# Patient Record
Sex: FEMALE | Race: WHITE | Employment: OTHER | URBAN - METROPOLITAN AREA
[De-identification: names, ages, dates, MRNs, and addresses within clinical notes are randomized per-mention and may not be internally consistent; named-entity substitution may affect disease eponyms.]

---

## 2018-03-12 RX ORDER — GABAPENTIN 100 MG/1
100 CAPSULE ORAL DAILY
COMMUNITY
End: 2018-09-17 | Stop reason: DRUGHIGH

## 2018-03-12 RX ORDER — CLOPIDOGREL BISULFATE 75 MG/1
75 TABLET ORAL DAILY
COMMUNITY
End: 2018-03-19 | Stop reason: ALTCHOICE

## 2018-03-12 RX ORDER — SERTRALINE HYDROCHLORIDE 100 MG/1
100 TABLET, FILM COATED ORAL DAILY
COMMUNITY
End: 2018-09-17 | Stop reason: ALTCHOICE

## 2018-03-12 RX ORDER — ROSUVASTATIN CALCIUM 40 MG/1
40 TABLET, COATED ORAL EVERY EVENING
COMMUNITY
End: 2021-03-03 | Stop reason: SDUPTHER

## 2018-03-19 ENCOUNTER — OFFICE VISIT (OUTPATIENT)
Dept: CARDIOLOGY CLINIC | Age: 63
End: 2018-03-19
Payer: MEDICARE

## 2018-03-19 VITALS
HEIGHT: 64 IN | BODY MASS INDEX: 33.97 KG/M2 | DIASTOLIC BLOOD PRESSURE: 60 MMHG | SYSTOLIC BLOOD PRESSURE: 120 MMHG | WEIGHT: 199 LBS | HEART RATE: 72 BPM

## 2018-03-19 DIAGNOSIS — E78.5 DYSLIPIDEMIA: ICD-10-CM

## 2018-03-19 DIAGNOSIS — I10 ESSENTIAL HYPERTENSION: Primary | ICD-10-CM

## 2018-03-19 DIAGNOSIS — I34.0 NON-RHEUMATIC MITRAL REGURGITATION: ICD-10-CM

## 2018-03-19 DIAGNOSIS — J41.0 SIMPLE CHRONIC BRONCHITIS (HCC): ICD-10-CM

## 2018-03-19 DIAGNOSIS — E66.09 CLASS 1 OBESITY DUE TO EXCESS CALORIES WITHOUT SERIOUS COMORBIDITY IN ADULT, UNSPECIFIED BMI: ICD-10-CM

## 2018-03-19 DIAGNOSIS — Z72.0 TOBACCO USE: ICD-10-CM

## 2018-03-19 DIAGNOSIS — R06.09 DOE (DYSPNEA ON EXERTION): ICD-10-CM

## 2018-03-19 DIAGNOSIS — I42.9 CARDIOMYOPATHY, UNSPECIFIED TYPE (HCC): ICD-10-CM

## 2018-03-19 DIAGNOSIS — I11.9 HYPERTENSIVE LEFT VENTRICULAR HYPERTROPHY, WITHOUT HEART FAILURE: ICD-10-CM

## 2018-03-19 PROCEDURE — G8417 CALC BMI ABV UP PARAM F/U: HCPCS | Performed by: INTERNAL MEDICINE

## 2018-03-19 PROCEDURE — 4004F PT TOBACCO SCREEN RCVD TLK: CPT | Performed by: INTERNAL MEDICINE

## 2018-03-19 PROCEDURE — 3014F SCREEN MAMMO DOC REV: CPT | Performed by: INTERNAL MEDICINE

## 2018-03-19 PROCEDURE — 99204 OFFICE O/P NEW MOD 45 MIN: CPT | Performed by: INTERNAL MEDICINE

## 2018-03-19 PROCEDURE — 3017F COLORECTAL CA SCREEN DOC REV: CPT | Performed by: INTERNAL MEDICINE

## 2018-03-19 PROCEDURE — G8926 SPIRO NO PERF OR DOC: HCPCS | Performed by: INTERNAL MEDICINE

## 2018-03-19 PROCEDURE — 3023F SPIROM DOC REV: CPT | Performed by: INTERNAL MEDICINE

## 2018-03-19 PROCEDURE — G8484 FLU IMMUNIZE NO ADMIN: HCPCS | Performed by: INTERNAL MEDICINE

## 2018-03-19 PROCEDURE — 3046F HEMOGLOBIN A1C LEVEL >9.0%: CPT | Performed by: INTERNAL MEDICINE

## 2018-03-19 PROCEDURE — 93000 ELECTROCARDIOGRAM COMPLETE: CPT | Performed by: INTERNAL MEDICINE

## 2018-03-19 PROCEDURE — G8427 DOCREV CUR MEDS BY ELIG CLIN: HCPCS | Performed by: INTERNAL MEDICINE

## 2018-03-19 RX ORDER — METOPROLOL SUCCINATE 25 MG/1
TABLET, EXTENDED RELEASE ORAL
Qty: 90 TABLET | Refills: 3 | Status: SHIPPED | OUTPATIENT
Start: 2018-03-19 | End: 2021-03-03 | Stop reason: SDUPTHER

## 2018-03-19 RX ORDER — METOPROLOL SUCCINATE 25 MG/1
TABLET, EXTENDED RELEASE ORAL
Refills: 0 | COMMUNITY
Start: 2018-03-09 | End: 2018-03-19 | Stop reason: SDUPTHER

## 2018-03-19 RX ORDER — ASPIRIN 81 MG/1
81 TABLET ORAL DAILY
Qty: 30 TABLET | Refills: 11
Start: 2018-03-19 | End: 2021-03-03 | Stop reason: SDUPTHER

## 2018-04-17 ENCOUNTER — HOSPITAL ENCOUNTER (OUTPATIENT)
Dept: CARDIOLOGY | Age: 63
Discharge: HOME | End: 2018-04-17
Payer: MEDICARE

## 2018-04-17 DIAGNOSIS — I11.9 HYPERTENSIVE LEFT VENTRICULAR HYPERTROPHY, WITHOUT HEART FAILURE: ICD-10-CM

## 2018-04-17 DIAGNOSIS — I10 ESSENTIAL HYPERTENSION: ICD-10-CM

## 2018-04-17 DIAGNOSIS — I34.0 NON-RHEUMATIC MITRAL REGURGITATION: ICD-10-CM

## 2018-04-17 DIAGNOSIS — R06.09 DOE (DYSPNEA ON EXERTION): ICD-10-CM

## 2018-04-17 LAB
LEFT VENTRICULAR EJECTION FRACTION HIGH VALUE: 60 %
LEFT VENTRICULAR EJECTION FRACTION MODE: NORMAL
LV EF: 55 %
LV EF: 58 %
LVEF MODALITY: NORMAL

## 2018-04-17 PROCEDURE — 93306 TTE W/DOPPLER COMPLETE: CPT

## 2018-04-18 ENCOUNTER — TELEPHONE (OUTPATIENT)
Dept: CARDIOLOGY CLINIC | Age: 63
End: 2018-04-18

## 2018-05-21 ENCOUNTER — HOSPITAL ENCOUNTER (OUTPATIENT)
Dept: GENERAL RADIOLOGY | Age: 63
Discharge: HOME | End: 2018-05-23
Payer: MEDICARE

## 2018-05-21 ENCOUNTER — HOSPITAL ENCOUNTER (OUTPATIENT)
Age: 63
Discharge: HOME | End: 2018-05-23
Payer: MEDICARE

## 2018-05-21 DIAGNOSIS — R07.81 RIB PAIN ON RIGHT SIDE: ICD-10-CM

## 2018-05-21 PROCEDURE — 71101 X-RAY EXAM UNILAT RIBS/CHEST: CPT

## 2018-09-17 ENCOUNTER — OFFICE VISIT (OUTPATIENT)
Dept: CARDIOLOGY CLINIC | Age: 63
End: 2018-09-17
Payer: MEDICARE

## 2018-09-17 VITALS
HEART RATE: 75 BPM | WEIGHT: 197 LBS | SYSTOLIC BLOOD PRESSURE: 134 MMHG | DIASTOLIC BLOOD PRESSURE: 62 MMHG | HEIGHT: 64 IN | BODY MASS INDEX: 33.63 KG/M2

## 2018-09-17 DIAGNOSIS — I34.0 NON-RHEUMATIC MITRAL REGURGITATION: ICD-10-CM

## 2018-09-17 DIAGNOSIS — Z72.0 TOBACCO USE: ICD-10-CM

## 2018-09-17 DIAGNOSIS — E66.9 NON MORBID OBESITY: ICD-10-CM

## 2018-09-17 DIAGNOSIS — I42.8 OTHER CARDIOMYOPATHY (HCC): ICD-10-CM

## 2018-09-17 DIAGNOSIS — E78.5 DYSLIPIDEMIA: ICD-10-CM

## 2018-09-17 DIAGNOSIS — I10 ESSENTIAL HYPERTENSION: Primary | ICD-10-CM

## 2018-09-17 PROBLEM — I42.9 MYOCARDIOPATHY (HCC): Status: ACTIVE | Noted: 2018-09-17

## 2018-09-17 PROCEDURE — 99214 OFFICE O/P EST MOD 30 MIN: CPT | Performed by: INTERNAL MEDICINE

## 2018-09-17 PROCEDURE — 4004F PT TOBACCO SCREEN RCVD TLK: CPT | Performed by: INTERNAL MEDICINE

## 2018-09-17 PROCEDURE — G8417 CALC BMI ABV UP PARAM F/U: HCPCS | Performed by: INTERNAL MEDICINE

## 2018-09-17 PROCEDURE — 93000 ELECTROCARDIOGRAM COMPLETE: CPT | Performed by: INTERNAL MEDICINE

## 2018-09-17 PROCEDURE — G8427 DOCREV CUR MEDS BY ELIG CLIN: HCPCS | Performed by: INTERNAL MEDICINE

## 2018-09-17 PROCEDURE — 3017F COLORECTAL CA SCREEN DOC REV: CPT | Performed by: INTERNAL MEDICINE

## 2018-09-17 RX ORDER — GABAPENTIN 300 MG/1
300 CAPSULE ORAL 3 TIMES DAILY
Status: ON HOLD | COMMUNITY
Start: 2018-09-12 | End: 2021-09-12 | Stop reason: HOSPADM

## 2018-09-17 RX ORDER — LISINOPRIL 2.5 MG/1
2.5 TABLET ORAL DAILY
Qty: 90 TABLET | Refills: 3 | Status: SHIPPED | OUTPATIENT
Start: 2018-09-17 | End: 2021-08-24 | Stop reason: DRUGHIGH

## 2018-09-17 RX ORDER — CETIRIZINE HYDROCHLORIDE 10 MG/1
10 TABLET ORAL DAILY
COMMUNITY

## 2018-09-17 RX ORDER — VARENICLINE TARTRATE
KIT
COMMUNITY
Start: 2018-09-12 | End: 2018-09-17 | Stop reason: DRUGHIGH

## 2018-09-17 RX ORDER — VARENICLINE TARTRATE 1 MG/1
TABLET, FILM COATED ORAL 2 TIMES DAILY
COMMUNITY
Start: 2018-09-12 | End: 2019-12-09

## 2018-09-17 RX ORDER — IPRATROPIUM/ALBUTEROL SULFATE 20-100 MCG
1 MIST INHALER (GRAM) INHALATION EVERY 6 HOURS PRN
Status: ON HOLD | COMMUNITY
Start: 2018-09-12 | End: 2021-09-12 | Stop reason: HOSPADM

## 2018-09-17 RX ORDER — VENLAFAXINE HYDROCHLORIDE 150 MG/1
150 TABLET, EXTENDED RELEASE ORAL
COMMUNITY
Start: 2018-09-12

## 2018-09-17 NOTE — PROGRESS NOTES
Hyperlipidemia     Hypertension             Past Surgical History:   Procedure Laterality Date    CHOLECYSTECTOMY      HYSTERECTOMY      HYSTERECTOMY, VAGINAL            Family History   Problem Relation Age of Onset    COPD Mother           Social History   Substance Use Topics    Smoking status: Current Some Day Smoker     Packs/day: 0.25    Smokeless tobacco: Never Used      Comment: 5-6 cigarettes per day    Alcohol use Yes      Comment: Wine daily          Review of Systems:  HEENT: negative for acute visual symptoms or auditory problems, no dysphagia  Constitutional: negative for fever and chills, or significant weight loss  Respiratory: negative for cough, wheezing, or hemoptysis  Cardiovascular: negative for chest pain, palpitations, and dyspnea  Gastrointestinal: negative for abdominal pain, diarrhea, nausea and vomiting  Endocrine: Negative for polyuria and polydyspsia  Genitourinary:negative for dysuria and hematuria  Derm: negative for rash and skin lesion(s)  Neurological: negative for tingling, numbness, weakness, seizures and tremors  Endocrine: negative for polydipsia and polyuria  Musculoskeletal: negative for pain or tenderness  Psychiatric: negative for anxiety, depression, or suicidal ideations         O:  COMPLETE PHYSICAL EXAM:       /62   Pulse 75   Ht 5' 4\" (1.626 m)   Wt 197 lb (89.4 kg)   BMI 33.81 kg/m²       General:   Patient alert, comfortable, no distress. Appears stated age. HEENT:    Pupils equal, no icterus, no nasal drainage, tongue moist.   Neck:              No masses, Thyroid not palpable. Chest:   Normal configuration, non tender. Lungs:   Clear to auscultation bilaterally, few scattered rhonchi. Cardiovascular:  Regular rhythm, 1/6 systolic murmur, No S3, no palpable thrills, No elevated JVD, No carotid bruit. Abdomen:  Soft, Non tender, Bowel sounds normal, no pulsatile abdominal aorta, no palpable masses. Extremities:  No edema.  Distal pulses palpable. No cyanosis, no clubbing. Skin:   Good turgor, warm and dry, no cyanosis. Musculoskeletal: No joint swelling or deformity. Neuro:   Cranial nerves grossly intact; No focal neurologic deficit. Psych:   Alert, good mood and effect. REVIEW OF DIAGNOSTIC TESTS:        Electrocardiogram: Reviewed              A/P:   ASSESSMENT / PLAN:    Carlyn Medrano was seen today for hypertension. Diagnoses and all orders for this visit:    Essential hypertension - Stable  -     EKG 12 Lead    Other cardiomyopathy (HCC) - EF 45%, no acute CHF; On BB; Resume Lisinopril 2.5mg ( Was on Lisinopril 20mg last visit)    Non-rheumatic mitral regurgitation - Mild, Stable    Dyslipidemia - On Statins    Tobacco use - Counseled to quit smoking    Non morbid obesity - Diet, exercise and weight loss discussed. Preventive Cardiology: Low cholesterol diet, regular exercise as tolerate, and gradual weight loss discussed. -  Resume Lisinopril 2.5mg ONCE daily     Monitor BP and heart rates. All questions answered about cardiac diagnoses and cardiac medications. Continue current medications. Compliance with medications and f/u with all physicians discussed. Risk factor modification based on risk profile discussed. Call if any exertional chest pain, short of breath, dizzy or palpitations   Follow up in 9 months or earlier if needed.          [facility]  [address], [place], [address] [facility]  [phone]

## 2018-11-24 ENCOUNTER — HOSPITAL ENCOUNTER (INPATIENT)
Age: 63
LOS: 2 days | Discharge: HOME | DRG: 312 | End: 2018-11-26
Attending: EMERGENCY MEDICINE | Admitting: INTERNAL MEDICINE
Payer: MEDICARE

## 2018-11-24 ENCOUNTER — APPOINTMENT (OUTPATIENT)
Dept: GENERAL RADIOLOGY | Age: 63
DRG: 312 | End: 2018-11-24
Payer: MEDICARE

## 2018-11-24 ENCOUNTER — APPOINTMENT (OUTPATIENT)
Dept: CT IMAGING | Age: 63
DRG: 312 | End: 2018-11-24
Payer: MEDICARE

## 2018-11-24 DIAGNOSIS — R10.84 GENERALIZED ABDOMINAL PAIN: ICD-10-CM

## 2018-11-24 DIAGNOSIS — R55 NEAR SYNCOPE: Primary | ICD-10-CM

## 2018-11-24 DIAGNOSIS — R19.7 DIARRHEA, UNSPECIFIED TYPE: ICD-10-CM

## 2018-11-24 PROBLEM — R10.9 ABDOMINAL PAIN: Status: ACTIVE | Noted: 2018-11-24

## 2018-11-24 LAB
ALBUMIN SERPL-MCNC: 4.8 G/DL (ref 3.5–5.2)
ALP BLD-CCNC: 96 U/L (ref 35–104)
ALT SERPL-CCNC: 31 U/L (ref 0–32)
ANION GAP SERPL CALCULATED.3IONS-SCNC: 15 MMOL/L (ref 7–16)
AST SERPL-CCNC: 39 U/L (ref 0–31)
BACTERIA: ABNORMAL /HPF
BASOPHILS ABSOLUTE: 0.17 E9/L (ref 0–0.2)
BASOPHILS RELATIVE PERCENT: 0.9 % (ref 0–2)
BILIRUB SERPL-MCNC: 0.4 MG/DL (ref 0–1.2)
BILIRUBIN URINE: NEGATIVE
BLOOD, URINE: NEGATIVE
BUN BLDV-MCNC: 26 MG/DL (ref 8–23)
CALCIUM SERPL-MCNC: 10.7 MG/DL (ref 8.6–10.2)
CHLORIDE BLD-SCNC: 97 MMOL/L (ref 98–107)
CHP ED QC CHECK: NORMAL
CHP ED QC CHECK: NORMAL
CLARITY: CLEAR
CO2: 31 MMOL/L (ref 22–29)
COLOR: YELLOW
CREAT SERPL-MCNC: 1.5 MG/DL (ref 0.5–1)
CRYSTALS, UA: ABNORMAL
EOSINOPHILS ABSOLUTE: 0 E9/L (ref 0.05–0.5)
EOSINOPHILS RELATIVE PERCENT: 0.6 % (ref 0–6)
EPITHELIAL CELLS, UA: ABNORMAL /HPF
GFR AFRICAN AMERICAN: 42
GFR NON-AFRICAN AMERICAN: 35 ML/MIN/1.73
GLUCOSE BLD-MCNC: 285 MG/DL
GLUCOSE BLD-MCNC: 60 MG/DL (ref 74–99)
GLUCOSE BLD-MCNC: 68 MG/DL
GLUCOSE URINE: NEGATIVE MG/DL
HCT VFR BLD CALC: 46.2 % (ref 34–48)
HEMOGLOBIN: 15.3 G/DL (ref 11.5–15.5)
KETONES, URINE: 15 MG/DL
LACTIC ACID: 2.3 MMOL/L (ref 0.5–2.2)
LEUKOCYTE ESTERASE, URINE: NEGATIVE
LIPASE: 44 U/L (ref 13–60)
LYMPHOCYTES ABSOLUTE: 1.92 E9/L (ref 1.5–4)
LYMPHOCYTES RELATIVE PERCENT: 10.3 % (ref 20–42)
MCH RBC QN AUTO: 31.2 PG (ref 26–35)
MCHC RBC AUTO-ENTMCNC: 33.1 % (ref 32–34.5)
MCV RBC AUTO: 94.1 FL (ref 80–99.9)
METER GLUCOSE: 285 MG/DL (ref 74–99)
MONOCYTES ABSOLUTE: 1.15 E9/L (ref 0.1–0.95)
MONOCYTES RELATIVE PERCENT: 6 % (ref 2–12)
NEUTROPHILS ABSOLUTE: 15.94 E9/L (ref 1.8–7.3)
NEUTROPHILS RELATIVE PERCENT: 82.8 % (ref 43–80)
NITRITE, URINE: NEGATIVE
PDW BLD-RTO: 11.4 FL (ref 11.5–15)
PH UA: 5 (ref 5–9)
PLATELET # BLD: 283 E9/L (ref 130–450)
PMV BLD AUTO: 8.8 FL (ref 7–12)
POTASSIUM REFLEX MAGNESIUM: 4.5 MMOL/L (ref 3.5–5)
PROTEIN UA: 100 MG/DL
RBC # BLD: 4.91 E12/L (ref 3.5–5.5)
RBC UA: ABNORMAL /HPF (ref 0–2)
SODIUM BLD-SCNC: 143 MMOL/L (ref 132–146)
SPECIFIC GRAVITY UA: >=1.03 (ref 1–1.03)
TOTAL PROTEIN: 7.8 G/DL (ref 6.4–8.3)
TROPONIN: <0.01 NG/ML (ref 0–0.03)
UROBILINOGEN, URINE: 1 E.U./DL
WBC # BLD: 19.2 E9/L (ref 4.5–11.5)
WBC UA: ABNORMAL /HPF (ref 0–5)

## 2018-11-24 PROCEDURE — 81001 URINALYSIS AUTO W/SCOPE: CPT

## 2018-11-24 PROCEDURE — 99285 EMERGENCY DEPT VISIT HI MDM: CPT

## 2018-11-24 PROCEDURE — 83690 ASSAY OF LIPASE: CPT

## 2018-11-24 PROCEDURE — 74176 CT ABD & PELVIS W/O CONTRAST: CPT

## 2018-11-24 PROCEDURE — 2700000000 HC OXYGEN THERAPY PER DAY

## 2018-11-24 PROCEDURE — 36415 COLL VENOUS BLD VENIPUNCTURE: CPT

## 2018-11-24 PROCEDURE — 80053 COMPREHEN METABOLIC PANEL: CPT

## 2018-11-24 PROCEDURE — 83605 ASSAY OF LACTIC ACID: CPT

## 2018-11-24 PROCEDURE — 96374 THER/PROPH/DIAG INJ IV PUSH: CPT

## 2018-11-24 PROCEDURE — 93005 ELECTROCARDIOGRAM TRACING: CPT | Performed by: STUDENT IN AN ORGANIZED HEALTH CARE EDUCATION/TRAINING PROGRAM

## 2018-11-24 PROCEDURE — 84484 ASSAY OF TROPONIN QUANT: CPT

## 2018-11-24 PROCEDURE — 71045 X-RAY EXAM CHEST 1 VIEW: CPT

## 2018-11-24 PROCEDURE — 82962 GLUCOSE BLOOD TEST: CPT

## 2018-11-24 PROCEDURE — 6370000000 HC RX 637 (ALT 250 FOR IP): Performed by: EMERGENCY MEDICINE

## 2018-11-24 PROCEDURE — 85025 COMPLETE CBC W/AUTO DIFF WBC: CPT

## 2018-11-24 PROCEDURE — 2580000003 HC RX 258: Performed by: STUDENT IN AN ORGANIZED HEALTH CARE EDUCATION/TRAINING PROGRAM

## 2018-11-24 PROCEDURE — 1200000000 HC SEMI PRIVATE

## 2018-11-24 RX ORDER — DEXTROSE MONOHYDRATE 25 G/50ML
25 INJECTION, SOLUTION INTRAVENOUS ONCE
Status: COMPLETED | OUTPATIENT
Start: 2018-11-24 | End: 2018-11-24

## 2018-11-24 RX ORDER — SODIUM CHLORIDE 0.9 % (FLUSH) 0.9 %
10 SYRINGE (ML) INJECTION PRN
Status: DISCONTINUED | OUTPATIENT
Start: 2018-11-24 | End: 2018-11-26 | Stop reason: HOSPADM

## 2018-11-24 RX ORDER — ACETAMINOPHEN 325 MG/1
650 TABLET ORAL EVERY 4 HOURS PRN
Status: DISCONTINUED | OUTPATIENT
Start: 2018-11-24 | End: 2018-11-26 | Stop reason: HOSPADM

## 2018-11-24 RX ORDER — SODIUM CHLORIDE 0.9 % (FLUSH) 0.9 %
10 SYRINGE (ML) INJECTION EVERY 12 HOURS SCHEDULED
Status: DISCONTINUED | OUTPATIENT
Start: 2018-11-24 | End: 2018-11-26 | Stop reason: HOSPADM

## 2018-11-24 RX ORDER — DIPHENOXYLATE HYDROCHLORIDE AND ATROPINE SULFATE 2.5; .025 MG/1; MG/1
1 TABLET ORAL ONCE
Status: COMPLETED | OUTPATIENT
Start: 2018-11-24 | End: 2018-11-24

## 2018-11-24 RX ORDER — MULTIVITAMIN/IRON/FOLIC ACID 18MG-0.4MG
1 TABLET ORAL DAILY
COMMUNITY

## 2018-11-24 RX ADMIN — SODIUM CHLORIDE 1000 ML: 9 INJECTION, SOLUTION INTRAVENOUS at 19:09

## 2018-11-24 RX ADMIN — DIPHENOXYLATE HYDROCHLORIDE AND ATROPINE SULFATE 1 TABLET: 2.5; .025 TABLET ORAL at 22:27

## 2018-11-24 RX ADMIN — DEXTROSE MONOHYDRATE 25 G: 25 INJECTION, SOLUTION INTRAVENOUS at 19:09

## 2018-11-24 ASSESSMENT — ENCOUNTER SYMPTOMS
SHORTNESS OF BREATH: 1
COUGH: 0
ABDOMINAL PAIN: 1
CONSTIPATION: 0
DIARRHEA: 1
VOMITING: 0
ABDOMINAL DISTENTION: 0
ANAL BLEEDING: 0
EYE REDNESS: 0
NAUSEA: 0
VISUAL CHANGE: 0
HEMATOCHEZIA: 0

## 2018-11-24 ASSESSMENT — PAIN DESCRIPTION - FREQUENCY
FREQUENCY: INTERMITTENT
FREQUENCY: INTERMITTENT

## 2018-11-24 ASSESSMENT — PAIN DESCRIPTION - PAIN TYPE
TYPE: ACUTE PAIN
TYPE: ACUTE PAIN

## 2018-11-24 ASSESSMENT — PAIN DESCRIPTION - DESCRIPTORS
DESCRIPTORS: ACHING;DISCOMFORT
DESCRIPTORS: CRAMPING

## 2018-11-24 ASSESSMENT — PAIN DESCRIPTION - LOCATION
LOCATION: ABDOMEN
LOCATION: ABDOMEN

## 2018-11-24 ASSESSMENT — PAIN SCALES - GENERAL
PAINLEVEL_OUTOF10: 6
PAINLEVEL_OUTOF10: 4

## 2018-11-24 ASSESSMENT — PAIN DESCRIPTION - ORIENTATION
ORIENTATION: MID;UPPER
ORIENTATION: MID

## 2018-11-24 ASSESSMENT — PAIN DESCRIPTION - ONSET
ONSET: ON-GOING
ONSET: ON-GOING

## 2018-11-24 NOTE — ED PROVIDER NOTES
abdominal distention, anal bleeding, anorexia, constipation, dysphagia, hematochezia, melena, nausea and vomiting. Endocrine: Negative for polyuria. Genitourinary: Negative for dysuria, frequency and urgency. Musculoskeletal: Negative for arthralgias, falls and myalgias. Allergic/Immunologic: Negative for immunocompromised state. Neurological: Positive for light-headedness. Negative for dizziness, seizures, loss of consciousness, weakness and headaches. Hematological: Does not bruise/bleed easily. Psychiatric/Behavioral: Negative. Physical Exam   Constitutional: She is oriented to person, place, and time. She appears well-developed and well-nourished. She appears listless. She is active and cooperative. She has a sickly appearance. She appears ill. HENT:   Head: Normocephalic and atraumatic. Right Ear: Hearing and external ear normal.   Left Ear: Hearing and external ear normal.   Nose: Nose normal.   Mouth/Throat: Uvula is midline, oropharynx is clear and moist and mucous membranes are normal.   Eyes: Conjunctivae are normal. Lids are everted and swept, no foreign bodies found. Neck: Trachea normal, normal range of motion, full passive range of motion without pain and phonation normal. Neck supple. Cardiovascular: Normal rate, regular rhythm and normal heart sounds. Pulmonary/Chest: Effort normal.   Abdominal: Normal appearance and bowel sounds are normal. There is generalized tenderness. Neurological: She is oriented to person, place, and time. She appears listless. GCS eye subscore is 4. GCS verbal subscore is 5. GCS motor subscore is 6. Skin: Skin is warm, dry and intact. Psychiatric: She has a normal mood and affect.  Her speech is normal and behavior is normal. Judgment and thought content normal. Cognition and memory are normal.       Procedures    MDM  Number of Diagnoses or Management Options  Diarrhea, unspecified type:   Generalized abdominal pain:   Near syncope: Diagnosis management comments: Patient's lab work showed lactic acidosis, other white count, and hypoglycemia. ED Course as of Nov 24 2000   Sat Nov 24, 2018 1909 CT called and informed me that due to patient's apparent GORDON, she cannot have contrast also put in a waiver. I do not one damage patient's kidneys more therefore I have changed the order to noncontrast CT of her abdomen and pelvis. [DS]      ED Course User Index  [DS] Vikash Ayers DO       Labs      Radiology      EKG Interpretation. EKG: This EKG is signed and interpreted by me. Rate: 70  Rhythm: Sinus  Interpretation: no acute changes  Comparison: Was normal     ED Course as of Nov 25 1520   Sat Nov 24, 2018 1909 CT called and informed me that due to patient's apparent GORDON, she cannot have contrast also put in a waiver. I do not one damage patient's kidneys more therefore I have changed the order to noncontrast CT of her abdomen and pelvis. [DS]      ED Course User Index  [DS] Taran Soni DO       --------------------------------------------- PAST HISTORY ---------------------------------------------  Past Medical History:  has a past medical history of Arthritis; Asthma; Cerebral artery occlusion with cerebral infarction [facility]); COPD (chronic obstructive pulmonary disease) ([facility] Diabetes mellitus ([facility] Hyperlipidemia; and Hypertension. Past Surgical History:  has a past surgical history that includes Hysterectomy; Cholecystectomy; and Hysterectomy, vaginal.    Social History:  reports that she has been smoking. She has been smoking about 0.25 packs per day. She has never used smokeless tobacco. She reports that she drinks alcohol. She reports that she does not use drugs. Family History: family history includes COPD in her mother. The patients home medications have been reviewed.     Allergies: Pcn [penicillins]    -------------------------------------------------- RESULTS

## 2018-11-25 ENCOUNTER — APPOINTMENT (OUTPATIENT)
Dept: ULTRASOUND IMAGING | Age: 63
DRG: 312 | End: 2018-11-25
Payer: MEDICARE

## 2018-11-25 LAB
C DIFFICILE TOXIN, EIA: NORMAL
EKG ATRIAL RATE: 70 BPM
EKG P AXIS: 64 DEGREES
EKG P-R INTERVAL: 156 MS
EKG Q-T INTERVAL: 396 MS
EKG QRS DURATION: 72 MS
EKG QTC CALCULATION (BAZETT): 427 MS
EKG R AXIS: -3 DEGREES
EKG T AXIS: 85 DEGREES
EKG VENTRICULAR RATE: 70 BPM
FILM ARRAY ADENOVIRUS: NORMAL
FILM ARRAY BORDETELLA PERTUSSIS: NORMAL
FILM ARRAY CHLAMYDOPHILIA PNEUMONIAE: NORMAL
FILM ARRAY CORONAVIRUS 229E: NORMAL
FILM ARRAY CORONAVIRUS HKU1: NORMAL
FILM ARRAY CORONAVIRUS NL63: NORMAL
FILM ARRAY CORONAVIRUS OC43: NORMAL
FILM ARRAY INFLUENZA A VIRUS 09H1: NORMAL
FILM ARRAY INFLUENZA A VIRUS H1: NORMAL
FILM ARRAY INFLUENZA A VIRUS H3: NORMAL
FILM ARRAY INFLUENZA A VIRUS: NORMAL
FILM ARRAY INFLUENZA B: NORMAL
FILM ARRAY METAPNEUMOVIRUS: NORMAL
FILM ARRAY MYCOPLASMA PNEUMONIAE: NORMAL
FILM ARRAY PARAINFLUENZA VIRUS 1: NORMAL
FILM ARRAY PARAINFLUENZA VIRUS 2: NORMAL
FILM ARRAY PARAINFLUENZA VIRUS 3: NORMAL
FILM ARRAY PARAINFLUENZA VIRUS 4: NORMAL
FILM ARRAY RESPIRATORY SYNCITIAL VIRUS: NORMAL
FILM ARRAY RHINOVIRUS/ENTEROVIRUS: NORMAL
METER GLUCOSE: 213 MG/DL (ref 74–99)
METER GLUCOSE: 224 MG/DL (ref 74–99)
METER GLUCOSE: 290 MG/DL (ref 74–99)
TROPONIN: <0.01 NG/ML (ref 0–0.03)
TROPONIN: <0.01 NG/ML (ref 0–0.03)

## 2018-11-25 PROCEDURE — 87581 M.PNEUMON DNA AMP PROBE: CPT

## 2018-11-25 PROCEDURE — 94761 N-INVAS EAR/PLS OXIMETRY MLT: CPT

## 2018-11-25 PROCEDURE — 36415 COLL VENOUS BLD VENIPUNCTURE: CPT

## 2018-11-25 PROCEDURE — 87798 DETECT AGENT NOS DNA AMP: CPT

## 2018-11-25 PROCEDURE — 93880 EXTRACRANIAL BILAT STUDY: CPT

## 2018-11-25 PROCEDURE — 2580000003 HC RX 258: Performed by: FAMILY MEDICINE

## 2018-11-25 PROCEDURE — 87324 CLOSTRIDIUM AG IA: CPT

## 2018-11-25 PROCEDURE — 6370000000 HC RX 637 (ALT 250 FOR IP): Performed by: INTERNAL MEDICINE

## 2018-11-25 PROCEDURE — 2700000000 HC OXYGEN THERAPY PER DAY

## 2018-11-25 PROCEDURE — 82962 GLUCOSE BLOOD TEST: CPT

## 2018-11-25 PROCEDURE — 87486 CHLMYD PNEUM DNA AMP PROBE: CPT

## 2018-11-25 PROCEDURE — 87329 GIARDIA AG IA: CPT

## 2018-11-25 PROCEDURE — 6370000000 HC RX 637 (ALT 250 FOR IP): Performed by: FAMILY MEDICINE

## 2018-11-25 PROCEDURE — 87045 FECES CULTURE AEROBIC BACT: CPT

## 2018-11-25 PROCEDURE — 6360000002 HC RX W HCPCS: Performed by: FAMILY MEDICINE

## 2018-11-25 PROCEDURE — 2580000003 HC RX 258: Performed by: INTERNAL MEDICINE

## 2018-11-25 PROCEDURE — 84484 ASSAY OF TROPONIN QUANT: CPT

## 2018-11-25 PROCEDURE — 1200000000 HC SEMI PRIVATE

## 2018-11-25 PROCEDURE — 87633 RESP VIRUS 12-25 TARGETS: CPT

## 2018-11-25 RX ORDER — VENLAFAXINE HYDROCHLORIDE 150 MG/1
150 CAPSULE, EXTENDED RELEASE ORAL
Status: DISCONTINUED | OUTPATIENT
Start: 2018-11-25 | End: 2018-11-26 | Stop reason: HOSPADM

## 2018-11-25 RX ORDER — VENLAFAXINE HYDROCHLORIDE 150 MG/1
150 TABLET, EXTENDED RELEASE ORAL
Status: DISCONTINUED | OUTPATIENT
Start: 2018-11-25 | End: 2018-11-25 | Stop reason: SDUPTHER

## 2018-11-25 RX ORDER — DEXTROSE MONOHYDRATE 50 MG/ML
100 INJECTION, SOLUTION INTRAVENOUS PRN
Status: DISCONTINUED | OUTPATIENT
Start: 2018-11-25 | End: 2018-11-26 | Stop reason: HOSPADM

## 2018-11-25 RX ORDER — DEXTROSE MONOHYDRATE 25 G/50ML
12.5 INJECTION, SOLUTION INTRAVENOUS PRN
Status: DISCONTINUED | OUTPATIENT
Start: 2018-11-25 | End: 2018-11-26 | Stop reason: HOSPADM

## 2018-11-25 RX ORDER — ROSUVASTATIN CALCIUM 10 MG/1
40 TABLET, COATED ORAL EVERY EVENING
Status: DISCONTINUED | OUTPATIENT
Start: 2018-11-25 | End: 2018-11-26 | Stop reason: HOSPADM

## 2018-11-25 RX ORDER — NICOTINE POLACRILEX 4 MG
15 LOZENGE BUCCAL PRN
Status: DISCONTINUED | OUTPATIENT
Start: 2018-11-25 | End: 2018-11-26 | Stop reason: HOSPADM

## 2018-11-25 RX ORDER — ASPIRIN 81 MG/1
81 TABLET ORAL DAILY
Status: DISCONTINUED | OUTPATIENT
Start: 2018-11-25 | End: 2018-11-26 | Stop reason: HOSPADM

## 2018-11-25 RX ORDER — SODIUM CHLORIDE, SODIUM LACTATE, POTASSIUM CHLORIDE, CALCIUM CHLORIDE 600; 310; 30; 20 MG/100ML; MG/100ML; MG/100ML; MG/100ML
INJECTION, SOLUTION INTRAVENOUS CONTINUOUS
Status: DISCONTINUED | OUTPATIENT
Start: 2018-11-25 | End: 2018-11-26 | Stop reason: HOSPADM

## 2018-11-25 RX ORDER — GABAPENTIN 300 MG/1
300 CAPSULE ORAL 3 TIMES DAILY
Status: DISCONTINUED | OUTPATIENT
Start: 2018-11-25 | End: 2018-11-26 | Stop reason: HOSPADM

## 2018-11-25 RX ORDER — CETIRIZINE HYDROCHLORIDE 10 MG/1
10 TABLET ORAL DAILY
Status: DISCONTINUED | OUTPATIENT
Start: 2018-11-25 | End: 2018-11-26 | Stop reason: HOSPADM

## 2018-11-25 RX ORDER — METOPROLOL SUCCINATE 25 MG/1
25 TABLET, EXTENDED RELEASE ORAL DAILY
Status: DISCONTINUED | OUTPATIENT
Start: 2018-11-25 | End: 2018-11-26 | Stop reason: HOSPADM

## 2018-11-25 RX ORDER — LISINOPRIL 5 MG/1
2.5 TABLET ORAL DAILY
Status: DISCONTINUED | OUTPATIENT
Start: 2018-11-25 | End: 2018-11-26 | Stop reason: HOSPADM

## 2018-11-25 RX ADMIN — ACETAMINOPHEN 650 MG: 325 TABLET, FILM COATED ORAL at 09:39

## 2018-11-25 RX ADMIN — GABAPENTIN 300 MG: 300 CAPSULE ORAL at 09:39

## 2018-11-25 RX ADMIN — SODIUM CHLORIDE, POTASSIUM CHLORIDE, SODIUM LACTATE AND CALCIUM CHLORIDE: 600; 310; 30; 20 INJECTION, SOLUTION INTRAVENOUS at 20:33

## 2018-11-25 RX ADMIN — Medication 10 ML: at 01:08

## 2018-11-25 RX ADMIN — LISINOPRIL 2.5 MG: 5 TABLET ORAL at 09:39

## 2018-11-25 RX ADMIN — SODIUM CHLORIDE, POTASSIUM CHLORIDE, SODIUM LACTATE AND CALCIUM CHLORIDE: 600; 310; 30; 20 INJECTION, SOLUTION INTRAVENOUS at 11:12

## 2018-11-25 RX ADMIN — CETIRIZINE HYDROCHLORIDE 10 MG: 10 TABLET, FILM COATED ORAL at 09:39

## 2018-11-25 RX ADMIN — ROSUVASTATIN CALCIUM 40 MG: 10 TABLET, FILM COATED ORAL at 18:48

## 2018-11-25 RX ADMIN — GABAPENTIN 300 MG: 300 CAPSULE ORAL at 20:33

## 2018-11-25 RX ADMIN — PROCHLORPERAZINE EDISYLATE 10 MG: 5 INJECTION INTRAMUSCULAR; INTRAVENOUS at 09:52

## 2018-11-25 RX ADMIN — SODIUM CHLORIDE, POTASSIUM CHLORIDE, SODIUM LACTATE AND CALCIUM CHLORIDE: 600; 310; 30; 20 INJECTION, SOLUTION INTRAVENOUS at 01:03

## 2018-11-25 RX ADMIN — VENLAFAXINE HYDROCHLORIDE 150 MG: 150 CAPSULE, EXTENDED RELEASE ORAL at 09:42

## 2018-11-25 RX ADMIN — METOPROLOL SUCCINATE 25 MG: 25 TABLET, EXTENDED RELEASE ORAL at 09:39

## 2018-11-25 ASSESSMENT — PAIN SCALES - GENERAL
PAINLEVEL_OUTOF10: 10
PAINLEVEL_OUTOF10: 2
PAINLEVEL_OUTOF10: 0
PAINLEVEL_OUTOF10: 5

## 2018-11-25 ASSESSMENT — PAIN DESCRIPTION - LOCATION: LOCATION: ABDOMEN

## 2018-11-25 ASSESSMENT — PAIN DESCRIPTION - PROGRESSION: CLINICAL_PROGRESSION: NOT CHANGED

## 2018-11-25 ASSESSMENT — PAIN DESCRIPTION - DESCRIPTORS: DESCRIPTORS: ACHING;DISCOMFORT;TENDER

## 2018-11-25 ASSESSMENT — PAIN DESCRIPTION - ORIENTATION: ORIENTATION: MID

## 2018-11-25 ASSESSMENT — PAIN DESCRIPTION - PAIN TYPE: TYPE: ACUTE PAIN

## 2018-11-25 ASSESSMENT — PAIN DESCRIPTION - FREQUENCY: FREQUENCY: CONTINUOUS

## 2018-11-25 ASSESSMENT — PAIN DESCRIPTION - ONSET: ONSET: ON-GOING

## 2018-11-25 NOTE — H&P
11/24/2018    SPECGRAV >=1.030 11/24/2018    BLOODU Negative 11/24/2018    GLUCOSEU Negative 11/24/2018     TSH:    No results found for: TSH    Ct Abdomen Pelvis Wo Contrast Additional Contrast? None    Result Date: 11/24/2018  Location:200 Exam: CT ABDOMEN PELVIS WO CONTRAST Comparison: None History:  Dizziness Contrast: No contrast FINDINGS:  Spiral CT scan of the abdomen and pelvis performed without contrast from the lower chest to symphysis pubis. Coronal and sagittal reconstructions. Automated dose exposure control was utilized for this examination. Visualized lung bases are clear. The liver has a vague area of low attenuation in the left lobe, just lateral to the hepatic fissure, which could be focal area of fatty infiltration. Gallbladder is surgically absent. The spleen, pancreas, adrenal glands, and kidneys are normal.  There is no evidence of adenopathy or ascites. The visualized intestinal structures are normal. The appendix is normal. The abdominal aorta is densely calcified, normal in size. There is a normal appearing urinary bladder. There are no pelvic masses. 1. No acute abnormalities of the abdomen or pelvis. 2. Questionable vague hypodensity in the liver as described above. Possible represents a area of focal fatty infiltration. If there is clinical concern recommend contrast-enhanced CT, alternatively a ultrasound or MRI could be performed for further evaluation. Xr Chest 1 Vw    Result Date: 11/24/2018  Location:200 Exam: XR CHEST 1 VIEW Comparison: May 21, 2018 History:   Abdominal pain dizziness Findings: A single frontal portable view of the chest shows the mediastinum, great vessels and heart to be unremarkable. No acute pulmonary parenchymal opacity.      Normal portable chest.      Assessment and Plan:  Presyncope 2/2 to vasovagal  HTN  HLD  COPD  DM  CAD      This is a Dr. Prince Davies pt with Dr. Rola Billings and Dr. Segovia Sessions covering  Admit to med/surg  AM labs  Cycle cardiac enzymes  Carb control diet  Carotid US  Home medications reviewed  DVT/GI prophylaxis  Will discuss with Attending Physician. Veta Duverney, DO 12:11 AM, 11/25/2018     Addendum: I have personally participated in a face-to-face history and physical exam on the date of service. Reviewed chart, vitals, labs and radiologic studies. I agree with all of the pertinent clinical information, assessment and treatment plan. I have reviewed and edited the note above based on my findings during my history, exam, and decision making.      Agree with IVF  Await stool studies  Still having abd pain with an essentially normal ct abd - will consult GI  Follow labs    Jose R Browne MD

## 2018-11-26 VITALS
TEMPERATURE: 98.8 F | RESPIRATION RATE: 16 BRPM | SYSTOLIC BLOOD PRESSURE: 138 MMHG | HEIGHT: 63 IN | OXYGEN SATURATION: 93 % | DIASTOLIC BLOOD PRESSURE: 72 MMHG | WEIGHT: 182 LBS | BODY MASS INDEX: 32.25 KG/M2 | HEART RATE: 69 BPM

## 2018-11-26 PROBLEM — R55 PRE-SYNCOPE: Status: ACTIVE | Noted: 2018-11-26

## 2018-11-26 LAB
ALBUMIN SERPL-MCNC: 3.3 G/DL (ref 3.5–5.2)
ALP BLD-CCNC: 65 U/L (ref 35–104)
ALT SERPL-CCNC: 16 U/L (ref 0–32)
ANION GAP SERPL CALCULATED.3IONS-SCNC: 10 MMOL/L (ref 7–16)
AST SERPL-CCNC: 18 U/L (ref 0–31)
BASOPHILS ABSOLUTE: 0.03 E9/L (ref 0–0.2)
BASOPHILS RELATIVE PERCENT: 0.3 % (ref 0–2)
BILIRUB SERPL-MCNC: 0.4 MG/DL (ref 0–1.2)
BUN BLDV-MCNC: 15 MG/DL (ref 8–23)
CALCIUM SERPL-MCNC: 8.8 MG/DL (ref 8.6–10.2)
CHLORIDE BLD-SCNC: 100 MMOL/L (ref 98–107)
CHOLESTEROL, TOTAL: 156 MG/DL (ref 0–199)
CO2: 31 MMOL/L (ref 22–29)
CREAT SERPL-MCNC: 1 MG/DL (ref 0.5–1)
EKG ATRIAL RATE: 88 BPM
EKG P AXIS: 72 DEGREES
EKG P-R INTERVAL: 148 MS
EKG Q-T INTERVAL: 352 MS
EKG QRS DURATION: 64 MS
EKG QTC CALCULATION (BAZETT): 425 MS
EKG R AXIS: 19 DEGREES
EKG T AXIS: 59 DEGREES
EKG VENTRICULAR RATE: 88 BPM
EOSINOPHILS ABSOLUTE: 0.17 E9/L (ref 0.05–0.5)
EOSINOPHILS RELATIVE PERCENT: 1.7 % (ref 0–6)
GFR AFRICAN AMERICAN: >60
GFR NON-AFRICAN AMERICAN: 56 ML/MIN/1.73
GIARDIA ANTIGEN STOOL: NORMAL
GLUCOSE BLD-MCNC: 471 MG/DL (ref 74–99)
HBA1C MFR BLD: 10.2 % (ref 4–5.6)
HCT VFR BLD CALC: 36.6 % (ref 34–48)
HDLC SERPL-MCNC: 41 MG/DL
HEMOGLOBIN: 11.7 G/DL (ref 11.5–15.5)
IMMATURE GRANULOCYTES #: 0.04 E9/L
IMMATURE GRANULOCYTES %: 0.4 % (ref 0–5)
LDL CHOLESTEROL CALCULATED: 68 MG/DL (ref 0–99)
LYMPHOCYTES ABSOLUTE: 2.57 E9/L (ref 1.5–4)
LYMPHOCYTES RELATIVE PERCENT: 25.5 % (ref 20–42)
MCH RBC QN AUTO: 31.1 PG (ref 26–35)
MCHC RBC AUTO-ENTMCNC: 32 % (ref 32–34.5)
MCV RBC AUTO: 97.3 FL (ref 80–99.9)
METER GLUCOSE: 427 MG/DL (ref 74–99)
MONOCYTES ABSOLUTE: 0.96 E9/L (ref 0.1–0.95)
MONOCYTES RELATIVE PERCENT: 9.5 % (ref 2–12)
NEUTROPHILS ABSOLUTE: 6.29 E9/L (ref 1.8–7.3)
NEUTROPHILS RELATIVE PERCENT: 62.6 % (ref 43–80)
PDW BLD-RTO: 11.7 FL (ref 11.5–15)
PLATELET # BLD: 187 E9/L (ref 130–450)
PMV BLD AUTO: 9.4 FL (ref 7–12)
POTASSIUM SERPL-SCNC: 5.1 MMOL/L (ref 3.5–5)
RBC # BLD: 3.76 E12/L (ref 3.5–5.5)
SODIUM BLD-SCNC: 141 MMOL/L (ref 132–146)
TOTAL PROTEIN: 5.3 G/DL (ref 6.4–8.3)
TRIGL SERPL-MCNC: 236 MG/DL (ref 0–149)
TSH SERPL DL<=0.05 MIU/L-ACNC: 1.68 UIU/ML (ref 0.27–4.2)
VLDLC SERPL CALC-MCNC: 47 MG/DL
WBC # BLD: 10.1 E9/L (ref 4.5–11.5)

## 2018-11-26 PROCEDURE — 36415 COLL VENOUS BLD VENIPUNCTURE: CPT

## 2018-11-26 PROCEDURE — 80053 COMPREHEN METABOLIC PANEL: CPT

## 2018-11-26 PROCEDURE — 93005 ELECTROCARDIOGRAM TRACING: CPT | Performed by: FAMILY MEDICINE

## 2018-11-26 PROCEDURE — 93010 ELECTROCARDIOGRAM REPORT: CPT | Performed by: INTERNAL MEDICINE

## 2018-11-26 PROCEDURE — 2580000003 HC RX 258: Performed by: FAMILY MEDICINE

## 2018-11-26 PROCEDURE — 83036 HEMOGLOBIN GLYCOSYLATED A1C: CPT

## 2018-11-26 PROCEDURE — 82962 GLUCOSE BLOOD TEST: CPT

## 2018-11-26 PROCEDURE — 84443 ASSAY THYROID STIM HORMONE: CPT

## 2018-11-26 PROCEDURE — 94640 AIRWAY INHALATION TREATMENT: CPT

## 2018-11-26 PROCEDURE — 94761 N-INVAS EAR/PLS OXIMETRY MLT: CPT

## 2018-11-26 PROCEDURE — 6370000000 HC RX 637 (ALT 250 FOR IP): Performed by: FAMILY MEDICINE

## 2018-11-26 PROCEDURE — 85025 COMPLETE CBC W/AUTO DIFF WBC: CPT

## 2018-11-26 PROCEDURE — 80061 LIPID PANEL: CPT

## 2018-11-26 RX ADMIN — LISINOPRIL 2.5 MG: 5 TABLET ORAL at 08:26

## 2018-11-26 RX ADMIN — MOMETASONE FUROATE AND FORMOTEROL FUMARATE DIHYDRATE 2 PUFF: 100; 5 AEROSOL RESPIRATORY (INHALATION) at 06:42

## 2018-11-26 RX ADMIN — ASPIRIN 81 MG: 81 TABLET, COATED ORAL at 08:27

## 2018-11-26 RX ADMIN — VENLAFAXINE HYDROCHLORIDE 150 MG: 150 CAPSULE, EXTENDED RELEASE ORAL at 08:26

## 2018-11-26 RX ADMIN — IPRATROPIUM BROMIDE AND ALBUTEROL 1 PUFF: 20; 100 SPRAY, METERED RESPIRATORY (INHALATION) at 06:41

## 2018-11-26 RX ADMIN — GABAPENTIN 300 MG: 300 CAPSULE ORAL at 08:26

## 2018-11-26 RX ADMIN — GABAPENTIN 300 MG: 300 CAPSULE ORAL at 14:20

## 2018-11-26 RX ADMIN — ROSUVASTATIN CALCIUM 40 MG: 10 TABLET, FILM COATED ORAL at 18:13

## 2018-11-26 RX ADMIN — IPRATROPIUM BROMIDE AND ALBUTEROL 1 PUFF: 20; 100 SPRAY, METERED RESPIRATORY (INHALATION) at 10:53

## 2018-11-26 RX ADMIN — MOMETASONE FUROATE AND FORMOTEROL FUMARATE DIHYDRATE 2 PUFF: 100; 5 AEROSOL RESPIRATORY (INHALATION) at 16:20

## 2018-11-26 RX ADMIN — SODIUM CHLORIDE, POTASSIUM CHLORIDE, SODIUM LACTATE AND CALCIUM CHLORIDE: 600; 310; 30; 20 INJECTION, SOLUTION INTRAVENOUS at 16:01

## 2018-11-26 RX ADMIN — METOPROLOL SUCCINATE 25 MG: 25 TABLET, EXTENDED RELEASE ORAL at 08:27

## 2018-11-26 RX ADMIN — CETIRIZINE HYDROCHLORIDE 10 MG: 10 TABLET, FILM COATED ORAL at 08:26

## 2018-11-26 RX ADMIN — IPRATROPIUM BROMIDE AND ALBUTEROL 1 PUFF: 20; 100 SPRAY, METERED RESPIRATORY (INHALATION) at 16:20

## 2018-11-26 ASSESSMENT — PAIN SCALES - GENERAL
PAINLEVEL_OUTOF10: 0
PAINLEVEL_OUTOF10: 0

## 2018-11-28 LAB — CULTURE, STOOL: NORMAL

## 2019-06-11 ENCOUNTER — OFFICE VISIT (OUTPATIENT)
Dept: CARDIOLOGY CLINIC | Age: 64
End: 2019-06-11
Payer: MEDICARE

## 2019-06-11 VITALS
BODY MASS INDEX: 32.78 KG/M2 | OXYGEN SATURATION: 94 % | SYSTOLIC BLOOD PRESSURE: 146 MMHG | DIASTOLIC BLOOD PRESSURE: 60 MMHG | HEART RATE: 82 BPM | RESPIRATION RATE: 16 BRPM | WEIGHT: 192 LBS | HEIGHT: 64 IN

## 2019-06-11 DIAGNOSIS — I10 ESSENTIAL HYPERTENSION: Primary | ICD-10-CM

## 2019-06-11 PROCEDURE — 93000 ELECTROCARDIOGRAM COMPLETE: CPT | Performed by: INTERNAL MEDICINE

## 2019-06-11 PROCEDURE — G8427 DOCREV CUR MEDS BY ELIG CLIN: HCPCS | Performed by: NURSE PRACTITIONER

## 2019-06-11 PROCEDURE — 3017F COLORECTAL CA SCREEN DOC REV: CPT | Performed by: NURSE PRACTITIONER

## 2019-06-11 PROCEDURE — 4004F PT TOBACCO SCREEN RCVD TLK: CPT | Performed by: NURSE PRACTITIONER

## 2019-06-11 PROCEDURE — 99213 OFFICE O/P EST LOW 20 MIN: CPT | Performed by: NURSE PRACTITIONER

## 2019-06-11 PROCEDURE — G8417 CALC BMI ABV UP PARAM F/U: HCPCS | Performed by: NURSE PRACTITIONER

## 2019-06-11 RX ORDER — RANITIDINE 150 MG/1
150 TABLET ORAL 2 TIMES DAILY
Refills: 0 | COMMUNITY
Start: 2019-05-24 | End: 2019-12-09

## 2019-06-11 SDOH — HEALTH STABILITY: MENTAL HEALTH: HOW MANY STANDARD DRINKS CONTAINING ALCOHOL DO YOU HAVE ON A TYPICAL DAY?: 1 OR 2

## 2019-06-11 SDOH — HEALTH STABILITY: MENTAL HEALTH: HOW OFTEN DO YOU HAVE A DRINK CONTAINING ALCOHOL?: 4 OR MORE TIMES A WEEK

## 2019-06-11 NOTE — PROGRESS NOTES
Laterality Date    CHOLECYSTECTOMY      COLONOSCOPY  12/2018    HYSTERECTOMY      HYSTERECTOMY, VAGINAL      UPPER GASTROINTESTINAL ENDOSCOPY  12/2018       Family History   Problem Relation Age of Onset    COPD Mother    Sal Dunlap Alzheimer's Disease Mother     Alcohol Abuse Father     No Known Problems Sister     Diabetes Brother     No Known Problems Brother          O:  COMPLETE PHYSICAL EXAM:       BP (!) 146/60   Pulse 82   Resp 16   Ht 5' 4\" (1.626 m)   Wt 192 lb (87.1 kg)   SpO2 94%   BMI 32.96 kg/m²       General:   in no acute distress. Well-nourished well-developed   Skin                  Warm and dry, no rashes   Head & Neck:  No JVD. No carotid bruits. Mucous membranes moist.   Chest:   No deformities. Symmetrical and nontender. No respiratory distress   Lungs:   Clear to auscultation bilaterally. Wheezing slightly   Heart:  Normal S1 and S2. No S3 or S4. No Murmur. No gallops no rubs   Abdomen: Soft without organomegaly or masses. Nontender, Bowel sound     normoactive   Extremities:  No edema of lower extremities . No cyanosis and moves all extremities   Neuro:  Alert & orient x 3 no focal deficits     REVIEW OF DIAGNOSTIC TESTS:     EKG today sinus rhythm with low voltage  Lab Results   Component Value Date     11/26/2018     11/24/2018    K 5.1 11/26/2018    K 4.5 11/24/2018     11/26/2018    CL 97 11/24/2018    CO2 31 11/26/2018    CO2 31 11/24/2018    BUN 15 11/26/2018    BUN 26 11/24/2018    CREATININE 1.0 11/26/2018    CREATININE 1.5 11/24/2018       No results found for: PROBNP      ASSESSMENT / DIAGNOSIS:   1. Coronary artery disease is stable  2. Hypertension is not ideally controlled however she notes some episodes of lightheadedness and low blood pressure at home  3. Obesity pressure   4. Hyperlipidemia, on a statin  5. Recent tobacco cessation  6. Mild aortic insufficiency by 2D echocardiogram in April 2018     TREATMENT PLAN:  1.  Encouraged to continue with tobacco cessation  2. Encouraged exercise  3. Continue current medication  4. Return to the office in 6 months      The patient's current medication list, allergies, problem list and results of all previously ordered tests were reviewed at today's visit      Liza Paige, SHELBIE - CNP        [address] [address]  [facility] [address]  [phone]

## 2019-12-09 ENCOUNTER — OFFICE VISIT (OUTPATIENT)
Dept: CARDIOLOGY CLINIC | Age: 64
End: 2019-12-09
Payer: MEDICARE

## 2019-12-09 VITALS
WEIGHT: 198 LBS | DIASTOLIC BLOOD PRESSURE: 68 MMHG | BODY MASS INDEX: 33.8 KG/M2 | HEIGHT: 64 IN | SYSTOLIC BLOOD PRESSURE: 134 MMHG | HEART RATE: 95 BPM

## 2019-12-09 DIAGNOSIS — E78.5 DYSLIPIDEMIA: ICD-10-CM

## 2019-12-09 DIAGNOSIS — I35.1 NONRHEUMATIC AORTIC VALVE INSUFFICIENCY: ICD-10-CM

## 2019-12-09 DIAGNOSIS — E66.9 NON MORBID OBESITY: ICD-10-CM

## 2019-12-09 DIAGNOSIS — Z87.898 HISTORY OF SYNCOPE: ICD-10-CM

## 2019-12-09 DIAGNOSIS — Z86.79 HISTORY OF CARDIOMYOPATHY: Primary | ICD-10-CM

## 2019-12-09 DIAGNOSIS — I10 ESSENTIAL HYPERTENSION: ICD-10-CM

## 2019-12-09 DIAGNOSIS — J41.0 SIMPLE CHRONIC BRONCHITIS (HCC): ICD-10-CM

## 2019-12-09 PROCEDURE — 4040F PNEUMOC VAC/ADMIN/RCVD: CPT | Performed by: INTERNAL MEDICINE

## 2019-12-09 PROCEDURE — G8417 CALC BMI ABV UP PARAM F/U: HCPCS | Performed by: INTERNAL MEDICINE

## 2019-12-09 PROCEDURE — G8427 DOCREV CUR MEDS BY ELIG CLIN: HCPCS | Performed by: INTERNAL MEDICINE

## 2019-12-09 PROCEDURE — G8400 PT W/DXA NO RESULTS DOC: HCPCS | Performed by: INTERNAL MEDICINE

## 2019-12-09 PROCEDURE — G8926 SPIRO NO PERF OR DOC: HCPCS | Performed by: INTERNAL MEDICINE

## 2019-12-09 PROCEDURE — G8484 FLU IMMUNIZE NO ADMIN: HCPCS | Performed by: INTERNAL MEDICINE

## 2019-12-09 PROCEDURE — 3017F COLORECTAL CA SCREEN DOC REV: CPT | Performed by: INTERNAL MEDICINE

## 2019-12-09 PROCEDURE — 1036F TOBACCO NON-USER: CPT | Performed by: INTERNAL MEDICINE

## 2019-12-09 PROCEDURE — 99213 OFFICE O/P EST LOW 20 MIN: CPT | Performed by: INTERNAL MEDICINE

## 2019-12-09 PROCEDURE — 1090F PRES/ABSN URINE INCON ASSESS: CPT | Performed by: INTERNAL MEDICINE

## 2019-12-09 PROCEDURE — 1123F ACP DISCUSS/DSCN MKR DOCD: CPT | Performed by: INTERNAL MEDICINE

## 2019-12-09 PROCEDURE — 93000 ELECTROCARDIOGRAM COMPLETE: CPT | Performed by: INTERNAL MEDICINE

## 2019-12-09 PROCEDURE — 3023F SPIROM DOC REV: CPT | Performed by: INTERNAL MEDICINE

## 2019-12-19 ENCOUNTER — HOSPITAL ENCOUNTER (OUTPATIENT)
Age: 64
Discharge: HOME | End: 2019-12-19
Payer: MEDICARE

## 2019-12-19 ENCOUNTER — OFFICE VISIT (OUTPATIENT)
Dept: SURGERY | Age: 64
End: 2019-12-19
Payer: MEDICARE

## 2019-12-19 VITALS
OXYGEN SATURATION: 93 % | HEIGHT: 64 IN | TEMPERATURE: 98.5 F | SYSTOLIC BLOOD PRESSURE: 164 MMHG | BODY MASS INDEX: 33.12 KG/M2 | HEART RATE: 102 BPM | WEIGHT: 194 LBS | DIASTOLIC BLOOD PRESSURE: 75 MMHG

## 2019-12-19 DIAGNOSIS — R10.84 DIFFUSE ABDOMINAL PAIN: ICD-10-CM

## 2019-12-19 DIAGNOSIS — R10.84 DIFFUSE ABDOMINAL PAIN: Primary | ICD-10-CM

## 2019-12-19 LAB
ANION GAP SERPL CALCULATED.3IONS-SCNC: 17 MMOL/L (ref 7–16)
BUN BLDV-MCNC: 22 MG/DL (ref 8–23)
CALCIUM SERPL-MCNC: 9.6 MG/DL (ref 8.6–10.2)
CHLORIDE BLD-SCNC: 96 MMOL/L (ref 98–107)
CO2: 22 MMOL/L (ref 22–29)
CREAT SERPL-MCNC: 0.9 MG/DL (ref 0.5–1)
GFR AFRICAN AMERICAN: >60
GFR NON-AFRICAN AMERICAN: >60 ML/MIN/1.73
GLUCOSE BLD-MCNC: 287 MG/DL (ref 74–99)
POTASSIUM SERPL-SCNC: 4.7 MMOL/L (ref 3.5–5)
RHEUMATOID FACTOR: <10 IU/ML (ref 0–13)
SEDIMENTATION RATE, ERYTHROCYTE: 10 MM/HR (ref 0–20)
SODIUM BLD-SCNC: 135 MMOL/L (ref 132–146)
URIC ACID, SERUM: 5 MG/DL (ref 2.4–5.7)

## 2019-12-19 PROCEDURE — 84550 ASSAY OF BLOOD/URIC ACID: CPT

## 2019-12-19 PROCEDURE — G8400 PT W/DXA NO RESULTS DOC: HCPCS | Performed by: SURGERY

## 2019-12-19 PROCEDURE — 4040F PNEUMOC VAC/ADMIN/RCVD: CPT | Performed by: SURGERY

## 2019-12-19 PROCEDURE — 1036F TOBACCO NON-USER: CPT | Performed by: SURGERY

## 2019-12-19 PROCEDURE — G8427 DOCREV CUR MEDS BY ELIG CLIN: HCPCS | Performed by: SURGERY

## 2019-12-19 PROCEDURE — 36415 COLL VENOUS BLD VENIPUNCTURE: CPT

## 2019-12-19 PROCEDURE — 86431 RHEUMATOID FACTOR QUANT: CPT

## 2019-12-19 PROCEDURE — 1090F PRES/ABSN URINE INCON ASSESS: CPT | Performed by: SURGERY

## 2019-12-19 PROCEDURE — G8417 CALC BMI ABV UP PARAM F/U: HCPCS | Performed by: SURGERY

## 2019-12-19 PROCEDURE — 85651 RBC SED RATE NONAUTOMATED: CPT

## 2019-12-19 PROCEDURE — 86200 CCP ANTIBODY: CPT

## 2019-12-19 PROCEDURE — 80048 BASIC METABOLIC PNL TOTAL CA: CPT

## 2019-12-19 PROCEDURE — 86038 ANTINUCLEAR ANTIBODIES: CPT

## 2019-12-19 PROCEDURE — G8484 FLU IMMUNIZE NO ADMIN: HCPCS | Performed by: SURGERY

## 2019-12-19 PROCEDURE — 1123F ACP DISCUSS/DSCN MKR DOCD: CPT | Performed by: SURGERY

## 2019-12-19 PROCEDURE — 3017F COLORECTAL CA SCREEN DOC REV: CPT | Performed by: SURGERY

## 2019-12-19 PROCEDURE — 99204 OFFICE O/P NEW MOD 45 MIN: CPT | Performed by: SURGERY

## 2019-12-20 ENCOUNTER — HOSPITAL ENCOUNTER (OUTPATIENT)
Dept: CT IMAGING | Age: 64
Discharge: HOME | End: 2019-12-20
Payer: MEDICARE

## 2019-12-20 DIAGNOSIS — R10.84 DIFFUSE ABDOMINAL PAIN: ICD-10-CM

## 2019-12-20 LAB — ANTI-NUCLEAR ANTIBODY (ANA): NEGATIVE

## 2019-12-20 PROCEDURE — 6360000004 HC RX CONTRAST MEDICATION: Performed by: RADIOLOGY

## 2019-12-20 PROCEDURE — 74177 CT ABD & PELVIS W/CONTRAST: CPT

## 2019-12-20 RX ADMIN — IOHEXOL 50 ML: 240 INJECTION, SOLUTION INTRATHECAL; INTRAVASCULAR; INTRAVENOUS; ORAL at 14:39

## 2019-12-20 RX ADMIN — IOPAMIDOL 80 ML: 755 INJECTION, SOLUTION INTRAVENOUS at 14:40

## 2019-12-21 LAB — CCP IGG ANTIBODIES: 9 UNITS (ref 0–19)

## 2019-12-23 ENCOUNTER — TELEPHONE (OUTPATIENT)
Dept: VASCULAR SURGERY | Age: 64
End: 2019-12-23

## 2019-12-23 ENCOUNTER — OFFICE VISIT (OUTPATIENT)
Dept: SURGERY | Age: 64
End: 2019-12-23
Payer: MEDICARE

## 2019-12-23 ENCOUNTER — TELEPHONE (OUTPATIENT)
Dept: SURGERY | Age: 64
End: 2019-12-23

## 2019-12-23 VITALS
HEART RATE: 72 BPM | SYSTOLIC BLOOD PRESSURE: 173 MMHG | HEIGHT: 64 IN | OXYGEN SATURATION: 93 % | TEMPERATURE: 98 F | DIASTOLIC BLOOD PRESSURE: 83 MMHG | WEIGHT: 194 LBS | BODY MASS INDEX: 33.12 KG/M2

## 2019-12-23 DIAGNOSIS — I74.5 ILIAC ARTERY OCCLUSION, BILATERAL (HCC): ICD-10-CM

## 2019-12-23 DIAGNOSIS — R10.84 DIFFUSE ABDOMINAL PAIN: Primary | ICD-10-CM

## 2019-12-23 PROCEDURE — 3017F COLORECTAL CA SCREEN DOC REV: CPT | Performed by: SURGERY

## 2019-12-23 PROCEDURE — G8598 ASA/ANTIPLAT THER USED: HCPCS | Performed by: SURGERY

## 2019-12-23 PROCEDURE — G8417 CALC BMI ABV UP PARAM F/U: HCPCS | Performed by: SURGERY

## 2019-12-23 PROCEDURE — 1123F ACP DISCUSS/DSCN MKR DOCD: CPT | Performed by: SURGERY

## 2019-12-23 PROCEDURE — G8400 PT W/DXA NO RESULTS DOC: HCPCS | Performed by: SURGERY

## 2019-12-23 PROCEDURE — 99213 OFFICE O/P EST LOW 20 MIN: CPT | Performed by: SURGERY

## 2019-12-23 PROCEDURE — 1036F TOBACCO NON-USER: CPT | Performed by: SURGERY

## 2019-12-23 PROCEDURE — 4040F PNEUMOC VAC/ADMIN/RCVD: CPT | Performed by: SURGERY

## 2019-12-23 PROCEDURE — G8427 DOCREV CUR MEDS BY ELIG CLIN: HCPCS | Performed by: SURGERY

## 2019-12-23 PROCEDURE — 1090F PRES/ABSN URINE INCON ASSESS: CPT | Performed by: SURGERY

## 2019-12-23 PROCEDURE — G8484 FLU IMMUNIZE NO ADMIN: HCPCS | Performed by: SURGERY

## 2019-12-28 ENCOUNTER — HOSPITAL ENCOUNTER (OUTPATIENT)
Dept: MRI IMAGING | Age: 64
Discharge: HOME | End: 2019-12-30
Payer: MEDICARE

## 2019-12-28 DIAGNOSIS — R10.84 DIFFUSE ABDOMINAL PAIN: ICD-10-CM

## 2019-12-28 PROCEDURE — A9577 INJ MULTIHANCE: HCPCS | Performed by: RADIOLOGY

## 2019-12-28 PROCEDURE — 6360000004 HC RX CONTRAST MEDICATION: Performed by: RADIOLOGY

## 2019-12-28 PROCEDURE — 74183 MRI ABD W/O CNTR FLWD CNTR: CPT

## 2019-12-28 RX ADMIN — GADOBENATE DIMEGLUMINE 20 ML: 529 INJECTION, SOLUTION INTRAVENOUS at 13:08

## 2019-12-30 ENCOUNTER — OFFICE VISIT (OUTPATIENT)
Dept: SURGERY | Age: 64
End: 2019-12-30
Payer: MEDICARE

## 2019-12-30 VITALS
TEMPERATURE: 98.1 F | BODY MASS INDEX: 32.78 KG/M2 | DIASTOLIC BLOOD PRESSURE: 75 MMHG | WEIGHT: 192 LBS | SYSTOLIC BLOOD PRESSURE: 138 MMHG | HEART RATE: 93 BPM | HEIGHT: 64 IN

## 2019-12-30 DIAGNOSIS — K31.9 LESION OF STOMACH: Primary | ICD-10-CM

## 2019-12-30 PROCEDURE — 1036F TOBACCO NON-USER: CPT | Performed by: SURGERY

## 2019-12-30 PROCEDURE — G8598 ASA/ANTIPLAT THER USED: HCPCS | Performed by: SURGERY

## 2019-12-30 PROCEDURE — G8400 PT W/DXA NO RESULTS DOC: HCPCS | Performed by: SURGERY

## 2019-12-30 PROCEDURE — 4040F PNEUMOC VAC/ADMIN/RCVD: CPT | Performed by: SURGERY

## 2019-12-30 PROCEDURE — G8427 DOCREV CUR MEDS BY ELIG CLIN: HCPCS | Performed by: SURGERY

## 2019-12-30 PROCEDURE — 1123F ACP DISCUSS/DSCN MKR DOCD: CPT | Performed by: SURGERY

## 2019-12-30 PROCEDURE — 99213 OFFICE O/P EST LOW 20 MIN: CPT | Performed by: SURGERY

## 2019-12-30 PROCEDURE — 3017F COLORECTAL CA SCREEN DOC REV: CPT | Performed by: SURGERY

## 2019-12-30 PROCEDURE — G8484 FLU IMMUNIZE NO ADMIN: HCPCS | Performed by: SURGERY

## 2019-12-30 PROCEDURE — 1090F PRES/ABSN URINE INCON ASSESS: CPT | Performed by: SURGERY

## 2019-12-30 PROCEDURE — G8417 CALC BMI ABV UP PARAM F/U: HCPCS | Performed by: SURGERY

## 2020-01-03 ENCOUNTER — APPOINTMENT (OUTPATIENT)
Dept: GENERAL RADIOLOGY | Age: 65
End: 2020-01-03
Payer: MEDICARE

## 2020-01-03 ENCOUNTER — HOSPITAL ENCOUNTER (EMERGENCY)
Age: 65
Discharge: HOME | End: 2020-01-03
Attending: EMERGENCY MEDICINE
Payer: MEDICARE

## 2020-01-03 VITALS
HEIGHT: 64 IN | BODY MASS INDEX: 33.8 KG/M2 | OXYGEN SATURATION: 99 % | HEART RATE: 99 BPM | DIASTOLIC BLOOD PRESSURE: 57 MMHG | RESPIRATION RATE: 18 BRPM | WEIGHT: 198 LBS | TEMPERATURE: 98 F | SYSTOLIC BLOOD PRESSURE: 145 MMHG

## 2020-01-03 LAB
ALBUMIN SERPL-MCNC: 3.6 G/DL (ref 3.5–5.2)
ALP BLD-CCNC: 89 U/L (ref 35–104)
ALT SERPL-CCNC: 21 U/L (ref 0–32)
ANION GAP SERPL CALCULATED.3IONS-SCNC: 13 MMOL/L (ref 7–16)
AST SERPL-CCNC: 31 U/L (ref 0–31)
BASOPHILS ABSOLUTE: 0.04 E9/L (ref 0–0.2)
BASOPHILS RELATIVE PERCENT: 0.4 % (ref 0–2)
BILIRUB SERPL-MCNC: 0.4 MG/DL (ref 0–1.2)
BUN BLDV-MCNC: 14 MG/DL (ref 8–23)
CALCIUM SERPL-MCNC: 9.4 MG/DL (ref 8.6–10.2)
CHLORIDE BLD-SCNC: 92 MMOL/L (ref 98–107)
CHP ED QC CHECK: YES
CO2: 27 MMOL/L (ref 22–29)
CREAT SERPL-MCNC: 0.9 MG/DL (ref 0.5–1)
EOSINOPHILS ABSOLUTE: 0.15 E9/L (ref 0.05–0.5)
EOSINOPHILS RELATIVE PERCENT: 1.5 % (ref 0–6)
GFR AFRICAN AMERICAN: >60
GFR NON-AFRICAN AMERICAN: >60 ML/MIN/1.73
GLUCOSE BLD-MCNC: 380 MG/DL
GLUCOSE BLD-MCNC: 399 MG/DL (ref 74–99)
HCT VFR BLD CALC: 36.7 % (ref 34–48)
HEMOGLOBIN: 11.9 G/DL (ref 11.5–15.5)
IMMATURE GRANULOCYTES #: 0.03 E9/L
IMMATURE GRANULOCYTES %: 0.3 % (ref 0–5)
INFLUENZA A BY PCR: NOT DETECTED
INFLUENZA B BY PCR: NOT DETECTED
LYMPHOCYTES ABSOLUTE: 1.79 E9/L (ref 1.5–4)
LYMPHOCYTES RELATIVE PERCENT: 17.7 % (ref 20–42)
MCH RBC QN AUTO: 32.2 PG (ref 26–35)
MCHC RBC AUTO-ENTMCNC: 32.4 % (ref 32–34.5)
MCV RBC AUTO: 99.5 FL (ref 80–99.9)
METER GLUCOSE: 380 MG/DL (ref 74–99)
MONOCYTES ABSOLUTE: 1.47 E9/L (ref 0.1–0.95)
MONOCYTES RELATIVE PERCENT: 14.5 % (ref 2–12)
NEUTROPHILS ABSOLUTE: 6.64 E9/L (ref 1.8–7.3)
NEUTROPHILS RELATIVE PERCENT: 65.6 % (ref 43–80)
PDW BLD-RTO: 12 FL (ref 11.5–15)
PLATELET # BLD: 206 E9/L (ref 130–450)
PMV BLD AUTO: 9.3 FL (ref 7–12)
POTASSIUM SERPL-SCNC: 5 MMOL/L (ref 3.5–5)
PRO-BNP: 317 PG/ML (ref 0–125)
RBC # BLD: 3.69 E12/L (ref 3.5–5.5)
SODIUM BLD-SCNC: 132 MMOL/L (ref 132–146)
TOTAL PROTEIN: 6.1 G/DL (ref 6.4–8.3)
TROPONIN: <0.01 NG/ML (ref 0–0.03)
WBC # BLD: 10.1 E9/L (ref 4.5–11.5)

## 2020-01-03 PROCEDURE — 96375 TX/PRO/DX INJ NEW DRUG ADDON: CPT

## 2020-01-03 PROCEDURE — 87040 BLOOD CULTURE FOR BACTERIA: CPT

## 2020-01-03 PROCEDURE — 96374 THER/PROPH/DIAG INJ IV PUSH: CPT

## 2020-01-03 PROCEDURE — 99284 EMERGENCY DEPT VISIT MOD MDM: CPT

## 2020-01-03 PROCEDURE — 71046 X-RAY EXAM CHEST 2 VIEWS: CPT

## 2020-01-03 PROCEDURE — 80053 COMPREHEN METABOLIC PANEL: CPT

## 2020-01-03 PROCEDURE — 6360000002 HC RX W HCPCS: Performed by: STUDENT IN AN ORGANIZED HEALTH CARE EDUCATION/TRAINING PROGRAM

## 2020-01-03 PROCEDURE — 94644 CONT INHLJ TX 1ST HOUR: CPT

## 2020-01-03 PROCEDURE — 87502 INFLUENZA DNA AMP PROBE: CPT

## 2020-01-03 PROCEDURE — 93005 ELECTROCARDIOGRAM TRACING: CPT | Performed by: STUDENT IN AN ORGANIZED HEALTH CARE EDUCATION/TRAINING PROGRAM

## 2020-01-03 PROCEDURE — 36415 COLL VENOUS BLD VENIPUNCTURE: CPT

## 2020-01-03 PROCEDURE — 82962 GLUCOSE BLOOD TEST: CPT

## 2020-01-03 PROCEDURE — 84484 ASSAY OF TROPONIN QUANT: CPT

## 2020-01-03 PROCEDURE — 6370000000 HC RX 637 (ALT 250 FOR IP): Performed by: STUDENT IN AN ORGANIZED HEALTH CARE EDUCATION/TRAINING PROGRAM

## 2020-01-03 PROCEDURE — 83880 ASSAY OF NATRIURETIC PEPTIDE: CPT

## 2020-01-03 PROCEDURE — 85025 COMPLETE CBC W/AUTO DIFF WBC: CPT

## 2020-01-03 RX ORDER — PREDNISONE 20 MG/1
TABLET ORAL
Qty: 10 TABLET | Refills: 0 | Status: SHIPPED | OUTPATIENT
Start: 2020-01-03 | End: 2020-01-13

## 2020-01-03 RX ORDER — IPRATROPIUM BROMIDE AND ALBUTEROL SULFATE 2.5; .5 MG/3ML; MG/3ML
3 SOLUTION RESPIRATORY (INHALATION) ONCE
Status: COMPLETED | OUTPATIENT
Start: 2020-01-03 | End: 2020-01-03

## 2020-01-03 RX ORDER — METHYLPREDNISOLONE SODIUM SUCCINATE 125 MG/2ML
125 INJECTION, POWDER, LYOPHILIZED, FOR SOLUTION INTRAMUSCULAR; INTRAVENOUS ONCE
Status: COMPLETED | OUTPATIENT
Start: 2020-01-03 | End: 2020-01-03

## 2020-01-03 RX ORDER — IPRATROPIUM BROMIDE AND ALBUTEROL SULFATE 2.5; .5 MG/3ML; MG/3ML
1 SOLUTION RESPIRATORY (INHALATION) EVERY 6 HOURS PRN
Qty: 360 ML | Refills: 0 | Status: SHIPPED | OUTPATIENT
Start: 2020-01-03

## 2020-01-03 RX ORDER — AZITHROMYCIN 250 MG/1
TABLET, FILM COATED ORAL
Qty: 1 PACKET | Refills: 0 | Status: SHIPPED | OUTPATIENT
Start: 2020-01-03 | End: 2020-01-13

## 2020-01-03 RX ADMIN — INSULIN HUMAN 5 UNITS: 100 INJECTION, SOLUTION PARENTERAL at 14:34

## 2020-01-03 RX ADMIN — METHYLPREDNISOLONE SODIUM SUCCINATE 125 MG: 125 INJECTION, POWDER, FOR SOLUTION INTRAMUSCULAR; INTRAVENOUS at 12:45

## 2020-01-03 RX ADMIN — IPRATROPIUM BROMIDE AND ALBUTEROL SULFATE 3 AMPULE: .5; 3 SOLUTION RESPIRATORY (INHALATION) at 12:04

## 2020-01-03 ASSESSMENT — ENCOUNTER SYMPTOMS
WHEEZING: 1
NAUSEA: 0
BLOOD IN STOOL: 0
CHEST TIGHTNESS: 0
BACK PAIN: 0
SORE THROAT: 0
DIARRHEA: 0
TROUBLE SWALLOWING: 0
CONSTIPATION: 0
VOICE CHANGE: 0
VOMITING: 0
COUGH: 1
COLOR CHANGE: 0
ABDOMINAL DISTENTION: 0
ABDOMINAL PAIN: 0
SHORTNESS OF BREATH: 1

## 2020-01-03 ASSESSMENT — PAIN DESCRIPTION - PAIN TYPE: TYPE: ACUTE PAIN

## 2020-01-03 ASSESSMENT — PAIN DESCRIPTION - LOCATION: LOCATION: RIB CAGE

## 2020-01-03 ASSESSMENT — PAIN DESCRIPTION - ORIENTATION: ORIENTATION: RIGHT;LEFT

## 2020-01-03 ASSESSMENT — PAIN DESCRIPTION - FREQUENCY: FREQUENCY: CONTINUOUS

## 2020-01-03 ASSESSMENT — PAIN SCALES - GENERAL: PAINLEVEL_OUTOF10: 6

## 2020-01-03 NOTE — ED NOTES
Pt ambulated, felt good walking. Pt spo2 88-92% dropped briefly to 88%, HR 85-95 bpm. Pt states she has oxygen at home.      Woo Singh RN  01/03/20 2342

## 2020-01-03 NOTE — ED PROVIDER NOTES
Allergic/Immunologic: Negative for immunocompromised state. Neurological: Negative for dizziness, syncope, weakness, light-headedness, numbness and headaches. Hematological: Negative for adenopathy. Does not bruise/bleed easily. Psychiatric/Behavioral: Negative. Physical Exam  Vitals signs and nursing note reviewed. Constitutional:       General: She is not in acute distress. Appearance: She is well-developed. She is not ill-appearing, toxic-appearing or diaphoretic. HENT:      Head: Normocephalic and atraumatic. Right Ear: Tympanic membrane, ear canal and external ear normal.      Left Ear: Tympanic membrane, ear canal and external ear normal.      Nose: Nose normal. No congestion or rhinorrhea. Mouth/Throat:      Mouth: Mucous membranes are moist.      Pharynx: Oropharynx is clear. No oropharyngeal exudate or posterior oropharyngeal erythema. Eyes:      General: No scleral icterus. Right eye: No discharge. Left eye: No discharge. Conjunctiva/sclera: Conjunctivae normal.      Pupils: Pupils are equal, round, and reactive to light. Neck:      Musculoskeletal: Normal range of motion and neck supple. No muscular tenderness. Thyroid: No thyromegaly. Vascular: No JVD. Trachea: No tracheal deviation. Cardiovascular:      Rate and Rhythm: Regular rhythm. Tachycardia present. Pulses:           Radial pulses are 2+ on the right side and 2+ on the left side. Heart sounds: Normal heart sounds, S1 normal and S2 normal. Heart sounds not distant. No murmur. No friction rub. No gallop. No S4 sounds. Pulmonary:      Effort: Pulmonary effort is normal. Tachypnea present. No accessory muscle usage, respiratory distress or retractions. Breath sounds: No stridor, decreased air movement or transmitted upper airway sounds. Wheezing present. No rhonchi or rales. Chest:      Chest wall: Tenderness (Bilateral lower ribs anteriorly) present.  No mass, lacerations, deformity, swelling, crepitus or edema. Abdominal:      General: Bowel sounds are normal. There is no distension. Palpations: Abdomen is soft. There is no hepatomegaly, splenomegaly, mass or pulsatile mass. Tenderness: There is no tenderness. There is no right CVA tenderness, left CVA tenderness, guarding or rebound. Hernia: No hernia is present. Musculoskeletal: Normal range of motion. General: No swelling, tenderness, deformity or signs of injury. Right lower leg: Edema present. Left lower leg: Edema present. Lymphadenopathy:      Cervical: No cervical adenopathy. Skin:     General: Skin is warm and dry. Capillary Refill: Capillary refill takes less than 2 seconds. Coloration: Skin is not jaundiced or pale. Findings: No bruising, erythema, lesion or rash. Neurological:      Mental Status: She is alert and oriented to person, place, and time. Psychiatric:         Mood and Affect: Mood normal.         Behavior: Behavior normal.         Thought Content: Thought content normal.         Judgment: Judgment normal.          Procedures     MDM   Patient presents to the ED for cough, fever, sore throat, rib pain. Differential diagnoses included but not limited to ACS, COPD exacerbation, viral URI, pneumonia, pneumothorax, musculoskeletal pain, ACS, arrhythmia/dysrhythmia, CHF, sepsis, other process. Workup in the ED revealed findings consistent with COPD exacerbation and hyperglycemia. Patient was given IV Solu-Medrol, DuoNeb treatments, IV insulin for their symptoms with good improvement. Patient nontoxic on reassessment. Patient discharged home with PCP follow-up. 5-day course of oral steroids, DuoNeb treatments as needed prescribed, 5-day course of azithromycin. EKG: This EKG is signed and interpreted by me.     Rate: 90  Rhythm: Sinus  Interpretation: Normal sinus rhythm  Comparison: changes compared to previous EKG on 12-9-19, PVCs no longer present    ED Course as of Jan 03 1556 Fri Jan 03, 2020   1449 Patient reevaluated. She report significant improvement in her symptoms. Lung sounds much improved. Only faint wheezes at this time. Patient requesting to go home. She does report that she has home oxygen that she can use as needed. The patient was found to have a blood sugar of 399. She was given 5 units regular insulin. Patient was advised that since she will be on steroids for the next 5 days she will likely need to watch her blood sugar more closely and may need to give more insulin than usual.  She stated understanding. Patient was ambulated in the emergency department on room air. Her pulse oximeter reading dropped briefly to 88% and then came back up above the 90s. She was not having any shortness of breath or distress while ambulating.    [LS]      ED Course User Index  [LS] Ginny Cameron, DO          ED Course as of Jan 03 1557 Fri Jan 03, 2020   1449 Patient reevaluated. She report significant improvement in her symptoms. Lung sounds much improved. Only faint wheezes at this time. Patient requesting to go home. She does report that she has home oxygen that she can use as needed. The patient was found to have a blood sugar of 399. She was given 5 units regular insulin. Patient was advised that since she will be on steroids for the next 5 days she will likely need to watch her blood sugar more closely and may need to give more insulin than usual.  She stated understanding. Patient was ambulated in the emergency department on room air. Her pulse oximeter reading dropped briefly to 88% and then came back up above the 90s.   She was not having any shortness of breath or distress while ambulating.    [LS]      ED Course User Index  [LS] Ginny Cameron DO       --------------------------------------------- PAST HISTORY ---------------------------------------------  Past Medical History:  has a past medical history of Interpretation: Normal      ------------------------------------------ PROGRESS NOTES ------------------------------------------  3:57 PM  I have spoken with the patient and discussed todays results, in addition to providing specific details for the plan of care and counseling regarding the diagnosis and prognosis. Their questions are answered at this time and they are agreeable with the plan. I discussed at length with them reasons for immediate return here for re evaluation. They will followup with their primary care physician by calling their office on Monday.      --------------------------------- ADDITIONAL PROVIDER NOTES ---------------------------------  At this time the patient is without objective evidence of an acute process requiring hospitalization or inpatient management. They have remained hemodynamically stable throughout their entire ED visit and are stable for discharge with outpatient follow-up. The plan has been discussed in detail and they are aware of the specific conditions for emergent return, as well as the importance of follow-up. Discharge Medication List as of 1/3/2020  3:00 PM      START taking these medications    Details   ipratropium-albuterol (DUONEB) 0.5-2.5 (3) MG/3ML SOLN nebulizer solution Inhale 3 mLs into the lungs every 6 hours as needed for Shortness of Breath, Disp-360 mL, R-0Print      predniSONE (DELTASONE) 20 MG tablet Take prednisone 40 mg by mouth daily for 5 days, Disp-10 tablet, R-0Print      azithromycin (ZITHROMAX Z-LOUIE) 250 MG tablet TAKE 500MG PO DAY ONE. .. 250MG PO DAY TWO THROUGH FIVE   DISPENSE 6 TABS  NO REFILLS, Disp-1 packet, R-0Print             Diagnosis:  1. COPD exacerbation ([facility]    2. Hyperglycemia        Disposition:  Patient's disposition: Discharge to home  Patient's condition is stable.         Shayna Castro DO  Resident  01/03/20 1266

## 2020-01-04 LAB
EKG ATRIAL RATE: 90 BPM
EKG P AXIS: 62 DEGREES
EKG P-R INTERVAL: 146 MS
EKG Q-T INTERVAL: 360 MS
EKG QRS DURATION: 68 MS
EKG QTC CALCULATION (BAZETT): 440 MS
EKG R AXIS: 22 DEGREES
EKG T AXIS: 71 DEGREES
EKG VENTRICULAR RATE: 90 BPM

## 2020-01-04 PROCEDURE — 93010 ELECTROCARDIOGRAM REPORT: CPT | Performed by: INTERNAL MEDICINE

## 2020-01-08 LAB
BLOOD CULTURE, ROUTINE: NORMAL
CULTURE, BLOOD 2: NORMAL

## 2020-01-27 ENCOUNTER — OFFICE VISIT (OUTPATIENT)
Dept: VASCULAR SURGERY | Age: 65
End: 2020-01-27
Payer: MEDICARE

## 2020-01-27 VITALS — DIASTOLIC BLOOD PRESSURE: 80 MMHG | SYSTOLIC BLOOD PRESSURE: 122 MMHG | HEART RATE: 76 BPM

## 2020-01-27 PROCEDURE — 99204 OFFICE O/P NEW MOD 45 MIN: CPT | Performed by: SURGERY

## 2020-01-27 RX ORDER — CILOSTAZOL 100 MG/1
100 TABLET ORAL 2 TIMES DAILY
Qty: 60 TABLET | Refills: 5 | Status: SHIPPED
Start: 2020-01-27 | End: 2020-07-23

## 2020-01-27 NOTE — PROGRESS NOTES
Vascular Surgery Outpatient Consultation    Reason for Consult:  PVD    PCP : Galina Holland MD   Gen Surgeon : Dr. Stepan Lauren  Podiatrist : Dr. John Catalan:    The patient is a 71 y.o. female who states is here in regards to pvd noted on CT imaging done to evaluate abdominal pain. She does get cramping in her left > right posterior thigh cramping when she walks. Her pain resolves when she stops walking. The pain has been present since 2018, and has been about the same. It happens at about 150 feet. It is limiting but ankle pain is primary issue in limiting her mobility. She has chronic pain in her ankles bilaterally since 2017. She denies previous workup. It is constant when she is walking. She has no pain when she is sitting. Her pain 9/10, achey, sore. Her pain in her ankles radiates up to her calves. She takes alleve and excedrin which helps a little bit. She also notes some swelling calves and ankles which is also chronic. Elevation seems to help. She is a smoker and is a DM. She did have egd with excision of area per her report by Dr. Taina Cline. Per her report she is on medication now for this issue. She does have reflux sxs also.         ROS : All others Negative if blank [], Positive if [x]  General Urinary   [] Fevers [] Hematuria   [] Chills [] Dysuria   [] Weight Loss Vascular   Skin [x] Claudication   [] Tissue Loss [] Rest Pain   Eyes Neurologic   [x] Wears Glasses/Contacts [] Stroke/TIA   [] Vision Changes [] Focal weakness   Respiratory [] Slurred Speech    [] Shortness of breath ENT   Cardiovascular [x] Difficulty swallowing - intermittent regurgitation   [] Chest Pain Endocrine    [] Shortness of breath with exertion [] Increased Thirst   Gastrointestinal    [x] Abdominal Pain bilateral mid abdominal pain    [] Melena   [] Hematochezia         Past Medical History:        Diagnosis Date    Arthritis     Asthma     Cerebral artery occlusion with cerebral infarction [facility]) 2015    COPD (chronic obstructive pulmonary disease) (HCC)     Diabetes [facility]     Heart attack ([facility]     Hyperlipidemia     Hypertension     VHD (valvular heart disease)      Past Surgical History:        Procedure Laterality Date    CHOLECYSTECTOMY      COLONOSCOPY  12/2018    HYSTERECTOMY      HYSTERECTOMY, VAGINAL      UPPER GASTROINTESTINAL ENDOSCOPY  12/2018     Current Medications:   Current Outpatient Medications   Medication Sig Dispense Refill    ipratropium-albuterol (DUONEB) 0.5-2.5 (3) MG/3ML SOLN nebulizer solution Inhale 3 mLs into the lungs every 6 hours as needed for Shortness of Breath 360 mL 0    Milk Thistle 1000 MG CAPS Take by mouth      Multiple Vitamins-Minerals (CENTRUM ADULTS) TABS Take 1 tablet by mouth daily       venlafaxine 150 MG extended release tablet Take 150 mg by mouth daily (with breakfast)       gabapentin (NEURONTIN) 300 MG capsule Take 300 mg by mouth 3 times daily. Yadira Cason cetirizine (ZYRTEC) 10 MG tablet Take 10 mg by mouth daily      ADVAIR DISKUS 250-50 MCG/DOSE AEPB Inhale 1 puff into the lungs 2 times daily       COMBIVENT RESPIMAT  MCG/ACT AERS inhaler 1 puff 2 times daily       lisinopril (PRINIVIL;ZESTRIL) 2.5 MG tablet Take 1 tablet by mouth daily 90 tablet 3    insulin lispro (HUMALOG) 100 UNIT/ML injection vial Inject into the skin Checks BS 4-6x/day; For use with insulin pump;  Uses sliding scale      aspirin EC 81 MG EC tablet Take 1 tablet by mouth daily 30 tablet 11    metoprolol succinate (TOPROL XL) 25 MG extended release tablet take 1 tablet by mouth once daily 90 tablet 3    rosuvastatin (CRESTOR) 40 MG tablet Take 40 mg by mouth every evening       No current facility-administered medications for this visit.       Allergies:  Pcn [penicillins]  Social History     Socioeconomic History    Marital status:      Spouse name: Not on file    Number of children: Not on file    Years of PHYSICAL EXAM:    CONSTITUTIONAL:   Awake, alert, cooperative  PSYCHIATRIC :  Oriented to time, place and person     Appropriate insight to disease process  EYES: Lids and lashes normal  ENT:  External ears and nose without lesions   Hearing deficits not noted  NECK: Supple, symmetrical, trachea midline   Thyroid goiter not appreciated   Carotid bruit not noted  LUNGS:  No increased work of breathing                 Clear to auscultation  CARDIOVASCULAR:  regular rate and rhythm   ABDOMEN:  soft, non-distended, mod ttp bilaterally mid abdomen   Hernias not noted   Aorta is not palpable  Lymphatics : Cervical lymphadenopathy not noted     Femoral lymphadenopathy not noted  SKIN:   Normal skin color   Texture and turgor normal, no induration  EXTREMITIES:   R UE 5/5 strength   No cyanosis noted in nail beds  L UE 5/5 strength   No cyanosis noted in nail beds  R LE Edema slight   No open wounds  L LE Edema slight   No open wounds  R femoral biphasic L femoral biphasic   R dorsalis pedis biphasic L dorsalis pedis biphasic   R posterior tibial biphasic L posterior tibial biphasic     RADIOLOGY:  CTA reviewed  ?  80% stenosis of proximal common iliac arteries  Patent distal to this    A/P PVD with bilateral thigh claudication  · Continue Medical management with ASA and statin  · Discussed with patient use of pletal   · start pletal 100 mg bid  · they have no hx of chf  · understands to stop medication and call if develops sob, dizziness  · Discussed with pt tobacco use and significant relationship to PVD   pt currently is  a smoker  pt offered nicotine patch and is not interested - she was previously on chantix  Pt understands tobacco uses potential to cause increased problems post intervention, future worsening of disease, and even limb loss  · Walking Program  · Emphasized importance of foot care  · They understood to call if develops any wounds or ulcerations, changes in appearance or symptoms  · abis and pvrs at [facility]

## 2020-01-28 NOTE — PATIENT INSTRUCTIONS
Patient Education        Learning About Peripheral Arterial Disease of the Legs  What is peripheral arterial disease? Peripheral arterial disease (PAD) is narrowing or blockage of arteries in your arms and legs. The most common cause of PAD is the buildup of plaque on the inside of arteries. Plaque is made of extra cholesterol, calcium, and other material in your blood. Over time, plaque builds up in the walls of the arteries, including those that supply blood to your legs. This buildup leads to poor blood flow. When you have PAD, you have a risk of having plaque in other arteries in your body. This raises your risk of a heart attack and stroke. This information focuses on peripheral arterial disease of the legs, the area where it is most common. When you have PAD of the legs and you walk or exercise, your leg muscles may not get enough blood. This may cause symptoms, such as leg pain during exercise. Peripheral arterial disease is also called peripheral vascular disease. What are the symptoms? Many people who have PAD do not have any symptoms. But if you have symptoms, you may have weak or tired legs, difficulty walking or balancing, or pain. If you have pain, you might feel a tight, aching, or squeezing pain in the calf, thigh, or buttock. This pain usually happens after you have walked a certain distance. The pain goes away if you stop walking. This pain is called intermittent claudication. If PAD gets worse, you may have other symptoms that are caused by poor blood flow to your legs and feet. You may have:  · Cold or numb feet or toes. · Sores that are slow to heal.  · Leg or foot pain while you are at rest.  · Feet and toes that become pale from exercise or when elevated. · Feet that turn red when dangled. · Blue or purple marks on your legs, feet, or toes. How can you prevent PAD? · Quit smoking. Quitting smoking is one of the best things you can do to help prevent PAD.  If you need help quitting, talk to your doctor about stop-smoking programs and medicines. These can increase your chances of quitting for good. · Stay at a healthy weight. · Manage other health problems, including diabetes, high blood pressure, and high cholesterol. · Be physically active. Get at least 30 minutes of exercise on most days of the week. Walking is a good choice. You also may want to do other activities, such as running, swimming, cycling, or playing tennis or team sports. · Eat a variety of heart-healthy foods. ? Eat fruits, vegetables, whole grains (like oatmeal), dried beans and peas, nuts and seeds, soy products (like tofu), and fat-free or low-fat dairy products. ? Replace butter, margarine, and hydrogenated or partially hydrogenated oils with olive and canola oils. (Canola oil margarine without trans fat is fine.)  ? Replace red meat with fish, poultry, and soy protein (like tofu). ? Limit processed and packaged foods like chips, crackers, and cookies. How is PAD treated? Your doctor may suggest ways to relieve symptoms and lower your risk of heart attack and stroke. These may include:  · Quitting smoking. It's one of the most important things you can do. If you need help quitting, talk to your doctor about stop-smoking programs and medicines. These can increase your chances of quitting for good. · Eating heart-healthy foods. · Staying at a healthy weight. Lose weight if you need to. · Regular exercise (if your doctor says it's safe). Try walking, swimming, or biking for at least 30 minutes on most, if not all, days of the week. If you have leg symptoms when you exercise, your doctor might recommend a specialized exercise program that may relieve symptoms. The goal is to be able to walk farther without pain. · Medicines that help manage other problems such as high blood pressure and high cholesterol.   · Medicine, such as aspirin, that prevents blood clots which could cause a heart attack or stroke. · Procedures, such as opening narrowed or blocked arteries (angioplasty) or using healthy blood vessels to create detours around narrowed or blocked arteries (bypass surgery). Follow-up care is a key part of your treatment and safety. Be sure to make and go to all appointments, and call your doctor if you are having problems. It's also a good idea to know your test results and keep a list of the medicines you take. Where can you learn more? Go to https://[employer] Systems.[employer]ue Technology. org and sign in to your [employer] account. Enter A812 in the [employer] box to learn more about \"Learning About Peripheral Arterial Disease of the Legs. \"     If you do not have an account, please click on the \"Sign Up Now\" link. Current as of: April 9, 2019  Content Version: 12.3  © 8576-8330 Healthwise, Incorporated. Care instructions adapted under license by [facility] ([facility]). If you have questions about a medical condition or this instruction, always ask your healthcare professional. [place] [address] any warranty or liability for your use of this information.

## 2020-03-16 ENCOUNTER — HOSPITAL ENCOUNTER (OUTPATIENT)
Dept: GENERAL RADIOLOGY | Age: 65
Discharge: HOME | End: 2020-03-18
Payer: MEDICARE

## 2020-03-16 ENCOUNTER — HOSPITAL ENCOUNTER (OUTPATIENT)
Age: 65
Discharge: HOME | End: 2020-03-18
Payer: MEDICARE

## 2020-03-16 PROCEDURE — 72100 X-RAY EXAM L-S SPINE 2/3 VWS: CPT

## 2020-03-16 PROCEDURE — 72220 X-RAY EXAM SACRUM TAILBONE: CPT

## 2020-06-05 ENCOUNTER — TELEPHONE (OUTPATIENT)
Dept: VASCULAR SURGERY | Age: 65
End: 2020-06-05

## 2020-07-01 ENCOUNTER — TELEPHONE (OUTPATIENT)
Dept: VASCULAR SURGERY | Age: 65
End: 2020-07-01

## 2020-07-08 ENCOUNTER — HOSPITAL ENCOUNTER (OUTPATIENT)
Dept: INTERVENTIONAL RADIOLOGY/VASCULAR | Age: 65
Discharge: HOME | End: 2020-07-10
Payer: MEDICARE

## 2020-07-08 PROCEDURE — 93923 UPR/LXTR ART STDY 3+ LVLS: CPT

## 2020-07-14 NOTE — PROGRESS NOTES
ABIs and PVRs reviewed  Right ZEINA 0.66, TBI 0.49  Left ZEINA 0.79, TBI 0.50    Right lower extremity has evidence of iliofemoral and tibial arterial occlusive disease moderate  Left lower extremity has evidence of tibial disease mild to moderate    Sarwat Sparks

## 2020-07-20 ENCOUNTER — VIRTUAL VISIT (OUTPATIENT)
Dept: VASCULAR SURGERY | Age: 65
End: 2020-07-20
Payer: MEDICARE

## 2020-07-20 ENCOUNTER — TELEPHONE (OUTPATIENT)
Dept: VASCULAR SURGERY | Age: 65
End: 2020-07-20

## 2020-07-20 PROBLEM — M25.572 CHRONIC PAIN OF BOTH ANKLES: Status: ACTIVE | Noted: 2020-07-20

## 2020-07-20 PROBLEM — G89.29 CHRONIC PAIN OF BOTH ANKLES: Status: ACTIVE | Noted: 2020-07-20

## 2020-07-20 PROBLEM — I73.9 PVD (PERIPHERAL VASCULAR DISEASE) WITH CLAUDICATION (HCC): Status: ACTIVE | Noted: 2020-07-20

## 2020-07-20 PROBLEM — M25.571 CHRONIC PAIN OF BOTH ANKLES: Status: ACTIVE | Noted: 2020-07-20

## 2020-07-20 PROCEDURE — 99442 PR PHYS/QHP TELEPHONE EVALUATION 11-20 MIN: CPT | Performed by: SURGERY

## 2020-07-20 NOTE — PATIENT INSTRUCTIONS
Patient Education        Learning About Peripheral Arterial Disease of the Legs  What is peripheral arterial disease? Peripheral arterial disease (PAD) is narrowing or blockage of arteries in your arms and legs. The most common cause of PAD is the buildup of plaque on the inside of arteries. Plaque is made of extra cholesterol, calcium, and other material in your blood. Over time, plaque builds up in the walls of the arteries, including those that supply blood to your legs. This buildup leads to poor blood flow. When you have PAD, you have a risk of having plaque in other arteries in your body. This raises your risk of a heart attack and stroke. This information focuses on peripheral arterial disease of the legs, the area where it is most common. When you have PAD of the legs and you walk or exercise, your leg muscles may not get enough blood. This may cause symptoms, such as leg pain during exercise. Peripheral arterial disease is also called peripheral vascular disease. What are the symptoms? Many people who have PAD do not have any symptoms. But if you have symptoms, you may have weak or tired legs, difficulty walking or balancing, or pain. If you have pain, you might feel a tight, aching, or squeezing pain in the calf, thigh, or buttock. This pain usually happens after you have walked a certain distance. The pain goes away if you stop walking. This pain is called intermittent claudication. If PAD gets worse, you may have other symptoms that are caused by poor blood flow to your legs and feet. You may have:  · Cold or numb feet or toes. · Sores that are slow to heal.  · Leg or foot pain while you are at rest.  · Feet and toes that become pale from exercise or when elevated. · Feet that turn red when dangled. · Blue or purple marks on your legs, feet, or toes. How can you prevent PAD? · Quit smoking. Quitting smoking is one of the best things you can do to help prevent PAD.  If you need help stroke. · Procedures, such as opening narrowed or blocked arteries (angioplasty) or using healthy blood vessels to create detours around narrowed or blocked arteries (bypass surgery). Follow-up care is a key part of your treatment and safety. Be sure to make and go to all appointments, and call your doctor if you are having problems. It's also a good idea to know your test results and keep a list of the medicines you take. Where can you learn more? Go to https://PassbeeMedia.Drifty. org and sign in to your [employer] account. Enter C173 in the [place] box to learn more about \"Learning About Peripheral Arterial Disease of the Legs. \"     If you do not have an account, please click on the \"Sign Up Now\" link. Current as of: December 16, 2019               Content Version: 12.5  © 5102-1034 Healthwise, Incorporated. Care instructions adapted under license by [facility] ([facility]). If you have questions about a medical condition or this instruction, always ask your healthcare professional. [place] [address] any warranty or liability for your use of this information.

## 2020-07-20 NOTE — PROGRESS NOTES
TELEPHONE VISIT    Consent:  He and/or health care decision maker is aware that that he may receive a bill for this telephone service, depending on his insurance coverage, and has provided verbal consent to proceed: Yes    Documentation:  I communicated with the patient and/or health care decision maker about her pvd, and claudication sxs. Details of this discussion including any medical advice provided:     Her leg cramping is the same as previous. She is not able to walk more than 150 feet. She does get cramping in posterior thigh cramping when she walks. Her pain resolves when she stops walking. The pain has been present since 2018, and has been about the same. She previously felt ankle pain was primary issue now she feels both problems are limiting her. She has chronic pain in her ankles bilaterally since 2017. She denies previous workup. It is constant when she is walking. She has no pain when she is sitting. Her pain 9/10, achey, sore. Her pain in her ankles radiates up to her calves. She takes alleve and excedrin which helps a little bit.       She also notes some swelling calves and ankles which is also chronic. Elevation seems to help.       She is a smoker but has cut back significantly. She is a DM.        She did have egd with excision of area per her report by Dr. Claudia Batres. Per her report she is on medication now for this issue. She does have reflux sxs also. ABIs and PVRs reviewed  Right ZEINA 0.66, TBI 0.49  Left ZEINA 0.79, TBI 0.50     Right lower extremity has evidence of iliofemoral and tibial arterial occlusive disease moderate  Left lower extremity has evidence of tibial disease mild to moderate    CT imaging reviewed  Possible 80% stenosis of proximal common iliac arteries  Patent distal to this     PVD with bilateral thigh claudication  · Continue Medical management with ASA.  pletal and statin  · Discussed with pt tobacco use and significant relationship to PVD   · pt currently is smoker but has cut back and understands that if she hasn't quit by the time of the angiogram I will cancel her procedure  · Pt understands tobacco uses potential to cause increased problems post intervention, future worsening of disease, and even limb loss  · Walking Program  · Emphasized importance of foot care  ? They understood to call if develops any wounds or ulcerations, changes in appearance or symptoms   We discussed options   · Plan for angiogram assuming she quits smoking  · Likely iliac stenting  · She understands that some of her sxs may improve but not all    Chronic Ankle Pain  · Sounds more likely to be related to arthritis  · Further workup per pcp  · Unlikely to be related to pvd    I reviewed the procedure with the patient and family as available. I discussed the procedure, risks, benefits, complications, and alternatives of the procedure. They understand and consent. All questions were answered    I affirm this is a Patient Initiated Episode with a Patient who has not had a related appointment within my department in the past 7 days or scheduled within the next 24 hours.     Patient's location: Home address  Physician  location other address in [facility]   Other people involved in call    Total Time: minutes: 11-20 minutes     Tex Canela

## 2020-07-23 ENCOUNTER — HOSPITAL ENCOUNTER (OUTPATIENT)
Age: 65
Discharge: HOME | End: 2020-07-25
Payer: MEDICARE

## 2020-07-23 PROCEDURE — U0003 INFECTIOUS AGENT DETECTION BY NUCLEIC ACID (DNA OR RNA); SEVERE ACUTE RESPIRATORY SYNDROME CORONAVIRUS 2 (SARS-COV-2) (CORONAVIRUS DISEASE [COVID-19]), AMPLIFIED PROBE TECHNIQUE, MAKING USE OF HIGH THROUGHPUT TECHNOLOGIES AS DESCRIBED BY CMS-2020-01-R: HCPCS

## 2020-07-23 RX ORDER — CILOSTAZOL 100 MG/1
TABLET ORAL
Qty: 60 TABLET | Refills: 5 | Status: SHIPPED | OUTPATIENT
Start: 2020-07-23

## 2020-07-26 LAB
SARS-COV-2: NOT DETECTED
SOURCE: NORMAL

## 2020-07-27 PROBLEM — I73.9 PVD (PERIPHERAL VASCULAR DISEASE) (HCC): Status: ACTIVE | Noted: 2020-07-27

## 2020-07-28 ENCOUNTER — HOSPITAL ENCOUNTER (OUTPATIENT)
Dept: CARDIAC CATH/INVASIVE PROCEDURES | Age: 65
Discharge: HOME | End: 2020-07-28
Payer: MEDICARE

## 2020-07-28 VITALS
HEIGHT: 64 IN | BODY MASS INDEX: 33.29 KG/M2 | DIASTOLIC BLOOD PRESSURE: 90 MMHG | OXYGEN SATURATION: 95 % | SYSTOLIC BLOOD PRESSURE: 187 MMHG | HEART RATE: 95 BPM | WEIGHT: 195 LBS | TEMPERATURE: 97.9 F

## 2020-07-28 LAB
ABO/RH: NORMAL
ANION GAP SERPL CALCULATED.3IONS-SCNC: 11 MMOL/L (ref 7–16)
ANTIBODY SCREEN: NORMAL
BUN BLDV-MCNC: 20 MG/DL (ref 8–23)
CALCIUM SERPL-MCNC: 9.5 MG/DL (ref 8.6–10.2)
CHLORIDE BLD-SCNC: 96 MMOL/L (ref 98–107)
CO2: 30 MMOL/L (ref 22–29)
CREAT SERPL-MCNC: 0.9 MG/DL (ref 0.5–1)
GFR AFRICAN AMERICAN: >60
GFR NON-AFRICAN AMERICAN: >60 ML/MIN/1.73
GLUCOSE BLD-MCNC: 139 MG/DL (ref 74–99)
HCT VFR BLD CALC: 39.9 % (ref 34–48)
HEMOGLOBIN: 13.3 G/DL (ref 11.5–15.5)
INR BLD: 0.9
MCH RBC QN AUTO: 32 PG (ref 26–35)
MCHC RBC AUTO-ENTMCNC: 33.3 % (ref 32–34.5)
MCV RBC AUTO: 95.9 FL (ref 80–99.9)
PDW BLD-RTO: 11.3 FL (ref 11.5–15)
PLATELET # BLD: 259 E9/L (ref 130–450)
PMV BLD AUTO: 8.7 FL (ref 7–12)
POTASSIUM REFLEX MAGNESIUM: 3.9 MMOL/L (ref 3.5–5)
PROTHROMBIN TIME: 10.1 SEC (ref 9.3–12.4)
RBC # BLD: 4.16 E12/L (ref 3.5–5.5)
SODIUM BLD-SCNC: 137 MMOL/L (ref 132–146)
WBC # BLD: 8.6 E9/L (ref 4.5–11.5)

## 2020-07-28 PROCEDURE — 86850 RBC ANTIBODY SCREEN: CPT

## 2020-07-28 PROCEDURE — C1760 CLOSURE DEV, VASC: HCPCS

## 2020-07-28 PROCEDURE — 86901 BLOOD TYPING SEROLOGIC RH(D): CPT

## 2020-07-28 PROCEDURE — 85027 COMPLETE CBC AUTOMATED: CPT

## 2020-07-28 PROCEDURE — C1894 INTRO/SHEATH, NON-LASER: HCPCS

## 2020-07-28 PROCEDURE — 80048 BASIC METABOLIC PNL TOTAL CA: CPT

## 2020-07-28 PROCEDURE — 75625 CONTRAST EXAM ABDOMINL AORTA: CPT | Performed by: SURGERY

## 2020-07-28 PROCEDURE — 75630 X-RAY AORTA LEG ARTERIES: CPT | Performed by: SURGERY

## 2020-07-28 PROCEDURE — 37220 PR REVASCULARIZATION ILIAC ARTERY ANGIOP 1ST VSL: CPT | Performed by: SURGERY

## 2020-07-28 PROCEDURE — C1725 CATH, TRANSLUMIN NON-LASER: HCPCS

## 2020-07-28 PROCEDURE — 36415 COLL VENOUS BLD VENIPUNCTURE: CPT

## 2020-07-28 PROCEDURE — 2500000003 HC RX 250 WO HCPCS

## 2020-07-28 PROCEDURE — 75716 ARTERY X-RAYS ARMS/LEGS: CPT | Performed by: SURGERY

## 2020-07-28 PROCEDURE — 37220 HC ILIAC TERRITORY PLASTY: CPT | Performed by: SURGERY

## 2020-07-28 PROCEDURE — C1769 GUIDE WIRE: HCPCS

## 2020-07-28 PROCEDURE — 86900 BLOOD TYPING SEROLOGIC ABO: CPT

## 2020-07-28 PROCEDURE — 2709999900 HC NON-CHARGEABLE SUPPLY

## 2020-07-28 PROCEDURE — 2580000003 HC RX 258: Performed by: SURGERY

## 2020-07-28 PROCEDURE — 76937 US GUIDE VASCULAR ACCESS: CPT | Performed by: SURGERY

## 2020-07-28 PROCEDURE — 85610 PROTHROMBIN TIME: CPT

## 2020-07-28 PROCEDURE — 6360000002 HC RX W HCPCS

## 2020-07-28 RX ORDER — SODIUM CHLORIDE 0.9 % (FLUSH) 0.9 %
10 SYRINGE (ML) INJECTION PRN
Status: DISCONTINUED | OUTPATIENT
Start: 2020-07-28 | End: 2020-07-29 | Stop reason: HOSPADM

## 2020-07-28 RX ORDER — SODIUM CHLORIDE 0.9 % (FLUSH) 0.9 %
10 SYRINGE (ML) INJECTION EVERY 12 HOURS SCHEDULED
Status: DISCONTINUED | OUTPATIENT
Start: 2020-07-28 | End: 2020-07-29 | Stop reason: HOSPADM

## 2020-07-28 RX ORDER — SODIUM CHLORIDE 9 MG/ML
INJECTION, SOLUTION INTRAVENOUS CONTINUOUS
Status: DISCONTINUED | OUTPATIENT
Start: 2020-07-28 | End: 2020-07-29 | Stop reason: HOSPADM

## 2020-07-28 RX ORDER — CLINDAMYCIN PHOSPHATE 600 MG/50ML
600 INJECTION INTRAVENOUS ONCE
Status: DISCONTINUED | OUTPATIENT
Start: 2020-07-28 | End: 2020-07-29 | Stop reason: HOSPADM

## 2020-07-28 RX ADMIN — SODIUM CHLORIDE: 9 INJECTION, SOLUTION INTRAVENOUS at 06:40

## 2020-07-28 NOTE — H&P
Vascular Surgery History & Physical Exam    Chief Complaint: Peripheral vascular disease, lifestyle limiting claudication    HISTORY OF PRESENT ILLNESS:    The patient is a 76 y.o. female who presents to the hospital for elective arteriogram with possible intervention. The patient has a history of peripheral vascular disease and lifestyle limiting claudication. ROS : All others Negative if blank [], Positive if [x]  General   [] Fevers   [] Chills   [] Weight Loss   Skin   [] Tissue Loss   Eyes   [x] Wears Glasses/Contacts   [] Vision Changes   Respiratory    [] Shortness of breath   Cardiovascular   [] Chest Pain   [] Shortness of breath with exertion   Gastrointestinal   [] Abdominal Pain     Past Medical History:   Diagnosis Date    Arthritis     Asthma     [facility]bral [facility] 2015    [facility] obstructive [facility] disease) [facility]     Diabetes mellitus ([facility]     Heart attack ([facility]     Hyperlipidemia     Hypertension     VHD (valvular heart disease)      Past Surgical History:   Procedure Laterality Date    CHOLECYSTECTOMY      COLONOSCOPY  12/2018    HYSTERECTOMY      HYSTERECTOMY, VAGINAL      UPPER GASTROINTESTINAL ENDOSCOPY  12/2018     Current Medications:     Current Outpatient Medications:     cilostazol (PLETAL) 100 MG tablet, take 1 tablet by mouth twice a day, Disp: 60 tablet, Rfl: 5    Milk Thistle 1000 MG CAPS, Take by mouth, Disp: , Rfl:     Multiple Vitamins-Minerals (CENTRUM ADULTS) TABS, Take 1 tablet by mouth daily , Disp: , Rfl:     venlafaxine 150 MG extended release tablet, Take 150 mg by mouth daily (with breakfast) , Disp: , Rfl:     gabapentin (NEURONTIN) 300 MG capsule, Take 300 mg by mouth 3 times daily.  ., Disp: , Rfl:     cetirizine (ZYRTEC) 10 MG tablet, Take 10 mg by mouth daily, Disp: , Rfl:     ADVAIR DISKUS 250-50 MCG/DOSE AEPB, Inhale 1 puff into the lungs 2 times daily , Disp: , Rfl:     COMBIVENT RESPIMAT  MCG/ACT AERS inhaler, 1 puff 2 times daily , Disp: , Rfl:     lisinopril (PRINIVIL;ZESTRIL) 2.5 MG tablet, Take 1 tablet by mouth daily, Disp: 90 tablet, Rfl: 3    insulin lispro (HUMALOG) 100 UNIT/ML injection vial, Inject into the skin Checks BS 4-6x/day;  For use with insulin pump;  Uses sliding scale, Disp: , Rfl:     aspirin EC 81 MG EC tablet, Take 1 tablet by mouth daily, Disp: 30 tablet, Rfl: 11    metoprolol succinate (TOPROL XL) 25 MG extended release tablet, take 1 tablet by mouth once daily, Disp: 90 tablet, Rfl: 3    rosuvastatin (CRESTOR) 40 MG tablet, Take 40 mg by mouth every evening, Disp: , Rfl:     ipratropium-albuterol (DUONEB) 0.5-2.5 (3) MG/3ML SOLN nebulizer solution, Inhale 3 mLs into the lungs every 6 hours as needed for Shortness of Breath, Disp: 360 mL, Rfl: 0    Current Facility-Administered Medications:     0.9 % sodium chloride infusion, , Intravenous, Continuous, Erika Park MD, Last Rate: 75 mL/hr at 07/28/20 0640    sodium chloride flush 0.9 % injection 10 mL, 10 mL, Intravenous, 2 times per day, Erika Park MD    sodium chloride flush 0.9 % injection 10 mL, 10 mL, Intravenous, PRN, Erika Park MD    ceFAZolin (ANCEF) 2 g in sterile water 20 mL IV syringe, 2 g, Intravenous, On Call to OR, Erika Park MD  Allergies:  Pcn [penicillins]  Social History     Socioeconomic History    Marital status:      Spouse name: Not on file    Number of children: Not on file    Years of education: Not on file    Highest education level: Not on file   Occupational History    Not on file   Social Needs    Financial resource strain: Not on file    Food insecurity     Worry: Not on file     Inability: Not on file    Transportation needs     Medical: Not on file     Non-medical: Not on file   Tobacco Use    Smoking status: Current Every Day Smoker     Packs/day: 0.25     Years: 45.00     Pack years: 11.25     Types: Cigarettes    Smokeless tobacco: Never Used   Substance and Sexual Activity    Alcohol use: Yes     Alcohol/week: 7.0 - 8.0 standard drinks     Types: 7 - 8 Glasses of wine per week     Frequency: 4 or more times a week     Drinks per session: 1 or 2     Binge frequency: Never     Comment: glass of wine with dinner    Drug use: Never    Sexual activity: Not on file   Lifestyle    Physical activity     Days per week: Not on file     Minutes per session: Not on file    Stress: Not on file   Relationships    Social connections     Talks on phone: Not on file     Gets together: Not on file     Attends Mormon service: Not on file     Active member of club or organization: Not on file     Attends meetings of clubs or organizations: Not on file     Relationship status: Not on file    Intimate partner violence     Fear of current or ex partner: Not on file     Emotionally abused: Not on file     Physically abused: Not on file     Forced sexual activity: Not on file   Other Topics Concern    Not on file   Social History Narrative    Drinks 2 cups coffee daily.       Family History   Problem Relation Age of Onset   [place] COPD Mother    [place] Alzheimer's Disease Mother     Alcohol Abuse Father     No Known Problems Sister     Diabetes Brother     No Known Problems Brother      PHYSICAL EXAM:    Vitals:    07/28/20 0624   BP: (!) 187/90   Pulse: 95   Temp: 97.9 °F (36.6 °C)   SpO2: 95%     CONSTITUTIONAL:  awake, alert, cooperative, no apparent distress, and appears stated age  NECK:  supple, symmetrical, trachea midline  LUNGS:  no increased work of breathing, good resp excursion  CARDIOVASCULAR:  regular rate and rhythm  ABDOMEN:  soft, non-distended and non-tenderl   Pulse Exam   R femoral biphasic L femoral biphasic   R dorsalis pedis biphasic L dorsalis pedis biphasic   R posterior tibial biphasic L posterior tibial biphasic     LABS:    Lab Results   Component Value Date    WBC 8.6 07/28/2020    HGB 13.3 07/28/2020    HCT 39.9 07/28/2020     07/28/2020    K 3.9 07/28/2020    BUN 20 07/28/2020    CREATININE 0.9 07/28/2020     Assesment:  Peripheral vascular disease. Lifestyle limiting claudication  PLAN:    · Aortogram,  Bilateral lower extremity arteriogram, possible intervention. · I reviewed the procedure with the patient and family as available. I discussed the procedure, risks, benefits, complications, and alternatives of the procedure. They understand and consent.   All questions were answered    Ria Hugo

## 2020-07-28 NOTE — OP NOTE
evidence of residual stenosis. Completion angiogram noted improved filling distally and brisk flow though the stenotic area. Pressure gradient between aorta and iliacs was bout 20 mm hg.      Right lower extremity angiogram was prefromed via sheath.     A 6 fr angioseal device used to close the Right common femoral artery    Postop Exam  Right DP biphasic  PT strongly biphasic  Left DP biphasic  PT 1+    Plan  Continue Medical Management with asa, pletal and statin  Encourage continued tobacco cessation  Monitor sxs  - unable to place stents due to small size of native aorta and large common iliac arteries - to much of a size mis match  - if patients sxs not better will likely need a aortobiliac bypass  - Expect L LE to be significantly better and right probably not as significant improvement    [facility] [employer]

## 2020-08-17 ENCOUNTER — TELEPHONE (OUTPATIENT)
Dept: VASCULAR SURGERY | Age: 65
End: 2020-08-17

## 2020-08-17 ENCOUNTER — OFFICE VISIT (OUTPATIENT)
Dept: VASCULAR SURGERY | Age: 65
End: 2020-08-17
Payer: MEDICARE

## 2020-08-17 VITALS — HEIGHT: 64 IN | WEIGHT: 182 LBS | RESPIRATION RATE: 16 BRPM | BODY MASS INDEX: 31.07 KG/M2

## 2020-08-17 PROCEDURE — 99213 OFFICE O/P EST LOW 20 MIN: CPT | Performed by: SURGERY

## 2020-08-17 NOTE — PATIENT INSTRUCTIONS
Patient Education        Learning About Peripheral Arterial Disease of the Legs  What is peripheral arterial disease? Peripheral arterial disease (PAD) is narrowing or blockage of arteries in your arms and legs. The most common cause of PAD is the buildup of plaque on the inside of arteries. Plaque is made of extra cholesterol, calcium, and other material in your blood. Over time, plaque builds up in the walls of the arteries, including those that supply blood to your legs. This buildup leads to poor blood flow. When you have PAD, you have a risk of having plaque in other arteries in your body. This raises your risk of a heart attack and stroke. This information focuses on peripheral arterial disease of the legs, the area where it is most common. When you have PAD of the legs and you walk or exercise, your leg muscles may not get enough blood. This may cause symptoms, such as leg pain during exercise. Peripheral arterial disease is also called peripheral vascular disease. What are the symptoms? Many people who have PAD do not have any symptoms. But if you have symptoms, you may have weak or tired legs, difficulty walking or balancing, or pain. If you have pain, you might feel a tight, aching, or squeezing pain in the calf, thigh, or buttock. This pain usually happens after you have walked a certain distance. The pain goes away if you stop walking. This pain is called intermittent claudication. If PAD gets worse, you may have other symptoms that are caused by poor blood flow to your legs and feet. You may have:  · Cold or numb feet or toes. · Sores that are slow to heal.  · Leg or foot pain while you are at rest.  · Feet and toes that become pale from exercise or when elevated. · Feet that turn red when dangled. · Blue or purple marks on your legs, feet, or toes. How can you prevent PAD? · Quit smoking. Quitting smoking is one of the best things you can do to help prevent PAD.  If you need help stroke. · Procedures, such as opening narrowed or blocked arteries (angioplasty) or using healthy blood vessels to create detours around narrowed or blocked arteries (bypass surgery). Follow-up care is a key part of your treatment and safety. Be sure to make and go to all appointments, and call your doctor if you are having problems. It's also a good idea to know your test results and keep a list of the medicines you take. Where can you learn more? Go to https://Codeanywhere..com. org and sign in to your [employer] account. Enter E035 in the [employer] box to learn more about \"Learning About Peripheral Arterial Disease of the Legs. \"     If you do not have an account, please click on the \"Sign Up Now\" link. Current as of: December 16, 2019               Content Version: 12.5  © 5686-2002 Healthwise, Incorporated. Care instructions adapted under license by [facility] ([facility]). If you have questions about a medical condition or this instruction, always ask your healthcare professional. [place] [address] any warranty or liability for your use of this information.

## 2020-09-02 ENCOUNTER — HOSPITAL ENCOUNTER (OUTPATIENT)
Dept: INTERVENTIONAL RADIOLOGY/VASCULAR | Age: 65
Discharge: HOME | End: 2020-09-04
Payer: MEDICARE

## 2020-09-02 PROCEDURE — 93923 UPR/LXTR ART STDY 3+ LVLS: CPT

## 2020-09-06 NOTE — PROGRESS NOTES
Arterial studies reviewed  Right ZEINA 0.71, TBI 0.49  Left ZEINA 0.91, TBI 0.50    There is a significant improvement as compared to previous.   Bilaterally noted in the waveforms in the PVRs    Previous study 7/7/2020  Right ZEINA 0.66, TBI 0.49  Left ZEINA 0.76, TBI 0.50    She is scheduled to follow-up with me in approximately 1 month    Eboni Segovia

## 2020-09-18 ENCOUNTER — TELEPHONE (OUTPATIENT)
Dept: CARDIOLOGY CLINIC | Age: 65
End: 2020-09-18

## 2020-09-28 ENCOUNTER — OFFICE VISIT (OUTPATIENT)
Dept: VASCULAR SURGERY | Age: 65
End: 2020-09-28
Payer: MEDICARE

## 2020-09-28 VITALS — HEIGHT: 63 IN | BODY MASS INDEX: 34.73 KG/M2 | WEIGHT: 196 LBS | RESPIRATION RATE: 16 BRPM

## 2020-09-28 PROCEDURE — 99214 OFFICE O/P EST MOD 30 MIN: CPT | Performed by: PHYSICIAN ASSISTANT

## 2020-09-28 RX ORDER — CLOPIDOGREL BISULFATE 75 MG/1
75 TABLET ORAL DAILY
Qty: 90 TABLET | Refills: 1 | Status: SHIPPED | OUTPATIENT
Start: 2020-09-28

## 2020-09-28 NOTE — PROGRESS NOTES
Vascular Surgery Outpatient Followup    PCP : Marga Patricia MD   Gen Surgeon : Dr. Viktor Marin  Podiatrist : Dr. Del Valle Neither     7/28/20 Angiogram  B/L LE HERNAN plasty with 6x60 pacifica         HISTORY OF PRESENT ILLNESS:    The patient is a 76 y.o. female who states is here in regards to fu of her sx pvd. She was incidentally noted on CT imaging done for evaluation of her abdominal pain to have significant PVD involving her iliac arteries bilaterally. She remains free from claudication symptoms. At her initial visit she was having cramping in her left > right posterior thigh cramping when she walks at about 150 feet, present since 2018. Her pain in her ankles remains improved. She states it really hasnt been bothering her on a daily basis unless she has walked a lot in that day. Rates it to be about a 5/10 pain (previously 9/10). Her chronic pain in her ankles bilaterally since 2017 which is constant when she is walking. She has no pain when she is sitting. Her swelling in calves and ankles has also improved since her procedure which was chronic. Elevation seems to help. Pt has been noting dizziness, nausea and vomiting intermittently for the last 5 days. She notes she had a change to her HTN meds and thinks her BP is running too low. Position makes the dizziness worse. She has not seen her PCP or cardiologist regarding this. She denies any chest pain, SOB.      Past Medical History:        Diagnosis Date    Arthritis     Asthma     Cerebral artery occlusion with cerebral infarction [facility]) 2015    [facility] [address]     Diabetes mellitus ([facility] [address]     Heart attack ([facility] [address] [facility]     Hyperlipidemia     Hypertension     VHD (valvular heart disease)      Past Surgical History:        Procedure Laterality Date    CHOLECYSTECTOMY      COLONOSCOPY  12/2018    HYSTERECTOMY      HYSTERECTOMY, VAGINAL      UPPER GASTROINTESTINAL ENDOSCOPY  12/2018     Current Medications: Day Smoker     Packs/day: 0.25     Years: 45.00     Pack years: 11.25     Types: Cigarettes    Smokeless tobacco: Never Used   Substance and Sexual Activity    Alcohol use: Yes     Alcohol/week: 7.0 - 8.0 standard drinks     Types: 7 - 8 Glasses of wine per week     Frequency: 4 or more times a week     Drinks per session: 1 or 2     Binge frequency: Never     Comment: glass of wine with dinner    Drug use: Never    Sexual activity: Not on file   Lifestyle    Physical activity     Days per week: Not on file     Minutes per session: Not on file    Stress: Not on file   Relationships    Social connections     Talks on phone: Not on file     Gets together: Not on file     Attends Gnosticist service: Not on file     Active member of club or organization: Not on file     Attends meetings of clubs or organizations: Not on file     Relationship status: Not on file    Intimate partner violence     Fear of current or ex partner: Not on file     Emotionally abused: Not on file     Physically abused: Not on file     Forced sexual activity: Not on file   Other Topics Concern    Not on file   Social History Narrative    Drinks 2 cups coffee daily.          Family History   Problem Relation Age of Onset    COPD Mother    [facility] Alzheimer's Disease Mother     Alcohol Abuse Father     No Known Problems Sister     Diabetes Brother     No Known Problems Brother      Labs  Lab Results   Component Value Date    WBC 8.6 07/28/2020    HGB 13.3 07/28/2020    HCT 39.9 07/28/2020     07/28/2020    PROTIME 10.1 07/28/2020    INR 0.9 07/28/2020    K 3.9 07/28/2020    BUN 20 07/28/2020    CREATININE 0.9 07/28/2020     PHYSICAL EXAM:    CONSTITUTIONAL:   Awake, alert, cooperative  PSYCHIATRIC :  Oriented to time, place and person     Appropriate insight to disease process  EYES: Lids and lashes normal  ENT:  External ears and nose without lesions   Hearing deficits not noted  NECK: Supple, symmetrical, trachea midline   Carotid bruit not noted  LUNGS:  No increased work of breathing                 Clear to auscultation  CARDIOVASCULAR:  Irregularly irregular rhythm, normal rate  ABDOMEN:  soft, non-distended  SKIN:   Normal skin color   Texture and turgor normal, no induration  EXTREMITIES:   R LE Edema not present   No open wounds  L LE Edema not present   No open wounds  R femoral 1+ L femoral 1+   R dorsalis pedis biphasic L dorsalis pedis biphasic   R posterior tibial biphasic L posterior tibial 1+     RADIOLOGY:'  Arterial studies reviewed from 9/2/2020  Right ZEINA 0.77, TBI 0.49  Left ZEINA 0.91, TBI 0.50     There is a significant improvement as compared to previous.   Bilaterally noted in the waveforms in the PVRs     Previous study 7/7/2020  Right ZEINA 0.66, TBI 0.49  Left ZEINA 0.76, TBI 0.50    A/P PVD with bilateral thigh claudication s/p plasty  · Patient no longer has thigh claudication symptoms  · Her ankle pain remains improved  · Her leg swelling has improved  · Continue Medical management with ASA, plavix, pletal and statin  · Pt was not taking plavix, Rx sent to the pharmacy and she was educated on the need to take ez since she is still smoking  · Discussed with pt tobacco use and significant relationship to PVD   pt smoking about 3 cigarettes a day  She continues to use chantix  Pt understands tobacco uses potential to cause increased problems post intervention, future worsening of disease, and even limb loss  · Walking Program  · Emphasized importance of foot care  · They understood to call if develops any wounds or ulcerations, changes in appearance or symptoms  · abis and pvrs at fu visit   · Fu 6 months    Dizziness  · Seems to be associated with hypotension and change in HTN meds, per patient report  · Discussed with her that her heart rhythm was irregular, she notes she has been told this previously  · We discussed the need for both PCP and cardiologist fu in the near future - she stated she will call today for an appt    Pt seen and plan reviewed with Dr. Solis Childress.      Sarmad Low PA-C

## 2020-11-03 PROBLEM — I73.9 PVD (PERIPHERAL VASCULAR DISEASE) (HCC): Status: RESOLVED | Noted: 2020-07-27 | Resolved: 2020-11-03

## 2021-01-04 ENCOUNTER — HOSPITAL ENCOUNTER (OUTPATIENT)
Age: 66
Discharge: HOME | End: 2021-01-04
Payer: MEDICARE

## 2021-01-04 LAB
ALBUMIN SERPL-MCNC: 4 G/DL (ref 3.5–5.2)
ALP BLD-CCNC: 63 U/L (ref 35–104)
ALT SERPL-CCNC: 19 U/L (ref 0–32)
ANION GAP SERPL CALCULATED.3IONS-SCNC: 10 MMOL/L (ref 7–16)
AST SERPL-CCNC: 24 U/L (ref 0–31)
BASOPHILS ABSOLUTE: 0.05 E9/L (ref 0–0.2)
BASOPHILS RELATIVE PERCENT: 0.9 % (ref 0–2)
BILIRUB SERPL-MCNC: 0.3 MG/DL (ref 0–1.2)
BILIRUBIN DIRECT: <0.2 MG/DL (ref 0–0.3)
BUN BLDV-MCNC: 15 MG/DL (ref 8–23)
C-REACTIVE PROTEIN: 0.2 MG/DL (ref 0–0.4)
CALCIUM SERPL-MCNC: 9.2 MG/DL (ref 8.6–10.2)
CHLORIDE BLD-SCNC: 102 MMOL/L (ref 98–107)
CHOLESTEROL, TOTAL: 296 MG/DL (ref 0–199)
CO2: 29 MMOL/L (ref 22–29)
CREAT SERPL-MCNC: 0.9 MG/DL (ref 0.5–1)
CREATININE URINE: 285 MG/DL (ref 29–226)
EOSINOPHILS ABSOLUTE: 0.2 E9/L (ref 0.05–0.5)
EOSINOPHILS RELATIVE PERCENT: 3.6 % (ref 0–6)
GFR AFRICAN AMERICAN: >60
GFR NON-AFRICAN AMERICAN: >60 ML/MIN/1.73
GLUCOSE BLD-MCNC: 91 MG/DL (ref 74–99)
HBA1C MFR BLD: 8.4 % (ref 4–5.6)
HCT VFR BLD CALC: 41 % (ref 34–48)
HDLC SERPL-MCNC: 66 MG/DL
HEMOGLOBIN: 13 G/DL (ref 11.5–15.5)
IMMATURE GRANULOCYTES #: 0.01 E9/L
IMMATURE GRANULOCYTES %: 0.2 % (ref 0–5)
LDL CHOLESTEROL CALCULATED: 196 MG/DL (ref 0–99)
LYMPHOCYTES ABSOLUTE: 2.19 E9/L (ref 1.5–4)
LYMPHOCYTES RELATIVE PERCENT: 39.7 % (ref 20–42)
MCH RBC QN AUTO: 30.7 PG (ref 26–35)
MCHC RBC AUTO-ENTMCNC: 31.7 % (ref 32–34.5)
MCV RBC AUTO: 96.9 FL (ref 80–99.9)
MICROALBUMIN UR-MCNC: 21.9 MG/L
MICROALBUMIN/CREAT UR-RTO: 7.7 (ref 0–30)
MONOCYTES ABSOLUTE: 0.59 E9/L (ref 0.1–0.95)
MONOCYTES RELATIVE PERCENT: 10.7 % (ref 2–12)
NEUTROPHILS ABSOLUTE: 2.47 E9/L (ref 1.8–7.3)
NEUTROPHILS RELATIVE PERCENT: 44.9 % (ref 43–80)
PDW BLD-RTO: 11.9 FL (ref 11.5–15)
PLATELET # BLD: 258 E9/L (ref 130–450)
PMV BLD AUTO: 9.1 FL (ref 7–12)
POTASSIUM SERPL-SCNC: 4.3 MMOL/L (ref 3.5–5)
RBC # BLD: 4.23 E12/L (ref 3.5–5.5)
SODIUM BLD-SCNC: 141 MMOL/L (ref 132–146)
T4 FREE: 0.97 NG/DL (ref 0.93–1.7)
TOTAL PROTEIN: 6.6 G/DL (ref 6.4–8.3)
TRIGL SERPL-MCNC: 170 MG/DL (ref 0–149)
TSH SERPL DL<=0.05 MIU/L-ACNC: 1.84 UIU/ML (ref 0.27–4.2)
URIC ACID, SERUM: 3.4 MG/DL (ref 2.4–5.7)
VITAMIN D 25-HYDROXY: 66 NG/ML (ref 30–100)
VLDLC SERPL CALC-MCNC: 34 MG/DL
WBC # BLD: 5.5 E9/L (ref 4.5–11.5)

## 2021-01-04 PROCEDURE — 84443 ASSAY THYROID STIM HORMONE: CPT

## 2021-01-04 PROCEDURE — 86235 NUCLEAR ANTIGEN ANTIBODY: CPT

## 2021-01-04 PROCEDURE — 86334 IMMUNOFIX E-PHORESIS SERUM: CPT

## 2021-01-04 PROCEDURE — 83874 ASSAY OF MYOGLOBIN: CPT

## 2021-01-04 PROCEDURE — 84165 PROTEIN E-PHORESIS SERUM: CPT

## 2021-01-04 PROCEDURE — 85025 COMPLETE CBC W/AUTO DIFF WBC: CPT

## 2021-01-04 PROCEDURE — 82570 ASSAY OF URINE CREATININE: CPT

## 2021-01-04 PROCEDURE — 80053 COMPREHEN METABOLIC PANEL: CPT

## 2021-01-04 PROCEDURE — 36415 COLL VENOUS BLD VENIPUNCTURE: CPT

## 2021-01-04 PROCEDURE — 83036 HEMOGLOBIN GLYCOSYLATED A1C: CPT

## 2021-01-04 PROCEDURE — 82306 VITAMIN D 25 HYDROXY: CPT

## 2021-01-04 PROCEDURE — 86140 C-REACTIVE PROTEIN: CPT

## 2021-01-04 PROCEDURE — 82044 UR ALBUMIN SEMIQUANTITATIVE: CPT

## 2021-01-04 PROCEDURE — 84439 ASSAY OF FREE THYROXINE: CPT

## 2021-01-04 PROCEDURE — 82248 BILIRUBIN DIRECT: CPT

## 2021-01-04 PROCEDURE — 84550 ASSAY OF BLOOD/URIC ACID: CPT

## 2021-01-04 PROCEDURE — 80061 LIPID PANEL: CPT

## 2021-01-05 ENCOUNTER — HOSPITAL ENCOUNTER (OUTPATIENT)
Dept: MAMMOGRAPHY | Age: 66
Discharge: HOME | End: 2021-01-07
Payer: MEDICARE

## 2021-01-05 DIAGNOSIS — Z00.00 ROUTINE GENERAL MEDICAL EXAMINATION AT A HEALTH CARE FACILITY: ICD-10-CM

## 2021-01-05 LAB — ANTI JO-1 IGG: NEGATIVE

## 2021-01-05 PROCEDURE — 77067 SCR MAMMO BI INCL CAD: CPT

## 2021-01-06 LAB
ALBUMIN SERPL-MCNC: 3.3 G/DL (ref 3.5–4.7)
ALPHA-1-GLOBULIN: 0.3 G/DL (ref 0.2–0.4)
ALPHA-2-GLOBULIN: 1 G/FL (ref 0.5–1)
BETA GLOBULIN: 1.1 G/DL (ref 0.8–1.3)
ELECTROPHORESIS: ABNORMAL
GAMMA GLOBULIN: 0.7 G/DL (ref 0.7–1.6)
IMMUNOFIXATION RESULT, SERUM: NORMAL
MYOGLOBIN: 38 NG/ML (ref 25–58)
TOTAL PROTEIN: 6.3 G/DL (ref 6.4–8.3)

## 2021-01-08 ENCOUNTER — HOSPITAL ENCOUNTER (OUTPATIENT)
Age: 66
Discharge: HOME | End: 2021-01-10
Payer: MEDICARE

## 2021-01-08 ENCOUNTER — HOSPITAL ENCOUNTER (OUTPATIENT)
Dept: GENERAL RADIOLOGY | Age: 66
Discharge: HOME | End: 2021-01-10
Payer: MEDICARE

## 2021-01-08 DIAGNOSIS — M19.90 ARTHRITIS: ICD-10-CM

## 2021-01-08 DIAGNOSIS — M54.2 CERVICALGIA: ICD-10-CM

## 2021-01-08 PROCEDURE — 72050 X-RAY EXAM NECK SPINE 4/5VWS: CPT

## 2021-01-08 PROCEDURE — 73030 X-RAY EXAM OF SHOULDER: CPT

## 2021-01-18 ENCOUNTER — TELEPHONE (OUTPATIENT)
Dept: ADMINISTRATIVE | Age: 66
End: 2021-01-18

## 2021-01-20 ENCOUNTER — NURSE ONLY (OUTPATIENT)
Dept: CARDIOLOGY CLINIC | Age: 66
End: 2021-01-20

## 2021-01-20 ENCOUNTER — OFFICE VISIT (OUTPATIENT)
Dept: CARDIOLOGY CLINIC | Age: 66
End: 2021-01-20
Payer: MEDICARE

## 2021-01-20 VITALS
DIASTOLIC BLOOD PRESSURE: 76 MMHG | HEIGHT: 63 IN | WEIGHT: 192 LBS | SYSTOLIC BLOOD PRESSURE: 132 MMHG | HEART RATE: 91 BPM | BODY MASS INDEX: 34.02 KG/M2

## 2021-01-20 DIAGNOSIS — I35.1 NONRHEUMATIC AORTIC VALVE INSUFFICIENCY: ICD-10-CM

## 2021-01-20 DIAGNOSIS — I49.3 FREQUENT PVCS: ICD-10-CM

## 2021-01-20 DIAGNOSIS — Z72.0 TOBACCO USE: ICD-10-CM

## 2021-01-20 DIAGNOSIS — I10 ESSENTIAL HYPERTENSION: Primary | ICD-10-CM

## 2021-01-20 DIAGNOSIS — R94.31 ABNORMAL EKG: ICD-10-CM

## 2021-01-20 DIAGNOSIS — E78.5 DYSLIPIDEMIA: ICD-10-CM

## 2021-01-20 DIAGNOSIS — Z86.79 HISTORY OF CARDIOMYOPATHY: ICD-10-CM

## 2021-01-20 DIAGNOSIS — E66.9 NON MORBID OBESITY: ICD-10-CM

## 2021-01-20 PROCEDURE — 99214 OFFICE O/P EST MOD 30 MIN: CPT | Performed by: INTERNAL MEDICINE

## 2021-01-20 PROCEDURE — 93000 ELECTROCARDIOGRAM COMPLETE: CPT | Performed by: INTERNAL MEDICINE

## 2021-01-20 NOTE — PROGRESS NOTES
OFFICE VISIT     PRIMARY CARE PHYSICIAN:      Lakisha Juares MD       ALLERGIES / SENSITIVITIES:        Allergies   Allergen Reactions    Pcn [Penicillins] Hives          REVIEWED MEDICATIONS:        Current Outpatient Medications:     clopidogrel (PLAVIX) 75 MG tablet, Take 1 tablet by mouth daily, Disp: 90 tablet, Rfl: 1    cilostazol (PLETAL) 100 MG tablet, take 1 tablet by mouth twice a day, Disp: 60 tablet, Rfl: 5    ipratropium-albuterol (DUONEB) 0.5-2.5 (3) MG/3ML SOLN nebulizer solution, Inhale 3 mLs into the lungs every 6 hours as needed for Shortness of Breath, Disp: 360 mL, Rfl: 0    Milk Thistle 1000 MG CAPS, Take by mouth, Disp: , Rfl:     Multiple Vitamins-Minerals (CENTRUM ADULTS) TABS, Take 1 tablet by mouth daily , Disp: , Rfl:     venlafaxine 150 MG extended release tablet, Take 150 mg by mouth daily (with breakfast) , Disp: , Rfl:     gabapentin (NEURONTIN) 300 MG capsule, Take 300 mg by mouth 3 times daily. ., Disp: , Rfl:     cetirizine (ZYRTEC) 10 MG tablet, Take 10 mg by mouth daily, Disp: , Rfl:     ADVAIR DISKUS 250-50 MCG/DOSE AEPB, Inhale 1 puff into the lungs 2 times daily , Disp: , Rfl:     COMBIVENT RESPIMAT  MCG/ACT AERS inhaler, 1 puff 2 times daily , Disp: , Rfl:     lisinopril (PRINIVIL;ZESTRIL) 2.5 MG tablet, Take 1 tablet by mouth daily, Disp: 90 tablet, Rfl: 3    insulin lispro (HUMALOG) 100 UNIT/ML injection vial, Inject into the skin Checks BS 4-6x/day;  For use with insulin pump;  Uses sliding scale, Disp: , Rfl:     aspirin EC 81 MG EC tablet, Take 1 tablet by mouth daily, Disp: 30 tablet, Rfl: 11    metoprolol succinate (TOPROL XL) 25 MG extended release tablet, take 1 tablet by mouth once daily, Disp: 90 tablet, Rfl: 3    rosuvastatin (CRESTOR) 40 MG tablet, Take 40 mg by mouth every evening, Disp: , Rfl:       S: REASON FOR VISIT:       Chief Complaint   Patient presents with    Cardiomyopathy 9 month check . PAtient denies any cp sob or dizziness. History of Present Illness:       Office Visit for follow up of VHD, HTN, history of CMP   76 yr old Gautam Vizcarra with history of CMP, HTN, VHD came for f/u visit   No hospitalizations or surgeries since last visit   Patient is compliant with all medications   Amari any exertional chest pain or short of breath   No palpitations, dizzy or syncope. Active at home   No orthopnea   Try to watch diet          Past Medical History:   Diagnosis Date    Arthritis     Asthma     Cerebral artery occlusion with cerebral infarction ([facility] [address] 2015    COPD (chronic obstructive pulmonary disease) ([facility]     Diabetes mellitus ([facility]     [facility]     Hyperlipidemia     Hypertension     VHD (valvular heart disease)             Past Surgical History:   Procedure Laterality Date    CHOLECYSTECTOMY      COLONOSCOPY  12/2018    HYSTERECTOMY      HYSTERECTOMY, VAGINAL      UPPER GASTROINTESTINAL ENDOSCOPY  12/2018          Family History   Problem Relation Age of Onset    COPD Mother     Alzheimer's Disease Mother     Alcohol Abuse Father     No Known Problems Sister     Diabetes Brother     No Known Problems Brother           Social History     Tobacco Use    Smoking status: Current Every Day Smoker     Packs/day: 0.25     Years: 45.00     Pack years: 11.25     Types: Cigarettes    Smokeless tobacco: Never Used   Substance Use Topics    Alcohol use: Yes     Alcohol/week: 7.0 - 8.0 standard drinks     Types: 7 - 8 Glasses of wine per week     Frequency: 4 or more times a week     Drinks per session: 1 or 2     Binge frequency: Never     Comment: glass of wine with dinner.   1 cup of coffee a day          Review of Systems:  Constitutional: negative for fever and chills, or significant weight loss  HEENT: negative for acute visual symptoms or auditory problems, no dysphagia  Respiratory: negative for cough, wheezing, or hemoptysis -     Cardiac Stress Test Exercise - Treadmill; Future    Frequent PVCs - Avoid caffeine, continue BB, TSH and Lytes OK         -     Holter monitor 48 hour; Future  -     Echo 2D w doppler w color complete; Future  -     Cardiac Stress Test Exercise - Treadmill; Future    History of cardiomyopathy - EF normal on Echo 2018  -     Holter monitor 48 hour; Future  -     Echo 2D w doppler w color complete; Future  -     Cardiac Stress Test Exercise - Treadmill; Future    Nonrheumatic aortic valve insufficiency  -     Echo 2D w doppler w color complete; Future    Dyslipidemia - On Statin    Diabetes - Follows with Endocrinology - Dr Melodie Emmanuel    Non morbid obesity - Diet, exercise and weight loss discussed. COPD    Tobacco use - Counseled to quit smoking    Other orders  -     Beta Blocker Management Prior to Cardiac Stress Test; Standing       Preventive Cardiology: Low cholesterol diet, regular exercise as tolerate, and gradual weight loss discussed. Monitor BP and heart rates. Above recommendations discussed with her. All questions answered about cardiac diagnoses and cardiac medications. Continue current medications. Compliance with medications and f/u with all physicians discussed. Risk factor modification based on risk profile discussed. Call if any exertional chest pain, short of breath, dizzy or palpitations   Follow up after tests or  earlier if needed.          [facility]  [address] [facility]  [phone]

## 2021-02-09 ENCOUNTER — TELEPHONE (OUTPATIENT)
Dept: CARDIOLOGY | Age: 66
End: 2021-02-09

## 2021-02-09 NOTE — TELEPHONE ENCOUNTER
Spoke with patient reminder of appointment on 2/11/21 at 1:oopm for a Regular stress test instructions given hold Jojo VELAZQUEZ 24hrs prior to the test  patient confirmed appointment.

## 2021-02-10 DIAGNOSIS — Z86.79 HISTORY OF CARDIOMYOPATHY: ICD-10-CM

## 2021-02-10 DIAGNOSIS — I49.3 FREQUENT PVCS: ICD-10-CM

## 2021-02-11 ENCOUNTER — HOSPITAL ENCOUNTER (OUTPATIENT)
Dept: CARDIOLOGY | Age: 66
Discharge: HOME | End: 2021-02-11
Payer: MEDICARE

## 2021-02-11 VITALS
RESPIRATION RATE: 18 BRPM | HEART RATE: 89 BPM | BODY MASS INDEX: 34.02 KG/M2 | SYSTOLIC BLOOD PRESSURE: 160 MMHG | HEIGHT: 63 IN | WEIGHT: 192 LBS | TEMPERATURE: 96.6 F | DIASTOLIC BLOOD PRESSURE: 86 MMHG

## 2021-02-11 DIAGNOSIS — I35.1 NONRHEUMATIC AORTIC VALVE INSUFFICIENCY: ICD-10-CM

## 2021-02-11 DIAGNOSIS — R94.31 ABNORMAL EKG: ICD-10-CM

## 2021-02-11 DIAGNOSIS — I49.3 FREQUENT PVCS: ICD-10-CM

## 2021-02-11 DIAGNOSIS — Z86.79 HISTORY OF CARDIOMYOPATHY: ICD-10-CM

## 2021-02-11 DIAGNOSIS — I10 ESSENTIAL HYPERTENSION: ICD-10-CM

## 2021-02-11 LAB
LV EF: 65 %
LVEF MODALITY: NORMAL

## 2021-02-11 PROCEDURE — 93016 CV STRESS TEST SUPVJ ONLY: CPT | Performed by: INTERNAL MEDICINE

## 2021-02-11 PROCEDURE — 93018 CV STRESS TEST I&R ONLY: CPT | Performed by: INTERNAL MEDICINE

## 2021-02-11 PROCEDURE — 93017 CV STRESS TEST TRACING ONLY: CPT

## 2021-02-11 PROCEDURE — 93306 TTE W/DOPPLER COMPLETE: CPT

## 2021-02-11 NOTE — PROCEDURES
[address] [facility]. Ramón Kirby [address] [place] [address]  [phone]    Exercise Stress Study       Name: [address] [place] [address] Account Number:  [de-identified]      :  1955          Sex: female         Date of Study:  2021    Height: 5' 3\" (160 cm)          Weight: 192 lb (87.1 kg)    Ordering Provider: Clem Ennis MD          PCP: Shanel Mckenna MD    Cardiologist: Clem Ennis MD              Interpreting Physician: Keith Kyle MD    Indication:   Detecting the presence and location of coronary artery disease    Clinical History:   Patient has no known history of coronary artery disease. Resting ECG:    WY int 154 msec, QRS int 70 msec, QT int 366 msec; HR 89 bpm  Sinus rhythm with PVCs    Exercise: The patient exercised using a Pacheco protocol, completing 2:44 minutes and reaching an estimated work load of 4.5 metabolic equivalents (METS). Resting HR was 89. Peak exercise heart rate was 116 ( 75% of maximum predicted heart rate for age). Baseline /86. Peak exercise /59. The blood pressure response to exercise was normal      Exercise was terminated due to bilateral ankle discomfort. The patient experienced no chest pain with exercise. Pulse oximetry was used to monitor oxygen saturation during the stress test.  The study was performed on Room Air. The resting pulse oximeter was 98%. The post stress O2 saturation seen during exercise was 98 %. Exercise ECG:   The patient demonstrated occasional PVC's during exercise. With exercise, EKG was nondiagnostic since patient did not achieve 85% of her maximum predicted heart rate. Impression:    1. Exercise EKG was normal.  2. The patient experienced Shortness of breath and claudication with exercise. 3. Monterroso treadmill score was 2 implying intermediate risk of acute ischemic events.     4. Exercise capacity was impaired. 5. Nondiagnostic, suboptimal exercise stress test since patient did not achieve 85% of her maximum predicted heart rate (patient achieved 75% of her maximum predicted heart rate), test was terminated due to dyspnea on exertion and significant claudication    Thank you for sending your patient to this [facility] [employer] [facility].     Electronically signed by Aditi Andrade MD on 2/11/21 at 8:34 PM EST

## 2021-02-15 NOTE — PROGRESS NOTES
Outpatient Cardiology - Office Visit Follow-up    Date of Consultation: 3/3/2021    HISTORY OF PRESENT ILLNESS:   Patient is a 76year old WF who follows with Dr. Farrukh Armando. She is here today to discuss diagnostic testing results. Patient states she has been doing well since her last office visit. She does admit to occasional episodes of palpitations, maybe occurring once every few weeks. The episodes are random, and not always related to exertion. They are brief, lasting seconds in duration before complete spontaneous resolution. She denies any complaints to me of chest discomfort at rest or on exertion, shortness of breath at rest or on exertion, nausea, emesis, diaphoresis, dizziness, near syncope or syncope. She denies any paroxysmal nocturnal dyspnea, orthopnea or peripheral edema. She denies subjective fever, chills, cough or any known recent sick contacts. She admits in the office today to not taking any of her medications for the past several months. Please note: past medical records were reviewed per electronic medical record (EMR) - see detailed reports under Past Medical/ Surgical History. PAST MEDICAL HISTORY:    1. Hypertension. 2. Ventricular ectopy. 3. History of ?cardiomyopathy. 4. Valvular heart disease. 5. Diabetes mellitus type II. 6. Obesity. 7. Chronic obstructive pulmonary disease. 8. Hyperlipidemia, on statin therapy. 9. Tobacco abuse. 10. Peripheral arterial disease s/p angioplasty of bilateral common iliac arteries (Dr. Oanh Gill) July 2020. 11. Medical non-compliance.         PRIOR CARDIAC TESTING     Echocardiogram (Dr. Martinez Romero, 04/2018)  Summary:   Left ventricular internal dimensions were normal in diastole and systole.   Borderline concentric left ventricular hypertrophy.   No regional wall motion abnormalities seen.   Normal left ventricular ejection fraction. EF 55-60%.   The aortic valve appears mildly sclerotic.  Mild aortic regurgitation is noted.     Holter monitor 48 hour (02/2021)  Frequent PVCs, 11% of beats  Episodes of NSVT and SVT noted. Exercise Non-nuclear Stress Test (2/2021)  Exercise:  The patient exercised using a Pacheco protocol,   completing 2:44 minutes and reaching an estimated work load of   4.5 metabolic equivalents (METS). Resting HR was 89. Peak   exercise heart rate was 116 ( 75% of maximum predicted heart rate   for age). Baseline /86. Peak exercise /59. The blood pressure response to exercise was normal     Exercise was terminated due to bilateral ankle discomfort. The patient experienced no chest pain with exercise. Pulse oximetry was used to monitor oxygen saturation during the   stress test.  The study was performed on Room Air.  The resting   pulse oximeter was 98%.  The post stress O2 saturation seen   during exercise was 98 %.             Exercise ECG:   The patient demonstrated occasional PVC's during exercise. With exercise, EKG was nondiagnostic since patient did not   achieve 85% of her maximum predicted heart rate. Impression:     1. Exercise EKG was normal.   2. The patient experienced shortness of breath and claudication   with exercise. 3. Monterroso treadmill score was 2 implying intermediate risk of acute   ischemic events.     4. Exercise capacity was impaired. 5. Nondiagnostic, suboptimal exercise stress test since patient   did not achieve 85% of her maximum predicted heart rate (patient   achieved 75% of her maximum predicted heart rate), test was   terminated due to dyspnea on exertion and significant   claudication      Echocardiogram (Dr. Josue Asher, 02/2021)   Summary:   Normal left ventricular chamber size.   Normal left ventricular systolic function, LVEF is 65%.   Mild left ventricular concentric hypertrophy noted.   Stage I diastolic dysfunction.   Interatrial septum not well visualized but appears intact.  Normal right ventricle size and function.   No mitral valve prolapse.   The aortic valve appears mildly sclerotic.   No hemodynamically significant aortic stenosis.   There is mild aortic regurgitation.   Normal aortic root size.   No evidence of pericardial effusion.   No intra cardiac mass or thrombus.   Compared to prior echo from 4/2018, no changes noted. PAST SURGICAL HISTORY:    Past Surgical History:   Procedure Laterality Date    CHOLECYSTECTOMY      COLONOSCOPY  12/2018    HYSTERECTOMY      HYSTERECTOMY, VAGINAL      UPPER GASTROINTESTINAL ENDOSCOPY  12/2018       HOME MEDICATIONS:  Prior to Admission medications    Medication Sig Start Date End Date Taking? Authorizing Provider   clopidogrel (PLAVIX) 75 MG tablet Take 1 tablet by mouth daily 9/28/20   Quynh English PA-C   cilostazol (PLETAL) 100 MG tablet take 1 tablet by mouth twice a day 7/23/20   Grant Dawkins MD   ipratropium-albuterol (DUONEB) 0.5-2.5 (3) MG/3ML SOLN nebulizer solution Inhale 3 mLs into the lungs every 6 hours as needed for Shortness of Breath 1/3/20   Jean-Claude Perez,    Milk Thistle 1000 MG CAPS Take by mouth    Historical Provider, MD   Multiple Vitamins-Minerals (CENTRUM ADULTS) TABS Take 1 tablet by mouth daily     Historical Provider, MD   venlafaxine 150 MG extended release tablet Take 150 mg by mouth daily (with breakfast)  9/12/18   Historical Provider, MD   gabapentin (NEURONTIN) 300 MG capsule Take 300 mg by mouth 3 times daily.  . 9/12/18   Historical Provider, MD   cetirizine (ZYRTEC) 10 MG tablet Take 10 mg by mouth daily    Historical Provider, MD   ADVAIR DISKUS 250-50 MCG/DOSE AEPB Inhale 1 puff into the lungs 2 times daily  9/12/18   Historical Provider, MD   COMBIVENT RESPIMAT  MCG/ACT AERS inhaler 1 puff 2 times daily  9/12/18   Historical Provider, MD   lisinopril (PRINIVIL;ZESTRIL) 2.5 MG tablet Take 1 tablet by mouth daily 9/17/18   Brinda Ribeiro MD insulin lispro (HUMALOG) 100 UNIT/ML injection vial Inject into the skin Checks BS 4-6x/day; For use with insulin pump;  Uses sliding scale    Historical Provider, MD   aspirin EC 81 MG EC tablet Take 1 tablet by mouth daily 3/19/18   Boris Zuñiga MD   metoprolol succinate (TOPROL XL) 25 MG extended release tablet take 1 tablet by mouth once daily 3/19/18   Boris Zuñiga MD   rosuvastatin (CRESTOR) 40 MG tablet Take 40 mg by mouth every evening    Historical Provider, MD       CURRENT MEDICATIONS:      Current Outpatient Medications:     clopidogrel (PLAVIX) 75 MG tablet, Take 1 tablet by mouth daily, Disp: 90 tablet, Rfl: 1    cilostazol (PLETAL) 100 MG tablet, take 1 tablet by mouth twice a day, Disp: 60 tablet, Rfl: 5    ipratropium-albuterol (DUONEB) 0.5-2.5 (3) MG/3ML SOLN nebulizer solution, Inhale 3 mLs into the lungs every 6 hours as needed for Shortness of Breath, Disp: 360 mL, Rfl: 0    Milk Thistle 1000 MG CAPS, Take by mouth, Disp: , Rfl:     Multiple Vitamins-Minerals (CENTRUM ADULTS) TABS, Take 1 tablet by mouth daily , Disp: , Rfl:     venlafaxine 150 MG extended release tablet, Take 150 mg by mouth daily (with breakfast) , Disp: , Rfl:     gabapentin (NEURONTIN) 300 MG capsule, Take 300 mg by mouth 3 times daily. ., Disp: , Rfl:     cetirizine (ZYRTEC) 10 MG tablet, Take 10 mg by mouth daily, Disp: , Rfl:     ADVAIR DISKUS 250-50 MCG/DOSE AEPB, Inhale 1 puff into the lungs 2 times daily , Disp: , Rfl:     COMBIVENT RESPIMAT  MCG/ACT AERS inhaler, 1 puff 2 times daily , Disp: , Rfl:     lisinopril (PRINIVIL;ZESTRIL) 2.5 MG tablet, Take 1 tablet by mouth daily, Disp: 90 tablet, Rfl: 3    insulin lispro (HUMALOG) 100 UNIT/ML injection vial, Inject into the skin Checks BS 4-6x/day;  For use with insulin pump;  Uses sliding scale, Disp: , Rfl:     aspirin EC 81 MG EC tablet, Take 1 tablet by mouth daily, Disp: 30 tablet, Rfl: 11   metoprolol succinate (TOPROL XL) 25 MG extended release tablet, take 1 tablet by mouth once daily, Disp: 90 tablet, Rfl: 3    rosuvastatin (CRESTOR) 40 MG tablet, Take 40 mg by mouth every evening, Disp: , Rfl:       ALLERGIES:  Pcn [penicillins]    SOCIAL HISTORY:    Social History     Socioeconomic History    Marital status:      Spouse name: Not on file    Number of children: Not on file    Years of education: Not on file    Highest education level: Not on file   Occupational History    Not on file   Social Needs    Financial resource strain: Not on file    Food insecurity     Worry: Not on file     Inability: Not on file   [employer] needs     Medical: Not on file     Non-medical: Not on file   Tobacco Use    Smoking status: Current Every Day Smoker     Packs/day: 0.25     Years: 45.00     Pack years: 11.25     Types: Cigarettes    Smokeless tobacco: Never Used   Substance and Sexual Activity    Alcohol use: Yes     Alcohol/week: 7.0 - 8.0 standard drinks     Types: 7 - 8 Glasses of wine per week     Frequency: 4 or more times a week     Drinks per session: 1 or 2     Binge frequency: Never     Comment: glass of wine with dinner.   1 cup of coffee a day     Drug use: Never    Sexual activity: Not on file   Lifestyle    Physical activity     Days per week: Not on file     Minutes per session: Not on file    Stress: Not on file   Relationships    Social connections     Talks on phone: Not on file     Gets together: Not on file     Attends Hinduism service: Not on file     Active member of club or organization: Not on file     Attends meetings of clubs or organizations: Not on file     Relationship status: Not on file    Intimate partner violence     Fear of current or ex partner: Not on file     Emotionally abused: Not on file     Physically abused: Not on file     Forced sexual activity: Not on file   Other Topics Concern    Not on file   Social History Narrative Drinks 2 cups coffee daily. FAMILY HISTORY:   Family History   Problem Relation Age of Onset   Salome Officer COPD Mother     Alzheimer's Disease Mother     Alcohol Abuse Father     No Known Problems Sister     Diabetes Brother     No Known Problems Brother          REVIEW OF SYSTEMS:     · Constitutional: Denies fevers, chills, night sweats, and generalized fatigue. Denies significant weight loss or weight gain. · HEENT: Denies headaches, nose bleeds, rhinorrhea, sore throat. Denies blurred vision. Denies dysphagia, odynophagia. · Musculoskeletal: Denies falls, pain to BLE with ambulation. Denies muscle weakness. · Neurological: Denies dizziness and lightheadedness, numbness and tingling. Denies focal neurological deficits. · Cardiovascular: +palpitations. Denies chest pain, diaphoresis. Denies dizziness, syncope. Denies PND, orthopnea, peripheral edema. · Respiratory: Denies shortness of breath at rest or with exertion. Denies cough, hemoptysis. · Gastrointestinal: Denies abdominal pain, nausea/vomiting, diarrhea and constipation, black/bloody, and tarry stools. · Genitourinary: Denies dysuria and hematuria. · Hematologic: Denies excessive bruising or bleeding. · Endocrine: Denies excessive thirst. Denies intolerance to hot and cold. PHYSICAL EXAM:   /64 (Site: Right Upper Arm, Position: Sitting, Cuff Size: Large Adult)   Pulse 85   Ht 5' 3\" (1.6 m)   Wt 189 lb (85.7 kg)   SpO2 96%   BMI 33.48 kg/m²   CONST:  Well developed, obese WF who appears stated age. Awake, alert, cooperative, no apparent distress. HEENT:   Head- Normocephalic, atraumatic. Eyes- Conjunctivae pink, anicteric. Neck-  No stridor, trachea midline, no apparent jugular venous distention. CHEST: Chest symmetrical and non-tender to palpation. No accessory muscle use or intercostal retractions. RESPIRATORY: Lung sounds - clear throughout fields. No wheezing, rales or rhonchi.   CARDIOVASCULAR: No noted carotid bruit. Heart Inspection- shows no noted pulsations. Heart Palpation- no heaves or thrills. Heart Ausculation- Regular rate and rhythm, no apparent murmur. PV: No lower extremity edema. Pedal pulses palpable, no clubbing or cyanosis. ABDOMEN: Soft, non-tender to light palpation. Bowel sounds present. MS: Good muscle strength and tone. No atrophy or abnormal movements. : Deferred  SKIN: Warm and dry. No statis dermatitis or ulcers. NEURO / PSYCH: Oriented to person, place and time. Speech clear and appropriate. Follows all commands. Pleasant affect. DATA:    Labs:   HgA1c:   Lab Results   Component Value Date    LABA1C 8.4 (H) 01/04/2021     FASTING LIPID PANEL:  Lab Results   Component Value Date    CHOL 296 01/04/2021    HDL 66 01/04/2021    LDLCALC 196 01/04/2021    TRIG 170 01/04/2021         ASSESSMENT:  1. Episodes of palpitations. NSVT, frequent PVCs and SVT on Holter monitor February 2021. Normal LV function on echocardiogram.  2. Hypertension, well-controlled. 3. Mild AI with mild LVH and stage I diastolic dysfunction on TTE Feb 2021. 4. Hyperlipidemia, not on statin therapy. 5. Peripheral arterial disease s/p angioplasty of bilateral common iliac arteries (Dr. Casi Gardner) July 2020. 6. Medical non-compliance. Patient self-discontinued all medications on her own. 7. Chronic obstructive pulmonary disease. 8. Diabetes mellitus type II. 9. Tobacco abuse. 10. Obesity. PLAN:   1. Re-start aspirin 81 mg daily, Crestor 40 mg daily and Toprol-XL 25 mg daily. 2. Follow-up with vascular surgery regarding Plavix and Pletal re-initiation. 3. Check CMP and Mag level for evaluation of LFTs, electrolytes and kidney function. 4. Coronary CTA for further evaluation given exercise stress test with patient [facility] and NSVT noted on Holter monitor. Patient requesting coronary CTA prior to considering left heart catheterization. 5. Sleep medicine referral for evaluation of sleep apnea given cardiac arrhythmias. 6. EP referral for evaluation of cardiac arrhythmias. 7. Follow up in two months once above have been completed, or sooner if needed. The patient's current medication list, allergies, problem list, recent labs and diagnostic testing were reviewed at today's visit.       [facility], [address] [place] [address], [place] [id number] Cardiology    Electronically signed by Lorrie Kumar PA-C on 3/3/2021 at 1:24 PM

## 2021-03-03 ENCOUNTER — OFFICE VISIT (OUTPATIENT)
Dept: CARDIOLOGY CLINIC | Age: 66
End: 2021-03-03
Payer: MEDICARE

## 2021-03-03 ENCOUNTER — TELEPHONE (OUTPATIENT)
Dept: NON INVASIVE DIAGNOSTICS | Age: 66
End: 2021-03-03

## 2021-03-03 VITALS
SYSTOLIC BLOOD PRESSURE: 124 MMHG | DIASTOLIC BLOOD PRESSURE: 64 MMHG | BODY MASS INDEX: 33.49 KG/M2 | OXYGEN SATURATION: 96 % | HEIGHT: 63 IN | WEIGHT: 189 LBS | HEART RATE: 85 BPM

## 2021-03-03 DIAGNOSIS — I47.29 NSVT (NONSUSTAINED VENTRICULAR TACHYCARDIA): Primary | ICD-10-CM

## 2021-03-03 DIAGNOSIS — R07.9 CHEST PAIN, UNSPECIFIED TYPE: ICD-10-CM

## 2021-03-03 PROCEDURE — 99215 OFFICE O/P EST HI 40 MIN: CPT | Performed by: PHYSICIAN ASSISTANT

## 2021-03-03 RX ORDER — METOPROLOL SUCCINATE 25 MG/1
TABLET, EXTENDED RELEASE ORAL
Qty: 90 TABLET | Refills: 3 | Status: SHIPPED
Start: 2021-03-03 | End: 2021-04-21 | Stop reason: ALTCHOICE

## 2021-03-03 RX ORDER — ROSUVASTATIN CALCIUM 40 MG/1
40 TABLET, COATED ORAL EVERY EVENING
Qty: 30 TABLET | Refills: 2 | Status: SHIPPED
Start: 2021-03-03 | End: 2021-08-24 | Stop reason: ALTCHOICE

## 2021-03-03 RX ORDER — ASPIRIN 81 MG/1
81 TABLET ORAL DAILY
Qty: 30 TABLET | Refills: 11 | Status: SHIPPED | OUTPATIENT
Start: 2021-03-03

## 2021-03-03 NOTE — TELEPHONE ENCOUNTER
Called and left a message to call the office back to schedule a new pt appointment with next available doctor.

## 2021-03-09 NOTE — PROGRESS NOTES
[address] [facility],[address] and [address] [facility] Electrophysiology  Consultation Report  PATIENT: [facility] RECORD NUMBER: <[id number]>  DATE OF SERVICE:  3/10/2021  ATTENDING ELECTROPHYSIOLOGIST: Juliet Martins MD  REFERRING PHYSICIAN: Herb Chawla PA-C and Lana Baird MD  CHIEF COMPLAINT: Palpitations and abnormal Holter monitor    HPI: This is a 76 y.o. female with a history of   Patient Active Problem List   Diagnosis    Non-rheumatic mitral regurgitation    Hypertensive left ventricular hypertrophy, without heart failure    Dyslipidemia    Non morbid obesity    Essential hypertension    Diabetes mellitus, insulin dependent (IDDM), controlled    [facility]ocardiopathy ([facility] [address]    Tobacco use    Near syncope    Diarrhea    Generalized abdominal pain    Abdominal pain    Pre-syncope    PVD (peripheral vascular disease) with claudication (HCC)    Chronic pain of both ankles   who presents to cardiac electrophysiology clinic for consultation of palpitations and abnormal Holter monitor which shows NSVT. Ms. Thomas High follows with Dr. Paula Noguera from a Cardiology POV and complained of ongoing palpitations in January 2021. She a 48 hour HM that showed around 11% PVC burden and episodes of NSVT (maximum of 4 beats) and  SVT (maximum of 9 beats) were noted. While wearing the HM she reports being unaware of having any palpitations. She reports that her episodes are sporadic, occur regardless of activity, last a few seconds and resolve spontaneously. She goes on to report feeling well at today's visit but does report SOBOE especially with stairs. She presents today in NSR. The patient denies any chest pain, dizziness, syncope, orthopnea or paroxysmal nocturnal dyspnea.       Patient Active Problem List    Diagnosis Date Noted    PVD (peripheral vascular disease) with claudication ([facility] [address] 07/20/2020    Chronic pain of both ankles 07/20/2020    Pre-syncope 11/26/2018    Near syncope 11/24/2018    Diarrhea 11/24/2018    Generalized abdominal pain 11/24/2018    Abdominal pain 11/24/2018    Myocardiopathy ([facility] [address] 09/17/2018    Tobacco use 09/17/2018    Non-rheumatic mitral regurgitation 03/19/2018    Hypertensive left ventricular hypertrophy, without heart failure 03/19/2018    Dyslipidemia 03/19/2018    Non morbid obesity 03/19/2018    Essential hypertension 03/19/2018    Diabetes mellitus, insulin dependent (IDDM), controlled 03/19/2018       Past Medical History:   Diagnosis Date    Arthritis     Asthma     Cerebral artery occlusion with cerebral infarction ([facility] [address] 2015    COPD (chronic obstructive pulmonary disease) ([facility] [address]     Diabetes mellitus ([facility] [address]     Heart attack ([facility] [address]     Hyperlipidemia     Hypertension     VHD (valvular heart disease)        Family History   Problem Relation Age of Onset    COPD Mother     Alzheimer's Disease Mother     Alcohol Abuse Father     No Known Problems Sister     Diabetes Brother     No Known Problems Brother        Social History     Tobacco Use    Smoking status: Current Every Day Smoker     Packs/day: 0.25     Years: 45.00     Pack years: 11.25     Types: Cigarettes    Smokeless tobacco: Never Used   Substance Use Topics    Alcohol use: Yes     Alcohol/week: 7.0 - 8.0 standard drinks     Types: 7 - 8 Glasses of wine per week     Frequency: 4 or more times a week     Drinks per session: 1 or 2     Binge frequency: Never     Comment: glass of wine with dinner.   1 cup of coffee a day        Current Outpatient Medications   Medication Sig Dispense Refill    aspirin EC 81 MG EC tablet Take 1 tablet by mouth daily 30 tablet 11    metoprolol succinate (TOPROL XL) 25 MG extended release tablet take 1 tablet by mouth once daily 90 tablet 3    rosuvastatin (CRESTOR) 40 MG tablet Take 1 tablet by mouth every evening 30 tablet 2    clopidogrel (PLAVIX) 75 MG tablet Take 1 tablet by mouth daily 90 tablet 1    cilostazol (PLETAL) 100 MG tablet take 1 tablet by mouth twice a day 60 tablet 5    ipratropium-albuterol (DUONEB) 0.5-2.5 (3) MG/3ML SOLN nebulizer solution Inhale 3 mLs into the lungs every 6 hours as needed for Shortness of Breath 360 mL 0    Multiple Vitamins-Minerals (CENTRUM ADULTS) TABS Take 1 tablet by mouth daily       venlafaxine 150 MG extended release tablet Take 150 mg by mouth daily (with breakfast)       gabapentin (NEURONTIN) 300 MG capsule Take 300 mg by mouth 3 times daily. [place] cetirizine (ZYRTEC) 10 MG tablet Take 10 mg by mouth daily      ADVAIR DISKUS 250-50 MCG/DOSE AEPB Inhale 1 puff into the lungs 2 times daily       COMBIVENT RESPIMAT  MCG/ACT AERS inhaler 1 puff 2 times daily       lisinopril (PRINIVIL;ZESTRIL) 2.5 MG tablet Take 1 tablet by mouth daily 90 tablet 3    insulin lispro (HUMALOG) 100 UNIT/ML injection vial Inject into the skin Checks BS 4-6x/day; For use with insulin pump;  Uses sliding scale       No current facility-administered medications for this visit. Allergies   Allergen Reactions    Pcn [Penicillins] Hives       ROS:   Constitutional: Negative for fever, activity change and appetite change. HENT: Negative for epistaxis. Eyes: Negative for diploplia, blurred vision. Respiratory: Negative for cough, chest tightness, shortness of breath and wheezing. Cardiovascular: pertinent positives in HPI  Gastrointestinal: Negative for abdominal pain and blood in stool. All other review of systems are negative     PHYSICAL EXAM:   Vitals:    03/10/21 0843   BP: 136/72   Pulse: 82   Resp: 18   Weight: 188 lb 6.4 oz (85.5 kg)   Height: 5' 3\" (1.6 m)      Constitutional: Well-developed, no acute distress  Eyes: conjunctivae normal, no xanthelasma   Ears, Nose, Throat: oral mucosa moist, no cyanosis   CV: no JVD. Regular rate and rhythm. Normal S1S2 and no S3. No murmurs, rubs, or gallops.  PMI is nondisplaced  Lungs: clear to auscultation bilaterally, normal respiratory effort without used of accessory muscles  Abdomen: soft, non-tender, bowel sounds present, no masses or hepatomegaly   Musculoskeletal: no digital clubbing, no edema   Skin: warm, no rashes     I have personally reviewed the laboratory, cardiac diagnostic and radiographic testing as outlined below:    Data:    No results for input(s): WBC, HGB, HCT, PLT in the last 72 hours. No results for input(s): NA, K, CL, CO2, BUN, CREATININE, CALCIUM in the last 72 hours. Invalid input(s): GLU, MAGNESIUM   No results found for: MG  No results for input(s): TSH in the last 72 hours. No results for input(s): INR in the last 72 hours. EKG 3/10/21: SR, rate: 82 bpm - Please see scan in Cardiology. 48 hour Holter monitor 02/2021:  Frequent PVCs, 11% of beats  Episodes of NSVT and SVT noted.          Exercise Non-nuclear Stress Test (2/2021)  Exercise:  The patient exercised using a Pacheco protocol,   completing 2:44 minutes and reaching an estimated work load of   4.5 metabolic equivalents (METS). Resting HR was 89. Peak   exercise heart rate was 116 ( 75% of maximum predicted heart rate   for age). Baseline /86. Peak exercise /59. The blood pressure response to exercise was normal     Exercise was terminated due to bilateral ankle discomfort. The patient experienced no chest pain with exercise. Pulse oximetry was used to monitor oxygen saturation during the   stress test.  The study was performed on Room Air.  The resting   pulse oximeter was 98%.  The post stress O2 saturation seen   during exercise was 98 %.             Exercise ECG:   The patient demonstrated occasional PVC's during exercise. With exercise, EKG was nondiagnostic since patient did not   achieve 85% of her maximum predicted heart rate. Impression:     1. Exercise EKG was normal.   2. The patient experienced shortness of breath and claudication   with exercise.    3. [place] treadmill score was 2 implying intermediate risk of acute ischemic events.     4. Exercise capacity was impaired. 5. Nondiagnostic, suboptimal exercise stress test since patient   did not achieve 85% of her maximum predicted heart rate (patient   achieved 75% of her maximum predicted heart rate), test was   terminated due to dyspnea on exertion and significant   claudication      Echocardiogram (Dr. Gemma Bradley, 02/2021)   Summary:   Normal left ventricular chamber size.   Normal left ventricular systolic function, LVEF is 65%.   Mild left ventricular concentric hypertrophy noted.   Stage I diastolic dysfunction.   Interatrial septum not well visualized but appears intact.   Normal right ventricle size and function.   No mitral valve prolapse.   The aortic valve appears mildly sclerotic.   No hemodynamically significant aortic stenosis.   There is mild aortic regurgitation.   Normal aortic root size.   No evidence of pericardial effusion.   No intra cardiac mass or thrombus.   Compared to prior echo from 4/2018, no changes noted.       I have independently reviewed all of the ECGs and rhythm strips per above     Assessment/Plan: This is a 76 y.o. female with a history of   Patient Active Problem List   Diagnosis    Non-rheumatic mitral regurgitation    Hypertensive left ventricular hypertrophy, without heart failure    Dyslipidemia    Non morbid obesity    Essential hypertension    Diabetes mellitus, insulin dependent (IDDM), controlled    Myocardiopathy ([address]    Tobacco use    Near syncope    Diarrhea    Generalized abdominal pain    Abdominal pain    Pre-syncope    PVD (peripheral vascular disease) with claudication (HCC)    Chronic pain of both ankles    who presents with palpitations and abnormal Holter monitor result. 1. PVC and non-sustained ventricular tachycardia  - ? Asymptomatic.  - Maximum of 4 beats. -11% PVC burden. - Currently on Toprol for suppression.   - Echocardiogram showed LV EF 65%. - Exercise stress test was non diagnostic.   - CTA coronary is pending. 2. Supraventricular tachycardia  - ? Asymptomatic.  - Maximum of 9 beats.  - On Toprol XL. 3. Hypertension  - Well controlled at this office visit. - On Lisinopril and Toprol XL.   - Follows with Dr. Danielle Pete. 4. COPD  - On Duoneb, Advair and Combivent. 5. Diabetes mellitus  - On Insulin. 6. Hyperlipidemia  - On Crestor    7. Tobacco abuse  - Urged cessation. 8. Peripheral arterial disease  - Bilateral common iliac stenosis s/p angioplasty 7/28/20.    9. Obesity  - Encouraged weight loss. - Body mass index is 33.37 kg/m². 10. Sleep disturbance  - Awaiting evaluation from sleep medicine. Recommendations:  1. Thirty day cardiac monitor to further identify the the cause of palpitations and further verify SVT and NSVT burdens. 2. Await for CTA coronary result to exclude significant CAD. 3. Continue Toprol XL 25 mg daily. 4. Follow up in 3 months. Encouraged the patient to call the office for any questions or concerns. I have spent a total of 60 minutes with the patient and the family reviewing the above stated recommendations. And a total of >50% of that time involved face-to-face time providing counseling and or coordination of care with the other providers. Thank you for allowing me to participate in your patient's care. Please call me if there are any questions or concerns.       Curtis Burgess MD  Cardiac Electrophysiology  [facility]  [facility] [place]  3/10/21   9:09 AM

## 2021-03-10 ENCOUNTER — OFFICE VISIT (OUTPATIENT)
Dept: NON INVASIVE DIAGNOSTICS | Age: 66
End: 2021-03-10
Payer: MEDICARE

## 2021-03-10 VITALS
DIASTOLIC BLOOD PRESSURE: 72 MMHG | SYSTOLIC BLOOD PRESSURE: 136 MMHG | WEIGHT: 188.4 LBS | HEIGHT: 63 IN | RESPIRATION RATE: 18 BRPM | BODY MASS INDEX: 33.38 KG/M2 | HEART RATE: 82 BPM

## 2021-03-10 DIAGNOSIS — I47.29 NSVT (NONSUSTAINED VENTRICULAR TACHYCARDIA): Primary | ICD-10-CM

## 2021-03-10 PROCEDURE — 99205 OFFICE O/P NEW HI 60 MIN: CPT | Performed by: INTERNAL MEDICINE

## 2021-03-10 PROCEDURE — 93000 ELECTROCARDIOGRAM COMPLETE: CPT | Performed by: INTERNAL MEDICINE

## 2021-03-10 NOTE — PROGRESS NOTES
Pt tolerated placement of 30 day preventice monitor per Dr. Carollee Gosselin. Pt understood use and return of device.      Electronically signed by Winter Ward MA on 3/10/2021 at 9:17 AM

## 2021-03-26 ENCOUNTER — TELEPHONE (OUTPATIENT)
Dept: VASCULAR SURGERY | Age: 66
End: 2021-03-26

## 2021-03-29 ENCOUNTER — OFFICE VISIT (OUTPATIENT)
Dept: VASCULAR SURGERY | Age: 66
End: 2021-03-29
Payer: MEDICARE

## 2021-03-29 ENCOUNTER — HOSPITAL ENCOUNTER (OUTPATIENT)
Dept: CARDIOLOGY | Age: 66
Discharge: HOME | End: 2021-03-29
Payer: MEDICARE

## 2021-03-29 VITALS — HEIGHT: 64 IN | WEIGHT: 196 LBS | BODY MASS INDEX: 33.46 KG/M2

## 2021-03-29 DIAGNOSIS — I73.9 PVD (PERIPHERAL VASCULAR DISEASE) WITH CLAUDICATION (HCC): ICD-10-CM

## 2021-03-29 DIAGNOSIS — I73.9 PVD (PERIPHERAL VASCULAR DISEASE) (HCC): Primary | ICD-10-CM

## 2021-03-29 PROCEDURE — 93923 UPR/LXTR ART STDY 3+ LVLS: CPT

## 2021-03-29 PROCEDURE — 99214 OFFICE O/P EST MOD 30 MIN: CPT | Performed by: SURGERY

## 2021-03-29 NOTE — PROGRESS NOTES
Outpatient Medications   Medication Sig Dispense Refill    aspirin EC 81 MG EC tablet Take 1 tablet by mouth daily 30 tablet 11    metoprolol succinate (TOPROL XL) 25 MG extended release tablet take 1 tablet by mouth once daily 90 tablet 3    rosuvastatin (CRESTOR) 40 MG tablet Take 1 tablet by mouth every evening 30 tablet 2    clopidogrel (PLAVIX) 75 MG tablet Take 1 tablet by mouth daily 90 tablet 1    cilostazol (PLETAL) 100 MG tablet take 1 tablet by mouth twice a day 60 tablet 5    ipratropium-albuterol (DUONEB) 0.5-2.5 (3) MG/3ML SOLN nebulizer solution Inhale 3 mLs into the lungs every 6 hours as needed for Shortness of Breath 360 mL 0    Multiple Vitamins-Minerals (CENTRUM ADULTS) TABS Take 1 tablet by mouth daily       venlafaxine 150 MG extended release tablet Take 150 mg by mouth daily (with breakfast)       gabapentin (NEURONTIN) 300 MG capsule Take 300 mg by mouth 3 times daily. [place] cetirizine (ZYRTEC) 10 MG tablet Take 10 mg by mouth daily      ADVAIR DISKUS 250-50 MCG/DOSE AEPB Inhale 1 puff into the lungs 2 times daily       COMBIVENT RESPIMAT  MCG/ACT AERS inhaler 1 puff 2 times daily       lisinopril (PRINIVIL;ZESTRIL) 2.5 MG tablet Take 1 tablet by mouth daily 90 tablet 3    insulin lispro (HUMALOG) 100 UNIT/ML injection vial Inject into the skin Checks BS 4-6x/day; For use with insulin pump;  Uses sliding scale       No current facility-administered medications for this visit.       Allergies:  Pcn [penicillins]  Social History     Socioeconomic History    Marital status:      Spouse name: Not on file    Number of children: Not on file    Years of education: Not on file    Highest education level: Not on file   Occupational History    Not on file   Social Needs    Financial resource strain: Not on file    Food insecurity     Worry: Not on file     Inability: Not on file    Transportation needs     Medical: Not on file     Non-medical: Not on file   Tobacco Use    Smoking status: Current Every Day Smoker     Packs/day: 0.25     Years: 45.00     Pack years: 11.25     Types: Cigarettes    Smokeless tobacco: Never Used   Substance and Sexual Activity    Alcohol use: Yes     Alcohol/week: 7.0 - 8.0 standard drinks     Types: 7 - 8 Glasses of wine per week     Frequency: 4 or more times a week     Drinks per session: 1 or 2     Binge frequency: Never     Comment: glass of wine with dinner. 1 cup of coffee a day     Drug use: Never    Sexual activity: Not on file   Lifestyle    Physical activity     Days per week: Not on file     Minutes per session: Not on file    Stress: Not on file   Relationships    Social connections     Talks on phone: Not on file     Gets together: Not on file     Attends Yazidism service: Not on file     Active member of club or organization: Not on file     Attends meetings of clubs or organizations: Not on file     Relationship status: Not on file    Intimate partner violence     Fear of current or ex partner: Not on file     Emotionally abused: Not on file     Physically abused: Not on file     Forced sexual activity: Not on file   Other Topics Concern    Not on file   Social History Narrative    Drinks 2 cups coffee daily.          Family History   Problem Relation Age of Onset    COPD Mother     Alzheimer's Disease Mother     Alcohol Abuse Father     No Known Problems Sister     Diabetes Brother     No Known Problems Brother      Labs  Lab Results   Component Value Date    WBC 5.5 01/04/2021    HGB 13.0 01/04/2021    HCT 41.0 01/04/2021     01/04/2021    PROTIME 10.1 07/28/2020    INR 0.9 07/28/2020    K 4.3 01/04/2021    BUN 15 01/04/2021    CREATININE 0.9 01/04/2021     PHYSICAL EXAM:    CONSTITUTIONAL:   Awake, alert, cooperative  PSYCHIATRIC :  Oriented to time, place and person     Appropriate insight to disease process  EYES: Lids and lashes normal  ENT:  External ears and nose without lesions   Hearing deficits not noted  NECK: Supple, symmetrical, trachea midline   Carotid bruit not noted  LUNGS:  No increased work of breathing                 Clear to auscultation  CARDIOVASCULAR:  regular rate and rhythm   ABDOMEN:  soft, non-distended, mod ttp bilaterally mid abdomen  SKIN:   Normal skin color   Texture and turgor normal, no induration  EXTREMITIES:   R UE 5/5 strength  L UE 5/5 strength  R LE Edema slight   No open wounds  L LE Edema slight   No open wounds    RADIOLOGY:  3/29/2021  Right ZEINA 0.92, TBI 0.65  Left ZEINA 1.01, TBI 0.63    9/2/2020  Right ZEINA 0.77, TBI 0.49  Left ZEINA 0.91, TBI 0.50    A/P PVD with bilateral thigh claudication s/p plasty  · She remains assx  · Arterial studies reviewed  · R slightly improved  · L slightly worse  · No indication for intervention at this time  · Continue Medical management with ASA, plavix,and statin  · Stop pletal as she has no sxs  · Discussed with pt tobacco use and significant relationship to PVD   pt is smoking again - she is smoking about 5 cigarettes a day  pt offered nicotine patch and is not interested - she was previously on chantix and is going to restart chantix  Pt understands tobacco uses potential to cause increased problems post intervention, future worsening of disease, and even limb loss  · Walking Program  · Emphasized importance of foot care  · They understood to call if develops any wounds or ulcerations, changes in appearance or symptoms  · abis and pvrs at fu visit     Chronic Ankle Pain  · Ankle pain has improved since angiogram   · Likely osteoarthritis is playing a role also    Non compliance with medications  · Emphasized that if she continues this pattern of behavior she puts her self at high risk for complication and even death    Loyda Morataya

## 2021-04-14 DIAGNOSIS — I47.29 NSVT (NONSUSTAINED VENTRICULAR TACHYCARDIA): ICD-10-CM

## 2021-04-16 ENCOUNTER — TELEPHONE (OUTPATIENT)
Dept: NON INVASIVE DIAGNOSTICS | Age: 66
End: 2021-04-16

## 2021-04-16 NOTE — TELEPHONE ENCOUNTER
----- Message from Jerrod Grace MD sent at 4/14/2021  5:23 PM EDT -----  Thirty day monitor showed 6 beats NSVT. Advised to increase Toprol XL to 25 mg bid.  Thanks  ----- Message -----  From: Zhao Giraldo  Sent: 4/14/2021   4:16 PM EDT  To: Jerrod Grace MD

## 2021-04-18 NOTE — PATIENT INSTRUCTIONS
Patient Education        Learning About Peripheral Arterial Disease (PAD)  What is peripheral arterial disease? Peripheral arterial disease (PAD) is narrowing or blockage of arteries that causes poor blood flow to your arms and legs. PAD is most common in the legs. The most common cause of PAD is the buildup of plaque on the inside of arteries. Over time, plaque builds up in the walls of the arteries, including those that supply blood to your legs. If you have PAD, you're likely to have plaque in other arteries in your body. This raises your risk of a heart attack and stroke. Medicines and lifestyle changes may lower your risk of heart attack and stroke. They may also help if you have symptoms. In some cases, surgery or other treatment is needed. Peripheral arterial disease is also called peripheral vascular disease. What are the symptoms? Many people who have PAD don't have symptoms. If you have symptoms, they may include a tight, aching, or squeezing pain in your calf, thigh, or buttock. This pain is called intermittent claudication. It usually happens after you have walked a certain distance. The pain goes away if you stop walking. As PAD gets worse, you may have pain in your foot or toe when you aren't walking. Other symptoms may include weak or tired legs. You might have trouble walking or balancing. If PAD gets worse, you may have other symptoms caused by poor blood flow to your legs and feet. These symptoms aren't common. They may include cold or numb feet or toes, sores that are slow to heal, or leg or foot pain when you're at rest.  How can you prevent PAD? · Quit smoking. Quitting smoking is one of the best things you can do to help prevent PAD. If you need help quitting, talk to your doctor about stop-smoking programs and medicines. These can increase your chances of quitting for good. · Stay at a healthy weight.   · Manage other health problems, including diabetes, high blood pressure, and high cholesterol. · Be physically active. Try to do moderate activity at least 2½ hours a week. Or try to do vigorous activity at least 1¼ hours a week. You may want to walk or try other activities, such as running, swimming, cycling, or playing tennis or team sports. · Eat a variety of heart-healthy foods. ? Eat fruits, vegetables, whole grains, beans, and other high-fiber foods. ? Eat lean proteins, such as seafood, lean meats, beans, nuts, and soy products. ? Eat healthy fats, such as canola and olive oil. ? Choose foods that are low in saturated fat and avoid trans fat. ? Limit sodium and alcohol. ? Limit drinks and foods with added sugar. How is PAD treated? Treatment for PAD focuses on relieving symptoms and lowering your risk of heart attack and stroke. Making healthy lifestyle changes can help you lower this risk. · If you smoke, quit. Quitting is the best thing you can do when you have PAD. Medicines and counseling can help you quit for good. · Get regular exercise (if your doctor says it's safe). Try walking, swimming, or biking for at least 30 minutes on most, if not all, days of the week. If you have leg symptoms when you exercise, your doctor might recommend a specialized exercise program that may relieve symptoms. The goal is to be able to walk farther without pain. · Eat heart-healthy foods, such as vegetables, fruits, nuts, beans, fish, and whole grains. Limit foods that have a lot of salt, fat, and sugar. · Stay at a healthy weight. Lose weight if you need to. You may need medicines to help lower your risk of heart attack and stroke. These include medicine to prevent blood clots, improve cholesterol, or lower blood pressure. You also may take a medicine that can help ease pain while you are walking. People who have severe PAD may have bypass surgery or other procedures (such as angioplasty) to restore proper blood flow to the legs.   Follow-up care is a key part of your treatment and safety. Be sure to make and go to all appointments, and call your doctor if you are having problems. It's also a good idea to know your test results and keep a list of the medicines you take. Where can you learn more? Go to [email or web address] org and sign in to your [employer] account. Enter H829 in the Fanplayr box to learn more about \"Learning About Peripheral Arterial Disease (PAD). \"     If you do not have an account, please click on the \"Sign Up Now\" link. Current as of: August 31, 2020               Content Version: 12.8  © 9607-2433 Healthwise, Incorporated. Care instructions adapted under license by [facility] ([facility]). If you have questions about a medical condition or this instruction, always ask your healthcare professional. [address] any warranty or liability for your use of this information.

## 2021-04-20 ENCOUNTER — TELEPHONE (OUTPATIENT)
Dept: CARDIOLOGY CLINIC | Age: 66
End: 2021-04-20

## 2021-04-21 RX ORDER — METOPROLOL SUCCINATE 25 MG/1
25 TABLET, EXTENDED RELEASE ORAL 2 TIMES DAILY
COMMUNITY
End: 2021-04-21 | Stop reason: SDUPTHER

## 2021-04-21 RX ORDER — METOPROLOL SUCCINATE 25 MG/1
25 TABLET, EXTENDED RELEASE ORAL 2 TIMES DAILY
Qty: 30 TABLET | Refills: 6 | Status: SHIPPED
Start: 2021-04-21 | End: 2021-08-24 | Stop reason: DRUGHIGH

## 2021-04-21 NOTE — TELEPHONE ENCOUNTER
Zakia called back. I explained to her that doctor wanted her to increase her toprol xl to twice a day.   Patient verbalized understanding

## 2021-08-24 ENCOUNTER — APPOINTMENT (OUTPATIENT)
Dept: GENERAL RADIOLOGY | Age: 66
DRG: 207 | End: 2021-08-24
Payer: MEDICARE

## 2021-08-24 ENCOUNTER — HOSPITAL ENCOUNTER (INPATIENT)
Age: 66
LOS: 22 days | Discharge: NURSING, REHAB OR LONG-TERM CARE | DRG: 207 | End: 2021-09-15
Attending: EMERGENCY MEDICINE | Admitting: INTERNAL MEDICINE
Payer: MEDICARE

## 2021-08-24 DIAGNOSIS — R06.02 SOB (SHORTNESS OF BREATH): ICD-10-CM

## 2021-08-24 DIAGNOSIS — T78.3XXA ANGIOEDEMA, INITIAL ENCOUNTER: ICD-10-CM

## 2021-08-24 DIAGNOSIS — R06.03 ACUTE RESPIRATORY DISTRESS: ICD-10-CM

## 2021-08-24 DIAGNOSIS — J96.01 ACUTE RESPIRATORY FAILURE WITH HYPOXIA AND HYPERCAPNIA (HCC): Primary | ICD-10-CM

## 2021-08-24 DIAGNOSIS — J96.02 ACUTE RESPIRATORY FAILURE WITH HYPOXIA AND HYPERCAPNIA (HCC): Primary | ICD-10-CM

## 2021-08-24 PROBLEM — J96.00 ACUTE RESPIRATORY FAILURE (HCC): Status: ACTIVE | Noted: 2021-08-24

## 2021-08-24 PROBLEM — E11.9 DIABETES MELLITUS (HCC): Status: ACTIVE | Noted: 2018-03-19

## 2021-08-24 PROBLEM — T46.4X5A ACE INHIBITOR-AGGRAVATED ANGIOEDEMA: Status: ACTIVE | Noted: 2021-08-24

## 2021-08-24 LAB
ALBUMIN SERPL-MCNC: 4 G/DL (ref 3.5–5.2)
ALP BLD-CCNC: 85 U/L (ref 35–104)
ALT SERPL-CCNC: 19 U/L (ref 0–32)
ANION GAP SERPL CALCULATED.3IONS-SCNC: 12 MMOL/L (ref 7–16)
AST SERPL-CCNC: 26 U/L (ref 0–31)
BASOPHILS ABSOLUTE: 0.08 E9/L (ref 0–0.2)
BASOPHILS RELATIVE PERCENT: 0.7 % (ref 0–2)
BILIRUB SERPL-MCNC: 0.4 MG/DL (ref 0–1.2)
BUN BLDV-MCNC: 17 MG/DL (ref 6–23)
CALCIUM SERPL-MCNC: 9.2 MG/DL (ref 8.6–10.2)
CHLORIDE BLD-SCNC: 94 MMOL/L (ref 98–107)
CO2: 28 MMOL/L (ref 22–29)
CREAT SERPL-MCNC: 1.4 MG/DL (ref 0.5–1)
EOSINOPHILS ABSOLUTE: 0.21 E9/L (ref 0.05–0.5)
EOSINOPHILS RELATIVE PERCENT: 1.9 % (ref 0–6)
GFR AFRICAN AMERICAN: 45
GFR NON-AFRICAN AMERICAN: 37 ML/MIN/1.73
GLUCOSE BLD-MCNC: 285 MG/DL (ref 74–99)
HCT VFR BLD CALC: 47.2 % (ref 34–48)
HEMOGLOBIN: 15.7 G/DL (ref 11.5–15.5)
IMMATURE GRANULOCYTES #: 0.03 E9/L
IMMATURE GRANULOCYTES %: 0.3 % (ref 0–5)
LYMPHOCYTES ABSOLUTE: 2.47 E9/L (ref 1.5–4)
LYMPHOCYTES RELATIVE PERCENT: 21.8 % (ref 20–42)
MCH RBC QN AUTO: 31.2 PG (ref 26–35)
MCHC RBC AUTO-ENTMCNC: 33.3 % (ref 32–34.5)
MCV RBC AUTO: 93.7 FL (ref 80–99.9)
METER GLUCOSE: 229 MG/DL (ref 74–99)
MONOCYTES ABSOLUTE: 1.14 E9/L (ref 0.1–0.95)
MONOCYTES RELATIVE PERCENT: 10 % (ref 2–12)
NEUTROPHILS ABSOLUTE: 7.42 E9/L (ref 1.8–7.3)
NEUTROPHILS RELATIVE PERCENT: 65.3 % (ref 43–80)
PDW BLD-RTO: 12.1 FL (ref 11.5–15)
PLATELET # BLD: 307 E9/L (ref 130–450)
PMV BLD AUTO: 10.2 FL (ref 7–12)
POTASSIUM REFLEX MAGNESIUM: 4.4 MMOL/L (ref 3.5–5)
RBC # BLD: 5.04 E12/L (ref 3.5–5.5)
SODIUM BLD-SCNC: 134 MMOL/L (ref 132–146)
TOTAL PROTEIN: 6.3 G/DL (ref 6.4–8.3)
WBC # BLD: 11.4 E9/L (ref 4.5–11.5)

## 2021-08-24 PROCEDURE — 31500 INSERT EMERGENCY AIRWAY: CPT

## 2021-08-24 PROCEDURE — 94002 VENT MGMT INPAT INIT DAY: CPT

## 2021-08-24 PROCEDURE — 2000000000 HC ICU R&B

## 2021-08-24 PROCEDURE — 96375 TX/PRO/DX INJ NEW DRUG ADDON: CPT

## 2021-08-24 PROCEDURE — 2580000003 HC RX 258: Performed by: INTERNAL MEDICINE

## 2021-08-24 PROCEDURE — 96367 TX/PROPH/DG ADDL SEQ IV INF: CPT

## 2021-08-24 PROCEDURE — 85025 COMPLETE CBC W/AUTO DIFF WBC: CPT

## 2021-08-24 PROCEDURE — 71045 X-RAY EXAM CHEST 1 VIEW: CPT

## 2021-08-24 PROCEDURE — 2500000003 HC RX 250 WO HCPCS: Performed by: STUDENT IN AN ORGANIZED HEALTH CARE EDUCATION/TRAINING PROGRAM

## 2021-08-24 PROCEDURE — 6370000000 HC RX 637 (ALT 250 FOR IP)

## 2021-08-24 PROCEDURE — 80053 COMPREHEN METABOLIC PANEL: CPT

## 2021-08-24 PROCEDURE — 51702 INSERT TEMP BLADDER CATH: CPT

## 2021-08-24 PROCEDURE — 6360000002 HC RX W HCPCS: Performed by: PHYSICIAN ASSISTANT

## 2021-08-24 PROCEDURE — 0CJS8ZZ INSPECTION OF LARYNX, VIA NATURAL OR ARTIFICIAL OPENING ENDOSCOPIC: ICD-10-PCS | Performed by: OTOLARYNGOLOGY

## 2021-08-24 PROCEDURE — 6360000002 HC RX W HCPCS: Performed by: EMERGENCY MEDICINE

## 2021-08-24 PROCEDURE — 96365 THER/PROPH/DIAG IV INF INIT: CPT

## 2021-08-24 PROCEDURE — 36415 COLL VENOUS BLD VENIPUNCTURE: CPT

## 2021-08-24 PROCEDURE — 6360000002 HC RX W HCPCS

## 2021-08-24 PROCEDURE — 31575 DIAGNOSTIC LARYNGOSCOPY: CPT | Performed by: OTOLARYNGOLOGY

## 2021-08-24 PROCEDURE — 2500000003 HC RX 250 WO HCPCS: Performed by: EMERGENCY MEDICINE

## 2021-08-24 PROCEDURE — 36600 WITHDRAWAL OF ARTERIAL BLOOD: CPT

## 2021-08-24 PROCEDURE — 2580000003 HC RX 258

## 2021-08-24 PROCEDURE — 99222 1ST HOSP IP/OBS MODERATE 55: CPT | Performed by: OTOLARYNGOLOGY

## 2021-08-24 PROCEDURE — 82803 BLOOD GASES ANY COMBINATION: CPT

## 2021-08-24 PROCEDURE — 82962 GLUCOSE BLOOD TEST: CPT

## 2021-08-24 PROCEDURE — 2580000003 HC RX 258: Performed by: EMERGENCY MEDICINE

## 2021-08-24 PROCEDURE — 99283 EMERGENCY DEPT VISIT LOW MDM: CPT

## 2021-08-24 PROCEDURE — 2500000003 HC RX 250 WO HCPCS

## 2021-08-24 RX ORDER — SODIUM CHLORIDE, SODIUM LACTATE, POTASSIUM CHLORIDE, CALCIUM CHLORIDE 600; 310; 30; 20 MG/100ML; MG/100ML; MG/100ML; MG/100ML
INJECTION, SOLUTION INTRAVENOUS CONTINUOUS
Status: DISCONTINUED | OUTPATIENT
Start: 2021-08-24 | End: 2021-09-07

## 2021-08-24 RX ORDER — ROSUVASTATIN CALCIUM 40 MG/1
40 TABLET, COATED ORAL DAILY
COMMUNITY

## 2021-08-24 RX ORDER — METHYLPREDNISOLONE SODIUM SUCCINATE 125 MG/2ML
125 INJECTION, POWDER, LYOPHILIZED, FOR SOLUTION INTRAMUSCULAR; INTRAVENOUS ONCE
Status: COMPLETED | OUTPATIENT
Start: 2021-08-24 | End: 2021-08-24

## 2021-08-24 RX ORDER — BUDESONIDE, GLYCOPYRROLATE, AND FORMOTEROL FUMARATE 160; 9; 4.8 UG/1; UG/1; UG/1
2 AEROSOL, METERED RESPIRATORY (INHALATION) 2 TIMES DAILY
COMMUNITY

## 2021-08-24 RX ORDER — KETAMINE HYDROCHLORIDE 10 MG/ML
150 INJECTION, SOLUTION INTRAMUSCULAR; INTRAVENOUS ONCE
Status: COMPLETED | OUTPATIENT
Start: 2021-08-24 | End: 2021-08-24

## 2021-08-24 RX ORDER — ACETAMINOPHEN 325 MG/1
650 TABLET ORAL EVERY 6 HOURS PRN
Status: DISCONTINUED | OUTPATIENT
Start: 2021-08-24 | End: 2021-09-15 | Stop reason: HOSPADM

## 2021-08-24 RX ORDER — METHYLPREDNISOLONE SODIUM SUCCINATE 125 MG/2ML
60 INJECTION, POWDER, LYOPHILIZED, FOR SOLUTION INTRAMUSCULAR; INTRAVENOUS EVERY 6 HOURS
Status: DISCONTINUED | OUTPATIENT
Start: 2021-08-24 | End: 2021-08-25

## 2021-08-24 RX ORDER — SODIUM CHLORIDE 0.9 % (FLUSH) 0.9 %
5-40 SYRINGE (ML) INJECTION PRN
Status: DISCONTINUED | OUTPATIENT
Start: 2021-08-24 | End: 2021-09-13

## 2021-08-24 RX ORDER — LEVOTHYROXINE SODIUM 0.03 MG/1
25 TABLET ORAL DAILY
COMMUNITY

## 2021-08-24 RX ORDER — MIDAZOLAM HYDROCHLORIDE 1 MG/ML
2 INJECTION INTRAMUSCULAR; INTRAVENOUS ONCE
Status: COMPLETED | OUTPATIENT
Start: 2021-08-24 | End: 2021-08-24

## 2021-08-24 RX ORDER — ONDANSETRON 4 MG/1
4 TABLET, ORALLY DISINTEGRATING ORAL EVERY 8 HOURS PRN
Status: DISCONTINUED | OUTPATIENT
Start: 2021-08-24 | End: 2021-09-15 | Stop reason: HOSPADM

## 2021-08-24 RX ORDER — DEXTROSE MONOHYDRATE 25 G/50ML
12.5 INJECTION, SOLUTION INTRAVENOUS PRN
Status: DISCONTINUED | OUTPATIENT
Start: 2021-08-24 | End: 2021-09-15 | Stop reason: HOSPADM

## 2021-08-24 RX ORDER — ACETAMINOPHEN 650 MG/1
650 SUPPOSITORY RECTAL EVERY 6 HOURS PRN
Status: DISCONTINUED | OUTPATIENT
Start: 2021-08-24 | End: 2021-09-15 | Stop reason: HOSPADM

## 2021-08-24 RX ORDER — SODIUM CHLORIDE 0.9 % (FLUSH) 0.9 %
5-40 SYRINGE (ML) INJECTION EVERY 12 HOURS SCHEDULED
Status: DISCONTINUED | OUTPATIENT
Start: 2021-08-24 | End: 2021-09-13

## 2021-08-24 RX ORDER — LISINOPRIL 20 MG/1
20 TABLET ORAL DAILY
Status: ON HOLD | COMMUNITY
End: 2021-09-12 | Stop reason: HOSPADM

## 2021-08-24 RX ORDER — POLYETHYLENE GLYCOL 3350 17 G/17G
17 POWDER, FOR SOLUTION ORAL DAILY PRN
Status: DISCONTINUED | OUTPATIENT
Start: 2021-08-24 | End: 2021-09-15 | Stop reason: HOSPADM

## 2021-08-24 RX ORDER — METOPROLOL SUCCINATE 25 MG/1
25 TABLET, EXTENDED RELEASE ORAL DAILY
Status: ON HOLD | COMMUNITY
End: 2021-09-12 | Stop reason: HOSPADM

## 2021-08-24 RX ORDER — PANTOPRAZOLE SODIUM 40 MG/1
40 TABLET, DELAYED RELEASE ORAL DAILY
Status: DISCONTINUED | OUTPATIENT
Start: 2021-08-25 | End: 2021-08-24

## 2021-08-24 RX ORDER — DIPHENHYDRAMINE HYDROCHLORIDE 50 MG/ML
25 INJECTION INTRAMUSCULAR; INTRAVENOUS ONCE
Status: COMPLETED | OUTPATIENT
Start: 2021-08-24 | End: 2021-08-24

## 2021-08-24 RX ORDER — GLYCOPYRROLATE 0.2 MG/ML
0.4 INJECTION INTRAMUSCULAR; INTRAVENOUS ONCE
Status: COMPLETED | OUTPATIENT
Start: 2021-08-24 | End: 2021-08-24

## 2021-08-24 RX ORDER — ROSUVASTATIN CALCIUM 10 MG/1
40 TABLET, COATED ORAL DAILY
Status: DISCONTINUED | OUTPATIENT
Start: 2021-08-25 | End: 2021-08-25

## 2021-08-24 RX ORDER — NICOTINE POLACRILEX 4 MG
15 LOZENGE BUCCAL PRN
Status: DISCONTINUED | OUTPATIENT
Start: 2021-08-24 | End: 2021-09-15 | Stop reason: HOSPADM

## 2021-08-24 RX ORDER — PROPOFOL 10 MG/ML
INJECTION, EMULSION INTRAVENOUS
Status: COMPLETED
Start: 2021-08-24 | End: 2021-08-24

## 2021-08-24 RX ORDER — SODIUM CHLORIDE 9 MG/ML
25 INJECTION, SOLUTION INTRAVENOUS PRN
Status: DISCONTINUED | OUTPATIENT
Start: 2021-08-24 | End: 2021-09-13

## 2021-08-24 RX ORDER — ONDANSETRON 2 MG/ML
4 INJECTION INTRAMUSCULAR; INTRAVENOUS EVERY 6 HOURS PRN
Status: DISCONTINUED | OUTPATIENT
Start: 2021-08-24 | End: 2021-09-15 | Stop reason: HOSPADM

## 2021-08-24 RX ORDER — DIPHENHYDRAMINE HYDROCHLORIDE 50 MG/ML
50 INJECTION INTRAMUSCULAR; INTRAVENOUS EVERY 6 HOURS
Status: DISCONTINUED | OUTPATIENT
Start: 2021-08-24 | End: 2021-08-28

## 2021-08-24 RX ORDER — PROPOFOL 10 MG/ML
5-50 INJECTION, EMULSION INTRAVENOUS
Status: DISCONTINUED | OUTPATIENT
Start: 2021-08-24 | End: 2021-08-29

## 2021-08-24 RX ORDER — SODIUM CHLORIDE 0.9 % (FLUSH) 0.9 %
SYRINGE (ML) INJECTION
Status: DISCONTINUED
Start: 2021-08-24 | End: 2021-08-25

## 2021-08-24 RX ORDER — IPRATROPIUM BROMIDE AND ALBUTEROL SULFATE 2.5; .5 MG/3ML; MG/3ML
1 SOLUTION RESPIRATORY (INHALATION) EVERY 4 HOURS
Status: DISCONTINUED | OUTPATIENT
Start: 2021-08-24 | End: 2021-08-30

## 2021-08-24 RX ORDER — LEFLUNOMIDE 20 MG/1
20 TABLET ORAL DAILY
COMMUNITY

## 2021-08-24 RX ORDER — PANTOPRAZOLE SODIUM 40 MG/1
40 TABLET, DELAYED RELEASE ORAL DAILY
COMMUNITY

## 2021-08-24 RX ORDER — DONEPEZIL HYDROCHLORIDE 5 MG/1
5 TABLET, FILM COATED ORAL DAILY
COMMUNITY

## 2021-08-24 RX ORDER — METOPROLOL SUCCINATE 25 MG/1
25 TABLET, EXTENDED RELEASE ORAL DAILY
Status: DISCONTINUED | OUTPATIENT
Start: 2021-08-25 | End: 2021-08-25

## 2021-08-24 RX ORDER — ASPIRIN 81 MG/1
81 TABLET ORAL DAILY
Status: DISCONTINUED | OUTPATIENT
Start: 2021-08-25 | End: 2021-08-25

## 2021-08-24 RX ORDER — DEXTROSE MONOHYDRATE 50 MG/ML
100 INJECTION, SOLUTION INTRAVENOUS PRN
Status: DISCONTINUED | OUTPATIENT
Start: 2021-08-24 | End: 2021-09-15 | Stop reason: HOSPADM

## 2021-08-24 RX ADMIN — GLYCOPYRROLATE 0.4 MG: 0.2 INJECTION, SOLUTION INTRAMUSCULAR; INTRAVENOUS at 17:36

## 2021-08-24 RX ADMIN — MIDAZOLAM 2 MG: 1 INJECTION INTRAMUSCULAR; INTRAVENOUS at 17:10

## 2021-08-24 RX ADMIN — METHYLPREDNISOLONE SODIUM SUCCINATE 60 MG: 125 INJECTION, POWDER, FOR SOLUTION INTRAMUSCULAR; INTRAVENOUS at 22:27

## 2021-08-24 RX ADMIN — Medication 50 MCG/HR: at 18:19

## 2021-08-24 RX ADMIN — PROPOFOL 10 MCG/KG/MIN: 10 INJECTION, EMULSION INTRAVENOUS at 17:18

## 2021-08-24 RX ADMIN — SODIUM CHLORIDE, POTASSIUM CHLORIDE, SODIUM LACTATE AND CALCIUM CHLORIDE: 600; 310; 30; 20 INJECTION, SOLUTION INTRAVENOUS at 22:47

## 2021-08-24 RX ADMIN — KETAMINE HYDROCHLORIDE 150 MG: 10 INJECTION INTRAMUSCULAR; INTRAVENOUS at 17:02

## 2021-08-24 RX ADMIN — FAMOTIDINE 40 MG: 10 INJECTION INTRAVENOUS at 22:25

## 2021-08-24 RX ADMIN — INSULIN LISPRO 4 UNITS: 100 INJECTION, SOLUTION INTRAVENOUS; SUBCUTANEOUS at 22:27

## 2021-08-24 RX ADMIN — Medication 10 ML: at 22:48

## 2021-08-24 RX ADMIN — METHYLPREDNISOLONE SODIUM SUCCINATE 125 MG: 125 INJECTION, POWDER, FOR SOLUTION INTRAMUSCULAR; INTRAVENOUS at 16:34

## 2021-08-24 RX ADMIN — PROPOFOL 45 MCG/KG/MIN: 10 INJECTION, EMULSION INTRAVENOUS at 20:41

## 2021-08-24 RX ADMIN — DIPHENHYDRAMINE HYDROCHLORIDE 25 MG: 50 INJECTION, SOLUTION INTRAMUSCULAR; INTRAVENOUS at 16:34

## 2021-08-24 RX ADMIN — TRANEXAMIC ACID 1000 MG: 1 INJECTION, SOLUTION INTRAVENOUS at 16:47

## 2021-08-24 RX ADMIN — DIPHENHYDRAMINE HYDROCHLORIDE 50 MG: 50 INJECTION, SOLUTION INTRAMUSCULAR; INTRAVENOUS at 22:26

## 2021-08-24 ASSESSMENT — ENCOUNTER SYMPTOMS
VOMITING: 0
WHEEZING: 0
NAUSEA: 0
BACK PAIN: 0
EYE REDNESS: 0
SHORTNESS OF BREATH: 0
ABDOMINAL DISTENTION: 0
SINUS PRESSURE: 0
SORE THROAT: 0
EYE PAIN: 0
DIARRHEA: 0
COUGH: 0
EYE DISCHARGE: 0
FACIAL SWELLING: 1

## 2021-08-24 ASSESSMENT — PULMONARY FUNCTION TESTS: PIF_VALUE: 40

## 2021-08-24 NOTE — CONSULTS
OTOLARYNGOLOGY  CONSULT NOTE  8/24/2021    Physician Consulted: Dr. Benita Farrell  Reason for Consult: angioedema  Referring Physician: Dr. Sly Lord    HPI  Sara Lu is a 78 y.o. female who presents for evaluation of angioedema. Patient reports today she had onset of floor of mouth and tongue swelling that progressively worsened and reported to the ED for evaluation. Denies similar previous hx. She is on Lisinopril for HTN. Denies recent changes in medication or new foods. ENT was called to bedside in an emergent manner therefore history was limited from patient. She was sating 99 on NRB, able to speak full sentences. Past Medical History:   Diagnosis Date    Arthritis     Asthma     Cerebral artery occlusion with cerebral infarction [facility]) 2015    [facility]) ([facility])     [facility]     Heart attack ([facility]     Hyperlipidemia     Hypertension     VHD (valvular heart disease)        Past Surgical History:   Procedure Laterality Date    CHOLECYSTECTOMY      COLONOSCOPY  12/2018    HYSTERECTOMY      HYSTERECTOMY, VAGINAL      UPPER GASTROINTESTINAL ENDOSCOPY  12/2018       Medications Prior to Admission:    Prior to Admission medications    Medication Sig Start Date End Date Taking?  Authorizing Provider   metoprolol succinate (TOPROL XL) 25 MG extended release tablet Take 1 tablet by mouth 2 times daily 4/21/21   Alba Leyva MD   aspirin EC 81 MG EC tablet Take 1 tablet by mouth daily 3/3/21   [facility] MICHELLE Cota   rosuvastatin (CRESTOR) 40 MG tablet Take 1 tablet by mouth every evening 3/3/21   [facility] MICHELLE Cota   clopidogrel (PLAVIX) 75 MG tablet Take 1 tablet by mouth daily 9/28/20   Quynh English PA-C   cilostazol (PLETAL) 100 MG tablet take 1 tablet by mouth twice a day 7/23/20   Elise Duval MD   ipratropium-albuterol (DUONEB) 0.5-2.5 (3) MG/3ML SOLN nebulizer solution Inhale 3 mLs into the lungs every 6 hours as needed for Shortness of Breath 1/3/20   Jared Brown, DO   Multiple Vitamins-Minerals (CENTRUM ADULTS) TABS Take 1 tablet by mouth daily     Historical Provider, MD   venlafaxine 150 MG extended release tablet Take 150 mg by mouth daily (with breakfast)  9/12/18   Historical Provider, MD   gabapentin (NEURONTIN) 300 MG capsule Take 300 mg by mouth 3 times daily. . 9/12/18   Historical Provider, MD   cetirizine (ZYRTEC) 10 MG tablet Take 10 mg by mouth daily    Historical Provider, MD   ADVAIR DISKUS 250-50 MCG/DOSE AEPB Inhale 1 puff into the lungs 2 times daily  9/12/18   Historical Provider, MD   COMBIVENT RESPIMAT  MCG/ACT AERS inhaler 1 puff 2 times daily  9/12/18   Historical Provider, MD   lisinopril (PRINIVIL;ZESTRIL) 2.5 MG tablet Take 1 tablet by mouth daily 9/17/18   Javan Crowe MD   insulin lispro (HUMALOG) 100 UNIT/ML injection vial Inject into the skin Checks BS 4-6x/day; For use with insulin pump;  Uses sliding scale    Historical Provider, MD       Allergies   Allergen Reactions    Pcn [Penicillins] Hives       Family History   Problem Relation Age of Onset    COPD Mother    [facility] Alzheimer's Disease Mother     Alcohol Abuse Father     No Known Problems Sister     Diabetes Brother     No Known Problems Brother        Social History     Tobacco Use    Smoking status: Current Every Day Smoker     Packs/day: 0.25     Years: 45.00     Pack years: 11.25     Types: Cigarettes    Smokeless tobacco: Never Used   Vaping Use    Vaping Use: Never used   Substance Use Topics    Alcohol use: Yes     Alcohol/week: 7.0 - 8.0 standard drinks     Types: 7 - 8 Glasses of wine per week     Comment: glass of wine with dinner.   1 cup of coffee a day     Drug use: Never         Review of Systems   Unable to assess secondary to pt status      PHYSICAL EXAM:    Vitals:    08/24/21 1719   BP: (!) 173/110   Pulse: 87   Resp:    Temp:    SpO2: 98%       General Appearance:  Laying bed, awake, alert, no apparent distress  Head/face:  NC/AT  Eyes: PERRL, EOMI  ENT: floor of mouth with wet edema displacing tongue superiorly. Able to visualize top of uvula. Neck soft nontender no swelling trachea midline. Nares patent. External ears normal    Lungs:  Non-labored, good respiratory effort, no stridor  Heart:  RR    Flexible Nasopharyngolaryngoscope Procedure Note    Procedure Details   T he flexible nasopharyngolaryngoscope was passed through the left side(s) of the nose, and the nose, nasopharynx, oropharynx, hypopharynx and larynx were examined. Examination was performed during quiet respiration and with phonation. The following findings were noted. Findings:   Wet edema involving the bilateral base of tongue right greater than left. Epiglottis sharp, vocal cords without swelling, airway patent and amendable to intubation if needed. Condition:  Stable  Complications:  None      LABS:    CBC  Recent Labs     08/24/21  1630   WBC 11.4   HGB 15.7*   HCT 47.2        BMP  Recent Labs     08/24/21  1630      K 4.4   CL 94*   CO2 28   BUN 17   CREATININE 1.4*   CALCIUM 9.2     COAG's  No results for input(s): INR, PTT in the last 72 hours. Invalid input(s): PT  CALCIUM  Recent Labs     08/24/21  1630   CALCIUM 9.2     PTH  No results for input(s): PTH in the last 72 hours. RADIOLOGY    XR CHEST PORTABLE    Result Date: 8/24/2021  EXAMINATION: ONE XRAY VIEW OF THE CHEST 8/24/2021 5:30 pm COMPARISON: January 3, 2020 HISTORY: ORDERING SYSTEM PROVIDED HISTORY: SOB (shortness of breath) TECHNOLOGIST PROVIDED HISTORY: Reason for exam:->ETT placement, tongue swelling FINDINGS: Endotracheal tube is approximately 6.8 cm above the chivo. No airspace opacity or pleural effusion. The heart is normal in size. No pneumothorax. 1. Endotracheal tube is approximately 6.8 cm above the chivo. 2. No airspace opacity or pleural effusion.          ASSESSMENT:  78 y.o. female with angioedema secondary to ACE-I PLAN:  -scope performed which shows airway is amendable to intubation.  -decision made by ED team who intubated the patient in the ED   -recommend IV decadron [id number] for 3 doses, benadryl, pepcid  -stop all ACE-I/ARB use   -ENT to follow     Patient seen and discussed with Attending.        Electronically signed by Mik Hirsch DO on 8/24/21 at 5:44 PM EDT

## 2021-08-24 NOTE — ED NOTES
at bedside. Updated on plan of care. Aware his wife will be admitted to ICU and will remain sedated while on ventilator. Voiced understanding.       Mitzi Hunt RN  08/24/21 4585

## 2021-08-24 NOTE — ED NOTES
Pt intubated at this time, size 7.0 tube, 24cm at the lip.       Lamont Left, RN  08/24/21 1215 [facility], RN  08/24/21 9427

## 2021-08-24 NOTE — ED PROVIDER NOTES
[address] [address] ICU  EMERGENCY DEPARTMENT ENCOUNTER      Pt Name: Jeronimo Gillis  MRN: [id number]  [place] 1955  Date of evaluation: 8/24/2021      CHIEF COMPLAINT       Chief Complaint   Patient presents with    Oral Swelling     woke up with tongue swelling, worse over the day, took 25 mg benadryl 1.5 hrs ago without relief         HPI  Jeronimo Gillis is a 78 y.o. female with history of hypertension on lisinopril who presents to the emergency department with oral swelling. Patient states she woke up this morning and began to have swelling to her tongue and lips. States it worsened gradually throughout the day. She states it got worse last couple of hours. She got symptoms as moderate to severe, worsening with time. She is having trouble talking. She is able to swallow and does not feel like she is short of breath. She is on lisinopril. She denies any pain. Denies any injury or trauma. She took Benadryl prior to arrival.        Except as noted above the remainder of the review of systems was reviewed and negative. Review of Systems   Constitutional: Negative for chills and fever. HENT: Positive for facial swelling. Negative for ear pain, sinus pressure and sore throat. Eyes: Negative for pain, discharge and redness. Respiratory: Negative for cough, shortness of breath and wheezing. Cardiovascular: Negative for chest pain. Gastrointestinal: Negative for abdominal distention, diarrhea, nausea and vomiting. Genitourinary: Negative for dysuria and frequency. Musculoskeletal: Negative for arthralgias and back pain. Skin: Negative for rash and wound. Neurological: Negative for weakness and headaches. Hematological: Negative for adenopathy. Psychiatric/Behavioral: Negative for confusion. The patient is not nervous/anxious. All other systems reviewed and are negative. Physical Exam  Vitals and nursing note reviewed.    Constitutional:       General: She is not in acute distress. Appearance: She is well-developed. Comments: Awake and alert. Sitting in the gurney in no obvious distress. HENT:      Head: Normocephalic. Comments: Significant swelling to the soft palate especially to the tongue and posterior tongue as well as mild swelling to the lips. Uvula not swollen and is midline. No clear swelling to the posterior pharynx on initial examination. Right Ear: External ear normal.      Left Ear: External ear normal.      Mouth/Throat:      Mouth: Mucous membranes are moist.   Eyes:      General: No scleral icterus. Pupils: Pupils are equal, round, and reactive to light. Cardiovascular:      Rate and Rhythm: Normal rate and regular rhythm. Heart sounds: No murmur heard. Comments: 2+ radial and dorsal pedis pulses bilaterally  Pulmonary:      Effort: Pulmonary effort is normal. No respiratory distress. Breath sounds: Normal breath sounds. No wheezing. Abdominal:      Palpations: Abdomen is soft. Tenderness: There is no abdominal tenderness. There is no guarding or rebound. Musculoskeletal:         General: No tenderness or deformity. Normal range of motion. Cervical back: Normal range of motion and neck supple. Right lower leg: No edema. Left lower leg: No edema. Skin:     General: Skin is warm and dry. Capillary Refill: Capillary refill takes less than 2 seconds. Neurological:      General: No focal deficit present. Mental Status: She is alert and oriented to person, place, and time. Cranial Nerves: No cranial nerve deficit. Sensory: No sensory deficit. Motor: No weakness or abnormal muscle tone.    Psychiatric:         Mood and Affect: Mood normal.         Behavior: Behavior normal.          Procedures     Intubation Procedure Note    Indication: impending respiratory failure and impending airway compromise    Consent: The patient provided verbal consent for this procedure. Medications Used: ketamine intravenously    Procedure: The patient was placed in the appropriate position. Cricoid pressure was not required. Intubation was performed by indirect endoscopically with CMA a 7.0 cuffed endotracheal tube. The cuff was then inflated and the tube was secured appropriately at a distance of 24 cm to the dental ridge. Initial confirmation of placement included bilateral breath sounds, an end tidal CO2 detector, absence of sounds over the stomach, adequate chest rise and adequate pulse oximetry reading. A chest x-ray to verify correct placement of the tube showed appropriate tube position. The patient tolerated the procedure well. Complications: None         MDM   This is a 79-year-old female with history of diabetes, hypertension on lisinopril who presents with oral swelling concerning for angioedema. In the emergency department initially awake and alert tolerating secretions with significant swelling to the soft palate and tongue. ENT in the department was able to scope the patient notes a swelling even back to near the vocal cords. Decision was made to take the airway and intubate the patient for impending airway compromise secondary to the angioedema. Solu-Medrol Benadryl and TXA were administered. IV ketamine was administered and patient intubated successfully. Patient sedated with propofol. Admitted to the ICU for further evaluation. Discussed with Dr. Nancy Wolf who accepts patient for further evaluation. ACE inhibitor's and lisinopril added to allergy list due to angioedema.     Critical Care 35 minutes                --------------------------------------------- PAST HISTORY ---------------------------------------------  Past Medical History:  has a past medical history of Arthritis, Asthma, Cerebral artery occlusion with cerebral infarction ([facility] Hyperlipidemia, Hypertension, and VHD (valvular heart disease). Past Surgical History:  has a past surgical history that includes Hysterectomy; Cholecystectomy; Hysterectomy, vaginal; Colonoscopy (12/2018); and Upper gastrointestinal endoscopy (12/2018). Social History:  reports that she has been smoking cigarettes. She has a 11.25 pack-year smoking history. She has never used smokeless tobacco. She reports current alcohol use of about 7.0 - 8.0 standard drinks of alcohol per week. She reports that she does not use drugs. Family History: family history includes Alcohol Abuse in her father; Alzheimer's Disease in her mother; COPD in her mother; Diabetes in her brother; No Known Problems in her brother and sister. The patients home medications have been reviewed.     Allergies: Ace inhibitors, Angiotensin receptor blockers, Lisinopril, and Pcn [penicillins]    -------------------------------------------------- RESULTS -------------------------------------------------    LABS:  Results for orders placed or performed during the hospital encounter of 08/24/21   CBC Auto Differential   Result Value Ref Range    WBC 11.4 4.5 - 11.5 E9/L    RBC 5.04 3.50 - 5.50 E12/L    Hemoglobin 15.7 (H) 11.5 - 15.5 g/dL    Hematocrit 47.2 34.0 - 48.0 %    MCV 93.7 80.0 - 99.9 fL    MCH 31.2 26.0 - 35.0 pg    MCHC 33.3 32.0 - 34.5 %    RDW 12.1 11.5 - 15.0 fL    Platelets 079 492 - 504 E9/L    MPV 10.2 7.0 - 12.0 fL    Neutrophils % 65.3 43.0 - 80.0 %    Immature Granulocytes % 0.3 0.0 - 5.0 %    Lymphocytes % 21.8 20.0 - 42.0 %    Monocytes % 10.0 2.0 - 12.0 %    Eosinophils % 1.9 0.0 - 6.0 %    Basophils % 0.7 0.0 - 2.0 %    Neutrophils Absolute 7.42 (H) 1.80 - 7.30 E9/L    Immature Granulocytes # 0.03 E9/L    Lymphocytes Absolute 2.47 1.50 - 4.00 E9/L    Monocytes Absolute 1.14 (H) 0.10 - 0.95 E9/L    Eosinophils Absolute 0.21 0.05 - 0.50 E9/L    Basophils Absolute 0.08 0.00 - 0.20 E9/L   Comprehensive Metabolic Panel w/ Reflex to MG   Result Value Ref Range    Sodium 134 132 - 146 mmol/L    Potassium reflex Magnesium 4.4 3.5 - 5.0 mmol/L    Chloride 94 (L) 98 - 107 mmol/L    CO2 28 22 - 29 mmol/L    Anion Gap 12 7 - 16 mmol/L    Glucose 285 (H) 74 - 99 mg/dL    BUN 17 6 - 23 mg/dL    CREATININE 1.4 (H) 0.5 - 1.0 mg/dL    GFR Non-African American 37 >=60 mL/min/1.73    GFR African American 45     Calcium 9.2 8.6 - 10.2 mg/dL    Total Protein 6.3 (L) 6.4 - 8.3 g/dL    Albumin 4.0 3.5 - 5.2 g/dL    Total Bilirubin 0.4 0.0 - 1.2 mg/dL    Alkaline Phosphatase 85 35 - 104 U/L    ALT 19 0 - 32 U/L    AST 26 0 - 31 U/L   CBC auto differential   Result Value Ref Range    WBC 11.0 4.5 - 11.5 E9/L    RBC 4.20 3.50 - 5.50 E12/L    Hemoglobin 13.0 11.5 - 15.5 g/dL    Hematocrit 40.8 34.0 - 48.0 %    MCV 97.1 80.0 - 99.9 fL    MCH 31.0 26.0 - 35.0 pg    MCHC 31.9 (L) 32.0 - 34.5 %    RDW 12.1 11.5 - 15.0 fL    Platelets 738 146 - 366 E9/L    MPV 10.4 7.0 - 12.0 fL    Neutrophils % 87.5 (H) 43.0 - 80.0 %    Immature Granulocytes % 0.5 0.0 - 5.0 %    Lymphocytes % 8.9 (L) 20.0 - 42.0 %    Monocytes % 3.0 2.0 - 12.0 %    Eosinophils % 0.0 0.0 - 6.0 %    Basophils % 0.1 0.0 - 2.0 %    Neutrophils Absolute 9.59 (H) 1.80 - 7.30 E9/L    Immature Granulocytes # 0.06 E9/L    Lymphocytes Absolute 0.97 (L) 1.50 - 4.00 E9/L    Monocytes Absolute 0.33 0.10 - 0.95 E9/L    Eosinophils Absolute 0.00 (L) 0.05 - 0.50 E9/L    Basophils Absolute 0.01 0.00 - 0.20 M9/Z   Basic Metabolic Panel w/ Reflex to MG   Result Value Ref Range    Sodium 133 132 - 146 mmol/L    Potassium reflex Magnesium 4.7 3.5 - 5.0 mmol/L    Chloride 100 98 - 107 mmol/L    CO2 25 22 - 29 mmol/L    Anion Gap 8 7 - 16 mmol/L    Glucose 246 (H) 74 - 99 mg/dL    BUN 22 6 - 23 mg/dL    CREATININE 1.6 (H) 0.5 - 1.0 mg/dL    GFR Non-African American 32 >=60 mL/min/1.73    GFR African American 39     Calcium 8.4 (L) 8.6 - 10.2 mg/dL   POCT Glucose   Result Value Ref Range    Meter Glucose 229 (H) 74 - 99 mg/dL   POCT Glucose   Result Value Ref Range    Meter Glucose 277 (H) 74 - 99 mg/dL   POCT Glucose   Result Value Ref Range    Meter Glucose 199 (H) 74 - 99 mg/dL   POCT Glucose   Result Value Ref Range    Meter Glucose 205 (H) 74 - 99 mg/dL       RADIOLOGY:  XR CHEST PORTABLE   Final Result   No pulmonary infiltrates. No significant change since the prior study         XR CHEST PORTABLE   Final Result   1. Endotracheal tube is approximately 6.8 cm above the chivo. 2. No airspace opacity or pleural effusion.               ------------------------- NURSING NOTES AND VITALS REVIEWED ---------------------------  Date / Time Roomed:  8/24/2021  4:25 PM  ED Bed Assignment:  [id number]    The nursing notes within the ED encounter and vital signs as below have been reviewed.      Patient Vitals for the past 24 hrs:   BP Temp Temp src Pulse Resp SpO2 Weight   08/25/21 1135 (!) 146/75 -- -- 79 18 97 % --   08/25/21 1105 (!) 172/79 -- -- 85 18 99 % --   08/25/21 1025 (!) 185/84 -- -- 79 18 98 % --   08/25/21 1005 (!) 179/104 -- -- 83 18 98 % --   08/25/21 0900 (!) 118/52 -- -- 71 21 99 % --   08/25/21 0805 (!) 118/54 99 °F (37.2 °C) CORE 72 18 97 % --   08/25/21 0600 (!) 117/55 -- -- 56 18 99 % --   08/25/21 0500 (!) 112/55 -- -- 60 18 99 % --   08/25/21 0400 (!) 114/51 98.9 °F (37.2 °C) Bladder 64 18 99 % --   08/25/21 0300 (!) 113/55 -- -- 64 18 98 % --   08/25/21 0200 (!) 130/57 -- -- 69 18 98 % --   08/25/21 0100 (!) 126/56 -- -- 66 18 97 % --   08/25/21 0000 109/60 99.3 °F (37.4 °C) Bladder 61 18 100 % --   08/24/21 2300 (!) 96/59 -- -- 65 18 100 % --   08/24/21 2200 120/65 99.4 °F (37.4 °C) Bladder 76 18 100 % 181 lb (82.1 kg)   08/24/21 2145 -- -- -- 80 18 -- --   08/24/21 2028 118/64 -- -- 78 18 98 % --   08/24/21 2023 122/63 -- -- 79 18 98 % --   08/24/21 2012 122/68 -- -- 79 18 98 % --   08/24/21 2000 -- -- -- 81 18 98 % --   08/24/21 1932 113/64 -- -- 82 18 98 % --   08/24/21 1917 110/66 -- -- 83 18 98 % --   08/24/21 1853 130/77 -- -- 87 18 98 % --   08/24/21 1848 138/72 -- -- 86 18 98 % --   08/24/21 1845 -- -- -- 82 18 98 % --   08/24/21 1836 -- -- -- 83 15 98 % --   08/24/21 1812 (!) 111/56 -- -- 84 12 98 % --   08/24/21 1811 113/71 -- -- 81 13 97 % --   08/24/21 1748 -- -- -- 92 18 -- --   08/24/21 1747 126/63 -- -- 91 18 -- --   08/24/21 1743 (!) 136/56 -- -- 101 15 97 % --   08/24/21 1740 -- -- -- 102 26 99 % --   08/24/21 1732 (!) 151/125 -- -- -- -- 96 % --   08/24/21 1728 (!) 157/92 -- -- -- -- 95 % --   08/24/21 1725 (!) 168/82 -- -- -- -- 98 % --   08/24/21 1719 (!) 173/110 -- -- 87 -- 98 % 189 lb 9.5 oz (86 kg)   08/24/21 1712 (!) 153/116 -- -- -- -- 94 % --   08/24/21 1649 (!) 183/139 -- -- -- 16 100 % --   08/24/21 1648 (!) 183/139 -- -- -- -- 100 % --   08/24/21 1643 (!) 151/129 -- -- -- -- 97 % --   08/24/21 1619 (!) 152/87 -- -- -- 18 -- --   08/24/21 1613 -- 97.5 °F (36.4 °C) Infrared 66 -- 91 % --       Oxygen Saturation Interpretation: Normal    ------------------------------------------ PROGRESS NOTES ------------------------------------------    Counseling:  I have spoken with the patient and discussed todays results, in addition to providing specific details for the plan of care and counseling regarding the diagnosis and prognosis. Their questions are answered at this time and they are agreeable with the plan of admission.    --------------------------------- ADDITIONAL PROVIDER NOTES ---------------------------------  Consultations:  Spoke with Dr. Mikal Julien. Discussed case. They will admit the patient. This patient's ED course included: a personal history and physicial examination, re-evaluation prior to disposition, multiple bedside re-evaluations, IV medications, cardiac monitoring and continuous pulse oximetry    This patient has remained hemodynamically stable during their ED course. Diagnosis:  1. Angioedema, initial encounter    2. SOB (shortness of breath)    3.  Acute respiratory distress        Disposition:  Patient's disposition: Admit to CCU/ICU  Patient's condition is critical.         Ronnell Esquivel DO  Resident  08/25/21 1113 Luis Alberto Feldman MD  09/01/21 0506

## 2021-08-24 NOTE — ED NOTES
Rhino-scope completed by ENT, tolerated well. Remains a/o, spontaneous respirations.       Cally Hammer RN  08/24/21 2984

## 2021-08-24 NOTE — PROGRESS NOTES
Assisted ED therapist with vent patient. Withdrew ETT from 24 cm at the upper lip to 22 cm. Total time 30 minutes.

## 2021-08-24 NOTE — ED NOTES
ENT at bedside for eval/treat. Patient remains a/o x4, able to answer questions. Respirations unlabored, spontaneous. 100% NRB tolerated.  in hallway and updated.       Gurdeep Paz RN  08/24/21 9814

## 2021-08-25 LAB
ANION GAP SERPL CALCULATED.3IONS-SCNC: 8 MMOL/L (ref 7–16)
BASOPHILS ABSOLUTE: 0.01 E9/L (ref 0–0.2)
BASOPHILS RELATIVE PERCENT: 0.1 % (ref 0–2)
BUN BLDV-MCNC: 22 MG/DL (ref 6–23)
CALCIUM SERPL-MCNC: 8.4 MG/DL (ref 8.6–10.2)
CHLORIDE BLD-SCNC: 100 MMOL/L (ref 98–107)
CO2: 25 MMOL/L (ref 22–29)
CREAT SERPL-MCNC: 1.6 MG/DL (ref 0.5–1)
EOSINOPHILS ABSOLUTE: 0 E9/L (ref 0.05–0.5)
EOSINOPHILS RELATIVE PERCENT: 0 % (ref 0–6)
GFR AFRICAN AMERICAN: 39
GFR NON-AFRICAN AMERICAN: 32 ML/MIN/1.73
GLUCOSE BLD-MCNC: 246 MG/DL (ref 74–99)
HCT VFR BLD CALC: 40.8 % (ref 34–48)
HEMOGLOBIN: 13 G/DL (ref 11.5–15.5)
IMMATURE GRANULOCYTES #: 0.06 E9/L
IMMATURE GRANULOCYTES %: 0.5 % (ref 0–5)
LYMPHOCYTES ABSOLUTE: 0.97 E9/L (ref 1.5–4)
LYMPHOCYTES RELATIVE PERCENT: 8.9 % (ref 20–42)
MCH RBC QN AUTO: 31 PG (ref 26–35)
MCHC RBC AUTO-ENTMCNC: 31.9 % (ref 32–34.5)
MCV RBC AUTO: 97.1 FL (ref 80–99.9)
METER GLUCOSE: 199 MG/DL (ref 74–99)
METER GLUCOSE: 205 MG/DL (ref 74–99)
METER GLUCOSE: 217 MG/DL (ref 74–99)
METER GLUCOSE: 274 MG/DL (ref 74–99)
METER GLUCOSE: 277 MG/DL (ref 74–99)
METER GLUCOSE: 304 MG/DL (ref 74–99)
MONOCYTES ABSOLUTE: 0.33 E9/L (ref 0.1–0.95)
MONOCYTES RELATIVE PERCENT: 3 % (ref 2–12)
NEUTROPHILS ABSOLUTE: 9.59 E9/L (ref 1.8–7.3)
NEUTROPHILS RELATIVE PERCENT: 87.5 % (ref 43–80)
PDW BLD-RTO: 12.1 FL (ref 11.5–15)
PLATELET # BLD: 217 E9/L (ref 130–450)
PMV BLD AUTO: 10.4 FL (ref 7–12)
POTASSIUM REFLEX MAGNESIUM: 4.7 MMOL/L (ref 3.5–5)
RBC # BLD: 4.2 E12/L (ref 3.5–5.5)
SODIUM BLD-SCNC: 133 MMOL/L (ref 132–146)
WBC # BLD: 11 E9/L (ref 4.5–11.5)

## 2021-08-25 PROCEDURE — 6370000000 HC RX 637 (ALT 250 FOR IP): Performed by: INTERNAL MEDICINE

## 2021-08-25 PROCEDURE — 2500000003 HC RX 250 WO HCPCS

## 2021-08-25 PROCEDURE — 2500000003 HC RX 250 WO HCPCS: Performed by: STUDENT IN AN ORGANIZED HEALTH CARE EDUCATION/TRAINING PROGRAM

## 2021-08-25 PROCEDURE — 6360000002 HC RX W HCPCS: Performed by: INTERNAL MEDICINE

## 2021-08-25 PROCEDURE — 80048 BASIC METABOLIC PNL TOTAL CA: CPT

## 2021-08-25 PROCEDURE — 82962 GLUCOSE BLOOD TEST: CPT

## 2021-08-25 PROCEDURE — 85025 COMPLETE CBC W/AUTO DIFF WBC: CPT

## 2021-08-25 PROCEDURE — 2580000003 HC RX 258: Performed by: INTERNAL MEDICINE

## 2021-08-25 PROCEDURE — 6360000002 HC RX W HCPCS

## 2021-08-25 PROCEDURE — 36415 COLL VENOUS BLD VENIPUNCTURE: CPT

## 2021-08-25 PROCEDURE — 6370000000 HC RX 637 (ALT 250 FOR IP)

## 2021-08-25 PROCEDURE — 6360000002 HC RX W HCPCS: Performed by: EMERGENCY MEDICINE

## 2021-08-25 PROCEDURE — 94003 VENT MGMT INPAT SUBQ DAY: CPT

## 2021-08-25 PROCEDURE — 2500000003 HC RX 250 WO HCPCS: Performed by: INTERNAL MEDICINE

## 2021-08-25 PROCEDURE — 2000000000 HC ICU R&B

## 2021-08-25 PROCEDURE — 94640 AIRWAY INHALATION TREATMENT: CPT

## 2021-08-25 PROCEDURE — 83036 HEMOGLOBIN GLYCOSYLATED A1C: CPT

## 2021-08-25 RX ORDER — METOPROLOL TARTRATE 5 MG/5ML
5 INJECTION INTRAVENOUS EVERY 4 HOURS PRN
Status: DISCONTINUED | OUTPATIENT
Start: 2021-08-25 | End: 2021-09-04

## 2021-08-25 RX ORDER — DEXAMETHASONE SODIUM PHOSPHATE 10 MG/ML
10 INJECTION, SOLUTION INTRAMUSCULAR; INTRAVENOUS EVERY 8 HOURS
Status: DISCONTINUED | OUTPATIENT
Start: 2021-08-25 | End: 2021-08-25

## 2021-08-25 RX ORDER — DEXAMETHASONE SODIUM PHOSPHATE 4 MG/ML
4 INJECTION, SOLUTION INTRA-ARTICULAR; INTRALESIONAL; INTRAMUSCULAR; INTRAVENOUS; SOFT TISSUE EVERY 8 HOURS
Status: COMPLETED | OUTPATIENT
Start: 2021-08-25 | End: 2021-08-26

## 2021-08-25 RX ORDER — INSULIN GLARGINE 100 [IU]/ML
4 INJECTION, SOLUTION SUBCUTANEOUS 2 TIMES DAILY
Status: DISCONTINUED | OUTPATIENT
Start: 2021-08-25 | End: 2021-08-27

## 2021-08-25 RX ADMIN — METHYLPREDNISOLONE SODIUM SUCCINATE 60 MG: 125 INJECTION, POWDER, FOR SOLUTION INTRAMUSCULAR; INTRAVENOUS at 04:12

## 2021-08-25 RX ADMIN — DIPHENHYDRAMINE HYDROCHLORIDE 50 MG: 50 INJECTION, SOLUTION INTRAMUSCULAR; INTRAVENOUS at 10:30

## 2021-08-25 RX ADMIN — INSULIN LISPRO 6 UNITS: 100 INJECTION, SOLUTION INTRAVENOUS; SUBCUTANEOUS at 14:14

## 2021-08-25 RX ADMIN — DEXAMETHASONE SODIUM PHOSPHATE 10 MG: 10 INJECTION, SOLUTION INTRAMUSCULAR; INTRAVENOUS at 08:54

## 2021-08-25 RX ADMIN — PROPOFOL 35 MCG/KG/MIN: 10 INJECTION, EMULSION INTRAVENOUS at 10:21

## 2021-08-25 RX ADMIN — INSULIN LISPRO 6 UNITS: 100 INJECTION, SOLUTION INTRAVENOUS; SUBCUTANEOUS at 02:30

## 2021-08-25 RX ADMIN — PROPOFOL 50 MCG/KG/MIN: 10 INJECTION, EMULSION INTRAVENOUS at 17:10

## 2021-08-25 RX ADMIN — IPRATROPIUM BROMIDE AND ALBUTEROL SULFATE 3 ML: .5; 3 SOLUTION RESPIRATORY (INHALATION) at 18:24

## 2021-08-25 RX ADMIN — INSULIN LISPRO 9 UNITS: 100 INJECTION, SOLUTION INTRAVENOUS; SUBCUTANEOUS at 18:35

## 2021-08-25 RX ADMIN — FAMOTIDINE 20 MG: 10 INJECTION INTRAVENOUS at 21:40

## 2021-08-25 RX ADMIN — Medication 25 MCG/HR: at 11:05

## 2021-08-25 RX ADMIN — SODIUM CHLORIDE, POTASSIUM CHLORIDE, SODIUM LACTATE AND CALCIUM CHLORIDE: 600; 310; 30; 20 INJECTION, SOLUTION INTRAVENOUS at 11:05

## 2021-08-25 RX ADMIN — IPRATROPIUM BROMIDE AND ALBUTEROL SULFATE 3 ML: .5; 3 SOLUTION RESPIRATORY (INHALATION) at 23:48

## 2021-08-25 RX ADMIN — Medication 10 ML: at 09:03

## 2021-08-25 RX ADMIN — INSULIN LISPRO 2 UNITS: 100 INJECTION, SOLUTION INTRAVENOUS; SUBCUTANEOUS at 06:28

## 2021-08-25 RX ADMIN — IPRATROPIUM BROMIDE AND ALBUTEROL SULFATE 3 ML: .5; 3 SOLUTION RESPIRATORY (INHALATION) at 06:42

## 2021-08-25 RX ADMIN — INSULIN GLARGINE 4 UNITS: 100 INJECTION, SOLUTION SUBCUTANEOUS at 10:26

## 2021-08-25 RX ADMIN — ENOXAPARIN SODIUM 40 MG: 40 INJECTION SUBCUTANEOUS at 08:54

## 2021-08-25 RX ADMIN — SODIUM CHLORIDE, POTASSIUM CHLORIDE, SODIUM LACTATE AND CALCIUM CHLORIDE: 600; 310; 30; 20 INJECTION, SOLUTION INTRAVENOUS at 19:24

## 2021-08-25 RX ADMIN — PROPOFOL 50 MCG/KG/MIN: 10 INJECTION, EMULSION INTRAVENOUS at 14:16

## 2021-08-25 RX ADMIN — INSULIN GLARGINE 4 UNITS: 100 INJECTION, SOLUTION SUBCUTANEOUS at 21:41

## 2021-08-25 RX ADMIN — DIPHENHYDRAMINE HYDROCHLORIDE 50 MG: 50 INJECTION, SOLUTION INTRAMUSCULAR; INTRAVENOUS at 21:40

## 2021-08-25 RX ADMIN — IPRATROPIUM BROMIDE AND ALBUTEROL SULFATE 3 ML: .5; 3 SOLUTION RESPIRATORY (INHALATION) at 00:30

## 2021-08-25 RX ADMIN — INSULIN LISPRO 6 UNITS: 100 INJECTION, SOLUTION INTRAVENOUS; SUBCUTANEOUS at 10:26

## 2021-08-25 RX ADMIN — DIPHENHYDRAMINE HYDROCHLORIDE 50 MG: 50 INJECTION, SOLUTION INTRAMUSCULAR; INTRAVENOUS at 16:05

## 2021-08-25 RX ADMIN — IPRATROPIUM BROMIDE AND ALBUTEROL SULFATE 3 ML: .5; 3 SOLUTION RESPIRATORY (INHALATION) at 09:33

## 2021-08-25 RX ADMIN — FAMOTIDINE 40 MG: 10 INJECTION INTRAVENOUS at 08:54

## 2021-08-25 RX ADMIN — PROPOFOL 40 MCG/KG/MIN: 10 INJECTION, EMULSION INTRAVENOUS at 01:26

## 2021-08-25 RX ADMIN — PROPOFOL 35 MCG/KG/MIN: 10 INJECTION, EMULSION INTRAVENOUS at 05:31

## 2021-08-25 RX ADMIN — IPRATROPIUM BROMIDE AND ALBUTEROL SULFATE 3 ML: .5; 3 SOLUTION RESPIRATORY (INHALATION) at 14:32

## 2021-08-25 RX ADMIN — Medication 10 ML: at 23:29

## 2021-08-25 RX ADMIN — DIPHENHYDRAMINE HYDROCHLORIDE 50 MG: 50 INJECTION, SOLUTION INTRAMUSCULAR; INTRAVENOUS at 04:12

## 2021-08-25 RX ADMIN — DEXAMETHASONE SODIUM PHOSPHATE 4 MG: 4 INJECTION, SOLUTION INTRAMUSCULAR; INTRAVENOUS at 17:06

## 2021-08-25 RX ADMIN — INSULIN LISPRO 12 UNITS: 100 INJECTION, SOLUTION INTRAVENOUS; SUBCUTANEOUS at 21:50

## 2021-08-25 ASSESSMENT — PULMONARY FUNCTION TESTS
PIF_VALUE: 32
PIF_VALUE: 31
PIF_VALUE: 39
PIF_VALUE: 31
PIF_VALUE: 33
PIF_VALUE: 32
PIF_VALUE: 31
PIF_VALUE: 36
PIF_VALUE: 32
PIF_VALUE: 33
PIF_VALUE: 32
PIF_VALUE: 32
PIF_VALUE: 33
PIF_VALUE: 31
PIF_VALUE: 34
PIF_VALUE: 29
PIF_VALUE: 26
PIF_VALUE: 31
PIF_VALUE: 29
PIF_VALUE: 35
PIF_VALUE: 32
PIF_VALUE: 26
PIF_VALUE: 33
PIF_VALUE: 31
PIF_VALUE: 30
PIF_VALUE: 33
PIF_VALUE: 31
PIF_VALUE: 31

## 2021-08-25 NOTE — CONSULTS
Acute Problems  ACE inhibitor induced angioedema  GORD[place]  Hypertension  Diabetes  COPD (not in acute exacerbation)  Hyperlipidemia    Plan of Care  Continue mechanical ventilation  Repeat chest x-ray to verify ET tube placement  Start Solu-Medrol, Pepcid, Benadryl scheduled  Restraints  Continue sedation with propofol and fentanyl, titrate sedation for RASS of -1 to -2  Discontinue insulin pump  Every 4 hour blood sugar checks with medium dose sliding scale insulin  IV fluids  Avoid nephrotoxic medications  CMP, CBC, mag, Raudel daily  Continue home metoprolol  Continue Crestor  VTE prophylaxis with Lovenox  GI prophylaxis with Pepcid    Admission History:  Patient is a 78 y.o. female with the above medical history who was brought in by  for swelling of the oral cavity secondary to lisinopril usage. She was alert and oriented upon arrival to the ED and stated that the swelling began 1.5 hours prior to arrival.  She did take 25 mg of Benadryl however symptoms continue to worsen. At the time she did have trouble talking, difficulty swallowing and eventually developed difficulty breathing. Decision was made to intubate for airway protection. In the emergency department she was started on propofol and fentanyl for sedation, she was also given TXA, 125 mg of Solu-Medrol, and 25 mg of IV Benadryl. She does currently use an insulin pump for her diabetes management. Labs and imaging in the ED pertinent for creatinine 1.4, glucose 285, chest x-ray with endotracheal tube approximately 6.8 cm above the chivo. Upon my evaluation she is intubated and sedated. There is significant swelling to the soft palate, tongue and oral cavity. I was unable to clearly view the uvula. There were attempts in the emergency department to place an NG/OG tube however they were unsuccessful secondary to swelling. I did have the endotracheal tube advanced 2cm and and will repeat chest x-ray to verify placement.   It appears as though her creatinine is slightly elevated. Her baseline creatinine appears to be around 0.9 and today is 1.4. I did put her on some mild IV fluids and we will continue to watch her kidney function.  was at bedside and was able to tell me about her history. She does currently smoke cigarettes however, he denies daily alcohol use or any illicit drugs. He was fully updated on the plan of care and all questions were answered. Past Medical History:  Past Medical History:   Diagnosis Date    Arthritis     Asthma     [facility]bral [facility] occlusion [facility] 2015    [facility]     Diabetes mellitus ([facility]     Heart attack ([facility]     Hyperlipidemia     Hypertension     VHD (valvular heart disease)         Family History:   Family History   Problem Relation Age of Onset    COPD Mother    [facility] Alzheimer's Disease Mother     Alcohol Abuse Father     No Known Problems Sister     Diabetes Brother     No Known Problems Brother        Allergies:         Ace inhibitors, Lisinopril, and Pcn [penicillins]    Social history:  Social History     Socioeconomic History    Marital status:      Spouse name: Not on file    Number of children: Not on file    Years of education: Not on file    Highest education level: Not on file   Occupational History    Not on file   Tobacco Use    Smoking status: Current Every Day Smoker     Packs/day: 0.25     Years: 45.00     Pack years: 11.25     Types: Cigarettes    Smokeless tobacco: Never Used   Vaping Use    Vaping Use: Never used   Substance and Sexual Activity    Alcohol use: Yes     Alcohol/week: 7.0 - 8.0 standard drinks     Types: 7 - 8 Glasses of wine per week     Comment: glass of wine with dinner. 1 cup of coffee a day     Drug use: Never    Sexual activity: Not on file   Other Topics Concern    Not on file   Social History Narrative    Drinks 2 cups coffee daily.       Social Determinants of Health     Financial Resource Strain:     Difficulty of Paying Living Expenses:    Food Insecurity:     Worried About Running Out of Food in the Last Year:     [address] in the Last Year:    Transportation Needs:     Lack of Transportation (Medical):      Lack of Transportation (Non-Medical):    Physical Activity:     Days of Exercise per Week:     Minutes of Exercise per Session:    Stress:     Feeling of Stress :    Social Connections:     Frequency of Communication with Friends and Family:     Frequency of Social Gatherings with Friends and Family:     Attends [employer] Services:     Active Member of Clubs or Organizations:     Attends Club or Organization Meetings:     Marital Status:    Intimate Partner Violence:     Fear of Current or Ex-Partner:     Emotionally Abused:     Physically Abused:     Sexually Abused:        Current Medications:     Current Facility-Administered Medications:     propofol injection, 5-50 mcg/kg/min, IntraVENous, Titrated, Melva Leblanc MD, Last Rate: 23.2 mL/hr at 08/24/21 2041, 45 mcg/kg/min at 08/24/21 2041    fentaNYL 5 mcg/ml in 0.9%  ml infusion, 12.5-200 mcg/hr, IntraVENous, Continuous, Portia Flores DO, Last Rate: 10 mL/hr at 08/24/21 1819, 50 mcg/hr at 08/24/21 1819    diphenhydrAMINE (BENADRYL) injection 50 mg, 50 mg, IntraVENous, Q6H, SHELBIE Szymanski CNP    methylPREDNISolone sodium (SOLU-MEDROL) injection 60 mg, 60 mg, IntraVENous, Q6H, SHELBIE Szymanski CNP    famotidine (PEPCID) injection 40 mg, 40 mg, IntraVENous, BID, SHELBIE Chaves CNP    Budeson-Glycopyrrol-Formoterol 160-9-4.8 MCG/ACT AERO 2 puff, 2 puff, Inhalation, BID, Nickie Carpenter MD    ipratropium-albuterol (DUONEB) nebulizer solution 3 mL, 1 vial, Inhalation, Q4H, MD Gloria Michele  [START ON 8/25/2021] metoprolol succinate (TOPROL XL) extended release tablet 25 mg, 25 mg, Oral, Daily, Nickie Carpenter MD    [START ON 8/25/2021] rosuvastatin (CRESTOR) tablet 40 mg, 40 mg, Oral, Daily, MD Nick Zazueta  [START ON 8/25/2021] aspirin EC tablet 81 mg, 81 mg, Oral, Daily, Caro Woodson MD    glucose (GLUTOSE) 40 % oral gel 15 g, 15 g, Oral, PRN, Caro Woodson MD    dextrose 50 % IV solution, 12.5 g, IntraVENous, PRN, Caro Woodson MD    glucagon (rDNA) injection 1 mg, 1 mg, Intramuscular, PRN, Caro Woodson MD    dextrose 5 % solution, 100 mL/hr, IntraVENous, PRN, Caro Woodson MD    sodium chloride flush 0.9 % injection 5-40 mL, 5-40 mL, IntraVENous, 2 times per day, Caro Woodson MD    sodium chloride flush 0.9 % injection 5-40 mL, 5-40 mL, IntraVENous, PRN, Caro Woodson MD    0.9 % sodium chloride infusion, 25 mL, IntraVENous, PRN, MD Nick Zazueta Sawyer ON 8/25/2021] enoxaparin (LOVENOX) injection 40 mg, 40 mg, Subcutaneous, Daily, Caro Woodson MD    ondansetron (ZOFRAN-ODT) disintegrating tablet 4 mg, 4 mg, Oral, Q8H PRN **OR** ondansetron (ZOFRAN) injection 4 mg, 4 mg, IntraVENous, Q6H PRN, Caro Woodson MD    polyethylene glycol [facility]) packet 17 g, 17 g, Oral, Daily PRN, Caro Woodson MD    acetaminophen (TYLENOL) tablet 650 mg, 650 mg, Oral, Q6H PRN **OR** acetaminophen (TYLENOL) suppository 650 mg, 650 mg, Rectal, Q6H PRN, Caro Woodson MD    sodium chloride flush 0.9 % injection, , , ,     lactated ringers infusion, , IntraVENous, Continuous, SHELBIE Mansfield CNP    insulin lispro (HUMALOG) injection vial 0-12 Units, 0-12 Units, Subcutaneous, Q4H, SHELBIE Mansfield CNP    Review of Systems:   Unable to perform review of systems secondary to intubation and sedation    Physical Exam:   Vital Signs:  /64   Pulse 78   Temp 97.5 °F (36.4 °C) (Infrared)   Resp 18   Wt 189 lb 9.5 oz (86 kg)   SpO2 98%   BMI 32.54 kg/m²     Input/Output:   In: 100   Out: -     Oxygen requirements: 40%    Ventilator Information:  Vent Information  $Ventilation: $Initial Day  Vent Type: 980  Vent Mode: AC/VC  Vt Ordered: 400 mL  Rate Set: 400 bmp  FiO2 : 60 %  SpO2: 98 %  PEEP/CPAP: 7  Humidification Source: HME    General appearance: Overweight, not in pain or distress, in no respiratory distress    HEENT: normocephalic, ROLANDO, moist mucosa, significant oral swelling, edema of the tongue  Neck: Supple, no jugular venous distension, lymphadenopathy, thyromegaly or carotid bruits  Chest: Equal normal breath sounds, no wheezing, no crackles and no tenderness over ribs   Cardiovascular: Normal S1 , S2, regular rate and rhythm, no murmur, rub or gallop  Abdomen: Normal sounds present, soft, lax with no tenderness, no hepatosplenomegaly, and no masses, obese and rounded  GI: Vogel catheter in place, no bladder distention noted,  Extremities: No edema. Pulses are equally present. No clubbing or cyanosis  Skin: intact, no rashes or open wounds, edema noted to oral cavity, soft palate and tongue  Neurologic: Intubated and sedated, RASS of -2  Investigations:  Labs, radiological imaging and cardiac work up were reviewed        ICU STAFF PHYSICIAN NOTE OF PERSONAL INVOLVEMENT IN CARE  As the attending physician, I certify that I personally reviewed the patient's history and personally examined the patient to confirm the physical findings described above, and that I reviewed the relevant imaging studies and available reports. I also discussed the differential diagnosis and all of the proposed management plans with the patient and individuals accompanying the patient to this visit. They had the opportunity to ask questions about the proposed management plans and to have those questions answered. This patient has a high probability of sudden, clinically significant deterioration, which requires the highest level of physician preparedness to intervene urgently. I managed/supervised life or organ supporting interventions that required frequent physician assessment.   I devoted my full attention to the direct care of this patient for the amount of time indicated below. Time I spent with family or surrogate(s) is included only if the patient was incapable of providing the necessary information or participating in medical decision-making. Time devoted to teaching and to any procedures I billed separately is not included. Critical Care Time: 32 minutes      Electronically signed by SHELBIE Lundberg CNP on 8/24/2021 at 10:16 PM     Patient was seen and evaluated on August 25, 2021. I agree with assessment and plan of [place].

## 2021-08-25 NOTE — PLAN OF CARE
Problem: Non-Violent Restraints  Goal: No harm/injury to patient while restraints in use  Outcome: Met This Shift     Problem: Non-Violent Restraints  Goal: Patient's dignity will be maintained  Outcome: Met This Shift     Problem: Falls - Risk of:  Goal: Will remain free from falls  Description: Will remain free from falls  Outcome: Met This Shift     Problem: Skin Integrity:  Goal: Absence of new skin breakdown  Description: Absence of new skin breakdown  Outcome: Met This Shift     Problem: Non-Violent Restraints  Goal: Removal from restraints as soon as assessed to be safe  Outcome: Ongoing

## 2021-08-25 NOTE — ED NOTES
Attempt made by this nurse to insert nasogastric tube. 16g advanced 50cm, unable to auscultate air over epigastrum, no gastric secretions returned from suction. Unable to place oragastric tube d/t advanced tongue swelling.       Linden Cardoso RN  08/24/21 5315

## 2021-08-25 NOTE — PROGRESS NOTES
Subjective:  June Velasquez was seen and examined in the ICU today. The patient remains intubated and sedated  There were no new problems reported overnight. Tongue is still swollen    A complete review of systems and social history was completed on admission and remains unchanged unless otherwise noted    Scheduled Meds:   insulin lispro  0-18 Units Subcutaneous Q4H    dexamethasone  4 mg IntraVENous Q8H    famotidine (PEPCID) injection  20 mg IntraVENous BID    insulin glargine  4 Units Subcutaneous BID    diphenhydrAMINE  50 mg IntraVENous Q6H    ipratropium-albuterol  1 vial Inhalation Q4H    sodium chloride flush  5-40 mL IntraVENous 2 times per day    enoxaparin  40 mg Subcutaneous Daily     Continuous Infusions:   propofol 50 mcg/kg/min (08/25/21 1710)    fentaNYL 5 mcg/ml in 0.9%  ml infusion 25 mcg/hr (08/25/21 1105)    dextrose      sodium chloride      lactated ringers 125 mL/hr at 08/25/21 1105     PRN Meds:metoprolol, glucose, dextrose, glucagon (rDNA), dextrose, sodium chloride flush, sodium chloride, ondansetron **OR** ondansetron, polyethylene glycol, acetaminophen **OR** acetaminophen    Objective:  BP (!) 147/76   Pulse 85   Temp 99.3 °F (37.4 °C) (Core)   Resp (!) 32   Wt 181 lb (82.1 kg)   SpO2 98%   BMI 31.07 kg/m²   In: 1000 [I.V.:1000]  Out: 950    In: 1000   Out: 950 [Urine:950]     Intubated and sedated  Still with angioedema  RRR, pos S1, S2  CTA bilaterally, no wheeze, rales or rhonchi  bowel sounds present, nontender, nondistended  No clubbing, cyanosis, or edema  No neuro changes   No obvious rashes or lesions.     Recent Labs     08/24/21  1630 08/25/21  0447   WBC 11.4 11.0   HGB 15.7* 13.0    217     Recent Labs     08/24/21  1630 08/25/21  0447    133   K 4.4 4.7   CL 94* 100   CO2 28 25   BUN 17 22   CREATININE 1.4* 1.6*   GLUCOSE 285* 246*     Recent Labs     08/24/21  1630   BILITOT 0.4   ALKPHOS 85   AST 26   ALT 19     No results for input(s): INR in the last 72 hours. Invalid input(s): PT  No results for input(s): CKTOTAL, CKMB, CKMBINDEX, TROPONINI in the last 72 hours. XR CHEST PORTABLE    Result Date: 8/25/2021  EXAMINATION: ONE XRAY VIEW OF THE CHEST 8/24/2021 8:52 pm COMPARISON: 08/24/2021 HISTORY: ORDERING SYSTEM PROVIDED HISTORY: et tube placement TECHNOLOGIST PROVIDED HISTORY: Reason for exam:->et tube placement FINDINGS: Stable position of endotracheal tube. Cardiomediastinal silhouette is unremarkable. No infiltrate, effusion, or pneumothorax. No acute osseous abnormality. No pulmonary infiltrates. No significant change since the prior study     XR CHEST PORTABLE    Result Date: 8/24/2021  EXAMINATION: ONE XRAY VIEW OF THE CHEST 8/24/2021 5:30 pm COMPARISON: January 3, 2020 HISTORY: ORDERING SYSTEM PROVIDED HISTORY: SOB (shortness of breath) TECHNOLOGIST PROVIDED HISTORY: Reason for exam:->ETT placement, tongue swelling FINDINGS: Endotracheal tube is approximately 6.8 cm above the chivo. No airspace opacity or pleural effusion. The heart is normal in size. No pneumothorax. 1. Endotracheal tube is approximately 6.8 cm above the chivo. 2. No airspace opacity or pleural effusion. Assessment:  Kemal Vivas is a 78y.o. year old female who presented on 8/24/2021 and is being treated for:  Principal Problem:    Acute respiratory failure ([address]  Active Problems:    Essential hypertension    Diabetes mellitus ([address]    Myocardiopathy ([address]    ACE inhibitor-aggravated angioedema    COPD (chronic obstructive pulmonary disease) (HCC)    Acquired angioedema    SOB (shortness of breath)    Disorder of airway    Acquired hypertrophy of tongue  Resolved Problems:    * No resolved hospital problems. *    Plan  · Cont airway protection  · Cont IV steroids/h2 blockade  · Start basal insulin along with SSI  · Please see orders for further management and care.     Jeanne Ruano MD  7:21 PM  8/25/2021

## 2021-08-25 NOTE — ED NOTES
Report given to RN from ICU  Report method by phone   The following was reviewed with receiving RN: IV access x2, Vogel, ventilator settings, inability to place NGT d/t swelling, propofol gtt @ 45mcg/kg/hr, fentanyl gtt @ 50mcg/hr. Insulin pump.  via personal glucometer. Current vital signs:  /64   Pulse 78   Temp 97.5 °F (36.4 °C) (Infrared)   Resp 18   Wt 189 lb 9.5 oz (86 kg)   SpO2 98%   BMI 32.54 kg/m²                      Any medication or safety alerts were reviewed. Any pending diagnostics and notifications were also reviewed, as well as any safety concerns or issues, abnormal labs, abnormal imaging, and abnormal assessment findings. Questions were answered.             Dom Walsh RN  08/24/21 1697

## 2021-08-25 NOTE — CARE COORDINATION
8/25/21 No covid testing (vaccinated patient). CM transition of care: icu/vent/ sedation- fio2 at 45%, peep 7. Cm placed call to pts spouse Navarro Barber. No history of DME, SNF, IRF or home care. Plans to return to home, independent, lives with spouse in tri-level home. CM/SS will follow.  Electronically signed by MAURO Teresa on 8/25/2021 at 11:04 AM

## 2021-08-25 NOTE — ACP (ADVANCE CARE PLANNING)
Advance Care Planning   Healthcare Decision Maker:    Primary Decision Maker: Jeremie Michele    Secondary Decision Maker: Evin Bar - Child - [phone]      Electronically signed by Tiffany Avila RN-BC on 8/25/2021 at 10:39 AM

## 2021-08-25 NOTE — PROGRESS NOTES
OTOLARYNGOLOGY  DAILY PROGRESS NOTE  2021    Subjective:     Pt intubated and sedated on propofol and fentanyl. No issues overnight     Objective:     BP (!) 117/55   Pulse 56   Temp 98.9 °F (37.2 °C) (Bladder)   Resp 18   Wt 181 lb (82.1 kg)   SpO2 99%   BMI 31.07 kg/m²   PULSE OXIMETRY RANGE: SpO2  Av.8 %  Min: 91 %  Max: 100 %  I/O last 3 completed shifts: In: 1100 [I.V.:1000; IV Piggyback:100]  Out: 950 [Urine:950]    GENERAL:  Intubated sedated   HENT: Normocephalic, atraumatic. Floor of mouth edematous, tongue slightly protrudes out of oral cavity, neck soft nontender trachea midline.  ETT in place   EYES: No sclera icterus, pupils equal  LUNGS:  No increased work of breathing, no stridor  CARDIOVASCULAR:  RR      Assessment/Plan:     78 y.o. female with angioedema secondary to ACE-I    · recommend IV decadron [id number] for 3 doses, benadryl, pepcid  · stop all ACE-I/ARB use    ENT to follow     Electronically signed by Jeanette Shane DO on 2021 at 7:32 AM

## 2021-08-25 NOTE — PROGRESS NOTES
Acute Problems  ACE inhibitor induced angioedema  GORDON  Hypertension  Diabetes  COPD (not in acute exacerbation)  Hyperlipidemia    Plan of Care  Continue mechanical ventilation. Patient has a cuff leak however she continues to have significant tongue swelling. Start Solu-Medrol, Pepcid, Benadryl scheduled  Restraints  Continue sedation with propofol and fentanyl, titrate sedation for RASS of -1 to -2  Lantus insulin sliding scale. Every 4 hour blood sugar checks with medium dose sliding scale insulin  IV fluids  Avoid nephrotoxic medications  CMP, CBC, mag, [place] daily  As needed IV metoprolol. VTE prophylaxis with Lovenox  GI prophylaxis with Pepcid  Will list ACE inhibitors and ARB as allergies. Admission History:  Patient is a 78 y.o. female with the above medical history who was brought in by  for swelling of the oral cavity secondary to lisinopril usage. She was alert and oriented upon arrival to the ED and stated that the swelling began 1.5 hours prior to arrival.  She did take 25 mg of Benadryl however symptoms continue to worsen. At the time she did have trouble talking, difficulty swallowing and eventually developed difficulty breathing. Decision was made to intubate for airway protection. In the emergency department she was started on propofol and fentanyl for sedation, she was also given TXA, 125 mg of Solu-Medrol, and 25 mg of IV Benadryl. She does currently use an insulin pump for her diabetes management. Labs and imaging in the ED pertinent for creatinine 1.4, glucose 285, chest x-ray with endotracheal tube approximately 6.8 cm above the chivo. Upon my evaluation she is intubated and sedated. There is significant swelling to the soft palate, tongue and oral cavity. I was unable to clearly view the uvula. There were attempts in the emergency department to place an NG/OG tube however they were unsuccessful secondary to swelling.   I did have the endotracheal tube advanced 2cm and and will repeat chest x-ray to verify placement. It appears as though her creatinine is slightly elevated. Her baseline creatinine appears to be around 0.9 and today is 1.4. I did put her on some mild IV fluids and we will continue to watch her kidney function.  was at bedside and was able to tell me about her history. She does currently smoke cigarettes however, he denies daily alcohol use or any illicit drugs. He was fully updated on the plan of care and all questions were answered. Daily progress:  08/25/2021: Patient is awake and follows command of sedation. She continues to have significant tongue swelling. She has no fever, chills, or rigors. She has a cuff leak. She is hypertensive was started on IV metoprolol as needed. Past Medical History:  Past Medical History:   Diagnosis Date    Arthritis     Asthma     [facility]erebral artery occlusion with cerebral infarction ([facility] [address] 2015    COPD [facility]chronic obstructive pulmonary disease) ([facility] [address]     Diabetes mellitus ([facility] [address]     Heart attack ([facility] [address]     Hyperlipidemia     Hypertension     VHD (valvular heart disease)         Family History:   Family History   Problem Relation Age of Onset    COPD Mother    [address] [facility] Alzheimer's Disease Mother     Alcohol Abuse Father     No Known Problems Sister     Diabetes Brother     No Known Problems Brother        Allergies:         Ace inhibitors, Lisinopril, and Pcn [penicillins]    Social history:  Social History     Socioeconomic History    Marital status:      Spouse name: Not on file    Number of children: Not on file    Years of education: Not on file    Highest education level: Not on file   Occupational History    Not on file   Tobacco Use    Smoking status: Current Every Day Smoker     Packs/day: 0.25     Years: 45.00     Pack years: 11.25     Types: Cigarettes    Smokeless tobacco: Never Used   Vaping Use    Vaping Use: Never used   Substance and Sexual Activity    Alcohol use:  Yes 08/25/21 1059, 45 mcg/kg/min at 08/25/21 1059    fentaNYL 5 mcg/ml in 0.9%  ml infusion, 12.5-200 mcg/hr, IntraVENous, Continuous, Reino Salon, DO, Last Rate: 5 mL/hr at 08/25/21 1105, 25 mcg/hr at 08/25/21 1105    diphenhydrAMINE (BENADRYL) injection 50 mg, 50 mg, IntraVENous, Q6H, SHELBIE Nieto - CNP, 50 mg at 08/25/21 1030    ipratropium-albuterol (DUONEB) nebulizer solution 3 mL, 1 vial, Inhalation, Q4H, Morales Cyr MD, 3 mL at 08/25/21 0933    glucose (GLUTOSE) 40 % oral gel 15 g, 15 g, Oral, PRN, Morales Cyr MD    dextrose 50 % IV solution, 12.5 g, IntraVENous, PRN, Morales Cyr MD    glucagon (rDNA) injection 1 mg, 1 mg, Intramuscular, PRN, Morales Cyr MD    dextrose 5 % solution, 100 mL/hr, IntraVENous, PRN, Morales Cyr MD    sodium chloride flush 0.9 % injection 5-40 mL, 5-40 mL, IntraVENous, 2 times per day, Morales Cyr MD, 10 mL at 08/25/21 0903    sodium chloride flush 0.9 % injection 5-40 mL, 5-40 mL, IntraVENous, PRN, Morales Cyr MD    0.9 % sodium chloride infusion, 25 mL, IntraVENous, PRN, Morales Cyr MD    enoxaparin (LOVENOX) injection 40 mg, 40 mg, Subcutaneous, Daily, Morales Cyr MD, 40 mg at 08/25/21 0854    ondansetron (ZOFRAN-ODT) disintegrating tablet 4 mg, 4 mg, Oral, Q8H PRN **OR** ondansetron (ZOFRAN) injection 4 mg, 4 mg, IntraVENous, Q6H PRN, Morales Cyr MD    polyethylene glycol (GLYCOLAX) packet 17 g, 17 g, Oral, Daily PRN, Morales Cyr MD    acetaminophen (TYLENOL) tablet 650 mg, 650 mg, Oral, Q6H PRN **OR** acetaminophen (TYLENOL) suppository 650 mg, 650 mg, Rectal, Q6H PRN, Morales Cyr MD    lactated ringers infusion, , IntraVENous, Continuous, Tam Garcia MD, Last Rate: 125 mL/hr at 08/25/21 1105, New Bag at 08/25/21 1105    Review of Systems:   Unable to perform review of systems secondary to intubation and sedation    Physical Exam:   Vital Signs:  BP (!) 146/75   Pulse 79   Temp 99 °F (37.2 °C) (Core)   Resp 18   Wt 181 lb (82.1 kg)   SpO2 97%   BMI 31.07 kg/m²     Input/Output: In: 1100 [I.V.:1000]  Out: 950     Oxygen requirements: 40%    Ventilator Information:  Vent Information  $Ventilation: $Subsequent Day  Skin Assessment: Clean, dry, & intact  Vent Type: 980  Vent Mode: AC/VC  Vt Ordered: 400 mL  Rate Set: 18 bmp  Peak Flow: 60 L/min  Pressure Support: 0 cmH20  FiO2 : 45 %  SpO2: 97 %  SpO2/FiO2 ratio: 215.56  Sensitivity: 3  PEEP/CPAP: 7  I Time/ I Time %: 0 s  Humidification Source: Heated wire  Humidification Temp: 37  Humidification Temp Measured: 37    General appearance: Overweight, not in pain or distress, in no respiratory distress    HEENT: Intubated with significant oral swelling, edema of the tongue  Neck: Supple, no jugular venous distension, lymphadenopathy, thyromegaly or carotid bruits  Chest: Equal normal breath sounds, no wheezing, no crackles and no tenderness over ribs   Cardiovascular: Normal S1 , S2, regular rate and rhythm, no murmur, rub or gallop  Abdomen: Normal sounds present, soft, lax with no tenderness, no hepatosplenomegaly, and no masses, obese and rounded  GI: Vogel catheter in place, no bladder distention noted,  Extremities: No edema. Pulses are equally present. No clubbing or cyanosis  Skin: intact, no rashes or open wounds, edema noted to oral cavity, soft palate and tongue  Neurologic: Intubated and sedated, RASS of -2. Awake and follows command of sedation. Investigations:  Labs, radiological imaging and cardiac work up were reviewed        ICU STAFF PHYSICIAN NOTE OF PERSONAL INVOLVEMENT IN CARE  As the attending physician, I certify that I personally reviewed the patient's history and personally examined the patient to confirm the physical findings described above, and that I reviewed the relevant imaging studies and available reports.   I also discussed the differential diagnosis and all of the proposed management plans with the patient and individuals accompanying the patient to this visit. They had the opportunity to ask questions about the proposed management plans and to have those questions answered. This patient has a high probability of sudden, clinically significant deterioration, which requires the highest level of physician preparedness to intervene urgently. I managed/supervised life or organ supporting interventions that required frequent physician assessment. I devoted my full attention to the direct care of this patient for the amount of time indicated below. Time I spent with family or surrogate(s) is included only if the patient was incapable of providing the necessary information or participating in medical decision-making. Time devoted to teaching and to any procedures I billed separately is not included.   Critical Care Time: 32 minutes      Electronically signed by Trish Mann MD on 8/25/2021 at 11:57 AM

## 2021-08-26 ENCOUNTER — APPOINTMENT (OUTPATIENT)
Dept: GENERAL RADIOLOGY | Age: 66
DRG: 207 | End: 2021-08-26
Payer: MEDICARE

## 2021-08-26 LAB
ANION GAP SERPL CALCULATED.3IONS-SCNC: 11 MMOL/L (ref 7–16)
B.E.: -1.8 MMOL/L (ref -3–3)
BUN BLDV-MCNC: 27 MG/DL (ref 6–23)
CALCIUM SERPL-MCNC: 8.3 MG/DL (ref 8.6–10.2)
CHLORIDE BLD-SCNC: 101 MMOL/L (ref 98–107)
CO2: 25 MMOL/L (ref 22–29)
CREAT SERPL-MCNC: 1.5 MG/DL (ref 0.5–1)
DELIVERY SYSTEMS: ABNORMAL
DEVICE: ABNORMAL
FIO2 ARTERIAL: 60
GFR AFRICAN AMERICAN: 42
GFR NON-AFRICAN AMERICAN: 35 ML/MIN/1.73
GLUCOSE BLD-MCNC: 250 MG/DL (ref 74–99)
HBA1C MFR BLD: 9 % (ref 4–5.6)
HCO3 ARTERIAL: 26.2 MMOL/L (ref 22–26)
MAGNESIUM: 2.1 MG/DL (ref 1.6–2.6)
METER GLUCOSE: 194 MG/DL (ref 74–99)
METER GLUCOSE: 204 MG/DL (ref 74–99)
METER GLUCOSE: 222 MG/DL (ref 74–99)
METER GLUCOSE: 225 MG/DL (ref 74–99)
METER GLUCOSE: 242 MG/DL (ref 74–99)
METER GLUCOSE: 251 MG/DL (ref 74–99)
MODE: AC
O2 SATURATION: 93.4 % (ref 92–98.5)
OPERATOR ID: 30
PCO2 ARTERIAL: 57.7 MMHG (ref 35–45)
PH BLOOD GAS: 7.27 (ref 7.35–7.45)
PHOSPHORUS: 4.2 MG/DL (ref 2.5–4.5)
PO2 ARTERIAL: 79.6 MMHG (ref 60–80)
POSITIVE END EXP PRESS: 7 CMH2O
POTASSIUM SERPL-SCNC: 5 MMOL/L (ref 3.5–5)
RESPIRATORY RATE: 12 B/MIN
SODIUM BLD-SCNC: 137 MMOL/L (ref 132–146)
SOURCE, BLOOD GAS: ABNORMAL
TIDAL VOLUME: 400 ML

## 2021-08-26 PROCEDURE — 94640 AIRWAY INHALATION TREATMENT: CPT

## 2021-08-26 PROCEDURE — 2580000003 HC RX 258: Performed by: INTERNAL MEDICINE

## 2021-08-26 PROCEDURE — 2000000000 HC ICU R&B

## 2021-08-26 PROCEDURE — 6370000000 HC RX 637 (ALT 250 FOR IP): Performed by: INTERNAL MEDICINE

## 2021-08-26 PROCEDURE — 6360000002 HC RX W HCPCS

## 2021-08-26 PROCEDURE — 83735 ASSAY OF MAGNESIUM: CPT

## 2021-08-26 PROCEDURE — 6360000002 HC RX W HCPCS: Performed by: INTERNAL MEDICINE

## 2021-08-26 PROCEDURE — 36415 COLL VENOUS BLD VENIPUNCTURE: CPT

## 2021-08-26 PROCEDURE — 80048 BASIC METABOLIC PNL TOTAL CA: CPT

## 2021-08-26 PROCEDURE — 2580000003 HC RX 258: Performed by: STUDENT IN AN ORGANIZED HEALTH CARE EDUCATION/TRAINING PROGRAM

## 2021-08-26 PROCEDURE — 82962 GLUCOSE BLOOD TEST: CPT

## 2021-08-26 PROCEDURE — 6360000002 HC RX W HCPCS: Performed by: STUDENT IN AN ORGANIZED HEALTH CARE EDUCATION/TRAINING PROGRAM

## 2021-08-26 PROCEDURE — 6370000000 HC RX 637 (ALT 250 FOR IP)

## 2021-08-26 PROCEDURE — 84100 ASSAY OF PHOSPHORUS: CPT

## 2021-08-26 PROCEDURE — 6360000002 HC RX W HCPCS: Performed by: EMERGENCY MEDICINE

## 2021-08-26 PROCEDURE — 2500000003 HC RX 250 WO HCPCS: Performed by: INTERNAL MEDICINE

## 2021-08-26 PROCEDURE — 74018 RADEX ABDOMEN 1 VIEW: CPT

## 2021-08-26 PROCEDURE — 94003 VENT MGMT INPAT SUBQ DAY: CPT

## 2021-08-26 RX ORDER — DIMETHICONE, OXYBENZONE, AND PADIMATE O 2; 2.5; 6.6 G/100G; G/100G; G/100G
STICK TOPICAL DAILY
Status: DISCONTINUED | OUTPATIENT
Start: 2021-08-26 | End: 2021-08-26 | Stop reason: CLARIF

## 2021-08-26 RX ORDER — DIMETHICONE, CAMPHOR (SYNTHETIC), MENTHOL, AND PHENOL 1.1; .5; .625; .5 G/100G; G/100G; G/100G; G/100G
OINTMENT TOPICAL DAILY
Status: DISCONTINUED | OUTPATIENT
Start: 2021-08-26 | End: 2021-09-15 | Stop reason: HOSPADM

## 2021-08-26 RX ORDER — DEXAMETHASONE SODIUM PHOSPHATE 4 MG/ML
4 INJECTION, SOLUTION INTRA-ARTICULAR; INTRALESIONAL; INTRAMUSCULAR; INTRAVENOUS; SOFT TISSUE EVERY 12 HOURS
Status: DISCONTINUED | OUTPATIENT
Start: 2021-08-26 | End: 2021-08-28

## 2021-08-26 RX ADMIN — FAMOTIDINE 20 MG: 10 INJECTION INTRAVENOUS at 08:00

## 2021-08-26 RX ADMIN — INSULIN LISPRO 6 UNITS: 100 INJECTION, SOLUTION INTRAVENOUS; SUBCUTANEOUS at 18:00

## 2021-08-26 RX ADMIN — IPRATROPIUM BROMIDE AND ALBUTEROL SULFATE 3 ML: .5; 3 SOLUTION RESPIRATORY (INHALATION) at 21:46

## 2021-08-26 RX ADMIN — Medication 50 MCG/HR: at 03:56

## 2021-08-26 RX ADMIN — IPRATROPIUM BROMIDE AND ALBUTEROL SULFATE 3 ML: .5; 3 SOLUTION RESPIRATORY (INHALATION) at 03:22

## 2021-08-26 RX ADMIN — INSULIN LISPRO 3 UNITS: 100 INJECTION, SOLUTION INTRAVENOUS; SUBCUTANEOUS at 02:45

## 2021-08-26 RX ADMIN — DIPHENHYDRAMINE HYDROCHLORIDE 50 MG: 50 INJECTION, SOLUTION INTRAMUSCULAR; INTRAVENOUS at 21:02

## 2021-08-26 RX ADMIN — IPRATROPIUM BROMIDE AND ALBUTEROL SULFATE 3 ML: .5; 3 SOLUTION RESPIRATORY (INHALATION) at 17:49

## 2021-08-26 RX ADMIN — INSULIN LISPRO 6 UNITS: 100 INJECTION, SOLUTION INTRAVENOUS; SUBCUTANEOUS at 14:00

## 2021-08-26 RX ADMIN — ENOXAPARIN SODIUM 40 MG: 40 INJECTION SUBCUTANEOUS at 20:56

## 2021-08-26 RX ADMIN — INSULIN LISPRO 6 UNITS: 100 INJECTION, SOLUTION INTRAVENOUS; SUBCUTANEOUS at 06:10

## 2021-08-26 RX ADMIN — IPRATROPIUM BROMIDE AND ALBUTEROL SULFATE 3 ML: .5; 3 SOLUTION RESPIRATORY (INHALATION) at 13:12

## 2021-08-26 RX ADMIN — Medication 10 ML: at 08:03

## 2021-08-26 RX ADMIN — PROPOFOL 50 MCG/KG/MIN: 10 INJECTION, EMULSION INTRAVENOUS at 07:55

## 2021-08-26 RX ADMIN — PROPOFOL 50 MCG/KG/MIN: 10 INJECTION, EMULSION INTRAVENOUS at 14:26

## 2021-08-26 RX ADMIN — SODIUM CHLORIDE, POTASSIUM CHLORIDE, SODIUM LACTATE AND CALCIUM CHLORIDE: 600; 310; 30; 20 INJECTION, SOLUTION INTRAVENOUS at 10:24

## 2021-08-26 RX ADMIN — ENOXAPARIN SODIUM 40 MG: 40 INJECTION SUBCUTANEOUS at 08:30

## 2021-08-26 RX ADMIN — INSULIN GLARGINE 4 UNITS: 100 INJECTION, SOLUTION SUBCUTANEOUS at 10:29

## 2021-08-26 RX ADMIN — PROPOFOL 50 MCG/KG/MIN: 10 INJECTION, EMULSION INTRAVENOUS at 10:31

## 2021-08-26 RX ADMIN — DIMETHICONE, CAMPHOR (SYNTHETIC), MENTHOL, AND PHENOL: 1.1; .5; .625; .5 OINTMENT TOPICAL at 10:29

## 2021-08-26 RX ADMIN — INSULIN GLARGINE 4 UNITS: 100 INJECTION, SOLUTION SUBCUTANEOUS at 20:58

## 2021-08-26 RX ADMIN — SODIUM CHLORIDE, POTASSIUM CHLORIDE, SODIUM LACTATE AND CALCIUM CHLORIDE: 600; 310; 30; 20 INJECTION, SOLUTION INTRAVENOUS at 18:14

## 2021-08-26 RX ADMIN — DIPHENHYDRAMINE HYDROCHLORIDE 50 MG: 50 INJECTION, SOLUTION INTRAMUSCULAR; INTRAVENOUS at 16:44

## 2021-08-26 RX ADMIN — INSULIN LISPRO 9 UNITS: 100 INJECTION, SOLUTION INTRAVENOUS; SUBCUTANEOUS at 23:00

## 2021-08-26 RX ADMIN — PROPOFOL 50 MCG/KG/MIN: 10 INJECTION, EMULSION INTRAVENOUS at 22:59

## 2021-08-26 RX ADMIN — DIPHENHYDRAMINE HYDROCHLORIDE 50 MG: 50 INJECTION, SOLUTION INTRAMUSCULAR; INTRAVENOUS at 10:29

## 2021-08-26 RX ADMIN — METOPROLOL TARTRATE 5 MG: 1 INJECTION, SOLUTION INTRAVENOUS at 11:14

## 2021-08-26 RX ADMIN — IPRATROPIUM BROMIDE AND ALBUTEROL SULFATE 3 ML: .5; 3 SOLUTION RESPIRATORY (INHALATION) at 09:58

## 2021-08-26 RX ADMIN — DIPHENHYDRAMINE HYDROCHLORIDE 50 MG: 50 INJECTION, SOLUTION INTRAMUSCULAR; INTRAVENOUS at 04:48

## 2021-08-26 RX ADMIN — FAMOTIDINE 10 MG: 10 INJECTION INTRAVENOUS at 20:57

## 2021-08-26 RX ADMIN — INSULIN LISPRO 6 UNITS: 100 INJECTION, SOLUTION INTRAVENOUS; SUBCUTANEOUS at 10:30

## 2021-08-26 RX ADMIN — DEXAMETHASONE SODIUM PHOSPHATE 4 MG: 4 INJECTION, SOLUTION INTRAMUSCULAR; INTRAVENOUS at 02:00

## 2021-08-26 RX ADMIN — IPRATROPIUM BROMIDE AND ALBUTEROL SULFATE 3 ML: .5; 3 SOLUTION RESPIRATORY (INHALATION) at 06:18

## 2021-08-26 RX ADMIN — Medication 10 ML: at 20:57

## 2021-08-26 RX ADMIN — DEXAMETHASONE SODIUM PHOSPHATE 4 MG: 4 INJECTION, SOLUTION INTRAMUSCULAR; INTRAVENOUS at 10:28

## 2021-08-26 RX ADMIN — DEXAMETHASONE SODIUM PHOSPHATE 4 MG: 4 INJECTION, SOLUTION INTRAMUSCULAR; INTRAVENOUS at 21:01

## 2021-08-26 RX ADMIN — PROPOFOL 50 MCG/KG/MIN: 10 INJECTION, EMULSION INTRAVENOUS at 18:14

## 2021-08-26 ASSESSMENT — PULMONARY FUNCTION TESTS
PIF_VALUE: 36
PIF_VALUE: 34
PIF_VALUE: 34
PIF_VALUE: 27
PIF_VALUE: 31
PIF_VALUE: 30
PIF_VALUE: 34
PIF_VALUE: 28
PIF_VALUE: 35
PIF_VALUE: 29
PIF_VALUE: 36
PIF_VALUE: 35
PIF_VALUE: 31
PIF_VALUE: 36
PIF_VALUE: 38
PIF_VALUE: 36
PIF_VALUE: 34
PIF_VALUE: 26
PIF_VALUE: 31
PIF_VALUE: 34
PIF_VALUE: 34
PIF_VALUE: 29
PIF_VALUE: 32
PIF_VALUE: 50
PIF_VALUE: 41
PIF_VALUE: 34
PIF_VALUE: 41
PIF_VALUE: 36
PIF_VALUE: 28
PIF_VALUE: 31
PIF_VALUE: 35

## 2021-08-26 NOTE — PLAN OF CARE
Raul Salas,    Your patient is on a medication that requires a renal dose adjustment. Renal Function Assessment:    Date Body Weight IBW Adj. Body Weight SCr CrCl Dialysis status   8/26/2021 82.1 kg 54.7 kg 65.7 kg 1.5 38 ml/min no       Pharmacy has renally dose-adjusted the following medication(s):    Date Medication Original Dosing Regimen New Dosing Regimen   8/26/2021 famotidine 20mg IV BID 10mg IV BID           These changes were made per protocol according to the [facility] Renal Function-Based Dose Adjustments Policy    *Please note this dose may need readjusted if your patient's renal function significantly improves. Please contact pharmacy with any questions regarding these changes.

## 2021-08-26 NOTE — PLAN OF CARE
Problem: Non-Violent Restraints  Goal: Removal from restraints as soon as assessed to be safe  8/26/2021 1456 by Teddy Heimlich, RN  Outcome: Not Met This Shift  8/26/2021 0533 by Viviane Lee RN  Outcome: Not Met This Shift  Goal: No harm/injury to patient while restraints in use  8/26/2021 1456 by Teddy Heimlich, RN  Outcome: Met This Shift  8/26/2021 0533 by Viviane Lee RN  Outcome: Met This Shift  Goal: Patient's dignity will be maintained  8/26/2021 1456 by Teddy Heimlich, RN  Outcome: Met This Shift  8/26/2021 0533 by Viviane Lee RN  Outcome: Met This Shift     Problem: Falls - Risk of:  Goal: Will remain free from falls  Description: Will remain free from falls  Outcome: Met This Shift  Goal: Absence of physical injury  Description: Absence of physical injury  Outcome: Met This Shift     Problem: Skin Integrity:  Goal: Will show no infection signs and symptoms  Description: Will show no infection signs and symptoms  Outcome: Met This Shift  Goal: Absence of new skin breakdown  Description: Absence of new skin breakdown  Outcome: Met This Shift

## 2021-08-26 NOTE — PROGRESS NOTES
OTOLARYNGOLOGY  DAILY PROGRESS NOTE  2021    Subjective:     Pt intubated and sedated on propofol and fentanyl. No issues overnight. Tongue appears improved    Objective:     BP (!) 194/111   Pulse 80   Temp 100 °F (37.8 °C) (Core)   Resp 15   Wt 181 lb (82.1 kg)   SpO2 98%   BMI 31.07 kg/m²   PULSE OXIMETRY RANGE: SpO2  Av.2 %  Min: 92 %  Max: 99 %  I/O last 3 completed shifts: In: 1000 [I.V.:1000]  Out: 950 [Urine:950]    GENERAL:  Intubated sedated   HENT: Normocephalic, atraumatic. Floor of mouth edematous, tongue slightly protrudes out of oral cavity, neck soft nontender trachea midline. ETT in place.  Mucous membranes dry   EYES: No sclera icterus, pupils equal  LUNGS:  No increased work of breathing, no stridor  CARDIOVASCULAR:  RR      Assessment/Plan:     78 y.o. female with angioedema secondary to ACE-I    · Recommend continued IV decadron, benadryl, pepcid as patient still has some floor of mouth swelling, overall the swelling is improved from yesterday  · Improved oral cavity care, mucous membranes dry  · stop all ACE-I/ARB use    ENT to follow     Electronically signed by Abel Escobedo DO on 2021 at 6:56 AM

## 2021-08-26 NOTE — PROGRESS NOTES
Acute Problems  ACE inhibitor induced angioedema  GORDON  Hypertension  Diabetes  COPD (not in acute exacerbation)  Hyperlipidemia    Plan of Care  Continue mechanical ventilation. Patient has a cuff leak however she continues to have significant tongue swelling. Continue with Decadron, Pepcid, Benadryl scheduled  Restraints  Continue sedation with propofol and fentanyl, titrate sedation for RASS of -1 to -2  Lantus insulin sliding scale. SSI  An NG tube was inserted. X-ray confirmed placement. Patient will be started on tube feeding. Avoid nephrotoxic medications  CMP, CBC, mag, [place] daily  As needed IV metoprolol. VTE prophylaxis with Lovenox  GI prophylaxis with Pepcid  Will list ACE inhibitors and ARB as allergies. Daily sedation vacation. We will attempt to respiration trial tomorrow since the patient continues to have soft tissue swelling. Admission History:  Patient is a 78 y.o. female with the above medical history who was brought in by  for swelling of the oral cavity secondary to lisinopril usage. She was alert and oriented upon arrival to the ED and stated that the swelling began 1.5 hours prior to arrival.  She did take 25 mg of Benadryl however symptoms continue to worsen. At the time she did have trouble talking, difficulty swallowing and eventually developed difficulty breathing. Decision was made to intubate for airway protection. In the emergency department she was started on propofol and fentanyl for sedation, she was also given TXA, 125 mg of Solu-Medrol, and 25 mg of IV Benadryl. She does currently use an insulin pump for her diabetes management. Labs and imaging in the ED pertinent for creatinine 1.4, glucose 285, chest x-ray with endotracheal tube approximately 6.8 cm above the chivo. Upon my evaluation she is intubated and sedated. There is significant swelling to the soft palate, tongue and oral cavity. I was unable to clearly view the uvula.   There were attempts in the emergency department to place an NG/OG tube however they were unsuccessful secondary to swelling. I did have the endotracheal tube advanced 2cm and and will repeat chest x-ray to verify placement. It appears as though her creatinine is slightly elevated. Her baseline creatinine appears to be around 0.9 and today is 1.4. I did put her on some mild IV fluids and we will continue to watch her kidney function.  was at bedside and was able to tell me about her history. She does currently smoke cigarettes however, he denies daily alcohol use or any illicit drugs. He was fully updated on the plan of care and all questions were answered. Daily progress:  08/25/2021: Patient is awake and follows command of sedation. She continues to have significant tongue swelling. She has no fever, chills, or rigors. She has a cuff leak. She is hypertensive was started on IV metoprolol as needed. 08/26/2021: Patient's remains sedated intubated and mechanically ventilated. She continues to have some tongue swelling. Her urine output is maintained. An NG was inserted to initiate tube feeding. She will undergo sedation vacation and possible extubation tomorrow once the swelling resolves.       Past Medical History:  Past Medical History:   Diagnosis Date    Arthritis     Asthma     Cerebral artery occlusion with cerebral infarction ([facility] [address] 2015    COPD [facility]chronic obstructive pulmonary disease) ([facility] [address]     Diabetes mellitus ([facility] [address]     Heart attack ([facility] [address]     Hyperlipidemia     Hypertension     VHD (valvular heart disease)         Family History:   Family History   Problem Relation Age of Onset    COPD Mother     Alzheimer's Disease Mother     Alcohol Abuse Father     No Known Problems Sister     Diabetes Brother     No Known Problems Brother        Allergies:         Ace inhibitors, Angiotensin receptor blockers, Lisinopril, and Pcn [penicillins]    Social history:  Social History     Socioeconomic History  Marital status:      Spouse name: Not on file    Number of children: Not on file    Years of education: Not on file    Highest education level: Not on file   Occupational History    Not on file   Tobacco Use    Smoking status: Current Every Day Smoker     Packs/day: 0.25     Years: 45.00     Pack years: 11.25     Types: Cigarettes    Smokeless tobacco: Never Used   Vaping Use    Vaping Use: Never used   Substance and Sexual Activity    Alcohol use: Yes     Alcohol/week: 7.0 - 8.0 standard drinks     Types: 7 - 8 Glasses of wine per week     Comment: glass of wine with dinner. 1 cup of coffee a day     Drug use: Never    Sexual activity: Not on file   Other Topics Concern    Not on file   Social History Narrative    Drinks 2 cups coffee daily. Social Determinants of Health     Financial Resource Strain:     Difficulty of Paying Living Expenses:    Food Insecurity:     Worried About Running Out of Food in the Last Year:     [address] in the Last Year:    Transportation Needs:     Lack of Transportation (Medical):      Lack of Transportation (Non-Medical):    Physical Activity:     Days of Exercise per Week:     Minutes of Exercise per Session:    Stress:     Feeling of Stress :    Social Connections:     Frequency of Communication with Friends and Family:     Frequency of Social Gatherings with Friends and Family:     Attends [employer] Services:     Active Member of Clubs or Organizations:     Attends Club or Organization Meetings:     Marital Status:    Intimate Partner Violence:     Fear of Current or Ex-Partner:     Emotionally Abused:     Physically Abused:     Sexually Abused:        Current Medications:     Current Facility-Administered Medications:     medicated lip ointment (BLISTEX), , Topical, Daily, Jerry Pink DO, Given at 08/26/21 1029    dexamethasone (DECADRON) injection 4 mg, 4 mg, IntraVENous, Q12H, Pavithra Jett MD, 4 mg at 08/26/21 1028    enoxaparin (LOVENOX) injection 40 mg, 40 mg, Subcutaneous, BID, Bonnie Frances MD    insulin lispro (HUMALOG) injection vial 0-18 Units, 0-18 Units, Subcutaneous, Q4H, Krysgann Flow, APRN - CNP, 6 Units at 08/26/21 1030    famotidine (PEPCID) injection 20 mg, 20 mg, IntraVENous, BID, Bonnie Frances MD, 20 mg at 08/26/21 0800    insulin glargine (LANTUS) injection vial 4 Units, 4 Units, Subcutaneous, BID, Bonnie Frances MD, 4 Units at 08/26/21 1029    metoprolol (LOPRESSOR) injection 5 mg, 5 mg, IntraVENous, Q4H PRN, Bonnie Frances MD    propofol injection, 5-50 mcg/kg/min, IntraVENous, Titrated, Amisha Spnagler MD, Last Rate: 25.8 mL/hr at 08/26/21 1031, 50 mcg/kg/min at 08/26/21 1031    fentaNYL 5 mcg/ml in 0.9%  ml infusion, 12.5-200 mcg/hr, IntraVENous, Continuous, Claudene Chew, DO, Last Rate: 10 mL/hr at 08/26/21 0356, 50 mcg/hr at 08/26/21 0356    diphenhydrAMINE (BENADRYL) injection 50 mg, 50 mg, IntraVENous, Q6H, Jayme Flow, APRN - CNP, 50 mg at 08/26/21 1029    ipratropium-albuterol (DUONEB) nebulizer solution 3 mL, 1 vial, Inhalation, Q4H, Theodore Darling MD, 3 mL at 08/26/21 0958    glucose (GLUTOSE) 40 % oral gel 15 g, 15 g, Oral, PRN, Theodore Darling MD    dextrose 50 % IV solution, 12.5 g, IntraVENous, PRN, Theodore Darling MD    glucagon (rDNA) injection 1 mg, 1 mg, IntraMUSCular, PRN, Theodore Darling MD    dextrose 5 % solution, 100 mL/hr, IntraVENous, PRN, Theodore Darling MD    sodium chloride flush 0.9 % injection 5-40 mL, 5-40 mL, IntraVENous, 2 times per day, Theodore Darling MD, 10 mL at 08/26/21 0803    sodium chloride flush 0.9 % injection 5-40 mL, 5-40 mL, IntraVENous, PRN, Theodore Darling MD    0.9 % sodium chloride infusion, 25 mL, IntraVENous, PRN, Theodore Darling MD    ondansetron (ZOFRAN-ODT) disintegrating tablet 4 mg, 4 mg, Oral, Q8H PRN **OR** ondansetron (ZOFRAN) injection 4 mg, 4 mg, IntraVENous, Q6H PRN, Theodore Darling MD    polyethylene glycol [facility]) packet 17 g, 17 g, Oral, Daily PRN, Theodore Darling MD    acetaminophen (TYLENOL) tablet 650 mg, 650 mg, Oral, Q6H PRN **OR** acetaminophen (TYLENOL) suppository 650 mg, 650 mg, Rectal, Q6H PRN, Theodore Darling MD    lactated ringers infusion, , IntraVENous, Continuous, Bonnie Frances MD, Last Rate: 125 mL/hr at 08/26/21 1024, New Bag at 08/26/21 1024    Review of Systems:   Unable to perform review of systems secondary to intubation and sedation    Physical Exam:   Vital Signs:  BP (!) 141/62   Pulse 77   Temp 99.1 °F (37.3 °C) (Bladder)   Resp 18   Wt 181 lb (82.1 kg)   SpO2 97%   BMI 31.07 kg/m²     Input/Output: In: 1852.7 [I.V.:1852.7]  Out: 1000     Oxygen requirements: 40%    Ventilator Information:  Vent Information  $Ventilation: $Subsequent Day  Skin Assessment: Clean, dry, & intact  Vent Type: 980  Vent Mode: AC/VC  Vt Ordered: 400 mL  Rate Set: 18 bmp  Peak Flow: 50 L/min  Pressure Support: 0 cmH20  FiO2 : 45 %  SpO2: 97 %  SpO2/FiO2 ratio: 215.56  Sensitivity: 3  PEEP/CPAP: 7  I Time/ I Time %: 0 s  Humidification Source: Heated wire  Humidification Temp: 37  Humidification Temp Measured: 37    General appearance: Overweight, not in pain or distress, in no respiratory distress    HEENT: Intubated with significant oral swelling, edema of the tongue  Neck: Supple, no jugular venous distension, lymphadenopathy, thyromegaly or carotid bruits  Chest: Equal normal breath sounds, no wheezing, no crackles and no tenderness over ribs   Cardiovascular: Normal S1 , S2, regular rate and rhythm, no murmur, rub or gallop  Abdomen: Normal sounds present, soft, lax with no tenderness, no hepatosplenomegaly, and no masses, obese and rounded  GI: Vogel catheter in place, no bladder distention noted,  Extremities: No edema. Pulses are equally present.   No clubbing or cyanosis  Skin: intact, no rashes or open wounds, edema noted to oral cavity, soft palate and tongue  Neurologic: Intubated and sedated, RASS of -2. Awake and follows command of sedation. Investigations:  Labs, radiological imaging and cardiac work up were reviewed        ICU STAFF PHYSICIAN NOTE OF PERSONAL INVOLVEMENT IN CARE  As the attending physician, I certify that I personally reviewed the patient's history and personally examined the patient to confirm the physical findings described above, and that I reviewed the relevant imaging studies and available reports. I also discussed the differential diagnosis and all of the proposed management plans with the patient and individuals accompanying the patient to this visit. They had the opportunity to ask questions about the proposed management plans and to have those questions answered. This patient has a high probability of sudden, clinically significant deterioration, which requires the highest level of physician preparedness to intervene urgently. I managed/supervised life or organ supporting interventions that required frequent physician assessment. I devoted my full attention to the direct care of this patient for the amount of time indicated below. Time I spent with family or surrogate(s) is included only if the patient was incapable of providing the necessary information or participating in medical decision-making. Time devoted to teaching and to any procedures I billed separately is not included.   Critical Care Time: 32 minutes      Electronically signed by Maximilian Ortiz MD on 8/26/2021 at 11:03 AM

## 2021-08-26 NOTE — PROGRESS NOTES
Subjective:  Jase Fernández was seen and examined in the ICU today. The patient remains intubated and sedated  There were no new problems reported overnight. Tongue is still swollen but better  OGT placed    A complete review of systems and social history was completed on admission and remains unchanged unless otherwise noted    Scheduled Meds:   medicated lip ointment   Topical Daily    dexamethasone  4 mg IntraVENous Q12H    enoxaparin  40 mg Subcutaneous BID    famotidine (PEPCID) injection  10 mg IntraVENous BID    insulin lispro  0-18 Units Subcutaneous Q4H    insulin glargine  4 Units Subcutaneous BID    diphenhydrAMINE  50 mg IntraVENous Q6H    ipratropium-albuterol  1 vial Inhalation Q4H    sodium chloride flush  5-40 mL IntraVENous 2 times per day     Continuous Infusions:   propofol 50 mcg/kg/min (08/26/21 1814)    fentaNYL 5 mcg/ml in 0.9%  ml infusion 75 mcg/hr (08/26/21 1102)    dextrose      sodium chloride      lactated ringers 125 mL/hr at 08/26/21 1814     PRN Meds:metoprolol, glucose, dextrose, glucagon (rDNA), dextrose, sodium chloride flush, sodium chloride, ondansetron **OR** ondansetron, polyethylene glycol, acetaminophen **OR** acetaminophen    Objective:  BP (!) 149/63   Pulse 75   Temp 99.7 °F (37.6 °C) (Bladder)   Resp 18   Ht 5' 4\" (1.626 m)   Wt 181 lb (82.1 kg)   SpO2 96%   BMI 31.07 kg/m²   In: 3081.7 [I.V.:3081.7]  Out: 1650    In: 3081.7   Out: 2956 [Urine:1650]     Intubated and sedated  Still with angioedema but tongue looks smaller  RRR, pos S1, S2  CTA bilaterally, no wheeze, rales or rhonchi  bowel sounds present, nontender, nondistended  No clubbing, cyanosis, or edema  No neuro changes   No obvious rashes or lesions.     Recent Labs     08/24/21  1630 08/25/21  0447   WBC 11.4 11.0   HGB 15.7* 13.0    217     Recent Labs     08/24/21  1630 08/25/21  0447 08/26/21  1108    133 137   K 4.4 4.7 5.0   CL 94* 100 101   CO2 28 25 25   BUN 17 22 27* CREATININE 1.4* 1.6* 1.5*   GLUCOSE 285* 246* 250*     Recent Labs     08/24/21  1630   BILITOT 0.4   ALKPHOS 85   AST 26   ALT 19     No results for input(s): INR in the last 72 hours. Invalid input(s): PT  No results for input(s): CKTOTAL, CKMB, CKMBINDEX, TROPONINI in the last 72 hours. XR CHEST PORTABLE    Result Date: 8/25/2021  EXAMINATION: ONE XRAY VIEW OF THE CHEST 8/24/2021 8:52 pm COMPARISON: 08/24/2021 HISTORY: ORDERING SYSTEM PROVIDED HISTORY: et tube placement TECHNOLOGIST PROVIDED HISTORY: Reason for exam:->et tube placement FINDINGS: Stable position of endotracheal tube. Cardiomediastinal silhouette is unremarkable. No infiltrate, effusion, or pneumothorax. No acute osseous abnormality. No pulmonary infiltrates. No significant change since the prior study     XR CHEST PORTABLE    Result Date: 8/24/2021  EXAMINATION: ONE XRAY VIEW OF THE CHEST 8/24/2021 5:30 pm COMPARISON: January 3, 2020 HISTORY: ORDERING SYSTEM PROVIDED HISTORY: SOB (shortness of breath) TECHNOLOGIST PROVIDED HISTORY: Reason for exam:->ETT placement, tongue swelling FINDINGS: Endotracheal tube is approximately 6.8 cm above the chivo. No airspace opacity or pleural effusion. The heart is normal in size. No pneumothorax. 1. Endotracheal tube is approximately 6.8 cm above the chivo. 2. No airspace opacity or pleural effusion. Assessment:  Diomedes Miguel is a 78y.o. year old female who presented on 8/24/2021 and is being treated for:  Principal Problem:    Acute respiratory failure ([facility] [address]  Active Problems:    Essential hypertension    Diabetes mellitus ([facility] [address]    Myocardiopathy ([facility] [address]    ACE inhibitor-aggravated angioedema    COPD (chronic obstructive pulmonary disease) (HCC)    Acquired angioedema    SOB (shortness of breath)    Disorder of airway    Acquired hypertrophy of tongue  Resolved Problems:    * No resolved hospital problems.  *    Plan  · Cont airway protection - went weaning as per pulmonary  · Cont IV steroids/h2 blockade  · Increase basal insulin along with SSI  · Please see orders for further management and care.     Deandra Gregory MD  7:16 PM  8/26/2021

## 2021-08-26 NOTE — CONSULTS
Comprehensive Nutrition Assessment    Type and Reason for Visit:  Initial, Consult    Nutrition Recommendations/Plan: Will start Vital HP (Peptide Based HP) w/ goal rate of 40 ml/hr x 24 hr to provide: 960 mlTV, 960 kcal, 84 gm protein, and 803 mlFW. 120 ml FW q 4 hr or per critical care. Nutrition Assessment:  Pt admitted w/ SOB r/t tongue swelling, PMH of COPD, CVA, and DM. Pt intubated to protect airway. Will provide EN recommendations and monitor while admtited. Malnutrition Assessment:  Malnutrition Status: At risk for malnutrition (Comment)    Context:  Acute Illness     Findings of the 6 clinical characteristics of malnutrition:  Energy Intake:  Mild decrease in energy intake (Comment)  Weight Loss:  No significant weight loss     Body Fat Loss:  No significant body fat loss     Muscle Mass Loss:  No significant muscle mass loss    Fluid Accumulation:  No significant fluid accumulation     Strength:  Not Performed    Estimated Daily Nutrient Needs:  Energy (kcal):  9929-4919 ([id number]= 1564); Weight Used for Energy Requirements:  Current     Protein (g):   (1.5-1.8 gm/kg IBW);  Weight Used for Protein Requirements:  Ideal        Fluid (ml/day):  per critical care; Method Used for Fluid Requirements:  Other (Comment)      Nutrition Related Findings:  Pt intubated, Propofol @ 25.8 ml/hr (681 kcal/day), abd rounded soft, +BS, +2.2L I/O, no edema, OGT clamped, hyperglycemia, renal labs elevated      Wounds:  None       Current Nutrition Therapies:    Diet NPO    Anthropometric Measures:  · Height: 5' 4\" (162.6 cm)  · Current Body Weight: 190 lb 6.4 oz (86.4 kg) (8/26 bed scale)   · Admission Body Weight: 181 lb (82.1 kg) (8/24 bed scale)    · Usual Body Weight: 188 lb 6.4 oz (85.5 kg) (3/10/21 no method per EMR)     · Ideal Body Weight: 120 lbs; % Ideal Body Weight 158.7 %   · BMI: 32.7  · Adjusted Body Weight:  ; No Adjustment   · Adjusted BMI:      · BMI Categories: Obese Class 1 (BMI 30.0-34. 9)       Nutrition Diagnosis:   · Inadequate oral intake related to impaired respiratory function as evidenced by intubation, NPO or clear liquid status due to medical condition    Nutrition Interventions:   Food and/or Nutrient Delivery:  Continue NPO, Start Tube Feeding (Will start Vital HP (Peptide Based HP) w/ goal rate of 40 ml/hr x 24 hr to provide: 960 mlTV, 960 kcal, 84 gm protein, and 803 mlFW. 120 ml FW q 4 hr or per critical care.)  Nutrition Education/Counseling:  No recommendation at this time   Coordination of Nutrition Care:  Continue to monitor while inpatient    Goals:  Nutrition Progression       Nutrition Monitoring and Evaluation:   Behavioral-Environmental Outcomes:  None Identified   Food/Nutrient Intake Outcomes:  Enteral Nutrition Intake/Tolerance  Physical Signs/Symptoms Outcomes:  Biochemical Data, GI Status, Fluid Status or Edema, Hemodynamic Status, Nutrition Focused Physical Findings, Skin, Weight     Discharge Planning:     Too soon to determine     Electronically signed by Roselyn Levi RD, LD on 8/26/21 at 3:41 PM EDT    Contact: [phone]

## 2021-08-26 NOTE — PROGRESS NOTES
Cuff deflated positive leak noted returning 0 mls on ventilator. Cuff reinflated RN notified. Mouth and lips still appear to be swollen.   Br/s diffuse E wheezing noted

## 2021-08-26 NOTE — CARE COORDINATION
8/26/21 No covid testing (vaccinated patient). Cm transition of care: icu/vent/ sedation- fio2 at 45%, peep 7. Unclear direction of care planning- likely to return home with spouse.  Nereyda Steinberg will provide a ride at discharge. CM/SS to follow.  Electronically signed by Sharon Bragg RN-BC on 8/26/2021 at 10:20 AM

## 2021-08-27 LAB
ALBUMIN SERPL-MCNC: 3 G/DL (ref 3.5–5.2)
ALP BLD-CCNC: 62 U/L (ref 35–104)
ALT SERPL-CCNC: 13 U/L (ref 0–32)
ANION GAP SERPL CALCULATED.3IONS-SCNC: 8 MMOL/L (ref 7–16)
AST SERPL-CCNC: 20 U/L (ref 0–31)
BILIRUB SERPL-MCNC: <0.2 MG/DL (ref 0–1.2)
BUN BLDV-MCNC: 24 MG/DL (ref 6–23)
CALCIUM SERPL-MCNC: 8.1 MG/DL (ref 8.6–10.2)
CHLORIDE BLD-SCNC: 101 MMOL/L (ref 98–107)
CO2: 28 MMOL/L (ref 22–29)
CREAT SERPL-MCNC: 1.2 MG/DL (ref 0.5–1)
GFR AFRICAN AMERICAN: 54
GFR NON-AFRICAN AMERICAN: 45 ML/MIN/1.73
GLUCOSE BLD-MCNC: 171 MG/DL (ref 74–99)
HCT VFR BLD CALC: 36.8 % (ref 34–48)
HEMOGLOBIN: 11.4 G/DL (ref 11.5–15.5)
MAGNESIUM: 2.1 MG/DL (ref 1.6–2.6)
MCH RBC QN AUTO: 31 PG (ref 26–35)
MCHC RBC AUTO-ENTMCNC: 31 % (ref 32–34.5)
MCV RBC AUTO: 100 FL (ref 80–99.9)
METER GLUCOSE: 109 MG/DL (ref 74–99)
METER GLUCOSE: 157 MG/DL (ref 74–99)
METER GLUCOSE: 181 MG/DL (ref 74–99)
METER GLUCOSE: 201 MG/DL (ref 74–99)
METER GLUCOSE: 205 MG/DL (ref 74–99)
METER GLUCOSE: 254 MG/DL (ref 74–99)
PDW BLD-RTO: 12.8 FL (ref 11.5–15)
PHOSPHORUS: 3.6 MG/DL (ref 2.5–4.5)
PLATELET # BLD: 203 E9/L (ref 130–450)
PMV BLD AUTO: 10.8 FL (ref 7–12)
POTASSIUM SERPL-SCNC: 4.9 MMOL/L (ref 3.5–5)
RBC # BLD: 3.68 E12/L (ref 3.5–5.5)
SODIUM BLD-SCNC: 137 MMOL/L (ref 132–146)
TOTAL PROTEIN: 5.2 G/DL (ref 6.4–8.3)
TRIGL SERPL-MCNC: 222 MG/DL (ref 0–149)
WBC # BLD: 11.7 E9/L (ref 4.5–11.5)

## 2021-08-27 PROCEDURE — 36415 COLL VENOUS BLD VENIPUNCTURE: CPT

## 2021-08-27 PROCEDURE — 2580000003 HC RX 258: Performed by: INTERNAL MEDICINE

## 2021-08-27 PROCEDURE — 84100 ASSAY OF PHOSPHORUS: CPT

## 2021-08-27 PROCEDURE — 94640 AIRWAY INHALATION TREATMENT: CPT

## 2021-08-27 PROCEDURE — 83735 ASSAY OF MAGNESIUM: CPT

## 2021-08-27 PROCEDURE — 6360000002 HC RX W HCPCS

## 2021-08-27 PROCEDURE — 84478 ASSAY OF TRIGLYCERIDES: CPT

## 2021-08-27 PROCEDURE — 2580000003 HC RX 258: Performed by: STUDENT IN AN ORGANIZED HEALTH CARE EDUCATION/TRAINING PROGRAM

## 2021-08-27 PROCEDURE — 6360000002 HC RX W HCPCS: Performed by: INTERNAL MEDICINE

## 2021-08-27 PROCEDURE — 82962 GLUCOSE BLOOD TEST: CPT

## 2021-08-27 PROCEDURE — 6360000002 HC RX W HCPCS: Performed by: EMERGENCY MEDICINE

## 2021-08-27 PROCEDURE — 6370000000 HC RX 637 (ALT 250 FOR IP)

## 2021-08-27 PROCEDURE — 80053 COMPREHEN METABOLIC PANEL: CPT

## 2021-08-27 PROCEDURE — 2500000003 HC RX 250 WO HCPCS: Performed by: INTERNAL MEDICINE

## 2021-08-27 PROCEDURE — 2000000000 HC ICU R&B

## 2021-08-27 PROCEDURE — 6370000000 HC RX 637 (ALT 250 FOR IP): Performed by: INTERNAL MEDICINE

## 2021-08-27 PROCEDURE — 94003 VENT MGMT INPAT SUBQ DAY: CPT

## 2021-08-27 PROCEDURE — 6360000002 HC RX W HCPCS: Performed by: STUDENT IN AN ORGANIZED HEALTH CARE EDUCATION/TRAINING PROGRAM

## 2021-08-27 PROCEDURE — 85027 COMPLETE CBC AUTOMATED: CPT

## 2021-08-27 RX ORDER — DEXMEDETOMIDINE HYDROCHLORIDE 4 UG/ML
.2-1.4 INJECTION, SOLUTION INTRAVENOUS CONTINUOUS
Status: DISCONTINUED | OUTPATIENT
Start: 2021-08-27 | End: 2021-09-06

## 2021-08-27 RX ORDER — INSULIN GLARGINE 100 [IU]/ML
8 INJECTION, SOLUTION SUBCUTANEOUS 2 TIMES DAILY
Status: DISCONTINUED | OUTPATIENT
Start: 2021-08-27 | End: 2021-09-02

## 2021-08-27 RX ADMIN — FAMOTIDINE 10 MG: 10 INJECTION INTRAVENOUS at 08:35

## 2021-08-27 RX ADMIN — SODIUM CHLORIDE, POTASSIUM CHLORIDE, SODIUM LACTATE AND CALCIUM CHLORIDE: 600; 310; 30; 20 INJECTION, SOLUTION INTRAVENOUS at 17:26

## 2021-08-27 RX ADMIN — Medication 100 MCG/HR: at 12:43

## 2021-08-27 RX ADMIN — DIMETHICONE, CAMPHOR (SYNTHETIC), MENTHOL, AND PHENOL: 1.1; .5; .625; .5 OINTMENT TOPICAL at 08:35

## 2021-08-27 RX ADMIN — PROPOFOL 50 MCG/KG/MIN: 10 INJECTION, EMULSION INTRAVENOUS at 09:49

## 2021-08-27 RX ADMIN — IPRATROPIUM BROMIDE AND ALBUTEROL SULFATE 3 ML: .5; 3 SOLUTION RESPIRATORY (INHALATION) at 09:20

## 2021-08-27 RX ADMIN — INSULIN LISPRO 3 UNITS: 100 INJECTION, SOLUTION INTRAVENOUS; SUBCUTANEOUS at 09:00

## 2021-08-27 RX ADMIN — PROPOFOL 50 MCG/KG/MIN: 10 INJECTION, EMULSION INTRAVENOUS at 02:13

## 2021-08-27 RX ADMIN — DIPHENHYDRAMINE HYDROCHLORIDE 50 MG: 50 INJECTION, SOLUTION INTRAMUSCULAR; INTRAVENOUS at 04:20

## 2021-08-27 RX ADMIN — DIPHENHYDRAMINE HYDROCHLORIDE 50 MG: 50 INJECTION, SOLUTION INTRAMUSCULAR; INTRAVENOUS at 21:23

## 2021-08-27 RX ADMIN — METOPROLOL TARTRATE 5 MG: 1 INJECTION, SOLUTION INTRAVENOUS at 17:27

## 2021-08-27 RX ADMIN — METOPROLOL TARTRATE 5 MG: 1 INJECTION, SOLUTION INTRAVENOUS at 00:31

## 2021-08-27 RX ADMIN — FAMOTIDINE 10 MG: 10 INJECTION INTRAVENOUS at 21:22

## 2021-08-27 RX ADMIN — DEXAMETHASONE SODIUM PHOSPHATE 4 MG: 4 INJECTION, SOLUTION INTRAMUSCULAR; INTRAVENOUS at 21:23

## 2021-08-27 RX ADMIN — INSULIN LISPRO 6 UNITS: 100 INJECTION, SOLUTION INTRAVENOUS; SUBCUTANEOUS at 18:00

## 2021-08-27 RX ADMIN — ENOXAPARIN SODIUM 40 MG: 40 INJECTION SUBCUTANEOUS at 21:21

## 2021-08-27 RX ADMIN — IPRATROPIUM BROMIDE AND ALBUTEROL SULFATE 3 ML: .5; 3 SOLUTION RESPIRATORY (INHALATION) at 22:59

## 2021-08-27 RX ADMIN — INSULIN LISPRO 9 UNITS: 100 INJECTION, SOLUTION INTRAVENOUS; SUBCUTANEOUS at 02:16

## 2021-08-27 RX ADMIN — ACETAMINOPHEN 650 MG: 325 TABLET ORAL at 00:31

## 2021-08-27 RX ADMIN — SODIUM CHLORIDE, POTASSIUM CHLORIDE, SODIUM LACTATE AND CALCIUM CHLORIDE: 600; 310; 30; 20 INJECTION, SOLUTION INTRAVENOUS at 09:51

## 2021-08-27 RX ADMIN — INSULIN GLARGINE 8 UNITS: 100 INJECTION, SOLUTION SUBCUTANEOUS at 21:30

## 2021-08-27 RX ADMIN — DEXAMETHASONE SODIUM PHOSPHATE 4 MG: 4 INJECTION, SOLUTION INTRAMUSCULAR; INTRAVENOUS at 09:05

## 2021-08-27 RX ADMIN — INSULIN LISPRO 6 UNITS: 100 INJECTION, SOLUTION INTRAVENOUS; SUBCUTANEOUS at 21:30

## 2021-08-27 RX ADMIN — Medication 10 ML: at 21:21

## 2021-08-27 RX ADMIN — PROPOFOL 35 MCG/KG/MIN: 10 INJECTION, EMULSION INTRAVENOUS at 14:31

## 2021-08-27 RX ADMIN — IPRATROPIUM BROMIDE AND ALBUTEROL SULFATE 3 ML: .5; 3 SOLUTION RESPIRATORY (INHALATION) at 13:30

## 2021-08-27 RX ADMIN — Medication 100 MCG/HR: at 01:06

## 2021-08-27 RX ADMIN — INSULIN LISPRO 3 UNITS: 100 INJECTION, SOLUTION INTRAVENOUS; SUBCUTANEOUS at 06:53

## 2021-08-27 RX ADMIN — IPRATROPIUM BROMIDE AND ALBUTEROL SULFATE 3 ML: .5; 3 SOLUTION RESPIRATORY (INHALATION) at 02:39

## 2021-08-27 RX ADMIN — INSULIN GLARGINE 8 UNITS: 100 INJECTION, SOLUTION SUBCUTANEOUS at 08:49

## 2021-08-27 RX ADMIN — Medication 0.7 MCG/KG/HR: at 10:36

## 2021-08-27 RX ADMIN — SODIUM CHLORIDE, POTASSIUM CHLORIDE, SODIUM LACTATE AND CALCIUM CHLORIDE: 600; 310; 30; 20 INJECTION, SOLUTION INTRAVENOUS at 02:16

## 2021-08-27 RX ADMIN — Medication 10 ML: at 08:35

## 2021-08-27 RX ADMIN — IPRATROPIUM BROMIDE AND ALBUTEROL SULFATE 3 ML: .5; 3 SOLUTION RESPIRATORY (INHALATION) at 05:20

## 2021-08-27 RX ADMIN — PROPOFOL 50 MCG/KG/MIN: 10 INJECTION, EMULSION INTRAVENOUS at 05:34

## 2021-08-27 RX ADMIN — METOPROLOL TARTRATE 5 MG: 1 INJECTION, SOLUTION INTRAVENOUS at 04:20

## 2021-08-27 RX ADMIN — ENOXAPARIN SODIUM 40 MG: 40 INJECTION SUBCUTANEOUS at 08:37

## 2021-08-27 RX ADMIN — DIPHENHYDRAMINE HYDROCHLORIDE 50 MG: 50 INJECTION, SOLUTION INTRAMUSCULAR; INTRAVENOUS at 09:05

## 2021-08-27 RX ADMIN — PROPOFOL 40 MCG/KG/MIN: 10 INJECTION, EMULSION INTRAVENOUS at 19:04

## 2021-08-27 RX ADMIN — DIPHENHYDRAMINE HYDROCHLORIDE 50 MG: 50 INJECTION, SOLUTION INTRAMUSCULAR; INTRAVENOUS at 15:29

## 2021-08-27 RX ADMIN — IPRATROPIUM BROMIDE AND ALBUTEROL SULFATE 3 ML: .5; 3 SOLUTION RESPIRATORY (INHALATION) at 17:32

## 2021-08-27 ASSESSMENT — PULMONARY FUNCTION TESTS
PIF_VALUE: 40
PIF_VALUE: 44
PIF_VALUE: 42
PIF_VALUE: 36
PIF_VALUE: 38
PIF_VALUE: 54
PIF_VALUE: 50
PIF_VALUE: 44
PIF_VALUE: 46
PIF_VALUE: 35
PIF_VALUE: 34
PIF_VALUE: 60
PIF_VALUE: 34
PIF_VALUE: 38
PIF_VALUE: 34
PIF_VALUE: 44
PIF_VALUE: 46
PIF_VALUE: 45
PIF_VALUE: 47
PIF_VALUE: 42
PIF_VALUE: 32
PIF_VALUE: 54
PIF_VALUE: 36
PIF_VALUE: 32
PIF_VALUE: 11
PIF_VALUE: 61
PIF_VALUE: 35
PIF_VALUE: 35
PIF_VALUE: 33
PIF_VALUE: 55
PIF_VALUE: 35
PIF_VALUE: 41

## 2021-08-27 ASSESSMENT — PAIN SCALES - GENERAL
PAINLEVEL_OUTOF10: 0
PAINLEVEL_OUTOF10: 0

## 2021-08-27 NOTE — PROGRESS NOTES
OTOLARYNGOLOGY  DAILY PROGRESS NOTE  2021    Subjective:     Pt intubated and sedated. No issues overnight. Tongue appears continually improving    Objective:     BP (!) 171/90   Pulse 84   Temp 99.8 °F (37.7 °C) (Bladder)   Resp 18   Ht 5' 4\" (1.626 m)   Wt 181 lb (82.1 kg)   SpO2 97%   BMI 31.07 kg/m²   PULSE OXIMETRY RANGE: SpO2  Av.8 %  Min: 96 %  Max: 99 %  I/O last 3 completed shifts: In: 4274.5 [I.V.:3947.5; NG/GT:327]  Out: 2250 [Urine:2250]    GENERAL:  Intubated sedated   HENT: Normocephalic, atraumatic. Floor of mouth edematous mildly, tongue does noty protrudes out of oral cavity, neck soft nontender trachea midline. ETT in place.  Mucous membranes dry   EYES: No sclera icterus, pupils equal  LUNGS:  No increased work of breathing, no stridor  CARDIOVASCULAR:  RR      Assessment/Plan:     78 y.o. female with angioedema secondary to ACE-I    · Tongue and floor of mouth significantly improved since onset   · Continue oral cavity care  · stop all ACE-I/ARB use   · Monitor respiratory parameters for weaning off vent as pt is significantly improving     ENT to follow     Electronically signed by Lata Cabrera DO on 2021 at 7:46 AM

## 2021-08-27 NOTE — PLAN OF CARE
Problem: Non-Violent Restraints  Goal: Removal from restraints as soon as assessed to be safe  8/27/2021 1422 by Dane Carbajal RN  Outcome: Not Met This Shift  8/27/2021 0733 by Corby eY RN  Outcome: Ongoing  Goal: No harm/injury to patient while restraints in use  8/27/2021 1422 by Dane Carbajal RN  Outcome: Met This Shift  8/27/2021 0733 by Corby Ye RN  Outcome: Met This Shift  Goal: Patient's dignity will be maintained  8/27/2021 1422 by Dane Carbajal RN  Outcome: Met This Shift  8/27/2021 0733 by Corby Ye RN  Outcome: Met This Shift     Problem: Falls - Risk of:  Goal: Will remain free from falls  Description: Will remain free from falls  8/27/2021 1422 by Dane Carbajal RN  Outcome: Met This Shift  8/27/2021 0733 by Corby Ye RN  Outcome: Met This Shift  Goal: Absence of physical injury  Description: Absence of physical injury  8/27/2021 1422 by Dane Carbajal RN  Outcome: Met This Shift  8/27/2021 0733 by Corby Ye RN  Outcome: Met This Shift     Problem: Skin Integrity:  Goal: Will show no infection signs and symptoms  Description: Will show no infection signs and symptoms  8/27/2021 1422 by Dane Carbajal RN  Outcome: Met This Shift  8/27/2021 0733 by Corby Ye RN  Outcome: Met This Shift  Goal: Absence of new skin breakdown  Description: Absence of new skin breakdown  8/27/2021 1422 by Dane Carbajal RN  Outcome: Met This Shift  8/27/2021 0733 by Corby Ye RN  Outcome: Met This Shift

## 2021-08-27 NOTE — PROGRESS NOTES
Subjective:  Ricardo Verduzco was seen and examined in the ICU today. The patient remains intubated and sedated  There were no new problems reported overnight. Tongue swelling has improved  Unable to wean off ventilator today though    A complete review of systems and social history was completed on admission and remains unchanged unless otherwise noted    Scheduled Meds:   insulin glargine  8 Units Subcutaneous BID    medicated lip ointment   Topical Daily    dexamethasone  4 mg IntraVENous Q12H    enoxaparin  40 mg Subcutaneous BID    famotidine (PEPCID) injection  10 mg IntraVENous BID    insulin lispro  0-18 Units Subcutaneous Q4H    diphenhydrAMINE  50 mg IntraVENous Q6H    ipratropium-albuterol  1 vial Inhalation Q4H    sodium chloride flush  5-40 mL IntraVENous 2 times per day     Continuous Infusions:   dexmedetomidine HCl in NaCl Stopped (08/27/21 1120)    propofol 40 mcg/kg/min (08/27/21 1519)    fentaNYL 5 mcg/ml in 0.9%  ml infusion 125 mcg/hr (08/27/21 1519)    dextrose      sodium chloride      lactated ringers 125 mL/hr at 08/27/21 1726     PRN Meds:metoprolol, glucose, dextrose, glucagon (rDNA), dextrose, sodium chloride flush, sodium chloride, ondansetron **OR** ondansetron, polyethylene glycol, acetaminophen **OR** acetaminophen    Objective:  BP (!) 166/63   Pulse 72   Temp 100.6 °F (38.1 °C) (Bladder)   Resp 18   Ht 5' 4\" (1.626 m)   Wt 181 lb (82.1 kg)   SpO2 98%   BMI 31.07 kg/m²   In: 3138.2 [I.V.:2491.2; NG/GT:647]  Out: 1500    In: 3138.2   Out: 1500 [Urine:1500]     Intubated and sedated  Still with angioedema but better  RRR, pos S1, S2  Tight with no wheeze, rales or rhonchi  bowel sounds present, nontender, nondistended  No clubbing, cyanosis, or edema  No neuro changes   No obvious rashes or lesions.     Recent Labs     08/25/21  0447 08/27/21  0630   WBC 11.0 11.7*   HGB 13.0 11.4*    203     Recent Labs     08/25/21  0447 08/26/21  1108 08/27/21  0630   NA 133 137 137   K 4.7 5.0 4.9    101 101   CO2 25 25 28   BUN 22 27* 24*   CREATININE 1.6* 1.5* 1.2*   GLUCOSE 246* 250* 171*     Recent Labs     08/27/21  0630   BILITOT <0.2   ALKPHOS 62   AST 20   ALT 13     No results for input(s): INR in the last 72 hours. Invalid input(s): PT  No results for input(s): CKTOTAL, CKMB, CKMBINDEX, TROPONINI in the last 72 hours. XR CHEST PORTABLE    Result Date: 8/25/2021  EXAMINATION: ONE XRAY VIEW OF THE CHEST 8/24/2021 8:52 pm COMPARISON: 08/24/2021 HISTORY: ORDERING SYSTEM PROVIDED HISTORY: et tube placement TECHNOLOGIST PROVIDED HISTORY: Reason for exam:->et tube placement FINDINGS: Stable position of endotracheal tube. Cardiomediastinal silhouette is unremarkable. No infiltrate, effusion, or pneumothorax. No acute osseous abnormality. No pulmonary infiltrates. No significant change since the prior study     XR CHEST PORTABLE    Result Date: 8/24/2021  EXAMINATION: ONE XRAY VIEW OF THE CHEST 8/24/2021 5:30 pm COMPARISON: January 3, 2020 HISTORY: ORDERING SYSTEM PROVIDED HISTORY: SOB (shortness of breath) TECHNOLOGIST PROVIDED HISTORY: Reason for exam:->ETT placement, tongue swelling FINDINGS: Endotracheal tube is approximately 6.8 cm above the chivo. No airspace opacity or pleural effusion. The heart is normal in size. No pneumothorax. 1. Endotracheal tube is approximately 6.8 cm above the chivo. 2. No airspace opacity or pleural effusion.      Assessment:  Eliot Talavera is a 78y.o. year old female who presented on 8/24/2021 and is being treated for:  Principal Problem:    Acute respiratory failure ([address]  Active Problems:    Essential hypertension    Diabetes mellitus ([address]    Myocardiopathy ([address]    ACE inhibitor-aggravated angioedema    COPD (chronic obstructive pulmonary disease) (HCC)    Acquired angioedema    SOB (shortness of breath)    Disorder of airway    Acquired hypertrophy of tongue  Resolved Problems:    * No resolved hospital problems. *    Plan  · Cont airway protection - went weaning as per pulmonary  · Cont IV steroids/h2 blockade  · Increase basal insulin along with SSI to achieve better blood sugar control  · Please see orders for further management and care.     Chadd Vicente MD  5:55 PM  8/27/2021

## 2021-08-27 NOTE — PROGRESS NOTES
place an NG/OG tube however they were unsuccessful secondary to swelling. I did have the endotracheal tube advanced 2cm and and will repeat chest x-ray to verify placement. It appears as though her creatinine is slightly elevated. Her baseline creatinine appears to be around 0.9 and today is 1.4. I did put her on some mild IV fluids and we will continue to watch her kidney function.  was at bedside and was able to tell me about her history. She does currently smoke cigarettes however, he denies daily alcohol use or any illicit drugs. He was fully updated on the plan of care and all questions were answered. Daily progress:  08/25/2021: Patient is awake and follows command of sedation. She continues to have significant tongue swelling. She has no fever, chills, or rigors. She has a cuff leak. She is hypertensive was started on IV metoprolol as needed. 08/26/2021: Patient's remains sedated intubated and mechanically ventilated. She continues to have some tongue swelling. Her urine output is maintained. An NG was inserted to initiate tube feeding. She will undergo sedation vacation and possible extubation tomorrow once the swelling resolves. 08/27/2021: Tongue swelling has regressed significantly. She continues to be sedated intubated and mechanically ventilated. She had an uneventful night. She tolerated tube feeding very well. She tends to get agitated off sedation and therefore will be started on Precedex in order to control her agitation before extubation.       Past Medical History:  Past Medical History:   Diagnosis Date    Arthritis     Asthma     Cerebral artery occlusion with cerebral infarction ([facility] [address] 2015    COPD (chronic obstructive pulmonary disease) (HCC)     Diabetes mellitus ([facility] [address]     [facility]     Hyperlipidemia     Hypertension     VHD (valvular heart disease)         Family History:   Family History   Problem Relation Age of Onset    COPD Mother    [employer] Alzheimer's Disease Mother     Alcohol Abuse Father     No Known Problems Sister     Diabetes Brother     No Known Problems Brother        Allergies:         Ace inhibitors, Angiotensin receptor blockers, Lisinopril, and Pcn [penicillins]    Social history:  Social History     Socioeconomic History    Marital status:      Spouse name: Not on file    Number of children: Not on file    Years of education: Not on file    Highest education level: Not on file   Occupational History    Not on file   Tobacco Use    Smoking status: Current Every Day Smoker     Packs/day: 0.25     Years: 45.00     Pack years: 11.25     Types: Cigarettes    Smokeless tobacco: Never Used   Vaping Use    Vaping Use: Never used   Substance and Sexual Activity    Alcohol use: Yes     Alcohol/week: 7.0 - 8.0 standard drinks     Types: 7 - 8 Glasses of wine per week     Comment: glass of wine with dinner. 1 cup of coffee a day     Drug use: Never    Sexual activity: Not on file   Other Topics Concern    Not on file   Social History Narrative    Drinks 2 cups coffee daily. Social Determinants of Health     Financial Resource Strain:     Difficulty of Paying Living Expenses:    Food Insecurity:     Worried About Running Out of Food in the Last Year:     [address] in the Last Year:    Transportation Needs:     Lack of Transportation (Medical):      Lack of Transportation (Non-Medical):    Physical Activity:     Days of Exercise per Week:     Minutes of Exercise per Session:    Stress:     Feeling of Stress :    Social Connections:     Frequency of Communication with Friends and Family:     Frequency of Social Gatherings with Friends and Family:     Attends [employer]ian[employer] Services:     Active Member of Clubs or Organizations:     Attends Club or Organization Meetings:     Marital Status:    Intimate Partner Violence:     Fear of Current or Ex-Partner:     Emotionally Abused:     Physically Abused:     Sexually Abused:        Current Medications:     Current Facility-Administered Medications:     insulin glargine (LANTUS) injection vial 8 Units, 8 Units, Subcutaneous, BID, Attila Soriano MD, 8 Units at 08/27/21 0849    dexmedetomidine (PRECEDEX) 400 mcg in sodium chloride 0.9 % 100 mL infusion, 0.2-1.4 mcg/kg/hr, IntraVENous, Continuous, Jose A Cooper MD, Stopped at 08/27/21 1120    medicated lip ointment (BLISTEX), , Topical, Daily, Stepan Pink DO, Given at 08/27/21 0835    dexamethasone (DECADRON) injection 4 mg, 4 mg, IntraVENous, Q12H, Jose A Cooper MD, 4 mg at 08/27/21 0905    enoxaparin (LOVENOX) injection 40 mg, 40 mg, Subcutaneous, BID, Jose A Cooper MD, 40 mg at 08/27/21 0837    famotidine (PEPCID) injection 10 mg, 10 mg, IntraVENous, BID, Jose A Cooper MD, 10 mg at 08/27/21 0835    insulin lispro (HUMALOG) injection vial 0-18 Units, 0-18 Units, Subcutaneous, Q4H, SHELBIE Moscoso CNP, 3 Units at 08/27/21 0900    metoprolol (LOPRESSOR) injection 5 mg, 5 mg, IntraVENous, Q4H PRN, Jose A Cooper MD, 5 mg at 08/27/21 0420    propofol injection, 5-50 mcg/kg/min, IntraVENous, Titrated, Heather Chavarria MD, Last Rate: 10.3 mL/hr at 08/27/21 1145, 20 mcg/kg/min at 08/27/21 1145    fentaNYL 5 mcg/ml in 0.9%  ml infusion, 12.5-200 mcg/hr, IntraVENous, Continuous, Marvel Kumar DO, Last Rate: 15 mL/hr at 08/27/21 1123, 75 mcg/hr at 08/27/21 1123    diphenhydrAMINE (BENADRYL) injection 50 mg, 50 mg, IntraVENous, Q6H, SHELBIE Moscoso CNP, 50 mg at 08/27/21 0905    ipratropium-albuterol (DUONEB) nebulizer solution 3 mL, 1 vial, Inhalation, Q4H, Attila Soriano MD, 3 mL at 08/27/21 0920    glucose (GLUTOSE) 40 % oral gel 15 g, 15 g, Oral, PRN, Attila Soriano MD    dextrose 50 % IV solution, 12.5 g, IntraVENous, PRN, Attila Soriano MD    glucagon (rDNA) injection 1 mg, 1 mg, IntraMUSCular, PRN, Marny Petite Alivia Tena MD    dextrose 5 % solution, 100 mL/hr, IntraVENous, PRN, Al Lisa MD    sodium chloride flush 0.9 % injection 5-40 mL, 5-40 mL, IntraVENous, 2 times per day, Al Lisa MD, 10 mL at 08/27/21 0835    sodium chloride flush 0.9 % injection 5-40 mL, 5-40 mL, IntraVENous, PRN, Al Lisa MD    0.9 % sodium chloride infusion, 25 mL, IntraVENous, PRN, Al Lisa MD    ondansetron (ZOFRAN-ODT) disintegrating tablet 4 mg, 4 mg, Oral, Q8H PRN **OR** ondansetron (ZOFRAN) injection 4 mg, 4 mg, IntraVENous, Q6H PRN, Al Lisa MD    polyethylene glycol [facility]) packet 17 g, 17 g, Oral, Daily PRN, Al Lisa MD    acetaminophen (TYLENOL) tablet 650 mg, 650 mg, Oral, Q6H PRN, 650 mg at 08/27/21 0031 **OR** acetaminophen (TYLENOL) suppository 650 mg, 650 mg, Rectal, Q6H PRN, Al Lisa MD    lactated ringers infusion, , IntraVENous, Continuous, Handy Laird MD, Last Rate: 125 mL/hr at 08/27/21 0951, New Bag at 08/27/21 0951    Review of Systems:   Unable to perform review of systems secondary to intubation and sedation    Physical Exam:   Vital Signs:  /89   Pulse 82   Temp 99.7 °F (37.6 °C) (Bladder)   Resp 21   Ht 5' 4\" (1.626 m)   Wt 181 lb (82.1 kg)   SpO2 93%   BMI 31.07 kg/m²     Input/Output:   In: 3095.5 [I.V.:2718.5; NG/GT:377]  Out: 1600     Oxygen requirements: 40%    Ventilator Information:  Vent Information  $Ventilation: $Subsequent Day  Skin Assessment: Clean, dry, & intact  Vent Type: 980  Vent Mode: AC/VC  Vt Ordered: 400 mL  Rate Set: 18 bmp  Peak Flow: 50 L/min  Pressure Support: 0 cmH20  FiO2 : 45 %  SpO2: 93 %  SpO2/FiO2 ratio: 206.67  Sensitivity: 3  PEEP/CPAP: 5  I Time/ I Time %: 0 s  Humidification Source: Heated wire  Humidification Temp: 37  Humidification Temp Measured: 37    General appearance: Overweight, not in pain or distress, in no respiratory distress    HEENT: Intubated with significant oral swelling, edema of the and to any procedures I billed separately is not included.   Critical Care Time: 32 minutes      Electronically signed by Noah Mittal MD on 8/27/2021 at 11:58 AM

## 2021-08-27 NOTE — CARE COORDINATION
8/27/21 No covid testing (vaccinated). Cm transition of care: Icu/vent/sedation- fio2 at 45% with 7 of peep. Iv benedryl q 6 hrs, ivf, iv lopressor prn- had two doses today. Plans for returning to home at discharge.  will provide a ride. May consider [facility] if needed, closer to discharge. CM/SS to follow.  Electronically signed by Jennie Xavier RN-BC on 8/27/2021 at 10:45 AM

## 2021-08-27 NOTE — PLAN OF CARE
Problem: Non-Violent Restraints  Goal: No harm/injury to patient while restraints in use  Outcome: Met This Shift     Problem: Non-Violent Restraints  Goal: Patient's dignity will be maintained  Outcome: Met This Shift     Problem: Falls - Risk of:  Goal: Will remain free from falls  Description: Will remain free from falls  Outcome: Met This Shift     Problem: Falls - Risk of:  Goal: Absence of physical injury  Description: Absence of physical injury  Outcome: Met This Shift     Problem: Skin Integrity:  Goal: Will show no infection signs and symptoms  Description: Will show no infection signs and symptoms  Outcome: Met This Shift     Problem: Skin Integrity:  Goal: Absence of new skin breakdown  Description: Absence of new skin breakdown  Outcome: Met This Shift     Problem: Non-Violent Restraints  Goal: Removal from restraints as soon as assessed to be safe  Outcome: Ongoing

## 2021-08-28 ENCOUNTER — APPOINTMENT (OUTPATIENT)
Dept: GENERAL RADIOLOGY | Age: 66
DRG: 207 | End: 2021-08-28
Payer: MEDICARE

## 2021-08-28 LAB
ALBUMIN SERPL-MCNC: 3.1 G/DL (ref 3.5–5.2)
ALP BLD-CCNC: 66 U/L (ref 35–104)
ALT SERPL-CCNC: 12 U/L (ref 0–32)
ANION GAP SERPL CALCULATED.3IONS-SCNC: 7 MMOL/L (ref 7–16)
AST SERPL-CCNC: 12 U/L (ref 0–31)
BASOPHILS ABSOLUTE: 0 E9/L (ref 0–0.2)
BASOPHILS RELATIVE PERCENT: 0.2 % (ref 0–2)
BILIRUB SERPL-MCNC: 0.2 MG/DL (ref 0–1.2)
BUN BLDV-MCNC: 21 MG/DL (ref 6–23)
CALCIUM SERPL-MCNC: 8 MG/DL (ref 8.6–10.2)
CHLORIDE BLD-SCNC: 100 MMOL/L (ref 98–107)
CO2: 30 MMOL/L (ref 22–29)
CREAT SERPL-MCNC: 1 MG/DL (ref 0.5–1)
EOSINOPHILS ABSOLUTE: 0 E9/L (ref 0.05–0.5)
EOSINOPHILS RELATIVE PERCENT: 0.1 % (ref 0–6)
GFR AFRICAN AMERICAN: >60
GFR NON-AFRICAN AMERICAN: 55 ML/MIN/1.73
GLUCOSE BLD-MCNC: 297 MG/DL (ref 74–99)
HCT VFR BLD CALC: 36.3 % (ref 34–48)
HEMOGLOBIN: 11.2 G/DL (ref 11.5–15.5)
LYMPHOCYTES ABSOLUTE: 1.03 E9/L (ref 1.5–4)
LYMPHOCYTES RELATIVE PERCENT: 9.6 % (ref 20–42)
MAGNESIUM: 2 MG/DL (ref 1.6–2.6)
MCH RBC QN AUTO: 31.1 PG (ref 26–35)
MCHC RBC AUTO-ENTMCNC: 30.9 % (ref 32–34.5)
MCV RBC AUTO: 100.8 FL (ref 80–99.9)
METER GLUCOSE: 197 MG/DL (ref 74–99)
METER GLUCOSE: 226 MG/DL (ref 74–99)
METER GLUCOSE: 243 MG/DL (ref 74–99)
METER GLUCOSE: 280 MG/DL (ref 74–99)
METER GLUCOSE: 306 MG/DL (ref 74–99)
MONOCYTES ABSOLUTE: 0.82 E9/L (ref 0.1–0.95)
MONOCYTES RELATIVE PERCENT: 7.8 % (ref 2–12)
NEUTROPHILS ABSOLUTE: 8.55 E9/L (ref 1.8–7.3)
NEUTROPHILS RELATIVE PERCENT: 82.6 % (ref 43–80)
PDW BLD-RTO: 12.9 FL (ref 11.5–15)
PHOSPHORUS: 3.4 MG/DL (ref 2.5–4.5)
PLATELET # BLD: 204 E9/L (ref 130–450)
PMV BLD AUTO: 10.7 FL (ref 7–12)
POTASSIUM SERPL-SCNC: 4.9 MMOL/L (ref 3.5–5)
RBC # BLD: 3.6 E12/L (ref 3.5–5.5)
SODIUM BLD-SCNC: 137 MMOL/L (ref 132–146)
TOTAL PROTEIN: 5.5 G/DL (ref 6.4–8.3)
WBC # BLD: 10.3 E9/L (ref 4.5–11.5)

## 2021-08-28 PROCEDURE — 2580000003 HC RX 258: Performed by: STUDENT IN AN ORGANIZED HEALTH CARE EDUCATION/TRAINING PROGRAM

## 2021-08-28 PROCEDURE — 36415 COLL VENOUS BLD VENIPUNCTURE: CPT

## 2021-08-28 PROCEDURE — 6370000000 HC RX 637 (ALT 250 FOR IP): Performed by: INTERNAL MEDICINE

## 2021-08-28 PROCEDURE — 2500000003 HC RX 250 WO HCPCS

## 2021-08-28 PROCEDURE — 82962 GLUCOSE BLOOD TEST: CPT

## 2021-08-28 PROCEDURE — 6370000000 HC RX 637 (ALT 250 FOR IP)

## 2021-08-28 PROCEDURE — 2580000003 HC RX 258: Performed by: INTERNAL MEDICINE

## 2021-08-28 PROCEDURE — 83735 ASSAY OF MAGNESIUM: CPT

## 2021-08-28 PROCEDURE — 2500000003 HC RX 250 WO HCPCS: Performed by: INTERNAL MEDICINE

## 2021-08-28 PROCEDURE — 6360000002 HC RX W HCPCS: Performed by: INTERNAL MEDICINE

## 2021-08-28 PROCEDURE — 80053 COMPREHEN METABOLIC PANEL: CPT

## 2021-08-28 PROCEDURE — 84100 ASSAY OF PHOSPHORUS: CPT

## 2021-08-28 PROCEDURE — 6360000002 HC RX W HCPCS

## 2021-08-28 PROCEDURE — 2000000000 HC ICU R&B

## 2021-08-28 PROCEDURE — 94640 AIRWAY INHALATION TREATMENT: CPT

## 2021-08-28 PROCEDURE — 6360000002 HC RX W HCPCS: Performed by: STUDENT IN AN ORGANIZED HEALTH CARE EDUCATION/TRAINING PROGRAM

## 2021-08-28 PROCEDURE — 85025 COMPLETE CBC W/AUTO DIFF WBC: CPT

## 2021-08-28 PROCEDURE — 71045 X-RAY EXAM CHEST 1 VIEW: CPT

## 2021-08-28 PROCEDURE — 6360000002 HC RX W HCPCS: Performed by: EMERGENCY MEDICINE

## 2021-08-28 PROCEDURE — 2580000003 HC RX 258

## 2021-08-28 PROCEDURE — 94003 VENT MGMT INPAT SUBQ DAY: CPT

## 2021-08-28 RX ORDER — DEXAMETHASONE SODIUM PHOSPHATE 4 MG/ML
2 INJECTION, SOLUTION INTRA-ARTICULAR; INTRALESIONAL; INTRAMUSCULAR; INTRAVENOUS; SOFT TISSUE EVERY 12 HOURS
Status: DISCONTINUED | OUTPATIENT
Start: 2021-08-28 | End: 2021-08-28

## 2021-08-28 RX ORDER — VECURONIUM BROMIDE 20 MG/20ML
INJECTION, POWDER, LYOPHILIZED, FOR SOLUTION INTRAVENOUS
Status: COMPLETED
Start: 2021-08-28 | End: 2021-08-28

## 2021-08-28 RX ORDER — METHYLPREDNISOLONE SODIUM SUCCINATE 40 MG/ML
40 INJECTION, POWDER, LYOPHILIZED, FOR SOLUTION INTRAMUSCULAR; INTRAVENOUS EVERY 8 HOURS
Status: DISCONTINUED | OUTPATIENT
Start: 2021-08-28 | End: 2021-08-30

## 2021-08-28 RX ORDER — BUDESONIDE 0.5 MG/2ML
0.5 INHALANT ORAL 2 TIMES DAILY
Status: DISCONTINUED | OUTPATIENT
Start: 2021-08-28 | End: 2021-09-10

## 2021-08-28 RX ORDER — VECURONIUM BROMIDE 1 MG/ML
10 INJECTION, POWDER, LYOPHILIZED, FOR SOLUTION INTRAVENOUS ONCE
Status: COMPLETED | OUTPATIENT
Start: 2021-08-28 | End: 2021-08-28

## 2021-08-28 RX ADMIN — Medication 125 MCG/HR: at 00:15

## 2021-08-28 RX ADMIN — DEXAMETHASONE SODIUM PHOSPHATE 4 MG: 4 INJECTION, SOLUTION INTRAMUSCULAR; INTRAVENOUS at 09:34

## 2021-08-28 RX ADMIN — SODIUM CHLORIDE, POTASSIUM CHLORIDE, SODIUM LACTATE AND CALCIUM CHLORIDE: 600; 310; 30; 20 INJECTION, SOLUTION INTRAVENOUS at 09:44

## 2021-08-28 RX ADMIN — INSULIN LISPRO 3 UNITS: 100 INJECTION, SOLUTION INTRAVENOUS; SUBCUTANEOUS at 10:47

## 2021-08-28 RX ADMIN — IPRATROPIUM BROMIDE AND ALBUTEROL SULFATE 3 ML: .5; 3 SOLUTION RESPIRATORY (INHALATION) at 19:14

## 2021-08-28 RX ADMIN — BUDESONIDE 500 MCG: 0.5 INHALANT RESPIRATORY (INHALATION) at 19:14

## 2021-08-28 RX ADMIN — IPRATROPIUM BROMIDE AND ALBUTEROL SULFATE 3 ML: .5; 3 SOLUTION RESPIRATORY (INHALATION) at 06:25

## 2021-08-28 RX ADMIN — DIMETHICONE, CAMPHOR (SYNTHETIC), MENTHOL, AND PHENOL: 1.1; .5; .625; .5 OINTMENT TOPICAL at 08:02

## 2021-08-28 RX ADMIN — FAMOTIDINE 10 MG: 10 INJECTION INTRAVENOUS at 08:02

## 2021-08-28 RX ADMIN — PROPOFOL 40 MCG/KG/MIN: 10 INJECTION, EMULSION INTRAVENOUS at 20:00

## 2021-08-28 RX ADMIN — FAMOTIDINE 10 MG: 10 INJECTION INTRAVENOUS at 21:13

## 2021-08-28 RX ADMIN — ENOXAPARIN SODIUM 40 MG: 40 INJECTION SUBCUTANEOUS at 21:12

## 2021-08-28 RX ADMIN — PROPOFOL 40 MCG/KG/MIN: 10 INJECTION, EMULSION INTRAVENOUS at 00:21

## 2021-08-28 RX ADMIN — VECURONIUM BROMIDE 10 MG: 20 INJECTION, POWDER, LYOPHILIZED, FOR SOLUTION INTRAVENOUS at 13:09

## 2021-08-28 RX ADMIN — Medication 1 MCG/KG/HR: at 12:31

## 2021-08-28 RX ADMIN — INSULIN GLARGINE 8 UNITS: 100 INJECTION, SOLUTION SUBCUTANEOUS at 09:00

## 2021-08-28 RX ADMIN — PROPOFOL 40 MCG/KG/MIN: 10 INJECTION, EMULSION INTRAVENOUS at 09:21

## 2021-08-28 RX ADMIN — IPRATROPIUM BROMIDE AND ALBUTEROL SULFATE 3 ML: .5; 3 SOLUTION RESPIRATORY (INHALATION) at 22:16

## 2021-08-28 RX ADMIN — ENOXAPARIN SODIUM 40 MG: 40 INJECTION SUBCUTANEOUS at 08:02

## 2021-08-28 RX ADMIN — Medication 10 ML: at 08:04

## 2021-08-28 RX ADMIN — VECURONIUM BROMIDE 10 MG: 1 INJECTION, POWDER, LYOPHILIZED, FOR SOLUTION INTRAVENOUS at 13:09

## 2021-08-28 RX ADMIN — PROPOFOL 39.92 MCG/KG/MIN: 10 INJECTION, EMULSION INTRAVENOUS at 15:39

## 2021-08-28 RX ADMIN — IPRATROPIUM BROMIDE AND ALBUTEROL SULFATE 3 ML: .5; 3 SOLUTION RESPIRATORY (INHALATION) at 15:47

## 2021-08-28 RX ADMIN — INSULIN LISPRO 12 UNITS: 100 INJECTION, SOLUTION INTRAVENOUS; SUBCUTANEOUS at 06:42

## 2021-08-28 RX ADMIN — PROPOFOL 40 MCG/KG/MIN: 10 INJECTION, EMULSION INTRAVENOUS at 05:17

## 2021-08-28 RX ADMIN — DIPHENHYDRAMINE HYDROCHLORIDE 50 MG: 50 INJECTION, SOLUTION INTRAMUSCULAR; INTRAVENOUS at 09:34

## 2021-08-28 RX ADMIN — METOPROLOL TARTRATE 5 MG: 1 INJECTION, SOLUTION INTRAVENOUS at 08:10

## 2021-08-28 RX ADMIN — METOPROLOL TARTRATE 5 MG: 1 INJECTION, SOLUTION INTRAVENOUS at 17:39

## 2021-08-28 RX ADMIN — SODIUM CHLORIDE, POTASSIUM CHLORIDE, SODIUM LACTATE AND CALCIUM CHLORIDE: 600; 310; 30; 20 INJECTION, SOLUTION INTRAVENOUS at 01:44

## 2021-08-28 RX ADMIN — Medication 0.01 MCG/KG/HR: at 11:14

## 2021-08-28 RX ADMIN — SODIUM CHLORIDE, POTASSIUM CHLORIDE, SODIUM LACTATE AND CALCIUM CHLORIDE: 600; 310; 30; 20 INJECTION, SOLUTION INTRAVENOUS at 17:08

## 2021-08-28 RX ADMIN — METOPROLOL TARTRATE 5 MG: 1 INJECTION, SOLUTION INTRAVENOUS at 21:56

## 2021-08-28 RX ADMIN — IPRATROPIUM BROMIDE AND ALBUTEROL SULFATE 3 ML: .5; 3 SOLUTION RESPIRATORY (INHALATION) at 09:38

## 2021-08-28 RX ADMIN — IPRATROPIUM BROMIDE AND ALBUTEROL SULFATE 3 ML: .5; 3 SOLUTION RESPIRATORY (INHALATION) at 03:01

## 2021-08-28 RX ADMIN — METOPROLOL TARTRATE 5 MG: 1 INJECTION, SOLUTION INTRAVENOUS at 11:45

## 2021-08-28 RX ADMIN — INSULIN LISPRO 6 UNITS: 100 INJECTION, SOLUTION INTRAVENOUS; SUBCUTANEOUS at 02:16

## 2021-08-28 RX ADMIN — WATER: 1 INJECTION INTRAMUSCULAR; INTRAVENOUS; SUBCUTANEOUS at 13:10

## 2021-08-28 RX ADMIN — INSULIN LISPRO 6 UNITS: 100 INJECTION, SOLUTION INTRAVENOUS; SUBCUTANEOUS at 17:38

## 2021-08-28 RX ADMIN — INSULIN GLARGINE 8 UNITS: 100 INJECTION, SOLUTION SUBCUTANEOUS at 21:29

## 2021-08-28 RX ADMIN — METHYLPREDNISOLONE SODIUM SUCCINATE 40 MG: 40 INJECTION, POWDER, FOR SOLUTION INTRAMUSCULAR; INTRAVENOUS at 15:41

## 2021-08-28 RX ADMIN — INSULIN LISPRO 9 UNITS: 100 INJECTION, SOLUTION INTRAVENOUS; SUBCUTANEOUS at 21:28

## 2021-08-28 RX ADMIN — Medication 125 MCG/HR: at 11:33

## 2021-08-28 RX ADMIN — Medication 10 ML: at 21:09

## 2021-08-28 RX ADMIN — ACETAMINOPHEN 650 MG: 325 TABLET ORAL at 17:39

## 2021-08-28 RX ADMIN — Medication 125 MCG/HR: at 12:56

## 2021-08-28 RX ADMIN — ACETAMINOPHEN 650 MG: 325 TABLET ORAL at 11:53

## 2021-08-28 RX ADMIN — DIPHENHYDRAMINE HYDROCHLORIDE 50 MG: 50 INJECTION, SOLUTION INTRAMUSCULAR; INTRAVENOUS at 05:17

## 2021-08-28 ASSESSMENT — PULMONARY FUNCTION TESTS
PIF_VALUE: 20
PIF_VALUE: 56
PIF_VALUE: 41
PIF_VALUE: 47
PIF_VALUE: 42
PIF_VALUE: 37
PIF_VALUE: 36
PIF_VALUE: 48
PIF_VALUE: 35
PIF_VALUE: 29
PIF_VALUE: 36
PIF_VALUE: 30
PIF_VALUE: 38
PIF_VALUE: 43
PIF_VALUE: 7
PIF_VALUE: 46
PIF_VALUE: 36
PIF_VALUE: 30
PIF_VALUE: 42
PIF_VALUE: 33
PIF_VALUE: 42
PIF_VALUE: 43
PIF_VALUE: 55
PIF_VALUE: 53
PIF_VALUE: 32
PIF_VALUE: 40
PIF_VALUE: 35
PIF_VALUE: 34
PIF_VALUE: 33
PIF_VALUE: 47

## 2021-08-28 ASSESSMENT — PAIN SCALES - GENERAL
PAINLEVEL_OUTOF10: 0
PAINLEVEL_OUTOF10: 0

## 2021-08-28 NOTE — PROGRESS NOTES
Failed another SBT. Dr. Mohit Benavides here at bedside new ETT under flour placed. CXR ordered. Resumed Fentanyl and Propofol gtt at previous rated.  SHANI

## 2021-08-28 NOTE — PROGRESS NOTES
Pt failed SBT. Resumed Propofol and Fentanyl at previous rates. Noted HTN treated with 5 mg Lopressor IV. Dr Dulce Salguero called and updated. Pt return to previous ventilator settings.  SHANI

## 2021-08-28 NOTE — PLAN OF CARE
Problem: Falls - Risk of:  Goal: Will remain free from falls  Description: Will remain free from falls  8/28/2021 1918 by Jadyn Telles RN  Outcome: Met This Shift  8/28/2021 0556 by Zulma Strange RN  Outcome: Met This Shift  Goal: Absence of physical injury  Description: Absence of physical injury  8/28/2021 1918 by Jadyn Telles RN  Outcome: Met This Shift  8/28/2021 0556 by Zulma Strange RN  Outcome: Met This Shift     Problem: Skin Integrity:  Goal: Will show no infection signs and symptoms  Description: Will show no infection signs and symptoms  8/28/2021 1918 by Jadyn Telles RN  Outcome: Met This Shift  8/28/2021 0556 by Zulma Strange RN  Outcome: Met This Shift  Goal: Absence of new skin breakdown  Description: Absence of new skin breakdown  8/28/2021 1918 by Jadyn Telles RN  Outcome: Met This Shift  8/28/2021 0556 by Zulma Strange RN  Outcome: Met This Shift

## 2021-08-28 NOTE — PROGRESS NOTES
OTOLARYNGOLOGY  DAILY PROGRESS NOTE  2021    Subjective:     Pt intubated and sedated. No issues overnight. Tongue appears continually improving each day. Per nursing, weaning trials were attempted without parameters met yesterday     Objective:     BP (!) 173/115   Pulse 79   Temp 99.6 °F (37.6 °C) (Core)   Resp 18   Ht 5' 4\" (1.626 m)   Wt 181 lb (82.1 kg)   SpO2 93%   BMI 31.07 kg/m²   PULSE OXIMETRY RANGE: SpO2  Av.7 %  Min: 93 %  Max: 100 %  I/O last 3 completed shifts: In: 56 [I.V.:3999; NG/GT:1163]  Out: 9020 [Urine:2850]    GENERAL:  Intubated sedated   HENT: Normocephalic, atraumatic. Floor of mouth edematous mildly, tongue without edema, tongue remains in the oral cavity and does not protrudes out of oral cavity, neck soft nontender trachea midline. ETT in place. Mucous membranes dry   EYES: No sclera icterus, pupils equal  LUNGS:  No increased work of breathing, no stridor  CARDIOVASCULAR:  RR      Assessment/Plan:     78 y.o. female with angioedema secondary to ACE-I    · Tongue and floor of mouth significantly improved since onset   · Continue oral cavity care  · Continue benadryl/pepcid and agree with taper of decadron   · stop all ACE-I/ARB use   · Monitor respiratory parameters for weaning off vent as pt is significantly improving. there was mild right base of tongue swelling on initial presentation and believe it is likely improving at the same rate as the anterior tongue swelling     · Recommend CT Neck for further evaluation of failed weaning trials   ENT to follow     Patient discussed with Attending.        Electronically signed by Fransisco Hannah DO on 2021 at 9:21 AM

## 2021-08-28 NOTE — PROGRESS NOTES
Acute Problems  ACE inhibitor induced angioedema  GORDON  Hypertension  Diabetes  COPD (not in acute exacerbation)  Hyperlipidemia    Plan of Care  CPatient has a cuff leak . We will attempt sedation vacation and possible extubation today. Tongue swelling has regressed significantly. Decrease Decadron Decadron,C/w Pepcid but DC Benadryl   Restraints  Patient will be started on Precedex in order to control her agitation while on spontaneous breathing trial.  Lantus insulin sliding scale. SSI  An NG tube was inserted. X-ray confirmed placement. Hold tube feeding prior to extubation. Avoid nephrotoxic medications  CMP, CBC, mag, Phos daily  As needed IV metoprolol. VTE prophylaxis with Lovenox  GI prophylaxis with Pepcid  Will list ACE inhibitors and ARB as allergies. Daily sedation vacation    Admission History:  Patient is a 78 y.o. female with the above medical history who was brought in by  for swelling of the oral cavity secondary to lisinopril usage. She was alert and oriented upon arrival to the ED and stated that the swelling began 1.5 hours prior to arrival.  She did take 25 mg of Benadryl however symptoms continue to worsen. At the time she did have trouble talking, difficulty swallowing and eventually developed difficulty breathing. Decision was made to intubate for airway protection. In the emergency department she was started on propofol and fentanyl for sedation, she was also given TXA, 125 mg of Solu-Medrol, and 25 mg of IV Benadryl. She does currently use an insulin pump for her diabetes management. Labs and imaging in the ED pertinent for creatinine 1.4, glucose 285, chest x-ray with endotracheal tube approximately 6.8 cm above the chivo. Upon my evaluation she is intubated and sedated. There is significant swelling to the soft palate, tongue and oral cavity. I was unable to clearly view the uvula.   There were attempts in the emergency department to place an NG/OG tube however they were unsuccessful secondary to swelling. I did have the endotracheal tube advanced 2cm and and will repeat chest x-ray to verify placement. It appears as though her creatinine is slightly elevated. Her baseline creatinine appears to be around 0.9 and today is 1.4. I did put her on some mild IV fluids and we will continue to watch her kidney function.  was at bedside and was able to tell me about her history. She does currently smoke cigarettes however, he denies daily alcohol use or any illicit drugs. He was fully updated on the plan of care and all questions were answered. Daily progress:  08/25/2021: Patient is awake and follows command of sedation. She continues to have significant tongue swelling. She has no fever, chills, or rigors. She has a cuff leak. She is hypertensive was started on IV metoprolol as needed. 08/26/2021: Patient's remains sedated intubated and mechanically ventilated. She continues to have some tongue swelling. Her urine output is maintained. An NG was inserted to initiate tube feeding. She will undergo sedation vacation and possible extubation tomorrow once the swelling resolves. 08/27/2021: Tongue swelling has regressed significantly. She continues to be sedated intubated and mechanically ventilated. She had an uneventful night. She tolerated tube feeding very well. She tends to get agitated off sedation and therefore will be started on Precedex in order to control her agitation before extubation. 08/28/2021: Patient's tongue swelling is significantly improved. She is currently intubated sedated and mechanically ventilated. She failed spontaneous breathing trial yesterday. She has no fever, chills, or rigors. She had an uneventful night. We will attempt to extubate the patient today.       Past Medical History:  Past Medical History:   Diagnosis Date    Arthritis     Asthma     Cerebral artery occlusion with cerebral infarction ([facility] [address] 2015    [facility]     Diabetes mellitus [facility]     Heart attack ([facility]     Hyperlipidemia     Hypertension     VHD (valvular heart disease)         Family History:   Family History   Problem Relation Age of Onset    COPD Mother    Aleksandar Bailey Alzheimer's Disease Mother     Alcohol Abuse Father     No Known Problems Sister     Diabetes Brother     No Known Problems Brother        Allergies:         Ace inhibitors, Angiotensin receptor blockers, Lisinopril, and Pcn [penicillins]    Social history:  Social History     Socioeconomic History    Marital status:      Spouse name: Not on file    Number of children: Not on file    Years of education: Not on file    Highest education level: Not on file   Occupational History    Not on file   Tobacco Use    Smoking status: Current Every Day Smoker     Packs/day: 0.25     Years: 45.00     Pack years: 11.25     Types: Cigarettes    Smokeless tobacco: Never Used   Vaping Use    Vaping Use: Never used   Substance and Sexual Activity    Alcohol use: Yes     Alcohol/week: 7.0 - 8.0 standard drinks     Types: 7 - 8 Glasses of wine per week     Comment: glass of wine with dinner. 1 cup of coffee a day     Drug use: Never    Sexual activity: Not on file   Other Topics Concern    Not on file   Social History Narrative    Drinks 2 cups coffee daily. Social Determinants of Health     Financial Resource Strain:     Difficulty of Paying Living Expenses:    Food Insecurity:     Worried About Running Out of Food in the Last Year:     [address] in the Last Year:    Transportation Needs:     Lack of Transportation (Medical):      Lack of Transportation (Non-Medical):    Physical Activity:     Days of Exercise per Week:     Minutes of Exercise per Session:    Stress:     Feeling of Stress :    Social Connections:     Frequency of Communication with Friends and Family:     Frequency of Social Gatherings with Friends and Family:     Attends [employer] Services:     Active Member of Clubs or Organizations:     Attends Club or Organization Meetings:     Marital Status:    Intimate Partner Violence:     Fear of Current or Ex-Partner:     Emotionally Abused:     Physically Abused:     Sexually Abused:        Current Medications:     Current Facility-Administered Medications:     insulin glargine (LANTUS) injection vial 8 Units, 8 Units, Subcutaneous, BID, Morales Cyr MD, 8 Units at 08/28/21 0900    dexmedetomidine (PRECEDEX) 400 mcg in sodium chloride 0.9 % 100 mL infusion, 0.2-1.4 mcg/kg/hr, IntraVENous, Continuous, Tam Garcia MD, Stopped at 08/27/21 1120    medicated lip ointment (BLISTEX), , Topical, Daily, Khris Pink DO, Given at 08/28/21 0802    dexamethasone (DECADRON) injection 4 mg, 4 mg, IntraVENous, Q12H, Tam Garcia MD, 4 mg at 08/28/21 0934    enoxaparin (LOVENOX) injection 40 mg, 40 mg, Subcutaneous, BID, Tam Garcia MD, 40 mg at 08/28/21 0802    famotidine (PEPCID) injection 10 mg, 10 mg, IntraVENous, BID, Tam Garcia MD, 10 mg at 08/28/21 0802    insulin lispro (HUMALOG) injection vial 0-18 Units, 0-18 Units, Subcutaneous, Q4H, SHELBIE Nieto - CNP, 3 Units at 08/28/21 1047    metoprolol (LOPRESSOR) injection 5 mg, 5 mg, IntraVENous, Q4H PRN, Tam Garcia MD, 5 mg at 08/28/21 0810    propofol injection, 5-50 mcg/kg/min, IntraVENous, Titrated, Sarahi Perez MD, Last Rate: 20.6 mL/hr at 08/28/21 0921, 40 mcg/kg/min at 08/28/21 0921    fentaNYL 5 mcg/ml in 0.9%  ml infusion, 12.5-200 mcg/hr, IntraVENous, Continuous, Desire Narayanan DO, Last Rate: 25 mL/hr at 08/28/21 0015, 125 mcg/hr at 08/28/21 0015    diphenhydrAMINE (BENADRYL) injection 50 mg, 50 mg, IntraVENous, Q6H, SHELBIE Nieto - CNP, 50 mg at 08/28/21 0934    ipratropium-albuterol (DUONEB) nebulizer solution 3 mL, 1 vial, Inhalation, Jose L Hyatt MD, 3 mL at 08/28/21 0938    glucose (GLUTOSE) 40 % oral gel 15 g, 15 g, Oral, PRN, Caro Woodson MD    dextrose 50 % IV solution, 12.5 g, IntraVENous, PRN, Caro Woodson MD    glucagon (rDNA) injection 1 mg, 1 mg, IntraMUSCular, PRN, Caro Woodson MD    dextrose 5 % solution, 100 mL/hr, IntraVENous, PRN, Caro Woodson MD    sodium chloride flush 0.9 % injection 5-40 mL, 5-40 mL, IntraVENous, 2 times per day, Caro Woodson MD, 10 mL at 08/28/21 0804    sodium chloride flush 0.9 % injection 5-40 mL, 5-40 mL, IntraVENous, PRN, Caro Woodson MD    0.9 % sodium chloride infusion, 25 mL, IntraVENous, PRN, Caro Woodson MD    ondansetron (ZOFRAN-ODT) disintegrating tablet 4 mg, 4 mg, Oral, Q8H PRN **OR** ondansetron (ZOFRAN) injection 4 mg, 4 mg, IntraVENous, Q6H PRN, Caro Woodson MD    polyethylene glycol (GLYCOLAX) packet 17 g, 17 g, Oral, Daily PRN, Caro Woodson MD    acetaminophen (TYLENOL) tablet 650 mg, 650 mg, Oral, Q6H PRN, 650 mg at 08/27/21 0031 **OR** acetaminophen (TYLENOL) suppository 650 mg, 650 mg, Rectal, Q6H PRN, Caro Woodson MD    lactated ringers infusion, , IntraVENous, Continuous, Marielos Diana MD, Last Rate: 125 mL/hr at 08/28/21 0944, New Bag at 08/28/21 0944    Review of Systems:   Unable to perform review of systems secondary to intubation and sedation    Physical Exam:   Vital Signs:  BP (!) 166/110   Pulse 95   Temp 99.6 °F (37.6 °C) (Core)   Resp 26   Ht 5' 4\" (1.626 m)   Wt 181 lb (82.1 kg)   SpO2 100%   BMI 31.07 kg/m²     Input/Output:   In: 3720 [I.V.:2877; NG/GT:843]  Out: 2200     Oxygen requirements: 40%    Ventilator Information:  Vent Information  $Ventilation: $Subsequent Day  Skin Assessment: Clean, dry, & intact  Vent Type: 980  Vent Mode: AC/VC  Vt Ordered: 400 mL  Rate Set: 18 bmp  Peak Flow: 50 L/min  Pressure Support: 0 cmH20  FiO2 : 45 %  SpO2: 100 %  SpO2/FiO2 ratio: 222.22  Sensitivity: 3  PEEP/CPAP: 5  I Time/ I Time %: 0 s  Humidification Source: Heated wire  Humidification Temp: 37  Humidification Temp Measured: 37  Circuit Condensation: Not drained    General appearance: Overweight, not in pain or distress, in no respiratory distress    HEENT: Intubated with tongue swelling has significantly improved. Neck: Supple, no jugular venous distension, lymphadenopathy, thyromegaly or carotid bruits  Chest: Equal normal breath sounds, no wheezing, no crackles and no tenderness over ribs   Cardiovascular: Normal S1 , S2, regular rate and rhythm, no murmur, rub or gallop  Abdomen: Normal sounds present, soft, lax with no tenderness, no hepatosplenomegaly, and no masses, obese and rounded  GI: Vogel catheter in place, no bladder distention noted,  Extremities: No edema. Pulses are equally present. No clubbing or cyanosis  Skin: intact, no rashes or open wounds, edema noted to oral cavity, soft palate and tongue  Neurologic: Intubated and sedated, RASS of -2. Awake and follows command of sedation. Investigations:  Labs, radiological imaging and cardiac work up were reviewed        ICU STAFF PHYSICIAN NOTE OF PERSONAL INVOLVEMENT IN CARE  As the attending physician, I certify that I personally reviewed the patient's history and personally examined the patient to confirm the physical findings described above, and that I reviewed the relevant imaging studies and available reports. I also discussed the differential diagnosis and all of the proposed management plans with the patient and individuals accompanying the patient to this visit. They had the opportunity to ask questions about the proposed management plans and to have those questions answered. This patient has a high probability of sudden, clinically significant deterioration, which requires the highest level of physician preparedness to intervene urgently.   I managed/supervised life or organ supporting interventions that required frequent physician assessment. I devoted my full attention to the direct care of this patient for the amount of time indicated below. Time I spent with family or surrogate(s) is included only if the patient was incapable of providing the necessary information or participating in medical decision-making. Time devoted to teaching and to any procedures I billed separately is not included.   Critical Care Time: 32 minutes      Electronically signed by Trish Mann MD on 8/28/2021 at 11:08 AM

## 2021-08-28 NOTE — PROGRESS NOTES
Subjective:  Salome Locke was seen and examined in the ICU today. The patient remains intubated and sedated  There were no new problems reported overnight. Tongue swelling has improved  Unable to wean off ventilator today again    A complete review of systems and social history was completed on admission and remains unchanged unless otherwise noted    Scheduled Meds:   methylPREDNISolone  40 mg IntraVENous Q8H    budesonide  0.5 mg Nebulization BID    insulin glargine  8 Units Subcutaneous BID    medicated lip ointment   Topical Daily    enoxaparin  40 mg Subcutaneous BID    famotidine (PEPCID) injection  10 mg IntraVENous BID    insulin lispro  0-18 Units Subcutaneous Q4H    ipratropium-albuterol  1 vial Inhalation Q4H    sodium chloride flush  5-40 mL IntraVENous 2 times per day     Continuous Infusions:   dexmedetomidine HCl in NaCl 1 mcg/kg/hr (08/28/21 1231)    propofol 39.922 mcg/kg/min (08/28/21 1539)    fentaNYL 5 mcg/ml in 0.9%  ml infusion 125 mcg/hr (08/28/21 1256)    dextrose      sodium chloride      lactated ringers 125 mL/hr at 08/28/21 0944     PRN Meds:metoprolol, glucose, dextrose, glucagon (rDNA), dextrose, sodium chloride flush, sodium chloride, ondansetron **OR** ondansetron, polyethylene glycol, acetaminophen **OR** acetaminophen    Objective:  BP (!) 150/74   Pulse 69   Temp 101 °F (38.3 °C)   Resp 25   Ht 5' 4\" (1.626 m)   Wt 181 lb (82.1 kg)   SpO2 94%   BMI 31.07 kg/m²   In: 2814 [I.V.:1735; NG/GT:1079]  Out: 1400    In: 2814   Out: 1400 [Urine:1400]     Intubated and sedated  Still with angioedema but better  RRR, pos S1, S2  Tight with no wheeze, rales or rhonchi  bowel sounds present, nontender, nondistended  No clubbing, cyanosis, or edema  No neuro changes   No obvious rashes or lesions.     Recent Labs     08/27/21  0630 08/28/21  0545   WBC 11.7* 10.3   HGB 11.4* 11.2*    204     Recent Labs     08/26/21  1108 08/27/21  0630 08/28/21  0545    137 137   K 5.0 4.9 4.9    101 100   CO2 25 28 30*   BUN 27* 24* 21   CREATININE 1.5* 1.2* 1.0   GLUCOSE 250* 171* 297*     Recent Labs     08/27/21  0630 08/28/21  0545   BILITOT <0.2 0.2   ALKPHOS 62 66   AST 20 12   ALT 13 12     No results for input(s): INR in the last 72 hours. Invalid input(s): PT  No results for input(s): CKTOTAL, CKMB, CKMBINDEX, TROPONINI in the last 72 hours. XR CHEST PORTABLE    Result Date: 8/25/2021  EXAMINATION: ONE XRAY VIEW OF THE CHEST 8/24/2021 8:52 pm COMPARISON: 08/24/2021 HISTORY: ORDERING SYSTEM PROVIDED HISTORY: et tube placement TECHNOLOGIST PROVIDED HISTORY: Reason for exam:->et tube placement FINDINGS: Stable position of endotracheal tube. Cardiomediastinal silhouette is unremarkable. No infiltrate, effusion, or pneumothorax. No acute osseous abnormality. No pulmonary infiltrates. No significant change since the prior study     XR CHEST PORTABLE    Result Date: 8/24/2021  EXAMINATION: ONE XRAY VIEW OF THE CHEST 8/24/2021 5:30 pm COMPARISON: January 3, 2020 HISTORY: ORDERING SYSTEM PROVIDED HISTORY: SOB (shortness of breath) TECHNOLOGIST PROVIDED HISTORY: Reason for exam:->ETT placement, tongue swelling FINDINGS: Endotracheal tube is approximately 6.8 cm above the chivo. No airspace opacity or pleural effusion. The heart is normal in size. No pneumothorax. 1. Endotracheal tube is approximately 6.8 cm above the chivo. 2. No airspace opacity or pleural effusion.      Assessment:  Faraz Velasquez is a 78y.o. year old female who presented on 8/24/2021 and is being treated for:  Principal Problem:    Acute respiratory failure ([facility] [address]  Active Problems:    Essential hypertension    Diabetes mellitus ([facility] [address] [facility]    Myocardiopathy ([facility] [address]    ACE inhibitor-aggravated angioedema    COPD (chronic obstructive pulmonary disease) (HCC)    Acquired angioedema    SOB (shortness of breath)    Disorder of airway    Acquired hypertrophy of tongue  Resolved Problems:    * No resolved hospital problems. *    Plan  · Cont airway protection - vent weaning as per pulmonary  · Cont IV steroids/h2 blockade  · Increase basal insulin along with SSI to achieve better blood sugar control  · Please see orders for further management and care.     Nickie Carpenter MD  4:03 PM  8/28/2021

## 2021-08-29 ENCOUNTER — APPOINTMENT (OUTPATIENT)
Dept: GENERAL RADIOLOGY | Age: 66
DRG: 207 | End: 2021-08-29
Payer: MEDICARE

## 2021-08-29 ENCOUNTER — APPOINTMENT (OUTPATIENT)
Dept: CT IMAGING | Age: 66
DRG: 207 | End: 2021-08-29
Payer: MEDICARE

## 2021-08-29 LAB
ALBUMIN SERPL-MCNC: 2.7 G/DL (ref 3.5–5.2)
ALP BLD-CCNC: 54 U/L (ref 35–104)
ALT SERPL-CCNC: 14 U/L (ref 0–32)
ANION GAP SERPL CALCULATED.3IONS-SCNC: 8 MMOL/L (ref 7–16)
AST SERPL-CCNC: 15 U/L (ref 0–31)
B.E.: 5.2 MMOL/L (ref -3–3)
B.E.: 7.1 MMOL/L (ref -3–3)
BACTERIA: ABNORMAL /HPF
BASOPHILS ABSOLUTE: 0.01 E9/L (ref 0–0.2)
BASOPHILS RELATIVE PERCENT: 0.1 % (ref 0–2)
BILIRUB SERPL-MCNC: 0.3 MG/DL (ref 0–1.2)
BILIRUBIN URINE: NEGATIVE
BLOOD, URINE: ABNORMAL
BUN BLDV-MCNC: 21 MG/DL (ref 6–23)
CALCIUM SERPL-MCNC: 8.2 MG/DL (ref 8.6–10.2)
CHLORIDE BLD-SCNC: 100 MMOL/L (ref 98–107)
CLARITY: CLEAR
CO2: 29 MMOL/L (ref 22–29)
COHB: 0.8 % (ref 0–1.5)
COHB: 1.1 % (ref 0–1.5)
COLOR: YELLOW
CREAT SERPL-MCNC: 0.8 MG/DL (ref 0.5–1)
CRITICAL: ABNORMAL
CRITICAL: ABNORMAL
DATE ANALYZED: ABNORMAL
DATE ANALYZED: ABNORMAL
DATE OF COLLECTION: ABNORMAL
DATE OF COLLECTION: ABNORMAL
EKG ATRIAL RATE: 77 BPM
EKG P AXIS: 73 DEGREES
EKG P-R INTERVAL: 140 MS
EKG Q-T INTERVAL: 382 MS
EKG QRS DURATION: 68 MS
EKG QTC CALCULATION (BAZETT): 435 MS
EKG R AXIS: 29 DEGREES
EKG T AXIS: 99 DEGREES
EKG VENTRICULAR RATE: 78 BPM
EOSINOPHILS ABSOLUTE: 0.02 E9/L (ref 0.05–0.5)
EOSINOPHILS RELATIVE PERCENT: 0.2 % (ref 0–6)
EPITHELIAL CELLS, UA: ABNORMAL /HPF
FIO2: 40 %
FIO2: 50 %
GFR AFRICAN AMERICAN: >60
GFR NON-AFRICAN AMERICAN: >60 ML/MIN/1.73
GLUCOSE BLD-MCNC: 251 MG/DL (ref 74–99)
GLUCOSE URINE: 250 MG/DL
HCO3: 29.4 MMOL/L (ref 22–26)
HCO3: 32.6 MMOL/L (ref 22–26)
HCT VFR BLD CALC: 35.5 % (ref 34–48)
HEMOGLOBIN: 11.4 G/DL (ref 11.5–15.5)
HHB: 2.1 % (ref 0–5)
HHB: 2.6 % (ref 0–5)
IMMATURE GRANULOCYTES #: 0.06 E9/L
IMMATURE GRANULOCYTES %: 0.5 % (ref 0–5)
KETONES, URINE: NEGATIVE MG/DL
LAB: ABNORMAL
LAB: ABNORMAL
LACTIC ACID: 0.8 MMOL/L (ref 0.5–2.2)
LEUKOCYTE ESTERASE, URINE: NEGATIVE
LYMPHOCYTES ABSOLUTE: 0.98 E9/L (ref 1.5–4)
LYMPHOCYTES RELATIVE PERCENT: 8.1 % (ref 20–42)
Lab: ABNORMAL
Lab: ABNORMAL
MAGNESIUM: 1.9 MG/DL (ref 1.6–2.6)
MCH RBC QN AUTO: 31 PG (ref 26–35)
MCHC RBC AUTO-ENTMCNC: 32.1 % (ref 32–34.5)
MCV RBC AUTO: 96.5 FL (ref 80–99.9)
METER GLUCOSE: 138 MG/DL (ref 74–99)
METER GLUCOSE: 142 MG/DL (ref 74–99)
METER GLUCOSE: 210 MG/DL (ref 74–99)
METER GLUCOSE: 213 MG/DL (ref 74–99)
METER GLUCOSE: 235 MG/DL (ref 74–99)
METER GLUCOSE: 237 MG/DL (ref 74–99)
METER GLUCOSE: 263 MG/DL (ref 74–99)
METHB: 0.3 % (ref 0–1.5)
METHB: 0.3 % (ref 0–1.5)
MODE: ABNORMAL
MODE: AC
MONOCYTES ABSOLUTE: 1.44 E9/L (ref 0.1–0.95)
MONOCYTES RELATIVE PERCENT: 11.9 % (ref 2–12)
NEUTROPHILS ABSOLUTE: 9.56 E9/L (ref 1.8–7.3)
NEUTROPHILS RELATIVE PERCENT: 79.2 % (ref 43–80)
NITRITE, URINE: NEGATIVE
O2 CONTENT: 16.1 ML/DL
O2 CONTENT: 19.3 ML/DL
O2 SATURATION: 97.4 % (ref 92–98.5)
O2 SATURATION: 97.9 % (ref 92–98.5)
O2HB: 96.3 % (ref 94–97)
O2HB: 96.5 % (ref 94–97)
OPERATOR ID: 3086
OPERATOR ID: 3086
PATIENT TEMP: 37 C
PATIENT TEMP: 37 C
PCO2: 41.6 MMHG (ref 35–45)
PCO2: 49.7 MMHG (ref 35–45)
PDW BLD-RTO: 12.5 FL (ref 11.5–15)
PEEP/CPAP: 6 CMH2O
PEEP/CPAP: 8 CMH2O
PFO2: 2.13 MMHG/%
PFO2: 3.14 MMHG/%
PH BLOOD GAS: 7.43 (ref 7.35–7.45)
PH BLOOD GAS: 7.47 (ref 7.35–7.45)
PH UA: 6.5 (ref 5–9)
PHOSPHORUS: 2.7 MG/DL (ref 2.5–4.5)
PIP: 12 CMH2O
PLATELET # BLD: 186 E9/L (ref 130–450)
PMV BLD AUTO: 10.4 FL (ref 7–12)
PO2: 106.5 MMHG (ref 75–100)
PO2: 125.8 MMHG (ref 75–100)
POTASSIUM SERPL-SCNC: 4.4 MMOL/L (ref 3.5–5)
PROCALCITONIN: 0.14 NG/ML (ref 0–0.08)
PROTEIN UA: NEGATIVE MG/DL
RBC # BLD: 3.68 E12/L (ref 3.5–5.5)
RBC UA: ABNORMAL /HPF (ref 0–2)
RI(T): 0.73
RI(T): 1.79
RR MECHANICAL: 25 B/MIN
SODIUM BLD-SCNC: 137 MMOL/L (ref 132–146)
SOURCE, BLOOD GAS: ABNORMAL
SOURCE, BLOOD GAS: ABNORMAL
SPECIFIC GRAVITY UA: 1.01 (ref 1–1.03)
THB: 11.8 G/DL (ref 11.5–16.5)
THB: 14.1 G/DL (ref 11.5–16.5)
TIME ANALYZED: 2035
TIME ANALYZED: 2224
TOTAL PROTEIN: 5.5 G/DL (ref 6.4–8.3)
UROBILINOGEN, URINE: 0.2 E.U./DL
VT MECHANICAL: 400 ML
WBC # BLD: 12.1 E9/L (ref 4.5–11.5)
WBC UA: ABNORMAL /HPF (ref 0–5)

## 2021-08-29 PROCEDURE — 6360000004 HC RX CONTRAST MEDICATION: Performed by: RADIOLOGY

## 2021-08-29 PROCEDURE — 6360000002 HC RX W HCPCS

## 2021-08-29 PROCEDURE — 6370000000 HC RX 637 (ALT 250 FOR IP)

## 2021-08-29 PROCEDURE — 2580000003 HC RX 258: Performed by: INTERNAL MEDICINE

## 2021-08-29 PROCEDURE — 6370000000 HC RX 637 (ALT 250 FOR IP): Performed by: INTERNAL MEDICINE

## 2021-08-29 PROCEDURE — 93010 ELECTROCARDIOGRAM REPORT: CPT | Performed by: INTERNAL MEDICINE

## 2021-08-29 PROCEDURE — 70491 CT SOFT TISSUE NECK W/DYE: CPT

## 2021-08-29 PROCEDURE — 6360000002 HC RX W HCPCS: Performed by: STUDENT IN AN ORGANIZED HEALTH CARE EDUCATION/TRAINING PROGRAM

## 2021-08-29 PROCEDURE — 93005 ELECTROCARDIOGRAM TRACING: CPT

## 2021-08-29 PROCEDURE — 2500000003 HC RX 250 WO HCPCS: Performed by: INTERNAL MEDICINE

## 2021-08-29 PROCEDURE — 36415 COLL VENOUS BLD VENIPUNCTURE: CPT

## 2021-08-29 PROCEDURE — 2500000003 HC RX 250 WO HCPCS

## 2021-08-29 PROCEDURE — 87088 URINE BACTERIA CULTURE: CPT

## 2021-08-29 PROCEDURE — 85025 COMPLETE CBC W/AUTO DIFF WBC: CPT

## 2021-08-29 PROCEDURE — 36600 WITHDRAWAL OF ARTERIAL BLOOD: CPT

## 2021-08-29 PROCEDURE — 2580000003 HC RX 258

## 2021-08-29 PROCEDURE — 71045 X-RAY EXAM CHEST 1 VIEW: CPT

## 2021-08-29 PROCEDURE — 6360000002 HC RX W HCPCS: Performed by: INTERNAL MEDICINE

## 2021-08-29 PROCEDURE — 87070 CULTURE OTHR SPECIMN AEROBIC: CPT

## 2021-08-29 PROCEDURE — 84100 ASSAY OF PHOSPHORUS: CPT

## 2021-08-29 PROCEDURE — 80053 COMPREHEN METABOLIC PANEL: CPT

## 2021-08-29 PROCEDURE — 81001 URINALYSIS AUTO W/SCOPE: CPT

## 2021-08-29 PROCEDURE — 0BH17EZ INSERTION OF ENDOTRACHEAL AIRWAY INTO TRACHEA, VIA NATURAL OR ARTIFICIAL OPENING: ICD-10-PCS | Performed by: INTERNAL MEDICINE

## 2021-08-29 PROCEDURE — 5A1955Z RESPIRATORY VENTILATION, GREATER THAN 96 CONSECUTIVE HOURS: ICD-10-PCS | Performed by: INTERNAL MEDICINE

## 2021-08-29 PROCEDURE — 2000000000 HC ICU R&B

## 2021-08-29 PROCEDURE — 6360000002 HC RX W HCPCS: Performed by: EMERGENCY MEDICINE

## 2021-08-29 PROCEDURE — 83735 ASSAY OF MAGNESIUM: CPT

## 2021-08-29 PROCEDURE — 87206 SMEAR FLUORESCENT/ACID STAI: CPT

## 2021-08-29 PROCEDURE — 94003 VENT MGMT INPAT SUBQ DAY: CPT

## 2021-08-29 PROCEDURE — 2580000003 HC RX 258: Performed by: STUDENT IN AN ORGANIZED HEALTH CARE EDUCATION/TRAINING PROGRAM

## 2021-08-29 PROCEDURE — 82962 GLUCOSE BLOOD TEST: CPT

## 2021-08-29 PROCEDURE — 83605 ASSAY OF LACTIC ACID: CPT

## 2021-08-29 PROCEDURE — 94640 AIRWAY INHALATION TREATMENT: CPT

## 2021-08-29 PROCEDURE — 84145 PROCALCITONIN (PCT): CPT

## 2021-08-29 PROCEDURE — 74018 RADEX ABDOMEN 1 VIEW: CPT

## 2021-08-29 PROCEDURE — 87040 BLOOD CULTURE FOR BACTERIA: CPT

## 2021-08-29 PROCEDURE — 87186 SC STD MICRODIL/AGAR DIL: CPT

## 2021-08-29 PROCEDURE — 82805 BLOOD GASES W/O2 SATURATION: CPT

## 2021-08-29 RX ORDER — HEPARIN SODIUM (PORCINE) LOCK FLUSH IV SOLN 100 UNIT/ML 100 UNIT/ML
3 SOLUTION INTRAVENOUS EVERY 12 HOURS SCHEDULED
Status: DISCONTINUED | OUTPATIENT
Start: 2021-08-29 | End: 2021-09-15 | Stop reason: HOSPADM

## 2021-08-29 RX ORDER — LIDOCAINE HYDROCHLORIDE 10 MG/ML
5 INJECTION, SOLUTION EPIDURAL; INFILTRATION; INTRACAUDAL; PERINEURAL ONCE
Status: DISCONTINUED | OUTPATIENT
Start: 2021-08-29 | End: 2021-09-15 | Stop reason: HOSPADM

## 2021-08-29 RX ORDER — ROCURONIUM BROMIDE 10 MG/ML
INJECTION, SOLUTION INTRAVENOUS
Status: DISPENSED
Start: 2021-08-29 | End: 2021-08-30

## 2021-08-29 RX ORDER — SODIUM CHLORIDE 0.9 % (FLUSH) 0.9 %
5-40 SYRINGE (ML) INJECTION EVERY 12 HOURS SCHEDULED
Status: DISCONTINUED | OUTPATIENT
Start: 2021-08-29 | End: 2021-09-15 | Stop reason: HOSPADM

## 2021-08-29 RX ORDER — FENTANYL CITRATE 50 UG/ML
INJECTION, SOLUTION INTRAMUSCULAR; INTRAVENOUS
Status: COMPLETED
Start: 2021-08-29 | End: 2021-08-29

## 2021-08-29 RX ORDER — QUETIAPINE FUMARATE 25 MG/1
25 TABLET, FILM COATED ORAL 2 TIMES DAILY
Status: DISCONTINUED | OUTPATIENT
Start: 2021-08-30 | End: 2021-09-06

## 2021-08-29 RX ORDER — HEPARIN SODIUM (PORCINE) LOCK FLUSH IV SOLN 100 UNIT/ML 100 UNIT/ML
3 SOLUTION INTRAVENOUS PRN
Status: DISCONTINUED | OUTPATIENT
Start: 2021-08-29 | End: 2021-09-13

## 2021-08-29 RX ORDER — SODIUM CHLORIDE 9 MG/ML
25 INJECTION, SOLUTION INTRAVENOUS PRN
Status: DISCONTINUED | OUTPATIENT
Start: 2021-08-29 | End: 2021-09-13

## 2021-08-29 RX ORDER — ETOMIDATE 2 MG/ML
0.3 INJECTION INTRAVENOUS ONCE
Status: COMPLETED | OUTPATIENT
Start: 2021-08-29 | End: 2021-08-29

## 2021-08-29 RX ORDER — ETOMIDATE 2 MG/ML
INJECTION INTRAVENOUS
Status: COMPLETED
Start: 2021-08-29 | End: 2021-08-29

## 2021-08-29 RX ORDER — PROPOFOL 10 MG/ML
5-50 INJECTION, EMULSION INTRAVENOUS
Status: DISCONTINUED | OUTPATIENT
Start: 2021-08-29 | End: 2021-09-06

## 2021-08-29 RX ORDER — HYDRALAZINE HYDROCHLORIDE 20 MG/ML
10 INJECTION INTRAMUSCULAR; INTRAVENOUS EVERY 4 HOURS PRN
Status: DISCONTINUED | OUTPATIENT
Start: 2021-08-29 | End: 2021-09-04

## 2021-08-29 RX ORDER — FENTANYL CITRATE 50 UG/ML
100 INJECTION, SOLUTION INTRAMUSCULAR; INTRAVENOUS ONCE
Status: COMPLETED | OUTPATIENT
Start: 2021-08-29 | End: 2021-08-29

## 2021-08-29 RX ORDER — SODIUM CHLORIDE 0.9 % (FLUSH) 0.9 %
5-40 SYRINGE (ML) INJECTION PRN
Status: DISCONTINUED | OUTPATIENT
Start: 2021-08-29 | End: 2021-09-15 | Stop reason: HOSPADM

## 2021-08-29 RX ADMIN — IPRATROPIUM BROMIDE AND ALBUTEROL SULFATE 3 ML: .5; 3 SOLUTION RESPIRATORY (INHALATION) at 09:20

## 2021-08-29 RX ADMIN — BUDESONIDE 500 MCG: 0.5 INHALANT RESPIRATORY (INHALATION) at 05:05

## 2021-08-29 RX ADMIN — METOPROLOL TARTRATE 5 MG: 1 INJECTION, SOLUTION INTRAVENOUS at 13:51

## 2021-08-29 RX ADMIN — ACETAMINOPHEN 650 MG: 325 TABLET ORAL at 00:29

## 2021-08-29 RX ADMIN — IPRATROPIUM BROMIDE AND ALBUTEROL SULFATE 3 ML: .5; 3 SOLUTION RESPIRATORY (INHALATION) at 14:54

## 2021-08-29 RX ADMIN — DIMETHICONE, CAMPHOR (SYNTHETIC), MENTHOL, AND PHENOL: 1.1; .5; .625; .5 OINTMENT TOPICAL at 07:44

## 2021-08-29 RX ADMIN — Medication 10 ML: at 07:44

## 2021-08-29 RX ADMIN — ENOXAPARIN SODIUM 40 MG: 40 INJECTION SUBCUTANEOUS at 07:43

## 2021-08-29 RX ADMIN — PROPOFOL 40 MCG/KG/MIN: 10 INJECTION, EMULSION INTRAVENOUS at 21:50

## 2021-08-29 RX ADMIN — METHYLPREDNISOLONE SODIUM SUCCINATE 40 MG: 40 INJECTION, POWDER, FOR SOLUTION INTRAMUSCULAR; INTRAVENOUS at 00:00

## 2021-08-29 RX ADMIN — IPRATROPIUM BROMIDE AND ALBUTEROL SULFATE 3 ML: .5; 3 SOLUTION RESPIRATORY (INHALATION) at 01:47

## 2021-08-29 RX ADMIN — IPRATROPIUM BROMIDE AND ALBUTEROL SULFATE 3 ML: .5; 3 SOLUTION RESPIRATORY (INHALATION) at 19:51

## 2021-08-29 RX ADMIN — MEROPENEM 1000 MG: 1 INJECTION, POWDER, FOR SOLUTION INTRAVENOUS at 23:24

## 2021-08-29 RX ADMIN — IPRATROPIUM BROMIDE AND ALBUTEROL SULFATE 3 ML: .5; 3 SOLUTION RESPIRATORY (INHALATION) at 23:25

## 2021-08-29 RX ADMIN — Medication 125 MCG/HR: at 02:48

## 2021-08-29 RX ADMIN — BUDESONIDE 500 MCG: 0.5 INHALANT RESPIRATORY (INHALATION) at 19:51

## 2021-08-29 RX ADMIN — Medication 10 ML: at 20:34

## 2021-08-29 RX ADMIN — METHYLPREDNISOLONE SODIUM SUCCINATE 40 MG: 40 INJECTION, POWDER, FOR SOLUTION INTRAMUSCULAR; INTRAVENOUS at 06:09

## 2021-08-29 RX ADMIN — ACETAMINOPHEN 650 MG: 650 SUPPOSITORY RECTAL at 15:20

## 2021-08-29 RX ADMIN — ETOMIDATE 20 MG: 2 INJECTION, SOLUTION INTRAVENOUS at 21:10

## 2021-08-29 RX ADMIN — SODIUM CHLORIDE, POTASSIUM CHLORIDE, SODIUM LACTATE AND CALCIUM CHLORIDE: 600; 310; 30; 20 INJECTION, SOLUTION INTRAVENOUS at 08:57

## 2021-08-29 RX ADMIN — METOPROLOL TARTRATE 5 MG: 1 INJECTION, SOLUTION INTRAVENOUS at 08:13

## 2021-08-29 RX ADMIN — INSULIN LISPRO 6 UNITS: 100 INJECTION, SOLUTION INTRAVENOUS; SUBCUTANEOUS at 13:53

## 2021-08-29 RX ADMIN — INSULIN LISPRO 9 UNITS: 100 INJECTION, SOLUTION INTRAVENOUS; SUBCUTANEOUS at 04:02

## 2021-08-29 RX ADMIN — IOPAMIDOL 75 ML: 755 INJECTION, SOLUTION INTRAVENOUS at 03:38

## 2021-08-29 RX ADMIN — PROPOFOL 35 MCG/KG/MIN: 10 INJECTION, EMULSION INTRAVENOUS at 00:14

## 2021-08-29 RX ADMIN — METHYLPREDNISOLONE SODIUM SUCCINATE 40 MG: 40 INJECTION, POWDER, FOR SOLUTION INTRAMUSCULAR; INTRAVENOUS at 23:13

## 2021-08-29 RX ADMIN — Medication 10 ML: at 23:10

## 2021-08-29 RX ADMIN — SODIUM CHLORIDE, POTASSIUM CHLORIDE, SODIUM LACTATE AND CALCIUM CHLORIDE: 600; 310; 30; 20 INJECTION, SOLUTION INTRAVENOUS at 00:47

## 2021-08-29 RX ADMIN — Medication 0.6 MCG/KG/HR: at 21:48

## 2021-08-29 RX ADMIN — INSULIN GLARGINE 8 UNITS: 100 INJECTION, SOLUTION SUBCUTANEOUS at 22:01

## 2021-08-29 RX ADMIN — METHYLPREDNISOLONE SODIUM SUCCINATE 40 MG: 40 INJECTION, POWDER, FOR SOLUTION INTRAMUSCULAR; INTRAVENOUS at 13:56

## 2021-08-29 RX ADMIN — INSULIN LISPRO 3 UNITS: 100 INJECTION, SOLUTION INTRAVENOUS; SUBCUTANEOUS at 22:01

## 2021-08-29 RX ADMIN — INSULIN LISPRO 6 UNITS: 100 INJECTION, SOLUTION INTRAVENOUS; SUBCUTANEOUS at 10:08

## 2021-08-29 RX ADMIN — FAMOTIDINE 10 MG: 10 INJECTION INTRAVENOUS at 13:51

## 2021-08-29 RX ADMIN — INSULIN LISPRO 6 UNITS: 100 INJECTION, SOLUTION INTRAVENOUS; SUBCUTANEOUS at 06:14

## 2021-08-29 RX ADMIN — FAMOTIDINE 10 MG: 10 INJECTION INTRAVENOUS at 07:44

## 2021-08-29 RX ADMIN — SODIUM CHLORIDE, POTASSIUM CHLORIDE, SODIUM LACTATE AND CALCIUM CHLORIDE: 600; 310; 30; 20 INJECTION, SOLUTION INTRAVENOUS at 17:10

## 2021-08-29 RX ADMIN — ENOXAPARIN SODIUM 40 MG: 40 INJECTION SUBCUTANEOUS at 20:34

## 2021-08-29 RX ADMIN — FENTANYL CITRATE 100 MCG: 50 INJECTION, SOLUTION INTRAMUSCULAR; INTRAVENOUS at 21:09

## 2021-08-29 RX ADMIN — ACETAMINOPHEN 650 MG: 325 TABLET ORAL at 09:00

## 2021-08-29 RX ADMIN — IPRATROPIUM BROMIDE AND ALBUTEROL SULFATE 3 ML: .5; 3 SOLUTION RESPIRATORY (INHALATION) at 05:05

## 2021-08-29 RX ADMIN — Medication 0.2 MCG/KG/HR: at 01:10

## 2021-08-29 RX ADMIN — HYDRALAZINE HYDROCHLORIDE 10 MG: 20 INJECTION INTRAMUSCULAR; INTRAVENOUS at 20:10

## 2021-08-29 RX ADMIN — INSULIN GLARGINE 8 UNITS: 100 INJECTION, SOLUTION SUBCUTANEOUS at 10:08

## 2021-08-29 RX ADMIN — ETOMIDATE 20 MG: 2 INJECTION INTRAVENOUS at 21:10

## 2021-08-29 RX ADMIN — Medication 0.6 MCG/KG/HR: at 07:21

## 2021-08-29 RX ADMIN — VANCOMYCIN HYDROCHLORIDE 1250 MG: 1 INJECTION, POWDER, LYOPHILIZED, FOR SOLUTION INTRAVENOUS at 21:52

## 2021-08-29 ASSESSMENT — PULMONARY FUNCTION TESTS
PIF_VALUE: 30
PIF_VALUE: 11
PIF_VALUE: 30
PIF_VALUE: 11
PIF_VALUE: 34
PIF_VALUE: 30
PIF_VALUE: 35
PIF_VALUE: 33
PIF_VALUE: 32
PIF_VALUE: 12
PIF_VALUE: 11
PIF_VALUE: 29
PIF_VALUE: 33
PIF_VALUE: 32
PIF_VALUE: 35
PIF_VALUE: 31

## 2021-08-29 ASSESSMENT — PAIN SCALES - GENERAL
PAINLEVEL_OUTOF10: 0

## 2021-08-29 NOTE — PROGRESS NOTES
OTOLARYNGOLOGY  DAILY PROGRESS NOTE  2021    Subjective:     Pt just extubated, on NC O2, voice soft no stridor    Objective:     /76   Pulse 85   Temp 99.9 °F (37.7 °C) (Bladder)   Resp 24   Ht 5' 4\" (1.626 m)   Wt 181 lb (82.1 kg)   SpO2 95%   BMI 31.07 kg/m²   PULSE OXIMETRY RANGE: SpO2  Av.6 %  Min: 80 %  Max: 99 %  I/O last 3 completed shifts: In: 5 [I.V.:2874; YG/ON:4353]  Out: 9684 [Urine:5500]    GENERAL:  Intubated sedated   HENT: Normocephalic, atraumatic. Floor of mouth edematous without edema. tongue without edema, tongue remains in the oral cavity and protrudes midline, full ROM of tongue without asymmetry. neck soft nontender trachea midline. EYES: No sclera icterus, pupils equal  LUNGS:  No increased work of breathing, no stridor  CARDIOVASCULAR:  RR      Assessment/Plan:     78 y.o. female with angioedema secondary to ACE-I     Pt extubated without stridor or respiratory distress. Oral cavity swelling is resolved    CT neck reviewed, follow up outpatient for re-evaluation of sinus disease    Continue to tape decadron    No further ent intervention at this time   Close follow up with Dr. Storm Harman for re-evaluation    Discussed with patient and  we do not recommend she take any further ACE-I/ARB and close follow up with PCP for adjustment of her BP medications     Patient discussed with Attending.        Electronically signed by Abel Escobedo DO on 2021 at 12:21 PM

## 2021-08-29 NOTE — PLAN OF CARE
Problem: Non-Violent Restraints  Goal: No harm/injury to patient while restraints in use  8/29/2021 0759 by Betty Cuello RN  Outcome: Met This Shift     Problem: Non-Violent Restraints  Goal: Patient's dignity will be maintained  8/29/2021 0759 by Betty Cuello RN  Outcome: Met This Shift     Problem: Falls - Risk of:  Goal: Will remain free from falls  Description: Will remain free from falls  8/29/2021 0759 by Betty Cuello RN  Outcome: Met This Shift     Problem: Falls - Risk of:  Goal: Absence of physical injury  Description: Absence of physical injury  8/29/2021 0759 by Betty Cuello RN  Outcome: Met This Shift     Problem: Non-Violent Restraints  Goal: Removal from restraints as soon as assessed to be safe  8/29/2021 0759 by Betty Cuello RN  Outcome: Not Met This Shift

## 2021-08-29 NOTE — PROGRESS NOTES
Acute Problems  ACE inhibitor induced angioedema (resolved  [place]  Hypertension  Diabetes  COPD (in mild acute exacerbation)  Hyperlipidemia    Plan of Care  Patient was successfully extubated today. Switch Decadron to Solu-Medrol and DC Benadryl. Restraints  Patient will be started on Precedex in order to control her agitation while on spontaneous breathing trial.  Lantus insulin sliding scale. SSI  Speech therapy evaluation and PT OT  Avoid nephrotoxic medications  CMP, CBC, mag, Phos daily  As needed IV metoprolol. VTE prophylaxis with Lovenox  GI prophylaxis with Pepcid  Will list ACE inhibitors and ARB as allergies. Continue with IV fluid until patient is able to take orally. Admission History:  Patient is a 78 y.o. female with the above medical history who was brought in by  for swelling of the oral cavity secondary to lisinopril usage. She was alert and oriented upon arrival to the ED and stated that the swelling began 1.5 hours prior to arrival.  She did take 25 mg of Benadryl however symptoms continue to worsen. At the time she did have trouble talking, difficulty swallowing and eventually developed difficulty breathing. Decision was made to intubate for airway protection. In the emergency department she was started on propofol and fentanyl for sedation, she was also given TXA, 125 mg of Solu-Medrol, and 25 mg of IV Benadryl. She does currently use an insulin pump for her diabetes management. Labs and imaging in the ED pertinent for creatinine 1.4, glucose 285, chest x-ray with endotracheal tube approximately 6.8 cm above the chivo. Upon my evaluation she is intubated and sedated. There is significant swelling to the soft palate, tongue and oral cavity. I was unable to clearly view the uvula. There were attempts in the emergency department to place an NG/OG tube however they were unsuccessful secondary to swelling.   I did have the endotracheal tube advanced 2cm and and will repeat fever, chills, or rigors. She is currently on nasal cannula with high flow oxygen. Past Medical History:  Past Medical History:   Diagnosis Date    Arthritis     Asthma     Cerebral artery occlusion with cerebral infarction ([facility] 2015    COPD (chronic obstructive pulmonary disease) ([facility]     Diabetes mellitus ([facility]     Heart attack ([facility]     Hyperlipidemia     Hypertension     VHD (valvular heart disease)         Family History:   Family History   Problem Relation Age of Onset    COPD Mother    Blase Liming Alzheimer's Disease Mother     Alcohol Abuse Father     No Known Problems Sister     Diabetes Brother     No Known Problems Brother        Allergies:         Ace inhibitors, Angiotensin receptor blockers, Lisinopril, and Pcn [penicillins]    Social history:  Social History     Socioeconomic History    Marital status:      Spouse name: Not on file    Number of children: Not on file    Years of education: Not on file    Highest education level: Not on file   Occupational History    Not on file   Tobacco Use    Smoking status: Current Every Day Smoker     Packs/day: 0.25     Years: 45.00     Pack years: 11.25     Types: Cigarettes    Smokeless tobacco: Never Used   Vaping Use    Vaping Use: Never used   Substance and Sexual Activity    Alcohol use: Yes     Alcohol/week: 7.0 - 8.0 standard drinks     Types: 7 - 8 Glasses of wine per week     Comment: glass of wine with dinner. 1 cup of coffee a day     Drug use: Never    Sexual activity: Not on file   Other Topics Concern    Not on file   Social History Narrative    Drinks 2 cups coffee daily. Social Determinants of Health     Financial Resource Strain:     Difficulty of Paying Living Expenses:    Food Insecurity:     Worried About Running Out of Food in the Last Year:     [address] in the Last Year:    Transportation Needs:     Lack of Transportation (Medical):      Lack of Transportation (Non-Medical):    Physical Activity:     Days of Exercise per Week:     Minutes of Exercise per Session:    Stress:     Feeling of Stress :    Social Connections:     Frequency of Communication with Friends and Family:     Frequency of Social Gatherings with Friends and Family:     Attends Anab[employer] Services:     Active Member of Clubs or Organizations:     Attends Club or Organization Meetings:     Marital Status:    Intimate Partner Violence:     Fear of Current or Ex-Partner:     Emotionally Abused:     Physically Abused:     Sexually Abused:        Current Medications:     Current Facility-Administered Medications:     methylPREDNISolone sodium (SOLU-MEDROL) injection 40 mg, 40 mg, IntraVENous, Q8H, Hany Morrissey MD, 40 mg at 08/29/21 0609    budesonide (PULMICORT) nebulizer suspension 500 mcg, 0.5 mg, Nebulization, BID, Hany Morrissey MD, 500 mcg at 08/29/21 0505    insulin glargine (LANTUS) injection vial 8 Units, 8 Units, Subcutaneous, BID, Pam Blanco MD, 8 Units at 08/29/21 1008    dexmedetomidine (PRECEDEX) 400 mcg in sodium chloride 0.9 % 100 mL infusion, 0.2-1.4 mcg/kg/hr, IntraVENous, Continuous, Hany Morrissey MD, Last Rate: 12.3 mL/hr at 08/29/21 0721, 0.6 mcg/kg/hr at 08/29/21 0721    medicated lip ointment (BLISTEX), , Topical, Daily, Lars Pink DO, Given at 08/29/21 0744    enoxaparin (LOVENOX) injection 40 mg, 40 mg, Subcutaneous, BID, Hany Morrissey MD, 40 mg at 08/29/21 0743    famotidine (PEPCID) injection 10 mg, 10 mg, IntraVENous, BID, Hany Morrissey MD, 10 mg at 08/29/21 0744    insulin lispro (HUMALOG) injection vial 0-18 Units, 0-18 Units, Subcutaneous, Q4H, SHELBIE Baker CNP, 6 Units at 08/29/21 1008    metoprolol (LOPRESSOR) injection 5 mg, 5 mg, IntraVENous, Q4H PRN, Hany Morrissey MD, 5 mg at 08/29/21 0813    propofol injection, 5-50 mcg/kg/min, IntraVENous, Titrated, Marilu Cooper MD, Stopped at 08/29/21 0608    fentaNYL 5 mcg/ml in 0.9%  ml infusion, 12.5-200 mcg/hr, IntraVENous, Continuous, Leti Jones DO, Stopped at 08/29/21 0700    ipratropium-albuterol (DUONEB) nebulizer solution 3 mL, 1 vial, Inhalation, Q4H, Vane Vang MD, 3 mL at 08/29/21 0920    glucose (GLUTOSE) 40 % oral gel 15 g, 15 g, Oral, PRN, Vane Vang MD    dextrose 50 % IV solution, 12.5 g, IntraVENous, PRN, Vane Vang MD    glucagon (rDNA) injection 1 mg, 1 mg, IntraMUSCular, PRN, Vane Vang MD    dextrose 5 % solution, 100 mL/hr, IntraVENous, PRN, Vane Vang MD    sodium chloride flush 0.9 % injection 5-40 mL, 5-40 mL, IntraVENous, 2 times per day, Vane Vang MD, 10 mL at 08/29/21 0744    sodium chloride flush 0.9 % injection 5-40 mL, 5-40 mL, IntraVENous, PRN, Vane Vang MD    0.9 % sodium chloride infusion, 25 mL, IntraVENous, PRN, Vane Vang MD    ondansetron (ZOFRAN-ODT) disintegrating tablet 4 mg, 4 mg, Oral, Q8H PRN **OR** ondansetron (ZOFRAN) injection 4 mg, 4 mg, IntraVENous, Q6H PRN, Vane Vang MD    polyethylene glycol [facility]) packet 17 g, 17 g, Oral, Daily PRN, Vane Vang MD    acetaminophen (TYLENOL) tablet 650 mg, 650 mg, Oral, Q6H PRN, 650 mg at 08/29/21 0900 **OR** acetaminophen (TYLENOL) suppository 650 mg, 650 mg, Rectal, Q6H PRN, Vane Vang MD    lactated ringers infusion, , IntraVENous, Continuous, Licha Whelan MD, Last Rate: 125 mL/hr at 08/29/21 0857, New Bag at 08/29/21 0857    Review of Systems:   Unable to perform review of systems secondary to intubation and sedation    Physical Exam:   Vital Signs:  /76   Pulse 85   Temp 99.9 °F (37.7 °C) (Bladder)   Resp 24   Ht 5' 4\" (1.626 m)   Wt 181 lb (82.1 kg)   SpO2 95%   BMI 31.07 kg/m²     Input/Output:   In: 7557 [I.V.:2517; NG/GT:1057]  Out: 6600     Oxygen requirements: 40%    Ventilator Information:  Vent Information  $Ventilation: $Subsequent Day  Skin Assessment: Clean, dry, & intact  Vent Type: 980  Vent Mode: AC/VC  Vt Ordered: 0 mL  Rate Set: 0 bmp  Peak Flow: 50 L/min  Pressure Support: 5 cmH20  FiO2 : 35 %  SpO2: 95 %  SpO2/FiO2 ratio: 260  Sensitivity: 3  PEEP/CPAP: 5  I Time/ I Time %: 0 s  Humidification Source: Heated wire  Humidification Temp: 37  Humidification Temp Measured: 37  Circuit Condensation: Not drained    General appearance: Overweight, not in pain or distress, in no respiratory distress    HEENT: Intubated with tongue swelling has significantly improved. Neck: Supple, no jugular venous distension, lymphadenopathy, thyromegaly or carotid bruits  Chest: Equal normal breath sounds, +ve wheezing, no crackles and no tenderness over ribs   Cardiovascular: Normal S1 , S2, regular rate and rhythm, no murmur, rub or gallop  Abdomen: Normal sounds present, soft, lax with no tenderness, no hepatosplenomegaly, and no masses, obese and rounded  GI: Vogel catheter in place, no bladder distention noted,  Extremities: No edema. Pulses are equally present. No clubbing or cyanosis  Skin: intact, no rashes or open wounds, edema noted to oral cavity, soft palate and tongue  Neurologic: Intubated and sedated, RASS of -2. Awake and follows command of sedation. Investigations:  Labs, radiological imaging and cardiac work up were reviewed        ICU STAFF PHYSICIAN NOTE OF PERSONAL INVOLVEMENT IN CARE  As the attending physician, I certify that I personally reviewed the patient's history and personally examined the patient to confirm the physical findings described above, and that I reviewed the relevant imaging studies and available reports. I also discussed the differential diagnosis and all of the proposed management plans with the patient and individuals accompanying the patient to this visit. They had the opportunity to ask questions about the proposed management plans and to have those questions answered.     This patient has a high probability of sudden, clinically significant deterioration, which requires the highest level of physician preparedness to intervene urgently. I managed/supervised life or organ supporting interventions that required frequent physician assessment. I devoted my full attention to the direct care of this patient for the amount of time indicated below. Time I spent with family or surrogate(s) is included only if the patient was incapable of providing the necessary information or participating in medical decision-making. Time devoted to teaching and to any procedures I billed separately is not included.   Critical Care Time: 32 minutes      Electronically signed by Jose A Cooper MD on 8/29/2021 at 12:24 PM

## 2021-08-29 NOTE — PROGRESS NOTES
Subjective:  Mazin Hernandez was seen and examined in the ICU today. The patient was extubated earlier. There were no new problems reported overnight. Currently sedated on precedex   at bedside    A complete review of systems and social history was completed on admission and remains unchanged unless otherwise noted    Scheduled Meds:   famotidine (PEPCID) injection  20 mg IntraVENous Daily    methylPREDNISolone  40 mg IntraVENous Q8H    budesonide  0.5 mg Nebulization BID    insulin glargine  8 Units Subcutaneous BID    medicated lip ointment   Topical Daily    enoxaparin  40 mg Subcutaneous BID    insulin lispro  0-18 Units Subcutaneous Q4H    ipratropium-albuterol  1 vial Inhalation Q4H    sodium chloride flush  5-40 mL IntraVENous 2 times per day     Continuous Infusions:   dexmedetomidine HCl in NaCl 0.3 mcg/kg/hr (08/29/21 1300)    dextrose      sodium chloride      lactated ringers 125 mL/hr at 08/29/21 0857     PRN Meds:metoprolol, glucose, dextrose, glucagon (rDNA), dextrose, sodium chloride flush, sodium chloride, ondansetron **OR** ondansetron, polyethylene glycol, acetaminophen **OR** acetaminophen    Objective:  BP (!) 130/104   Pulse 92   Temp 99.9 °F (37.7 °C) (Bladder)   Resp 20   Ht 5' 4\" (1.626 m)   Wt 181 lb (82.1 kg)   SpO2 97%   BMI 31.07 kg/m²   In: 2848.3 [I.V.:2474.3; NG/GT:374]  Out: 5200    In: 2848. 3   Out: 5200 [Urine:5200]     Calm while on precedex  RRR, pos S1, S2  Tight with no wheeze, rales or rhonchi  bowel sounds present, nontender, nondistended  No clubbing, cyanosis, or edema  No neuro changes   No obvious rashes or lesions.     Recent Labs     08/27/21  0630 08/28/21  0545 08/29/21  0554   WBC 11.7* 10.3 12.1*   HGB 11.4* 11.2* 11.4*    204 186     Recent Labs     08/27/21  0630 08/28/21  0545 08/29/21  0554    137 137   K 4.9 4.9 4.4    100 100   CO2 28 30* 29   BUN 24* 21 21   CREATININE 1.2* 1.0 0.8   GLUCOSE 171* 297* 251*     Recent Labs     08/27/21  0630 08/28/21  0545 08/29/21  0554   BILITOT <0.2 0.2 0.3   ALKPHOS 62 66 54   AST 20 12 15   ALT 13 12 14     No results for input(s): INR in the last 72 hours. Invalid input(s): PT  No results for input(s): CKTOTAL, CKMB, CKMBINDEX, TROPONINI in the last 72 hours. XR CHEST PORTABLE    Result Date: 8/25/2021  EXAMINATION: ONE XRAY VIEW OF THE CHEST 8/24/2021 8:52 pm COMPARISON: 08/24/2021 HISTORY: ORDERING SYSTEM PROVIDED HISTORY: et tube placement TECHNOLOGIST PROVIDED HISTORY: Reason for exam:->et tube placement FINDINGS: Stable position of endotracheal tube. Cardiomediastinal silhouette is unremarkable. No infiltrate, effusion, or pneumothorax. No acute osseous abnormality. No pulmonary infiltrates. No significant change since the prior study     XR CHEST PORTABLE    Result Date: 8/24/2021  EXAMINATION: ONE XRAY VIEW OF THE CHEST 8/24/2021 5:30 pm COMPARISON: January 3, 2020 HISTORY: ORDERING SYSTEM PROVIDED HISTORY: SOB (shortness of breath) TECHNOLOGIST PROVIDED HISTORY: Reason for exam:->ETT placement, tongue swelling FINDINGS: Endotracheal tube is approximately 6.8 cm above the chivo. No airspace opacity or pleural effusion. The heart is normal in size. No pneumothorax. 1. Endotracheal tube is approximately 6.8 cm above the chivo. 2. No airspace opacity or pleural effusion. Assessment:  Katelin Bell is a 78y.o. year old female who presented on 8/24/2021 and is being treated for:  Principal Problem:    Acute respiratory failure ([address]  Active Problems:    Essential hypertension    Diabetes mellitus ([address]    Myocardiopathy ([address]    ACE inhibitor-aggravated angioedema    COPD (chronic obstructive pulmonary disease) (HCC)    Acquired angioedema    SOB (shortness of breath)    Disorder of airway    Acquired hypertrophy of tongue  Resolved Problems:    * No resolved hospital problems.  *    Plan  · Monitor in ICU today post extubation  · Cont IV steroids/nebs  · Monitor BS and cover with SSI  · Please see orders for further management and care.     Caro Woodson MD  4:10 PM  8/29/2021

## 2021-08-29 NOTE — PLAN OF CARE
Problem: Non-Violent Restraints  Goal: Removal from restraints as soon as assessed to be safe  8/29/2021 0843 by Rai Still RN  Outcome: Not Met This Shift  8/29/2021 0759 by Yessi Aceves RN  Outcome: Not Met This Shift  8/28/2021 1918 by Dulce Mcguire RN  Outcome: Ongoing     Problem: Non-Violent Restraints  Goal: No harm/injury to patient while restraints in use  8/29/2021 0843 by Rai Still RN  Outcome: Met This Shift  8/29/2021 0759 by Yessi Aceves RN  Outcome: Met This Shift  8/28/2021 1918 by Dulce Mcguire RN  Outcome: Ongoing     Problem: Non-Violent Restraints  Goal: Patient's dignity will be maintained  8/29/2021 0843 by Rai Still RN  Outcome: Met This Shift  8/29/2021 0759 by Yessi Aceves RN  Outcome: Met This Shift  8/28/2021 1918 by Dulce Mcguire RN  Outcome: Ongoing     Problem: Falls - Risk of:  Goal: Will remain free from falls  Description: Will remain free from falls  8/29/2021 0843 by Rai Still RN  Outcome: Met This Shift  8/29/2021 0759 by Yessi Aceves RN  Outcome: Met This Shift  8/28/2021 1918 by Dulce Mcguire RN  Outcome: Met This Shift  Goal: Absence of physical injury  Description: Absence of physical injury  8/29/2021 0843 by Rai Still RN  Outcome: Met This Shift  8/29/2021 0759 by Yessi Aceves RN  Outcome: Met This Shift  8/28/2021 1918 by Dulce Mcguire RN  Outcome: Met This Shift

## 2021-08-29 NOTE — FLOWSHEET NOTE
Went via bed with monitor with portable vent with with respiratory and two icu nurses pt tolerated well.

## 2021-08-30 LAB
ALBUMIN SERPL-MCNC: 2.7 G/DL (ref 3.5–5.2)
ALP BLD-CCNC: 54 U/L (ref 35–104)
ALT SERPL-CCNC: 13 U/L (ref 0–32)
ANION GAP SERPL CALCULATED.3IONS-SCNC: 10 MMOL/L (ref 7–16)
AST SERPL-CCNC: 15 U/L (ref 0–31)
B.E.: 5.2 MMOL/L (ref -3–3)
BASOPHILS ABSOLUTE: 0 E9/L (ref 0–0.2)
BASOPHILS RELATIVE PERCENT: 0.1 % (ref 0–2)
BILIRUB SERPL-MCNC: 0.4 MG/DL (ref 0–1.2)
BUN BLDV-MCNC: 22 MG/DL (ref 6–23)
BURR CELLS: ABNORMAL
CALCIUM SERPL-MCNC: 8.2 MG/DL (ref 8.6–10.2)
CHLORIDE BLD-SCNC: 100 MMOL/L (ref 98–107)
CO2: 29 MMOL/L (ref 22–29)
COHB: 0.9 % (ref 0–1.5)
CREAT SERPL-MCNC: 0.8 MG/DL (ref 0.5–1)
CRITICAL: ABNORMAL
DATE ANALYZED: ABNORMAL
DATE OF COLLECTION: ABNORMAL
EOSINOPHILS ABSOLUTE: 0 E9/L (ref 0.05–0.5)
EOSINOPHILS RELATIVE PERCENT: 0.1 % (ref 0–6)
FIO2: 50 %
GFR AFRICAN AMERICAN: >60
GFR NON-AFRICAN AMERICAN: >60 ML/MIN/1.73
GLUCOSE BLD-MCNC: 180 MG/DL (ref 74–99)
HCO3: 29.6 MMOL/L (ref 22–26)
HCT VFR BLD CALC: 35.7 % (ref 34–48)
HEMOGLOBIN: 11.6 G/DL (ref 11.5–15.5)
HHB: 2.7 % (ref 0–5)
LAB: ABNORMAL
LYMPHOCYTES ABSOLUTE: 1.18 E9/L (ref 1.5–4)
LYMPHOCYTES RELATIVE PERCENT: 7.8 % (ref 20–42)
Lab: ABNORMAL
MAGNESIUM: 1.9 MG/DL (ref 1.6–2.6)
MCH RBC QN AUTO: 31 PG (ref 26–35)
MCHC RBC AUTO-ENTMCNC: 32.5 % (ref 32–34.5)
MCV RBC AUTO: 95.5 FL (ref 80–99.9)
METER GLUCOSE: 146 MG/DL (ref 74–99)
METER GLUCOSE: 155 MG/DL (ref 74–99)
METER GLUCOSE: 180 MG/DL (ref 74–99)
METER GLUCOSE: 216 MG/DL (ref 74–99)
METER GLUCOSE: 252 MG/DL (ref 74–99)
METER GLUCOSE: 259 MG/DL (ref 74–99)
METHB: 0.3 % (ref 0–1.5)
MODE: AC
MONOCYTES ABSOLUTE: 1.18 E9/L (ref 0.1–0.95)
MONOCYTES RELATIVE PERCENT: 7.8 % (ref 2–12)
NEUTROPHILS ABSOLUTE: 12.35 E9/L (ref 1.8–7.3)
NEUTROPHILS RELATIVE PERCENT: 84.3 % (ref 43–80)
O2 CONTENT: 16.5 ML/DL
O2 SATURATION: 97.3 % (ref 92–98.5)
O2HB: 96.1 % (ref 94–97)
OPERATOR ID: 3086
OVALOCYTES: ABNORMAL
PATIENT TEMP: 37 C
PCO2: 43 MMHG (ref 35–45)
PDW BLD-RTO: 12.3 FL (ref 11.5–15)
PEEP/CPAP: 8 CMH2O
PFO2: 2.02 MMHG/%
PH BLOOD GAS: 7.46 (ref 7.35–7.45)
PHOSPHORUS: 2.9 MG/DL (ref 2.5–4.5)
PLATELET # BLD: 201 E9/L (ref 130–450)
PMV BLD AUTO: 10.1 FL (ref 7–12)
PO2: 101.1 MMHG (ref 75–100)
POIKILOCYTES: ABNORMAL
POLYCHROMASIA: ABNORMAL
POTASSIUM SERPL-SCNC: 4.5 MMOL/L (ref 3.5–5)
PROCALCITONIN: 0.19 NG/ML (ref 0–0.08)
RBC # BLD: 3.74 E12/L (ref 3.5–5.5)
RI(T): 1.92
RR MECHANICAL: 22 B/MIN
SODIUM BLD-SCNC: 139 MMOL/L (ref 132–146)
SOURCE, BLOOD GAS: ABNORMAL
THB: 12.1 G/DL (ref 11.5–16.5)
TIME ANALYZED: 517
TOTAL PROTEIN: 5.6 G/DL (ref 6.4–8.3)
VT MECHANICAL: 400 ML
WBC # BLD: 14.7 E9/L (ref 4.5–11.5)

## 2021-08-30 PROCEDURE — 76937 US GUIDE VASCULAR ACCESS: CPT

## 2021-08-30 PROCEDURE — 85025 COMPLETE CBC W/AUTO DIFF WBC: CPT

## 2021-08-30 PROCEDURE — 2720000010 HC SURG SUPPLY STERILE

## 2021-08-30 PROCEDURE — 6360000002 HC RX W HCPCS: Performed by: INTERNAL MEDICINE

## 2021-08-30 PROCEDURE — 6370000000 HC RX 637 (ALT 250 FOR IP)

## 2021-08-30 PROCEDURE — 6370000000 HC RX 637 (ALT 250 FOR IP): Performed by: INTERNAL MEDICINE

## 2021-08-30 PROCEDURE — 94640 AIRWAY INHALATION TREATMENT: CPT

## 2021-08-30 PROCEDURE — 83735 ASSAY OF MAGNESIUM: CPT

## 2021-08-30 PROCEDURE — 6360000002 HC RX W HCPCS

## 2021-08-30 PROCEDURE — 2580000003 HC RX 258: Performed by: INTERNAL MEDICINE

## 2021-08-30 PROCEDURE — 2500000003 HC RX 250 WO HCPCS: Performed by: INTERNAL MEDICINE

## 2021-08-30 PROCEDURE — 84100 ASSAY OF PHOSPHORUS: CPT

## 2021-08-30 PROCEDURE — 2580000003 HC RX 258

## 2021-08-30 PROCEDURE — 36415 COLL VENOUS BLD VENIPUNCTURE: CPT

## 2021-08-30 PROCEDURE — 87081 CULTURE SCREEN ONLY: CPT

## 2021-08-30 PROCEDURE — 94003 VENT MGMT INPAT SUBQ DAY: CPT

## 2021-08-30 PROCEDURE — 82962 GLUCOSE BLOOD TEST: CPT

## 2021-08-30 PROCEDURE — 80053 COMPREHEN METABOLIC PANEL: CPT

## 2021-08-30 PROCEDURE — 36600 WITHDRAWAL OF ARTERIAL BLOOD: CPT

## 2021-08-30 PROCEDURE — 82805 BLOOD GASES W/O2 SATURATION: CPT

## 2021-08-30 PROCEDURE — 02HV33Z INSERTION OF INFUSION DEVICE INTO SUPERIOR VENA CAVA, PERCUTANEOUS APPROACH: ICD-10-PCS | Performed by: INTERNAL MEDICINE

## 2021-08-30 PROCEDURE — 2000000000 HC ICU R&B

## 2021-08-30 PROCEDURE — C1751 CATH, INF, PER/CENT/MIDLINE: HCPCS

## 2021-08-30 PROCEDURE — 36569 INSJ PICC 5 YR+ W/O IMAGING: CPT

## 2021-08-30 RX ORDER — ALBUTEROL SULFATE 2.5 MG/3ML
2.5 SOLUTION RESPIRATORY (INHALATION) EVERY 4 HOURS
Status: DISCONTINUED | OUTPATIENT
Start: 2021-08-30 | End: 2021-09-01

## 2021-08-30 RX ORDER — ALBUTEROL SULFATE 2.5 MG/3ML
2.5 SOLUTION RESPIRATORY (INHALATION) 4 TIMES DAILY
Status: DISCONTINUED | OUTPATIENT
Start: 2021-08-30 | End: 2021-08-30

## 2021-08-30 RX ORDER — METHYLPREDNISOLONE SODIUM SUCCINATE 40 MG/ML
20 INJECTION, POWDER, LYOPHILIZED, FOR SOLUTION INTRAMUSCULAR; INTRAVENOUS EVERY 8 HOURS
Status: DISCONTINUED | OUTPATIENT
Start: 2021-08-30 | End: 2021-08-31

## 2021-08-30 RX ADMIN — PROPOFOL 50 MCG/KG/MIN: 10 INJECTION, EMULSION INTRAVENOUS at 04:46

## 2021-08-30 RX ADMIN — SODIUM CHLORIDE, PRESERVATIVE FREE 10 ML: 5 INJECTION INTRAVENOUS at 14:05

## 2021-08-30 RX ADMIN — PROPOFOL 40 MCG/KG/MIN: 10 INJECTION, EMULSION INTRAVENOUS at 16:37

## 2021-08-30 RX ADMIN — ALBUTEROL SULFATE 2.5 MG: 2.5 SOLUTION RESPIRATORY (INHALATION) at 10:49

## 2021-08-30 RX ADMIN — SODIUM CHLORIDE, POTASSIUM CHLORIDE, SODIUM LACTATE AND CALCIUM CHLORIDE: 600; 310; 30; 20 INJECTION, SOLUTION INTRAVENOUS at 18:41

## 2021-08-30 RX ADMIN — FAMOTIDINE 20 MG: 10 INJECTION INTRAVENOUS at 09:36

## 2021-08-30 RX ADMIN — Medication 10 ML: at 09:35

## 2021-08-30 RX ADMIN — Medication 0.6 MCG/KG/HR: at 07:02

## 2021-08-30 RX ADMIN — Medication 10 ML: at 21:05

## 2021-08-30 RX ADMIN — MEROPENEM 1000 MG: 1 INJECTION, POWDER, FOR SOLUTION INTRAVENOUS at 13:07

## 2021-08-30 RX ADMIN — PROPOFOL 40 MCG/KG/MIN: 10 INJECTION, EMULSION INTRAVENOUS at 12:41

## 2021-08-30 RX ADMIN — INSULIN LISPRO 3 UNITS: 100 INJECTION, SOLUTION INTRAVENOUS; SUBCUTANEOUS at 06:55

## 2021-08-30 RX ADMIN — PROPOFOL 35 MCG/KG/MIN: 10 INJECTION, EMULSION INTRAVENOUS at 00:09

## 2021-08-30 RX ADMIN — SODIUM CHLORIDE, PRESERVATIVE FREE 300 UNITS: 5 INJECTION INTRAVENOUS at 21:04

## 2021-08-30 RX ADMIN — INSULIN GLARGINE 8 UNITS: 100 INJECTION, SOLUTION SUBCUTANEOUS at 09:46

## 2021-08-30 RX ADMIN — FENTANYL CITRATE 50 MCG/HR: 50 INJECTION, SOLUTION INTRAMUSCULAR; INTRAVENOUS at 03:28

## 2021-08-30 RX ADMIN — ALBUTEROL SULFATE 2.5 MG: 2.5 SOLUTION RESPIRATORY (INHALATION) at 14:01

## 2021-08-30 RX ADMIN — BUDESONIDE 500 MCG: 0.5 INHALANT RESPIRATORY (INHALATION) at 17:29

## 2021-08-30 RX ADMIN — INSULIN LISPRO 9 UNITS: 100 INJECTION, SOLUTION INTRAVENOUS; SUBCUTANEOUS at 19:09

## 2021-08-30 RX ADMIN — Medication 10 ML: at 09:37

## 2021-08-30 RX ADMIN — INSULIN LISPRO 3 UNITS: 100 INJECTION, SOLUTION INTRAVENOUS; SUBCUTANEOUS at 02:52

## 2021-08-30 RX ADMIN — ALBUTEROL SULFATE 2.5 MG: 2.5 SOLUTION RESPIRATORY (INHALATION) at 22:49

## 2021-08-30 RX ADMIN — INSULIN LISPRO 9 UNITS: 100 INJECTION, SOLUTION INTRAVENOUS; SUBCUTANEOUS at 21:30

## 2021-08-30 RX ADMIN — VANCOMYCIN HYDROCHLORIDE 1000 MG: 1 INJECTION, POWDER, LYOPHILIZED, FOR SOLUTION INTRAVENOUS at 21:04

## 2021-08-30 RX ADMIN — MEROPENEM 1000 MG: 1 INJECTION, POWDER, FOR SOLUTION INTRAVENOUS at 07:14

## 2021-08-30 RX ADMIN — PROPOFOL 45 MCG/KG/MIN: 10 INJECTION, EMULSION INTRAVENOUS at 21:19

## 2021-08-30 RX ADMIN — ENOXAPARIN SODIUM 40 MG: 40 INJECTION SUBCUTANEOUS at 09:33

## 2021-08-30 RX ADMIN — SODIUM CHLORIDE, POTASSIUM CHLORIDE, SODIUM LACTATE AND CALCIUM CHLORIDE: 600; 310; 30; 20 INJECTION, SOLUTION INTRAVENOUS at 09:29

## 2021-08-30 RX ADMIN — INSULIN LISPRO 6 UNITS: 100 INJECTION, SOLUTION INTRAVENOUS; SUBCUTANEOUS at 14:14

## 2021-08-30 RX ADMIN — Medication 0.6 MCG/KG/HR: at 16:06

## 2021-08-30 RX ADMIN — METHYLPREDNISOLONE SODIUM SUCCINATE 40 MG: 40 INJECTION, POWDER, FOR SOLUTION INTRAMUSCULAR; INTRAVENOUS at 07:12

## 2021-08-30 RX ADMIN — MEROPENEM 1000 MG: 1 INJECTION, POWDER, FOR SOLUTION INTRAVENOUS at 21:04

## 2021-08-30 RX ADMIN — METHYLPREDNISOLONE SODIUM SUCCINATE 40 MG: 40 INJECTION, POWDER, FOR SOLUTION INTRAMUSCULAR; INTRAVENOUS at 14:05

## 2021-08-30 RX ADMIN — ALBUTEROL SULFATE 2.5 MG: 2.5 SOLUTION RESPIRATORY (INHALATION) at 07:25

## 2021-08-30 RX ADMIN — BUDESONIDE 500 MCG: 0.5 INHALANT RESPIRATORY (INHALATION) at 07:25

## 2021-08-30 RX ADMIN — INSULIN LISPRO 3 UNITS: 100 INJECTION, SOLUTION INTRAVENOUS; SUBCUTANEOUS at 10:35

## 2021-08-30 RX ADMIN — ALBUTEROL SULFATE 2.5 MG: 2.5 SOLUTION RESPIRATORY (INHALATION) at 17:29

## 2021-08-30 RX ADMIN — METHYLPREDNISOLONE SODIUM SUCCINATE 20 MG: 40 INJECTION, POWDER, FOR SOLUTION INTRAMUSCULAR; INTRAVENOUS at 23:06

## 2021-08-30 RX ADMIN — DIMETHICONE, CAMPHOR (SYNTHETIC), MENTHOL, AND PHENOL: 1.1; .5; .625; .5 OINTMENT TOPICAL at 09:35

## 2021-08-30 RX ADMIN — PROPOFOL 45 MCG/KG/MIN: 10 INJECTION, EMULSION INTRAVENOUS at 09:22

## 2021-08-30 RX ADMIN — IPRATROPIUM BROMIDE AND ALBUTEROL SULFATE 3 ML: .5; 3 SOLUTION RESPIRATORY (INHALATION) at 02:53

## 2021-08-30 RX ADMIN — INSULIN GLARGINE 8 UNITS: 100 INJECTION, SOLUTION SUBCUTANEOUS at 21:30

## 2021-08-30 RX ADMIN — VANCOMYCIN HYDROCHLORIDE 1000 MG: 1 INJECTION, POWDER, LYOPHILIZED, FOR SOLUTION INTRAVENOUS at 10:07

## 2021-08-30 RX ADMIN — SODIUM CHLORIDE, PRESERVATIVE FREE 10 ML: 5 INJECTION INTRAVENOUS at 14:12

## 2021-08-30 ASSESSMENT — PULMONARY FUNCTION TESTS
PIF_VALUE: 36
PIF_VALUE: 34
PIF_VALUE: 31
PIF_VALUE: 49
PIF_VALUE: 38
PIF_VALUE: 37
PIF_VALUE: 39
PIF_VALUE: 36
PIF_VALUE: 34
PIF_VALUE: 34
PIF_VALUE: 26
PIF_VALUE: 29
PIF_VALUE: 30
PIF_VALUE: 36
PIF_VALUE: 26
PIF_VALUE: 28
PIF_VALUE: 34
PIF_VALUE: 32
PIF_VALUE: 31
PIF_VALUE: 33
PIF_VALUE: 31
PIF_VALUE: 37
PIF_VALUE: 36
PIF_VALUE: 39
PIF_VALUE: 40

## 2021-08-30 NOTE — PROGRESS NOTES
flush, sodium chloride, heparin flush, metoprolol, glucose, dextrose, glucagon (rDNA), dextrose, sodium chloride flush, sodium chloride, ondansetron **OR** ondansetron, polyethylene glycol, acetaminophen **OR** acetaminophen      REVIEW OF SYSTEMS:  Unable to answer questions regarding review of systems because she is intubated and sedated    OBJECTIVE:  Vitals:    08/30/21 1500   BP: (!) 154/68   Pulse: 73   Resp: (!) 34   Temp:    SpO2: 95%     FiO2 : 40 %  O2 Flow Rate (L/min): 7 L/min  O2 Device: Ventilator    PHYSICAL EXAM:  Constitutional: No fever, chills, diaphoresis  Skin: Skin rash, no skin breakdown  HEENT: Endotracheal tube secured at lips  Neck: Large neck circumference, no JVD or lymphadenopathy  Cardiovascular: S1, S2 regular.   No S3 murmurs rubs present  Respiratory: Soft expiratory wheeze over both posterior lung field    Gastrointestinal: Soft, obese, nondistended  Genitourinary: No grossly bloody urine  Extremities: 1+ edema feet legs and ankles  Neurological: seDated  Psychological: Sedated    LABS:  WBC   Date Value Ref Range Status   08/30/2021 14.7 (H) 4.5 - 11.5 E9/L Final   08/29/2021 12.1 (H) 4.5 - 11.5 E9/L Final   08/28/2021 10.3 4.5 - 11.5 E9/L Final     Hemoglobin   Date Value Ref Range Status   08/30/2021 11.6 11.5 - 15.5 g/dL Final   08/29/2021 11.4 (L) 11.5 - 15.5 g/dL Final   08/28/2021 11.2 (L) 11.5 - 15.5 g/dL Final     Hematocrit   Date Value Ref Range Status   08/30/2021 35.7 34.0 - 48.0 % Final   08/29/2021 35.5 34.0 - 48.0 % Final   08/28/2021 36.3 34.0 - 48.0 % Final     MCV   Date Value Ref Range Status   08/30/2021 95.5 80.0 - 99.9 fL Final   08/29/2021 96.5 80.0 - 99.9 fL Final   08/28/2021 100.8 (H) 80.0 - 99.9 fL Final     Platelets   Date Value Ref Range Status   08/30/2021 201 130 - 450 E9/L Final   08/29/2021 186 130 - 450 E9/L Final   08/28/2021 204 130 - 450 E9/L Final     Sodium   Date Value Ref Range Status   08/30/2021 139 132 - 146 mmol/L Final   08/29/2021 137 132 - 146 mmol/L Final   08/28/2021 137 132 - 146 mmol/L Final     Potassium   Date Value Ref Range Status   08/30/2021 4.5 3.5 - 5.0 mmol/L Final   08/29/2021 4.4 3.5 - 5.0 mmol/L Final   08/28/2021 4.9 3.5 - 5.0 mmol/L Final     Potassium reflex Magnesium   Date Value Ref Range Status   08/25/2021 4.7 3.5 - 5.0 mmol/L Final   08/24/2021 4.4 3.5 - 5.0 mmol/L Final   07/28/2020 3.9 3.5 - 5.0 mmol/L Final     Chloride   Date Value Ref Range Status   08/30/2021 100 98 - 107 mmol/L Final   08/29/2021 100 98 - 107 mmol/L Final   08/28/2021 100 98 - 107 mmol/L Final     CO2   Date Value Ref Range Status   08/30/2021 29 22 - 29 mmol/L Final   08/29/2021 29 22 - 29 mmol/L Final   08/28/2021 30 (H) 22 - 29 mmol/L Final     BUN   Date Value Ref Range Status   08/30/2021 22 6 - 23 mg/dL Final   08/29/2021 21 6 - 23 mg/dL Final   08/28/2021 21 6 - 23 mg/dL Final     CREATININE   Date Value Ref Range Status   08/30/2021 0.8 0.5 - 1.0 mg/dL Final   08/29/2021 0.8 0.5 - 1.0 mg/dL Final   08/28/2021 1.0 0.5 - 1.0 mg/dL Final     Glucose   Date Value Ref Range Status   08/30/2021 180 (H) 74 - 99 mg/dL Final   08/29/2021 251 (H) 74 - 99 mg/dL Final   08/28/2021 297 (H) 74 - 99 mg/dL Final     Calcium   Date Value Ref Range Status   08/30/2021 8.2 (L) 8.6 - 10.2 mg/dL Final   08/29/2021 8.2 (L) 8.6 - 10.2 mg/dL Final   08/28/2021 8.0 (L) 8.6 - 10.2 mg/dL Final     Total Protein   Date Value Ref Range Status   08/30/2021 5.6 (L) 6.4 - 8.3 g/dL Final   08/29/2021 5.5 (L) 6.4 - 8.3 g/dL Final   08/28/2021 5.5 (L) 6.4 - 8.3 g/dL Final     Albumin   Date Value Ref Range Status   08/30/2021 2.7 (L) 3.5 - 5.2 g/dL Final   08/29/2021 2.7 (L) 3.5 - 5.2 g/dL Final   08/28/2021 3.1 (L) 3.5 - 5.2 g/dL Final     Total Bilirubin   Date Value Ref Range Status   08/30/2021 0.4 0.0 - 1.2 mg/dL Final   08/29/2021 0.3 0.0 - 1.2 mg/dL Final   08/28/2021 0.2 0.0 - 1.2 mg/dL Final     Alkaline Phosphatase   Date Value Ref Range Status   08/30/2021 54 35 - 104 U/L Final   08/29/2021 54 35 - 104 U/L Final   08/28/2021 66 35 - 104 U/L Final     AST   Date Value Ref Range Status   08/30/2021 15 0 - 31 U/L Final   08/29/2021 15 0 - 31 U/L Final   08/28/2021 12 0 - 31 U/L Final     ALT   Date Value Ref Range Status   08/30/2021 13 0 - 32 U/L Final   08/29/2021 14 0 - 32 U/L Final   08/28/2021 12 0 - 32 U/L Final     GFR Non-   Date Value Ref Range Status   08/30/2021 >60 >=60 mL/min/1.73 Final     Comment:     Chronic Kidney Disease: less than 60 ml/min/1.73 sq.m. Kidney Failure: less than 15 ml/min/1.73 sq.m. Results valid for patients 18 years and older. 08/29/2021 >60 >=60 mL/min/1.73 Final     Comment:     Chronic Kidney Disease: less than 60 ml/min/1.73 sq.m. Kidney Failure: less than 15 ml/min/1.73 sq.m. Results valid for patients 18 years and older. 08/28/2021 55 >=60 mL/min/1.73 Final     Comment:     Chronic Kidney Disease: less than 60 ml/min/1.73 sq.m. Kidney Failure: less than 15 ml/min/1.73 sq.m. Results valid for patients 18 years and older. GFR    Date Value Ref Range Status   08/30/2021 >60  Final   08/29/2021 >60  Final   08/28/2021 >60  Final     Magnesium   Date Value Ref Range Status   08/30/2021 1.9 1.6 - 2.6 mg/dL Final   08/29/2021 1.9 1.6 - 2.6 mg/dL Final   08/28/2021 2.0 1.6 - 2.6 mg/dL Final     Phosphorus   Date Value Ref Range Status   08/30/2021 2.9 2.5 - 4.5 mg/dL Final   08/29/2021 2.7 2.5 - 4.5 mg/dL Final   08/28/2021 3.4 2.5 - 4.5 mg/dL Final     Recent Labs     08/30/21  0517   PH 7.456*   PO2 101.1*   PCO2 43.0   HCO3 29.6*   BE 5.2*   O2SAT 97.3       RADIOLOGY:  XR ABDOMEN FOR NG/OG/NE TUBE PLACEMENT   Final Result   NG tube tip is at the level of the gastric antrum, well within the gastric   lumen. XR CHEST PORTABLE   Final Result   ETT tip is within 1 cm of the chivo. It could be withdrawn 1-2 cm for   better tube position.          XR CHEST exacerbation  4. Diabetes mellitus type 2  5. Question cardiomyopathy    PLAN:  1. Decrease IV fluids  2. Decrease IV steroids  3. Increase aerosolized bronchodilators  4. Continue IV antibiotics  5. Continue sedation until a.m. when we take her off the current sedatives and see if she can be weaned from the ventilator  6. Echocardiogram in a.m. ATTESTATION:  ICU Staff Physician note of personal involvement in Care  As the attending physician, I certify that I personally reviewed the patients history and personally examined the patient to confirm the physical findings described above,  And that I reviewed the relevant imaging studies and available reports. I also discussed the differential diagnosis and all of the proposed management plans with the patient and individuals accompanying the patient to this visit. They had the opportunity to ask questions about the proposed management plans and to have those questions answered. This patient has a high probability of sudden, clinically significant deterioration, which requires the highest level of physician preparedness to intervene urgently. I managed/supervised life or organ supporting interventions that required frequent physician assessment. I devoted my full attention to the direct care of this patient for the amount of time indicated below. Time I spent with the family or surrogate(s) is included only if the patient was incapable of providing the necessary information or participating in medical decisions - Time devoted to teaching and to any procedures I billed separately is not included.     CRITICAL CARE TIME:  33 minutes    Electronically signed by John Walsh MD on 8/30/2021 at 3:14 PM

## 2021-08-30 NOTE — PROCEDURES
Yokasta Giordano is a 78 y.o. female patient. 1. Angioedema, initial encounter    2. SOB (shortness of breath)    3. Acute respiratory distress      Past Medical History:   Diagnosis Date    Arthritis     Asthma     Cerebral artery occlusion with cerebral infarction ([facility] 2015    COPD (chronic obstructive pulmonary disease) (HCC)     Diabetes mellitus ([facility]     Heart attack ([facility]     Hyperlipidemia     Hypertension     VHD (valvular heart disease)      Blood pressure (!) 174/80, pulse 83, temperature 100.6 °F (38.1 °C), temperature source Esophageal, resp. rate (!) 37, height 5' 4\" (1.626 m), weight 196 lb (88.9 kg), SpO2 97 %. Intubation    Date/Time: 8/29/2021 9:27 PM  Performed by: SHELBIE Mckeon CNP  Authorized by: SHELBIE Mckeon CNP   Consent: The procedure was performed in an emergent situation. Verbal consent not obtained. Written consent not obtained. Risks and benefits: risks, benefits and alternatives were discussed  Patient understanding: patient states understanding of the procedure being performed  Patient consent: the patient's understanding of the procedure does not match consent given  Procedure consent: procedure consent matches procedure scheduled  Relevant documents: relevant documents present and verified  Test results: test results available and properly labeled  Site marked: the operative site was marked  Imaging studies: imaging studies available  Required items: required blood products, implants, devices, and special equipment available  Patient identity confirmed: verbally with patient and arm band  Time out: Immediately prior to procedure a \"time out\" was called to verify the correct patient, procedure, equipment, support staff and site/side marked as required.   Indications: respiratory distress  Intubation method: video-assisted  Preoxygenation: BVM  Pretreatment medications: fentanyl  Sedatives: etomidate  Laryngoscope size: Mac 3  Tube size: 7.5 mm  Tube type: cuffed  Number of attempts: 1  Ventilation between attempts: BVM  Cricoid pressure: no  Cords visualized: yes  Post-procedure assessment: chest rise and CO2 detector  Breath sounds: equal  Cuff inflated: yes  ETT to lip: 24 cm  Tube secured with: ETT bhardwaj  Chest x-ray interpreted by me.   Chest x-ray findings: endotracheal tube in appropriate position  Patient tolerance: patient tolerated the procedure well with no immediate complications  Comments: Dr. Cely Pineda was present for entire procedure           SHELBIE Vuong - JULIETA  8/29/2021

## 2021-08-30 NOTE — PROGRESS NOTES
Subjective:  Narciso Wong was seen and examined in the ICU today. The patient was reintubated last night.   There were no new problems reported o/w  Currently intubated and sedated still  No family at bedside    A complete review of systems and social history was completed on admission and remains unchanged unless otherwise noted    Scheduled Meds:   [START ON 8/31/2021] enoxaparin  40 mg SubCUTAneous Daily    albuterol  2.5 mg Nebulization Q4H    methylPREDNISolone  20 mg IntraVENous Q8H    famotidine (PEPCID) injection  20 mg IntraVENous Daily    meropenem  1,000 mg IntraVENous Q8H    QUEtiapine  25 mg Oral BID    lidocaine PF  5 mL IntraDERmal Once    sodium chloride flush  5-40 mL IntraVENous 2 times per day    heparin flush  3 mL IntraVENous 2 times per day    vancomycin  1,000 mg IntraVENous Q12H    budesonide  0.5 mg Nebulization BID    insulin glargine  8 Units SubCUTAneous BID    medicated lip ointment   Topical Daily    insulin lispro  0-18 Units SubCUTAneous Q4H    sodium chloride flush  5-40 mL IntraVENous 2 times per day     Continuous Infusions:   propofol 40 mcg/kg/min (08/30/21 1637)    fentaNYL 5 mcg/ml in 0.9%  ml infusion 50 mcg/hr (08/30/21 0800)    sodium chloride      dexmedetomidine HCl in NaCl 0.6 mcg/kg/hr (08/30/21 1606)    dextrose      sodium chloride      lactated ringers 50 mL/hr at 08/30/21 1841     PRN Meds:perflutren lipid microspheres, hydrALAZINE, sodium chloride flush, sodium chloride, heparin flush, metoprolol, glucose, dextrose, glucagon (rDNA), dextrose, sodium chloride flush, sodium chloride, ondansetron **OR** ondansetron, polyethylene glycol, acetaminophen **OR** acetaminophen    Objective:  BP (!) 130/56   Pulse 57   Temp 98.1 °F (36.7 °C) (Core)   Resp 22   Ht 5' 4\" (1.626 m)   Wt 196 lb (88.9 kg)   SpO2 95%   BMI 33.64 kg/m²   In: 1081 [I.V.:2897; NG/GT:207]  Out: 1350    In: 9138   Out: 1350 [Urine:1350]     Intubated and sedated  RRR, pos S1, S2  Tight with no wheeze, rales or rhonchi  bowel sounds present, nontender, nondistended  No clubbing, cyanosis, or edema  No neuro changes   No obvious rashes or lesions. Recent Labs     08/28/21  0545 08/29/21  0554 08/30/21  0456   WBC 10.3 12.1* 14.7*   HGB 11.2* 11.4* 11.6    186 201     Recent Labs     08/28/21  0545 08/29/21  0554 08/30/21  0456    137 139   K 4.9 4.4 4.5    100 100   CO2 30* 29 29   BUN 21 21 22   CREATININE 1.0 0.8 0.8   GLUCOSE 297* 251* 180*     Recent Labs     08/28/21  0545 08/29/21  0554 08/30/21  0456   BILITOT 0.2 0.3 0.4   ALKPHOS 66 54 54   AST 12 15 15   ALT 12 14 13     No results for input(s): INR in the last 72 hours. Invalid input(s): PT  No results for input(s): CKTOTAL, CKMB, CKMBINDEX, TROPONINI in the last 72 hours. XR CHEST PORTABLE    Result Date: 8/25/2021  EXAMINATION: ONE XRAY VIEW OF THE CHEST 8/24/2021 8:52 pm COMPARISON: 08/24/2021 HISTORY: ORDERING SYSTEM PROVIDED HISTORY: et tube placement TECHNOLOGIST PROVIDED HISTORY: Reason for exam:->et tube placement FINDINGS: Stable position of endotracheal tube. Cardiomediastinal silhouette is unremarkable. No infiltrate, effusion, or pneumothorax. No acute osseous abnormality. No pulmonary infiltrates. No significant change since the prior study     XR CHEST PORTABLE    Result Date: 8/24/2021  EXAMINATION: ONE XRAY VIEW OF THE CHEST 8/24/2021 5:30 pm COMPARISON: January 3, 2020 HISTORY: ORDERING SYSTEM PROVIDED HISTORY: SOB (shortness of breath) TECHNOLOGIST PROVIDED HISTORY: Reason for exam:->ETT placement, tongue swelling FINDINGS: Endotracheal tube is approximately 6.8 cm above the chivo. No airspace opacity or pleural effusion. The heart is normal in size. No pneumothorax. 1. Endotracheal tube is approximately 6.8 cm above the chivo. 2. No airspace opacity or pleural effusion.      Assessment:  Cholo Garcia is a 78y.o. year old female who presented on 8/24/2021 and is being treated for:  Principal Problem:    Acute respiratory failure ([facility]  Active Problems:    Essential hypertension    Diabetes mellitus ([facility]    Myocardiopathy ([facility]    ACE inhibitor-aggravated angioedema    COPD (chronic obstructive pulmonary disease) (HCC)    Acquired angioedema    SOB (shortness of breath)    Disorder of airway    Acquired hypertrophy of tongue  Resolved Problems:    * No resolved hospital problems. *    Plan  · Mechanical ventilation/vent weaning as per pulmonary  · Cont IV steroids/nebs  · Monitor BS and cover with SSI  · Please see orders for further management and care.     Mirta Phipps MD  8:59 PM  8/30/2021

## 2021-08-30 NOTE — PROGRESS NOTES
Comprehensive Nutrition Assessment    Type and Reason for Visit:  Reassess    Nutrition Recommendations/Plan: Recommend Vital HP (Peptide Based High Protein) w/ goal rate of 50 ml/hr x 24 hr to provide: 1200 mlTV, 1200 kcal, 105 gm protein, 1003 ml FW. Additional FW per critical care. Nutrition Assessment:  Pt admitted w/ SOB r/t tongue swelling, PMH of COPD, CVA, DM. Pt extubated 8/29/21 and then re-intubated the same day. Will update EN recommendations and monitor while admitted. Malnutrition Assessment:  Malnutrition Status: At risk for malnutrition (Comment)    Context:  Acute Illness     Findings of the 6 clinical characteristics of malnutrition:  Energy Intake:  Mild decrease in energy intake (Comment)  Weight Loss:  No significant weight loss     Body Fat Loss:  No significant body fat loss     Muscle Mass Loss:  No significant muscle mass loss    Fluid Accumulation:  No significant fluid accumulation     Strength:  Not Performed    Estimated Daily Nutrient Needs:  Energy (kcal):  9051-9096 ([id number]= 1719); Weight Used for Energy Requirements:  Current     Protein (g):  100-120 (1.8-2.0 gm/kg IBW);  Weight Used for Protein Requirements:  Ideal        Fluid (ml/day):  per critical care; Method Used for Fluid Requirements:  Other (Comment)      Nutrition Related Findings:  Pt intubated, Propofol @21.3 ml/hr (562 kcal/day), abd soft rounded, +BS, +2.9L I/O, +1 BLE and +2 BUE edema, hyperglycemia, A1C=9.0, OGT w/ EN running      Wounds:  None       Current Nutrition Therapies:    Diet NPO  ADULT TUBE FEEDING; Orogastric; Peptide Based High Protein; Continuous; 20; Yes; 20; Q 6 hours; 40; 120; Q 4 hours    Anthropometric Measures:  · Height: 5' 4\" (162.6 cm)  · Current Body Weight: 181 lb (82.1 kg) (Using admit wt d/t fluid status, Current wt: 196 lb (8/29) bed scale)   · Admission Body Weight: 181 lb (82.1 kg) (8/24 bed scale)    · Usual Body Weight: 188 lb 6.4 oz (85.5 kg) (3/10/21 no method per EMR)     · Ideal Body Weight: 120 lbs; % Ideal Body Weight 150.8 %   · BMI: 31.1  · Adjusted Body Weight: No Adjustment   · BMI Categories: Obese Class 1 (BMI 30.0-34. 9)       Nutrition Diagnosis:   · Inadequate oral intake related to impaired respiratory function as evidenced by intubation, NPO or clear liquid status due to medical condition, nutrition support - enteral nutrition    Nutrition Interventions:   Food and/or Nutrient Delivery:  Continue NPO, Modify Tube Feeding (Recommend Vital HP (Peptide Based High Protein) w/ goal rate of 50 ml/hr x 24 hr to provide: 1200 mlTV, 1200 kcal, 105 gm protein, 1003 ml FW. Additional FW per critical care.)  Nutrition Education/Counseling:  No recommendation at this time   Coordination of Nutrition Care:  Continue to monitor while inpatient    Goals:  EN tolerance       Nutrition Monitoring and Evaluation:   Behavioral-Environmental Outcomes:  None Identified   Food/Nutrient Intake Outcomes:  Enteral Nutrition Intake/Tolerance  Physical Signs/Symptoms Outcomes:  Biochemical Data, GI Status, Fluid Status or Edema, Nutrition Focused Physical Findings, Skin, Weight     Discharge Planning:     Too soon to determine     Electronically signed by Berta Ly RD, LD on 8/30/21 at 2:58 PM EDT    Contact: [phone]

## 2021-08-30 NOTE — ADT AUTH CERT
continue    8/30/2021 3:04 PM EDT by Jordi Bernal      Tongue is still swollen but better  Continue sedation with propofol and fentanyl, titrate sedation for RASS of -1 to -2  OGT placed  dexamethasone 4 [id number]  famotidine  10 mgIntraVENousBID  diphenhydrAMINE 50 [id number]    * Milestone   Additional Notes   8/26/2021      ICU   178/71 75 18 99.9 97%      · medicated lip ointment Topical Daily   · dexamethasone 4 mg IntraVENous Q12H   · enoxaparin 40 mg Subcutaneous BID   · famotidine (PEPCID) injection 10 mg IntraVENous BID   · insulin lispro 0-18 Units Subcutaneous Q4H   · insulin glargine 4 Units Subcutaneous BID   · diphenhydrAMINE 50 mg IntraVENous Q6H   · ipratropium-albuterol 1 vial Inhalation Q4H   · sodium chloride flush       dexamethasone (DECADRON) injection 4 mg    Dose: 4 mg   Freq: EVERY 8 HOURS Route: IV      fentaNYL 5 mcg/ml in 0.9%  ml infusion    Rate: 2.5-40 mL/hr Dose: 12.5-200 mcg/hr   Freq: CONTINUOUS Route: IV      lactated ringers infusion    Rate: 125 mL/hr Freq: CONTINUOUS Route: IV      propofol injection    Rate: 2.6-25.8 mL/hr Dose: 5-50 mcg/kg/min   Weight Dosing Info: 86 kg   Freq: TITRATED Route: IV      metoprolol (LOPRESSOR) injection 5 mg    Dose: 5 mg   Freq: EVERY 4 HOURS PRN Route: IV x1   Results    8/26/2021 02:43   Meter Glucose: 194 (H)      8/26/2021 06:06   Meter Glucose: 225 (H)      8/26/2021 10:27   Meter Glucose: 242 (H)      8/26/2021 11:08   BUN: 27 (H)   Creatinine: 1.5 (H)   Glucose: 250 (H)   Calcium: 8.3 (L)      8/26/2021 13:38   Meter Glucose: 204 (H)      8/26/2021 17:51   Meter Glucose: 222 (H)      8/26/2021 22:58   Meter Glucose: 251 (H)      XR ABDOMEN FOR NG/OG/NE TUBE PLACEMENT    Satisfactory position of the NG tube within the stomach       ** **IM Note   Subjective:   Thomas Quinteros was seen and examined in the ICU today.  The patient remains intubated and sedated   There were no new problems reported overnight.     Tongue is still swollen but better   OGT placed   Assessment:   Damaris Manuel is a 78y.o. year old female who presented on 8/24/2021 and is being treated for:   Principal Problem:     Acute respiratory failure ([facility]   Active Problems:     Essential hypertension     Diabetes mellitus ([facility]     Myocardiopathy ([facility]     ACE inhibitor-aggravated angioedema     COPD (chronic obstructive pulmonary disease) ([facility]     Acquired angioedema     SOB (shortness of breath)     Disorder of airway     Acquired hypertrophy of tongue      Plan   · Cont airway protection - went weaning as per pulmonary   · Cont IV steroids/h2 blockade   · Increase basal insulin along with SSI   · Please see orders for further management and care. ** **Critical Care Note   Acute Problems   ACE inhibitor induced angioedema   GORDON   Hypertension   Diabetes   COPD (not in acute exacerbation)   Hyperlipidemia       Plan of Care   Continue mechanical ventilation. Patient has a cuff leak however she continues to have significant tongue swelling. Continue with Decadron, Pepcid, Benadryl scheduled   Restraints   Continue sedation with propofol and fentanyl, titrate sedation for RASS of -1 to -2   Lantus insulin sliding scale. SSI   An NG tube was inserted.  X-ray confirmed placement.  Patient will be started on tube feeding. Avoid nephrotoxic medications   CMP, CBC, mag, Phos daily   As needed IV metoprolol. VTE prophylaxis with Lovenox   GI prophylaxis with Pepcid   Will list ACE inhibitors and ARB as allergies. Daily sedation vacation. Bianchi Chin will attempt to respiration trial tomorrow since the patient continues to have soft tissue swelling. ** **OTOLARYNGOLOGY      Subjective:    Pt intubated and sedated on propofol and fentanyl. No issues overnight. Tongue appears improved       Objective:       GENERAL:  Intubated sedated    HENT: Normocephalic, atraumatic.  Floor of mouth edematous, tongue slightly protrudes out of oral cavity, neck soft nontender trachea midline. ETT in place.  Mucous membranes dry    EYES: No sclera icterus, pupils equal   LUNGS:  No increased work of breathing, no stridor   CARDIOVASCULAR:  RR           Assessment/Plan:       78 y.o. female with angioedema secondary to ACE-I       · Recommend continued IV decadron, benadryl, pepcid as patient still has some floor of mouth swelling, overall the swelling is improved from yesterday   · Improved oral cavity care, mucous membranes dry   · stop all ACE-I/ARB use    · ENT to follow          Respiratory Failure GRG - Care Day 2 (8/25/2021) by Mike Garcia RN       Review Status Review Entered   Completed 8/26/2021 15:03      Criteria Review      Care Day: 2 Care Date: 8/25/2021 Level of Care: ICU    Guideline Day 2    Level Of Care    (X) ICU or ventilator-capable area    8/26/2021 3:03 PM EDT by Miguel Galeana      ICU/CCU    Clinical Status    ( ) * Weaning assessment performed    Interventions    (X) Inpatient interventions continue    8/26/2021 3:03 PM EDT by Miguel Galeana      iv decadron, sc lovenox, iv benadryl, iv pepcid, iv solu medrol, iv fentanyl drip, iv fluids, iv propofol drip, ventilator needed, ICU needs, otolaryngology following    * Milestone   Additional Notes   8/25    100 (37.8)  32   81   171/90  fiO2 45%  -  97%  Ventilator       dexamethasone (PF) (DECADRON) injection 10 mg    Dose: 10 mg   Freq: EVERY 8 HOURS Route: IV   then changed to   dexamethasone (DECADRON) injection 4 mg    Dose: 4 mg   Freq: EVERY 8 HOURS Route: IV      diphenhydrAMINE (BENADRYL) injection 50 mg    Dose: 50 mg   Freq: EVERY 6 HOURS Route: IV      enoxaparin (LOVENOX) injection 40 mg    Dose: 40 mg   Freq: DAILY Route: SC      famotidine (PEPCID) injection 40 mg    Dose: 40 mg   Freq: 2 TIMES DAILY Route: IV   then changed to   famotidine (PEPCID) injection 20 mg    Dose: 20 mg   Freq: 2 TIMES DAILY Route: IV      insulin glargine (LANTUS) injection vial 4 Units    Dose: 4 Units   Freq: 2 TIMES DAILY Route: SC      insulin lispro (HUMALOG) injection vial 0-12 Units    Dose: 0-12 Units   Freq: EVERY 4 HOURS Route: SC      insulin lispro (HUMALOG) injection vial 0-18 Units    Dose: 0-18 Units   Freq: EVERY 4 HOURS Route: SC      ipratropium-albuterol (DUONEB) nebulizer solution 3 mL    Dose: 1 vial   Freq: EVERY 4 HOURS Route: IN      methylPREDNISolone sodium (SOLU-MEDROL) injection 60 mg    Dose: 60 mg   Freq: EVERY 6 HOURS Route: IV      fentaNYL 5 mcg/ml in 0.9%  ml infusion    Rate: 2.5-40 mL/hr Dose: 12.5-200 mcg/hr   Freq: CONTINUOUS Route: IV      lactated ringers infusion    Rate: 125 mL/hr Freq: CONTINUOUS Route: IV      propofol injection    Rate: 2.6-25.8 mL/hr Dose: 5-50 mcg/kg/min   Weight Dosing Info: 86 kg   Freq: TITRATED Route: IV      8/25/2021 02:48   Meter Glucose: 277 (H)      8/25/2021 04:47   Creatinine: 1.6 (H)   Glucose: 246 (H)   Calcium: 8.4 (L)   MCHC: 31.9 (L)   Neutrophils %: 87.5 (H)   Lymphocyte %: 8.9 (L)   Neutrophils Absolute: 9.59 (H)   Lymphocytes Absolute: 0.97 (L)   Eosinophils Absolute: 0.00 (L)      8/25/2021 06:28   Meter Glucose: 199 (H)      8/25/2021 10:23   Meter Glucose: 205 (H)      8/25/2021 14:11   Meter Glucose: 217 (H)      8/25/2021 18:34   Meter Glucose: 274 (H)      8/25/2021 21:48   Meter Glucose: 304 (H)         **OTOLARYNGOLOGY   Assessment/Plan:    71 y.o. female with angioedema secondary to ACE-I    · recommend IV decadron [id number] for 3 doses, benadryl, pepcid   · stop all ACE-I/ARB use    · ENT to follow          **CRITICAL CARE   Plan of Care   Continue mechanical ventilation. Patient has a cuff leak however she continues to have significant tongue swelling. Start Solu-Medrol, Pepcid, Benadryl scheduled   Restraints   Continue sedation with propofol and fentanyl, titrate sedation for RASS of -1 to -2   Lantus insulin sliding scale.    Every 4 hour blood sugar checks with medium dose sliding scale insulin   IV fluids   Avoid nephrotoxic medications   CMP, CBC, mag, [place] daily   As needed IV metoprolol. VTE prophylaxis with Lovenox   GI prophylaxis with Pepcid   Will list ACE inhibitors and ARB as allergies. **IM   Assessment:   Marvin Becerril is a 78y.o. year old female who presented on 8/24/2021 and is being treated for:   Principal Problem:     Acute respiratory failure ([facility]   Active Problems:     Essential hypertension     Diabetes mellitus ([facility]     Myocardiopathy ([facility]     ACE inhibitor-aggravated angioedema     COPD (chronic obstructive pulmonary disease) ([facility]     Acquired angioedema     SOB (shortness of breath)     Disorder of airway     Acquired hypertrophy of tongue   Plan   · Cont airway protection   · Cont IV steroids/h2 blockade   · Start basal insulin along with SSI   · Please see orders for further management and care.

## 2021-08-30 NOTE — PROGRESS NOTES
Date:2021  Patient Name: Leonel Pierre  MRN: [id number]  : 1955  ROOM #: [id number]    Occupational Therapy on hold as pt was re-intubated; please reorder O.T. EVAL and TREAT when the patient is able to participate in therapy. Thank you.      Mulu ESCOBAR/DELANEY #[id number]

## 2021-08-30 NOTE — CARE COORDINATION
8/30/21 No covid testing- vaccinated. Cm transition of care: icu/vent 8/24 /sedation. Pt was extubated on 08/29/21 then reintubated within 8 hours noted. Pt currently on vent/icu/precedex gtt. Plans to return to home with  when medically stable for discharge. CM/SS to follow.  Electronically signed by MAURO Mccollum on 8/30/2021 at 12:01 PM

## 2021-08-30 NOTE — PROGRESS NOTES
Pharmacy Consultation Note  (Antibiotic Dosing and Monitoring)    Initial consult date: 21  Consulting physician: AUSTIN Avilez  Drug(s): Vancomycin  Indication: HAP/VAP    Ht Readings from Last 1 Encounters:   21 5' 4\" (1.626 m)     Wt Readings from Last 1 Encounters:   21 196 lb (88.9 kg)         Age/  Gender IBW DW  Allergy Information   78 y.o.     female 54.7 kg 68.4 kg  Ace inhibitors, Angiotensin receptor blockers, Lisinopril, and Pcn [penicillins]                 Date  WBC BUN/CR UOP Drug/Dose Time   Given Level(s)   (Time) Comments     (#1) 12.1 21/0.8 -- Vancomycin 1,250 mg   (#2) 14.7 22/0.8 2.4 Vancomycin 1,000 mg IV Q12H <1000>  <2200>       (#3)     <1000> <0930> Hold dose if trough is >20 mcg/mL     (#4)            (#5)            (#6)            (#7)            Estimated Creatinine Clearance: 74 mL/min (based on SCr of 0.8 mg/dL). UOP over the past 24 hours:       Intake/Output Summary (Last 24 hours) at 2021 0936  Last data filed at 2021 0714  Gross per 24 hour   Intake 4066.31 ml   Output 5100 ml   Net -1033.69 ml       Temp max: Temp (24hrs), Av.1 °F (37.8 °C), Min:98.8 °F (37.1 °C), Max:101 °F (38.3 °C)      Antibiotic Regimen:  Antibiotic Dose Date Initiated   Meropenem 1 g Q8H      Cultures:  available culture and sensitivity results were reviewed in EPIC  Cultures sent and are pending. Culture Date Result    Resp cx  Abundant PMN's  No epi cells  Rare gram + diplococci  Few GNR     Assessment:  · Consulted by AUSTIN Avilez to dose/monitor vancomycin  · Goal trough level:  10-20 mcg/mL, AUC/JEN: 400-600  · Pt is a 78 yof admitted with angioedema requiring intubation in the ED. Patient was extubated to [facility] and was re-intubated overnight for respiratory distress. Empiric antibiotics have been initiated for pneumonia.   · Serum creatinine today: 0.8; CrCl ~ 74 mL/min; baseline Scr ~ 0.8  · Patient received Vancomycin 1,250 mg IV x 1 last night    Plan:  · Start Vancomycin 1,000 mg IV Q12H this morning  · Trough tomorrow @ 0930, hold dose if trough is >20 mcg/mL  · Follow renal function  · Pharmacist will follow and monitor/adjust dosing as necessary      Thank you for the consult,    Katey Doshi, [facility] PharmD, BCPS 8/30/2021 9:36 AM

## 2021-08-30 NOTE — PROGRESS NOTES
Pharmacy Consultation Note  (Antibiotic Dosing and Monitoring)    Initial consult date: 2021  Consulting physician: SHELBIE Alejandre - CNP   Drug: Vancomycin  Indication:Pneumonia (HAP), Pneumonia (VAP)      Age/  Gender Height Weight IBW Dosing weight  Allergy Information   66 y.o./female 5' 4\" (162.6 cm) 189 lb 9.5 oz (86 kg)     Ideal body weight: 54.7 kg (120 lb 9.5 oz)  Adjusted ideal body weight: 68.4 kg (150 lb 12.1 oz)  88.9  Ace inhibitors, Angiotensin receptor blockers, Lisinopril, and Pcn [penicillins]      Temp (24hrs), Av.4 °F (38 °C), Min:98.8 °F (37.1 °C), Max:101.5 °F (38.6 °C)          Date  WBC BUN SCr CrCl  (mL/min) Drug/Dose Time   Given Level(s)   (Time) Comments   2021 12.1 21 0.8  74   Vancomycin 1250 mg IV  2152                                            Intake/Output Summary (Last 24 hours) at 2021 2308  Last data filed at 2021 2036  Gross per 24 hour   Intake 3671.31 ml   Output 7625 ml   Net -3953.69 ml       Historical Cultures:  No results found for: ORG  No results for input(s): BC in the last 72 hours. Cultures:  available culture and sensitivity results were reviewed in [facility]    Assessment:  · 78 y.o. female has been initiated Vancomycin.   · Estimated CrCl = 74 mL/min  · Goal trough level = 15-20 mcg/mL    Plan:  · Monitor renal function   · Clinical pharmacy to follow      FREDDIE Pearson [facility] 2021 11:08 PM

## 2021-08-30 NOTE — PROGRESS NOTES
Physical Therapy    Physical  Therapy on hold d/t re intubation; please reorder PT/OT EVAL AND TREAT when patient able to participate   Electronically signed by Victor M Swan PT on 8/30/2021 at 12:32 PM

## 2021-08-30 NOTE — ADT AUTH CERT
Patient Demographics    Name Patient ID SSN Gender Identity Birth Date   Sena Otero [id number]  Female 53 (78 yrs)   Address Phone Email Employer    [address] [facility] [phone] (O)   [phone] (H) [email or web address] [facility] Race Occupation Emp Status    [employer] White (non-) -- Retired    Reg Status PCP Date Last Verified Next Review Date    Verified Tiago Reinoso Ksawsukumar 29 21    Admission Date Discharge Date Admitting Provider     21 -- Stas Perez MD     Marital Status Jewish       Yarsani      Emergency Contact 1 Emergency Contact 2   Jemal Estrella [phone] [address] [place] [address] [address]   [phone] (K)   [phone] Kathy Stauffer (C)   [place]   [phone] (H)   [address] [place] [address] [de-identified]  [address] [place] [address] Name/Sex/Relation Subscriber  Subscriber Address/Phone Subscriber Emp/Emp Phone   1. [facility]   [id number] [address]  53 Female   (Self) 1955 [address] [place] [address],[address]   [place], [place]  [address] [phone](H) RETIRED    2.     [id number] [address] [facility] [address] [facility] Female   (Self) 1955 [address]  [address] [phone](C) RETIRED    Utilization Reviews       Respiratory Failure GRG - Care Day 3 (2021) by Collins Cevallos LPN       Review Status Review Entered   Completed 2021 15:04      Criteria Review      Care Day: 3 Care Date: 2021 Level of Care: ICU    Guideline Day 2    Level Of Care    (X) ICU or ventilator-capable area    2021 3:04 PM EDT by David Orozco      Vent Mode: AC/VC  Vt Ordered: 400 mL  Rate Set: 18 bmp  Sensitivity: 3  PEEP/CPAP: 7    Clinical Status    ( ) * Weaning assessment performed    2021 3:04 PM EDT by David Orozco      sedated intubated and mechanically ventilated. Livia Aceves continues to have some tongue swelling    Interventions    (X) Inpatient interventions continue    8/30/2021 3:04 PM EDT by Tony Colon      Tongue is still swollen but better  Continue sedation with propofol and fentanyl, titrate sedation for RASS of -1 to -2  OGT placed  dexamethasone 4 [id number]  famotidine  10 mgIntraVENousBID  diphenhydrAMINE 50 [id number]    * Milestone   Additional Notes   8/26/2021      ICU   178/71 75 18 99.9 97%      · medicated lip ointment Topical Daily   · dexamethasone 4 mg IntraVENous Q12H   · enoxaparin 40 mg Subcutaneous BID   · famotidine (PEPCID) injection 10 mg IntraVENous BID   · insulin lispro 0-18 Units Subcutaneous Q4H   · insulin glargine 4 Units Subcutaneous BID   · diphenhydrAMINE 50 mg IntraVENous Q6H   · ipratropium-albuterol 1 vial Inhalation Q4H   · sodium chloride flush       dexamethasone (DECADRON) injection 4 mg    Dose: 4 mg   Freq: EVERY 8 HOURS Route: IV      fentaNYL 5 mcg/ml in 0.9%  ml infusion    Rate: 2.5-40 mL/hr Dose: 12.5-200 mcg/hr   Freq: CONTINUOUS Route: IV      lactated ringers infusion    Rate: 125 mL/hr Freq: CONTINUOUS Route: IV      propofol injection    Rate: 2.6-25.8 mL/hr Dose: 5-50 mcg/kg/min   Weight Dosing Info: 86 kg   Freq: TITRATED Route: IV      metoprolol (LOPRESSOR) injection 5 mg    Dose: 5 mg   Freq: EVERY 4 HOURS PRN Route: IV x1   Results    8/26/2021 02:43   Meter Glucose: 194 (H)      8/26/2021 06:06   Meter Glucose: 225 (H)      8/26/2021 10:27   Meter Glucose: 242 (H)      8/26/2021 11:08   BUN: 27 (H)   Creatinine: 1.5 (H)   Glucose: 250 (H)   Calcium: 8.3 (L)      8/26/2021 13:38   Meter Glucose: 204 (H)      8/26/2021 17:51   Meter Glucose: 222 (H)      8/26/2021 22:58   Meter Glucose: 251 (H)      XR ABDOMEN FOR NG/OG/NE TUBE PLACEMENT    Satisfactory position of the NG tube within the stomach       ** **IM Note   Subjective:   Delta Deep was seen and examined in the ICU today.  The patient remains intubated and sedated   There were no new problems reported overnight.     Tongue is still swollen but better   OGT placed   Assessment:   Leonel Pierre is a 78y.o. year old female who presented on 8/24/2021 and is being treated for:   Principal Problem:     Acute respiratory failure ([facility]   Active Problems:     Essential hypertension     Diabetes mellitus ([facility]     Myocardiopathy ([facility]     ACE inhibitor-aggravated angioedema     COPD (chronic obstructive pulmonary disease) ([facility]     Acquired angioedema     SOB (shortness of breath)     Disorder of airway     Acquired hypertrophy of tongue      Plan   · Cont airway protection - went weaning as per pulmonary   · Cont IV steroids/h2 blockade   · Increase basal insulin along with SSI   · Please see orders for further management and care. ** **Critical Care Note   Acute Problems   ACE inhibitor induced angioedema   GORDON   Hypertension   Diabetes   COPD (not in acute exacerbation)   Hyperlipidemia       Plan of Care   Continue mechanical ventilation. Patient has a cuff leak however she continues to have significant tongue swelling. Continue with Decadron, Pepcid, Benadryl scheduled   Restraints   Continue sedation with propofol and fentanyl, titrate sedation for RASS of -1 to -2   Lantus insulin sliding scale. SSI   An NG tube was inserted.  X-ray confirmed placement.  Patient will be started on tube feeding. Avoid nephrotoxic medications   CMP, CBC, mag, Phos daily   As needed IV metoprolol. VTE prophylaxis with Lovenox   GI prophylaxis with Pepcid   Will list ACE inhibitors and ARB as allergies. Daily sedation vacation. Elvin Pry will attempt to respiration trial tomorrow since the patient continues to have soft tissue swelling. ** **OTOLARYNGOLOGY      Subjective:    Pt intubated and sedated on propofol and fentanyl. No issues overnight. Tongue appears improved       Objective:       GENERAL:  Intubated sedated    HENT: Normocephalic, atraumatic.  Floor of mouth edematous, tongue slightly protrudes out of oral cavity, neck soft nontender trachea midline. ETT in place.  Mucous membranes dry    EYES: No sclera icterus, pupils equal   LUNGS:  No increased work of breathing, no stridor   CARDIOVASCULAR:  RR           Assessment/Plan:       78 y.o. female with angioedema secondary to ACE-I       · Recommend continued IV decadron, benadryl, pepcid as patient still has some floor of mouth swelling, overall the swelling is improved from yesterday   · Improved oral cavity care, mucous membranes dry   · stop all ACE-I/ARB use    · ENT to follow          Respiratory Failure GRG - Care Day 2 (8/25/2021) by Narinder Zambrano RN       Review Status Review Entered   Completed 8/26/2021 15:03      Criteria Review      Care Day: 2 Care Date: 8/25/2021 Level of Care: ICU    Guideline Day 2    Level Of Care    (X) ICU or ventilator-capable area    8/26/2021 3:03 PM EDT by Elton Butler      ICU/CCU    Clinical Status    ( ) * Weaning assessment performed    Interventions    (X) Inpatient interventions continue    8/26/2021 3:03 PM EDT by Elton Butler      iv decadron, sc lovenox, iv benadryl, iv pepcid, iv solu medrol, iv fentanyl drip, iv fluids, iv propofol drip, ventilator needed, ICU needs, otolaryngology following    * Milestone   Additional Notes   8/25    100 (37.8)  32   81   171/90  fiO2 45%  -  97%  Ventilator       dexamethasone (PF) (DECADRON) injection 10 mg    Dose: 10 mg   Freq: EVERY 8 HOURS Route: IV   then changed to   dexamethasone (DECADRON) injection 4 mg    Dose: 4 mg   Freq: EVERY 8 HOURS Route: IV      diphenhydrAMINE (BENADRYL) injection 50 mg    Dose: 50 mg   Freq: EVERY 6 HOURS Route: IV      enoxaparin (LOVENOX) injection 40 mg    Dose: 40 mg   Freq: DAILY Route: SC      famotidine (PEPCID) injection 40 mg    Dose: 40 mg   Freq: 2 TIMES DAILY Route: IV   then changed to   famotidine (PEPCID) injection 20 mg    Dose: 20 mg   Freq: 2 TIMES DAILY Route: IV      insulin glargine (LANTUS) injection vial 4 Units    Dose: 4 Units   Freq: 2 TIMES DAILY Route: SC      insulin lispro (HUMALOG) injection vial 0-12 Units    Dose: 0-12 Units   Freq: EVERY 4 HOURS Route: SC      insulin lispro (HUMALOG) injection vial 0-18 Units    Dose: 0-18 Units   Freq: EVERY 4 HOURS Route: SC      ipratropium-albuterol (DUONEB) nebulizer solution 3 mL    Dose: 1 vial   Freq: EVERY 4 HOURS Route: IN      methylPREDNISolone sodium (SOLU-MEDROL) injection 60 mg    Dose: 60 mg   Freq: EVERY 6 HOURS Route: IV      fentaNYL 5 mcg/ml in 0.9%  ml infusion    Rate: 2.5-40 mL/hr Dose: 12.5-200 mcg/hr   Freq: CONTINUOUS Route: IV      lactated ringers infusion    Rate: 125 mL/hr Freq: CONTINUOUS Route: IV      propofol injection    Rate: 2.6-25.8 mL/hr Dose: 5-50 mcg/kg/min   Weight Dosing Info: 86 kg   Freq: TITRATED Route: IV      8/25/2021 02:48   Meter Glucose: 277 (H)      8/25/2021 04:47   Creatinine: 1.6 (H)   Glucose: 246 (H)   Calcium: 8.4 (L)   MCHC: 31.9 (L)   Neutrophils %: 87.5 (H)   Lymphocyte %: 8.9 (L)   Neutrophils Absolute: 9.59 (H)   Lymphocytes Absolute: 0.97 (L)   Eosinophils Absolute: 0.00 (L)      8/25/2021 06:28   Meter Glucose: 199 (H)      8/25/2021 10:23   Meter Glucose: 205 (H)      8/25/2021 14:11   Meter Glucose: 217 (H)      8/25/2021 18:34   Meter Glucose: 274 (H)      8/25/2021 21:48   Meter Glucose: 304 (H)         **OTOLARYNGOLOGY   Assessment/Plan:    71 y.o. female with angioedema secondary to ACE-I    · recommend IV decadron [id number] for 3 doses, benadryl, pepcid   · stop all ACE-I/ARB use    · ENT to follow          **CRITICAL CARE   Plan of Care   Continue mechanical ventilation. Patient has a cuff leak however she continues to have significant tongue swelling. Start Solu-Medrol, Pepcid, Benadryl scheduled   Restraints   Continue sedation with propofol and fentanyl, titrate sedation for RASS of -1 to -2   Lantus insulin sliding scale.    Every 4 hour blood sugar checks with medium dose sliding scale insulin   IV fluids   Avoid nephrotoxic medications   CMP, CBC, mag, [place] daily   As needed IV metoprolol. VTE prophylaxis with Lovenox   GI prophylaxis with Pepcid   Will list ACE inhibitors and ARB as allergies. **IM   Assessment:   Grupo Suarez is a 78y.o. year old female who presented on 8/24/2021 and is being treated for:   Principal Problem:     Acute respiratory failure ([facility]   Active Problems:     Essential hypertension     Diabetes mellitus ([facility]     Myocardiopathy ([facility]     ACE inhibitor-aggravated angioedema     COPD (chronic obstructive pulmonary disease) ([facility]     Acquired angioedema     SOB (shortness of breath)     Disorder of airway     Acquired hypertrophy of tongue   Plan   · Cont airway protection   · Cont IV steroids/h2 blockade   · Start basal insulin along with SSI   · Please see orders for further management and care.

## 2021-08-30 NOTE — SIGNIFICANT EVENT
HPI  Patient is a 78 y.o. female admitted for angioedema secondary to ACE inhibitor usage. She was intubated in the emergency department and brought to the ICU. She was started on high-dose Decadron of 10 mg every 8 hours, Benadryl and Pepcid. She did have a ENT consult. Significant event: Today the patient was successfully extubated to high flow nasal cannula. She did well initially however throughout the day continue to have increased work of breathing, agitation and confusion. RN notified me of respiratory distress. Patient was breathing with a rate of 40 breaths/min, agitated, diaphoretic. I did initially place her on BiPAP in hopes that it would alleviate her work of breathing, however she continued to have increased work of breathing, tachypnea, diaphoresis, tachycardia and hypertension. ABG was drawn which showed a mild hypercapnic respiratory acidosis. Decision was made to intubate. Vocal cords were red and mildly swollen however the ET tube passed through easily.       Past Medical History:  Past Medical History:   Diagnosis Date    Arthritis     Asthma     Cerebral artery occlusion with cerebral infarction ([facility] 2015    COPD (chronic obstructive pulmonary disease) (HCC)     Diabetes mellitus ([facility]     Heart attack ([facility]     Hyperlipidemia     Hypertension     VHD (valvular heart disease)         Family History:   Family History   Problem Relation Age of Onset    COPD Mother     Alzheimer's Disease Mother     Alcohol Abuse Father     No Known Problems Sister     Diabetes Brother     No Known Problems Brother        Allergies:         Ace inhibitors, Angiotensin receptor blockers, Lisinopril, and Pcn [penicillins]    Social history:  Social History     Socioeconomic History    Marital status:      Spouse name: Not on file    Number of children: Not on file    Years of education: Not on file    Highest education level: Not on file   Occupational History    Not on file Tobacco Use    Smoking status: Current Every Day Smoker     Packs/day: 0.25     Years: 45.00     Pack years: 11.25     Types: Cigarettes    Smokeless tobacco: Never Used   Vaping Use    Vaping Use: Never used   Substance and Sexual Activity    Alcohol use: Yes     Alcohol/week: 7.0 - 8.0 standard drinks     Types: 7 - 8 Glasses of wine per week     Comment: glass of wine with dinner. 1 cup of coffee a day     Drug use: Never    Sexual activity: Not on file   Other Topics Concern    Not on file   Social History Narrative    Drinks 2 cups coffee daily. Social Determinants of Health     Financial Resource Strain:     Difficulty of Paying Living Expenses:    Food Insecurity:     Worried About Running Out of Food in the Last Year:     [address] in the Last Year:    Transportation Needs:     Lack of Transportation (Medical):      Lack of Transportation (Non-Medical):    Physical Activity:     Days of Exercise per Week:     Minutes of Exercise per Session:    Stress:     Feeling of Stress :    Social Connections:     Frequency of Communication with Friends and Family:     Frequency of Social Gatherings with Friends and Family:     Attends [employer] Services:     Active Member of Clubs or Organizations:     Attends Club or Organization Meetings:     Marital Status:    Intimate Partner Violence:     Fear of Current or Ex-Partner:     Emotionally Abused:     Physically Abused:     Sexually Abused:        Current Medications:     Current Facility-Administered Medications:     famotidine (PEPCID) injection 20 mg, 20 mg, IntraVENous, Daily, Kenton Fajardo MD, 10 mg at 08/29/21 1351    hydrALAZINE (APRESOLINE) injection 10 mg, 10 mg, IntraVENous, Q4H PRN, Martha Townsend APRN - CNP, 10 mg at 08/29/21 2010    propofol injection, 5-50 mcg/kg/min, IntraVENous, Titrated, Martha Townsend APRN - CNP    rocuronium (ZEMURON) 100 MG/10ML injection, , , ,     meropenem (MERREM) 1,000 mg in sodium chloride 0.9 % 100 mL IVPB (mini-bag), 1,000 mg, IntraVENous, Q8H, SHELBIE Pedro CNP    vancomycin (VANCOCIN) 1,250 mg in dextrose 5 % 250 mL IVPB, 15 mg/kg, IntraVENous, Once, SHELBIE Pedro - CNP    fentaNYL 5 mcg/ml in 0.9%  ml infusion, 12.5-200 mcg/hr, IntraVENous, Continuous, SHELBIE Pedro CNP    [START ON 8/30/2021] QUEtiapine (SEROQUEL) tablet 25 mg, 25 mg, Oral, BID, SHELBIE Pedro CNP    methylPREDNISolone sodium (SOLU-MEDROL) injection 40 mg, 40 mg, IntraVENous, Q8H, Yamilet Gomez MD, 40 mg at 08/29/21 1356    budesonide (PULMICORT) nebulizer suspension 500 mcg, 0.5 mg, Nebulization, BID, Yamilet Gomez MD, 500 mcg at 08/29/21 1951    insulin glargine (LANTUS) injection vial 8 Units, 8 Units, Subcutaneous, BID, Sepideh Britton MD, 8 Units at 08/29/21 1008    dexmedetomidine (PRECEDEX) 400 mcg in sodium chloride 0.9 % 100 mL infusion, 0.2-1.4 mcg/kg/hr, IntraVENous, Continuous, Yamilet Gomez MD, Last Rate: 12.3 mL/hr at 08/29/21 2033, 0.6 mcg/kg/hr at 08/29/21 2033    medicated lip ointment (BLISTEX), , Topical, Daily, Johnathan Rinne, DO, Given at 08/29/21 0744    enoxaparin (LOVENOX) injection 40 mg, 40 mg, Subcutaneous, BID, Yamilet Gomez MD, 40 mg at 08/29/21 2034    insulin lispro (HUMALOG) injection vial 0-18 Units, 0-18 Units, Subcutaneous, Q4H, SHELBIE Pedro CNP, 6 Units at 08/29/21 1353    metoprolol (LOPRESSOR) injection 5 mg, 5 mg, IntraVENous, Q4H PRN, Yamilet Gomez MD, 5 mg at 08/29/21 1351    ipratropium-albuterol (DUONEB) nebulizer solution 3 mL, 1 vial, Inhalation, Q4H, Sepideh Britton MD, 3 mL at 08/29/21 1951    glucose (GLUTOSE) 40 % oral gel 15 g, 15 g, Oral, PRN, Sepideh Britton MD    dextrose 50 % IV solution, 12.5 g, IntraVENous, PRN, Sepideh Britton MD    glucagon (rDNA) injection 1 mg, 1 mg, IntraMUSCular, PRN, MD Uday Boone dextrose 5 % solution, 100 mL/hr, IntraVENous, PRN, Stas Perez MD    sodium chloride flush 0.9 % injection 5-40 mL, 5-40 mL, IntraVENous, 2 times per day, Stas Perez MD, 10 mL at 08/29/21 2034    sodium chloride flush 0.9 % injection 5-40 mL, 5-40 mL, IntraVENous, PRN, Stas Perez MD    0.9 % sodium chloride infusion, 25 mL, IntraVENous, PRN, Stas Perez MD    ondansetron (ZOFRAN-ODT) disintegrating tablet 4 mg, 4 mg, Oral, Q8H PRN **OR** ondansetron (ZOFRAN) injection 4 mg, 4 mg, IntraVENous, Q6H PRN, Stas Perez MD    polyethylene glycol [facility]) packet 17 g, 17 g, Oral, Daily PRN, Stas Perez MD    acetaminophen (TYLENOL) tablet 650 mg, 650 mg, Oral, Q6H PRN, 650 mg at 08/29/21 0900 **OR** acetaminophen (TYLENOL) suppository 650 mg, 650 mg, Rectal, Q6H PRN, Stas Perez MD, 650 mg at 08/29/21 1520    lactated ringers infusion, , IntraVENous, Continuous, Charity Dominguez MD, Last Rate: 125 mL/hr at 08/29/21 1710, New Bag at 08/29/21 1710    Review of Systems:   Unable to provide review of systems secondary to altered mental status    Physical Exam:   Vital Signs:  BP (!) 174/80   Pulse 83   Temp 100.6 °F (38.1 °C) (Esophageal)   Resp (!) 37   Ht 5' 4\" (1.626 m)   Wt 196 lb (88.9 kg)   SpO2 97%   BMI 33.64 kg/m²     Input/Output:   In: 3671.3 [I.V.:3297.3; NG/GT:374]  Out: 7625     Oxygen requirements: 50% FIO2 Vent    Ventilator Information:  Vent Information  $Ventilation: Off Vent  Skin Assessment: Clean, dry, & intact  Vent Type: 980  Vent Mode: AC/VC  Vt Ordered: 0 mL  Rate Set: 0 bmp  Peak Flow: 50 L/min  Pressure Support: 5 cmH20  FiO2 : 40 %  SpO2: 97 %  SpO2/FiO2 ratio: 260  Sensitivity: 3  PEEP/CPAP: 5  I Time/ I Time %: 0 s  Humidification Source: Heated wire  Humidification Temp: 37  Humidification Temp Measured: 37  Circuit Condensation: Not drained    General appearance: Well developed, in respiratory distress  HEENT: Atraumatic/normocephalic, ROLANDO, pharynx clear, moist mucosa, redness of the uvula appreciated, red, edematous vocal cords  Neck: Supple, no jugular venous distension, lymphadenopathy, thyromegaly or carotid bruits  Chest: Equal normal breath sounds, wheezing noted throughout lung bases, tight, no crackles and no tenderness over ribs, large amount of yellow sputum  Cardiovascular: Normal S1 , S2, increased rate rate and irregular rhythm, no murmur, rub or gallop  Abdomen: Normal sounds present, soft, lax with no tenderness, no hepatosplenomegaly, and no masses  Extremities: Edema to bilateral upper and lower extremities, nonpitting. Pulses are equally present. Skin: intact, no rashes   Neurologic: Alert to self only, agitated, able to follow simple commands     Investigations:  Labs, radiological imaging and cardiac work up were reviewed        ICU STAFF PHYSICIAN NOTE OF PERSONAL INVOLVEMENT IN CARE  As the attending physician, I certify that I personally reviewed the patient's history and personally examined the patient to confirm the physical findings described above, and that I reviewed the relevant imaging studies and available reports. I also discussed the differential diagnosis and all of the proposed management plans with the patient and individuals accompanying the patient to this visit. They had the opportunity to ask questions about the proposed management plans and to have those questions answered. This patient has a high probability of sudden, clinically significant deterioration, which requires the highest level of physician preparedness to intervene urgently. I managed/supervised life or organ supporting interventions that required frequent physician assessment. I devoted my full attention to the direct care of this patient for the amount of time indicated below.   Time I spent with family or surrogate(s) is included only if the patient was incapable of providing the necessary information or participating in medical decision-making. Time devoted to teaching and to any procedures I billed separately is not included.   Critical Care Time: 30 minutes      Electronically signed by SHELBIE Sprague CNP on 8/29/2021 at 9:44 PM

## 2021-08-30 NOTE — PLAN OF CARE
Problem: Non-Violent Restraints  Goal: Removal from restraints as soon as assessed to be safe  8/30/2021 1302 by Farzana Mcgovern RN  Outcome: Not Met This Shift  8/30/2021 1235 by Farzana Mcgovern, RN  Reactivated  8/30/2021 1235 by Farzana Mcgovern RN  Reactivated  Goal: No harm/injury to patient while restraints in use  8/30/2021 1302 by Farzana Mcgovern RN  Outcome: Met This Shift  8/30/2021 1235 by Farzana Mcgovern, RN  Reactivated  8/30/2021 1235 by Farzana Mcgovern RN  Reactivated  Goal: Patient's dignity will be maintained  8/30/2021 1302 by Farzana Mcgovern RN  Outcome: Met This Shift  8/30/2021 1235 by Farzana Mcgovern, RN  Reactivated  8/30/2021 1235 by Farzana Mcgovern RN  Reactivated  Goal: Removal from restraints as soon as assessed to be safe  8/30/2021 1302 by Farzana Mcgovern RN  Outcome: Not Met This Shift  8/30/2021 0754 by Kong Stockton RN  Outcome: Not Met This Shift  Goal: No harm/injury to patient while restraints in use  8/30/2021 1302 by Farzana Mcgovern RN  Outcome: Met This Shift  8/30/2021 0754 by Kong Stockton RN  Outcome: Met This Shift  Goal: Patient's dignity will be maintained  8/30/2021 1302 by Farzana Mcgovern RN  Outcome: Met This Shift  8/30/2021 0754 by Kong Stockton RN  Outcome: Met This Shift

## 2021-08-30 NOTE — PROCEDURES
TAM power PICC Placement 8/30/2021    Product number: [id number]   Lot Number: [id number]   Consult: limited access   Ultrasound: yes   Right Brachial vein:                Upper Arm Circumference: 37cm    Size: 5.5fr    Exposed Length: 5cm    Internal Length: 36cm   Cut: 41cm   Vein Measurement: 0.50cm    Consent signed pr family. Pt. Sedated and intubated. Time out prior to procedure. Flushes well, brisk blood return. Tip of picc in lower 1/3 SVC @ [facility] via VPS.  RN notified    Prabhu Rapp RN  8/30/2021  2:05 PM

## 2021-08-30 NOTE — PROGRESS NOTES
Speech Language Pathology  Order noted, chart reviewed, Pt re-intubated and on mechanical ventilation. SLP spoke with nursing regarding D/C'ing current order and requested the order for speech pathology be resubmitted upon extubation.     Sara Schooling, [address] [place]

## 2021-08-31 ENCOUNTER — APPOINTMENT (OUTPATIENT)
Dept: GENERAL RADIOLOGY | Age: 66
DRG: 207 | End: 2021-08-31
Payer: MEDICARE

## 2021-08-31 LAB
ALBUMIN SERPL-MCNC: 2.5 G/DL (ref 3.5–5.2)
ALP BLD-CCNC: 58 U/L (ref 35–104)
ALT SERPL-CCNC: 12 U/L (ref 0–32)
ANION GAP SERPL CALCULATED.3IONS-SCNC: 7 MMOL/L (ref 7–16)
AST SERPL-CCNC: 10 U/L (ref 0–31)
B.E.: 4.5 MMOL/L (ref -3–3)
BASOPHILS ABSOLUTE: 0.03 E9/L (ref 0–0.2)
BASOPHILS RELATIVE PERCENT: 0.2 % (ref 0–2)
BILIRUB SERPL-MCNC: 0.2 MG/DL (ref 0–1.2)
BUN BLDV-MCNC: 23 MG/DL (ref 6–23)
CALCIUM SERPL-MCNC: 8.4 MG/DL (ref 8.6–10.2)
CHLORIDE BLD-SCNC: 100 MMOL/L (ref 98–107)
CO2: 31 MMOL/L (ref 22–29)
COHB: 0.7 % (ref 0–1.5)
CREAT SERPL-MCNC: 0.7 MG/DL (ref 0.5–1)
CRITICAL: ABNORMAL
DATE ANALYZED: ABNORMAL
DATE OF COLLECTION: ABNORMAL
EOSINOPHILS ABSOLUTE: 0 E9/L (ref 0.05–0.5)
EOSINOPHILS RELATIVE PERCENT: 0 % (ref 0–6)
FIO2: 40 %
GFR AFRICAN AMERICAN: >60
GFR NON-AFRICAN AMERICAN: >60 ML/MIN/1.73
GLUCOSE BLD-MCNC: 226 MG/DL (ref 74–99)
HCO3: 29.3 MMOL/L (ref 22–26)
HCT VFR BLD CALC: 32 % (ref 34–48)
HEMOGLOBIN: 10.4 G/DL (ref 11.5–15.5)
HHB: 2.6 % (ref 0–5)
IMMATURE GRANULOCYTES #: 0.16 E9/L
IMMATURE GRANULOCYTES %: 1.3 % (ref 0–5)
LAB: ABNORMAL
LYMPHOCYTES ABSOLUTE: 0.76 E9/L (ref 1.5–4)
LYMPHOCYTES RELATIVE PERCENT: 6 % (ref 20–42)
Lab: ABNORMAL
MAGNESIUM: 2 MG/DL (ref 1.6–2.6)
MCH RBC QN AUTO: 31.1 PG (ref 26–35)
MCHC RBC AUTO-ENTMCNC: 32.5 % (ref 32–34.5)
MCV RBC AUTO: 95.8 FL (ref 80–99.9)
METER GLUCOSE: 210 MG/DL (ref 74–99)
METER GLUCOSE: 236 MG/DL (ref 74–99)
METER GLUCOSE: 248 MG/DL (ref 74–99)
METER GLUCOSE: 272 MG/DL (ref 74–99)
METER GLUCOSE: 289 MG/DL (ref 74–99)
METER GLUCOSE: 306 MG/DL (ref 74–99)
METHB: 0.3 % (ref 0–1.5)
MODE: AC
MONOCYTES ABSOLUTE: 1.48 E9/L (ref 0.1–0.95)
MONOCYTES RELATIVE PERCENT: 11.7 % (ref 2–12)
NEUTROPHILS ABSOLUTE: 10.17 E9/L (ref 1.8–7.3)
NEUTROPHILS RELATIVE PERCENT: 80.8 % (ref 43–80)
O2 CONTENT: 15.6 ML/DL
O2 SATURATION: 97.4 % (ref 92–98.5)
O2HB: 96.4 % (ref 94–97)
OPERATOR ID: 3224
ORGANISM: ABNORMAL
PATIENT TEMP: 37 C
PCO2: 45 MMHG (ref 35–45)
PDW BLD-RTO: 12.3 FL (ref 11.5–15)
PEEP/CPAP: 8 CMH2O
PFO2: 2.64 MMHG/%
PH BLOOD GAS: 7.43 (ref 7.35–7.45)
PHOSPHORUS: 2.6 MG/DL (ref 2.5–4.5)
PLATELET # BLD: 205 E9/L (ref 130–450)
PMV BLD AUTO: 10.1 FL (ref 7–12)
PO2: 105.5 MMHG (ref 75–100)
POTASSIUM SERPL-SCNC: 4.7 MMOL/L (ref 3.5–5)
RBC # BLD: 3.34 E12/L (ref 3.5–5.5)
RI(T): 1.12
RR MECHANICAL: 22 B/MIN
SODIUM BLD-SCNC: 138 MMOL/L (ref 132–146)
SOURCE, BLOOD GAS: ABNORMAL
THB: 11.4 G/DL (ref 11.5–16.5)
TIME ANALYZED: 754
TOTAL PROTEIN: 5.3 G/DL (ref 6.4–8.3)
URINE CULTURE, ROUTINE: ABNORMAL
VANCOMYCIN TROUGH: 10.5 MCG/ML (ref 5–16)
VT MECHANICAL: 400 ML
WBC # BLD: 12.6 E9/L (ref 4.5–11.5)

## 2021-08-31 PROCEDURE — 36592 COLLECT BLOOD FROM PICC: CPT

## 2021-08-31 PROCEDURE — 6370000000 HC RX 637 (ALT 250 FOR IP): Performed by: INTERNAL MEDICINE

## 2021-08-31 PROCEDURE — 2000000000 HC ICU R&B

## 2021-08-31 PROCEDURE — 93308 TTE F-UP OR LMTD: CPT

## 2021-08-31 PROCEDURE — 6360000002 HC RX W HCPCS: Performed by: INTERNAL MEDICINE

## 2021-08-31 PROCEDURE — 36600 WITHDRAWAL OF ARTERIAL BLOOD: CPT

## 2021-08-31 PROCEDURE — 71045 X-RAY EXAM CHEST 1 VIEW: CPT

## 2021-08-31 PROCEDURE — 2580000003 HC RX 258: Performed by: INTERNAL MEDICINE

## 2021-08-31 PROCEDURE — 82962 GLUCOSE BLOOD TEST: CPT

## 2021-08-31 PROCEDURE — 6370000000 HC RX 637 (ALT 250 FOR IP)

## 2021-08-31 PROCEDURE — 82805 BLOOD GASES W/O2 SATURATION: CPT

## 2021-08-31 PROCEDURE — 6360000002 HC RX W HCPCS

## 2021-08-31 PROCEDURE — 94640 AIRWAY INHALATION TREATMENT: CPT

## 2021-08-31 PROCEDURE — 80202 ASSAY OF VANCOMYCIN: CPT

## 2021-08-31 PROCEDURE — 84100 ASSAY OF PHOSPHORUS: CPT

## 2021-08-31 PROCEDURE — 85025 COMPLETE CBC W/AUTO DIFF WBC: CPT

## 2021-08-31 PROCEDURE — 80053 COMPREHEN METABOLIC PANEL: CPT

## 2021-08-31 PROCEDURE — 2580000003 HC RX 258

## 2021-08-31 PROCEDURE — 83735 ASSAY OF MAGNESIUM: CPT

## 2021-08-31 PROCEDURE — 94003 VENT MGMT INPAT SUBQ DAY: CPT

## 2021-08-31 PROCEDURE — 2500000003 HC RX 250 WO HCPCS: Performed by: INTERNAL MEDICINE

## 2021-08-31 RX ORDER — METHYLPREDNISOLONE SODIUM SUCCINATE 40 MG/ML
40 INJECTION, POWDER, LYOPHILIZED, FOR SOLUTION INTRAMUSCULAR; INTRAVENOUS EVERY 8 HOURS
Status: DISCONTINUED | OUTPATIENT
Start: 2021-08-31 | End: 2021-09-01

## 2021-08-31 RX ADMIN — DIMETHICONE, CAMPHOR (SYNTHETIC), MENTHOL, AND PHENOL: 1.1; .5; .625; .5 OINTMENT TOPICAL at 08:24

## 2021-08-31 RX ADMIN — ALBUTEROL SULFATE 2.5 MG: 2.5 SOLUTION RESPIRATORY (INHALATION) at 07:15

## 2021-08-31 RX ADMIN — ALBUTEROL SULFATE 2.5 MG: 2.5 SOLUTION RESPIRATORY (INHALATION) at 10:45

## 2021-08-31 RX ADMIN — SODIUM CHLORIDE, PRESERVATIVE FREE 300 UNITS: 5 INJECTION INTRAVENOUS at 08:13

## 2021-08-31 RX ADMIN — INSULIN LISPRO 9 UNITS: 100 INJECTION, SOLUTION INTRAVENOUS; SUBCUTANEOUS at 13:41

## 2021-08-31 RX ADMIN — BUDESONIDE 500 MCG: 0.5 INHALANT RESPIRATORY (INHALATION) at 07:15

## 2021-08-31 RX ADMIN — VANCOMYCIN HYDROCHLORIDE 1000 MG: 1 INJECTION, POWDER, LYOPHILIZED, FOR SOLUTION INTRAVENOUS at 21:00

## 2021-08-31 RX ADMIN — PROPOFOL 45 MCG/KG/MIN: 10 INJECTION, EMULSION INTRAVENOUS at 05:35

## 2021-08-31 RX ADMIN — CEFTRIAXONE SODIUM 1000 MG: 1 INJECTION, POWDER, FOR SOLUTION INTRAMUSCULAR; INTRAVENOUS at 13:30

## 2021-08-31 RX ADMIN — FAMOTIDINE 20 MG: 10 INJECTION INTRAVENOUS at 08:12

## 2021-08-31 RX ADMIN — INSULIN LISPRO 12 UNITS: 100 INJECTION, SOLUTION INTRAVENOUS; SUBCUTANEOUS at 18:30

## 2021-08-31 RX ADMIN — ALBUTEROL SULFATE 2.5 MG: 2.5 SOLUTION RESPIRATORY (INHALATION) at 19:04

## 2021-08-31 RX ADMIN — PROPOFOL 30 MCG/KG/MIN: 10 INJECTION, EMULSION INTRAVENOUS at 20:18

## 2021-08-31 RX ADMIN — BUDESONIDE 500 MCG: 0.5 INHALANT RESPIRATORY (INHALATION) at 19:04

## 2021-08-31 RX ADMIN — Medication 10 ML: at 08:13

## 2021-08-31 RX ADMIN — INSULIN LISPRO 6 UNITS: 100 INJECTION, SOLUTION INTRAVENOUS; SUBCUTANEOUS at 09:30

## 2021-08-31 RX ADMIN — Medication 10 ML: at 20:53

## 2021-08-31 RX ADMIN — INSULIN GLARGINE 8 UNITS: 100 INJECTION, SOLUTION SUBCUTANEOUS at 09:24

## 2021-08-31 RX ADMIN — INSULIN GLARGINE 8 UNITS: 100 INJECTION, SOLUTION SUBCUTANEOUS at 20:50

## 2021-08-31 RX ADMIN — METHYLPREDNISOLONE SODIUM SUCCINATE 20 MG: 40 INJECTION, POWDER, FOR SOLUTION INTRAMUSCULAR; INTRAVENOUS at 06:12

## 2021-08-31 RX ADMIN — SODIUM CHLORIDE, PRESERVATIVE FREE 300 UNITS: 5 INJECTION INTRAVENOUS at 20:54

## 2021-08-31 RX ADMIN — ALBUTEROL SULFATE 2.5 MG: 2.5 SOLUTION RESPIRATORY (INHALATION) at 22:09

## 2021-08-31 RX ADMIN — METHYLPREDNISOLONE SODIUM SUCCINATE 40 MG: 40 INJECTION, POWDER, FOR SOLUTION INTRAMUSCULAR; INTRAVENOUS at 23:32

## 2021-08-31 RX ADMIN — INSULIN LISPRO 9 UNITS: 100 INJECTION, SOLUTION INTRAVENOUS; SUBCUTANEOUS at 20:50

## 2021-08-31 RX ADMIN — FENTANYL CITRATE 50 MCG/HR: 50 INJECTION, SOLUTION INTRAMUSCULAR; INTRAVENOUS at 04:30

## 2021-08-31 RX ADMIN — PROPOFOL 45 MCG/KG/MIN: 10 INJECTION, EMULSION INTRAVENOUS at 01:02

## 2021-08-31 RX ADMIN — MEROPENEM 1000 MG: 1 INJECTION, POWDER, FOR SOLUTION INTRAVENOUS at 06:12

## 2021-08-31 RX ADMIN — Medication 0.3 MCG/KG/HR: at 04:34

## 2021-08-31 RX ADMIN — SODIUM CHLORIDE, POTASSIUM CHLORIDE, SODIUM LACTATE AND CALCIUM CHLORIDE: 600; 310; 30; 20 INJECTION, SOLUTION INTRAVENOUS at 13:34

## 2021-08-31 RX ADMIN — ENOXAPARIN SODIUM 40 MG: 40 INJECTION SUBCUTANEOUS at 08:30

## 2021-08-31 RX ADMIN — ALBUTEROL SULFATE 2.5 MG: 2.5 SOLUTION RESPIRATORY (INHALATION) at 13:47

## 2021-08-31 RX ADMIN — QUETIAPINE FUMARATE 25 MG: 25 TABLET ORAL at 10:13

## 2021-08-31 RX ADMIN — VANCOMYCIN HYDROCHLORIDE 1000 MG: 1 INJECTION, POWDER, LYOPHILIZED, FOR SOLUTION INTRAVENOUS at 10:49

## 2021-08-31 RX ADMIN — PROPOFOL 35 MCG/KG/MIN: 10 INJECTION, EMULSION INTRAVENOUS at 13:35

## 2021-08-31 RX ADMIN — INSULIN LISPRO 6 UNITS: 100 INJECTION, SOLUTION INTRAVENOUS; SUBCUTANEOUS at 02:18

## 2021-08-31 RX ADMIN — METHYLPREDNISOLONE SODIUM SUCCINATE 40 MG: 40 INJECTION, POWDER, FOR SOLUTION INTRAMUSCULAR; INTRAVENOUS at 14:00

## 2021-08-31 RX ADMIN — PROPOFOL 20 MCG/KG/MIN: 10 INJECTION, EMULSION INTRAVENOUS at 10:10

## 2021-08-31 RX ADMIN — QUETIAPINE FUMARATE 25 MG: 25 TABLET ORAL at 20:42

## 2021-08-31 RX ADMIN — Medication 0.3 MCG/KG/HR: at 18:29

## 2021-08-31 RX ADMIN — ALBUTEROL SULFATE 2.5 MG: 2.5 SOLUTION RESPIRATORY (INHALATION) at 01:39

## 2021-08-31 RX ADMIN — INSULIN LISPRO 6 UNITS: 100 INJECTION, SOLUTION INTRAVENOUS; SUBCUTANEOUS at 06:16

## 2021-08-31 ASSESSMENT — PULMONARY FUNCTION TESTS
PIF_VALUE: 31
PIF_VALUE: 22
PIF_VALUE: 35
PIF_VALUE: 20
PIF_VALUE: 33
PIF_VALUE: 26
PIF_VALUE: 24
PIF_VALUE: 28
PIF_VALUE: 36
PIF_VALUE: 32
PIF_VALUE: 22
PIF_VALUE: 45
PIF_VALUE: 31
PIF_VALUE: 32
PIF_VALUE: 24
PIF_VALUE: 24
PIF_VALUE: 28
PIF_VALUE: 31
PIF_VALUE: 19
PIF_VALUE: 25
PIF_VALUE: 37
PIF_VALUE: 37
PIF_VALUE: 23
PIF_VALUE: 26
PIF_VALUE: 25
PIF_VALUE: 27
PIF_VALUE: 27
PIF_VALUE: 31
PIF_VALUE: 31
PIF_VALUE: 34
PIF_VALUE: 38
PIF_VALUE: 31
PIF_VALUE: 25

## 2021-08-31 NOTE — PROGRESS NOTES
Subjective:  Jai Grant was seen and examined in the ICU today. The patient remains reintubated today. There were no new problems reported o/w  No family at bedside    A complete review of systems and social history was completed on admission and remains unchanged unless otherwise noted    Scheduled Meds:   methylPREDNISolone  40 mg IntraVENous Q8H    cefTRIAXone (ROCEPHIN) IV  1,000 mg IntraVENous Q24H    enoxaparin  40 mg SubCUTAneous Daily    albuterol  2.5 mg Nebulization Q4H    famotidine (PEPCID) injection  20 mg IntraVENous Daily    QUEtiapine  25 mg Oral BID    lidocaine PF  5 mL IntraDERmal Once    sodium chloride flush  5-40 mL IntraVENous 2 times per day    heparin flush  3 mL IntraVENous 2 times per day    vancomycin  1,000 mg IntraVENous Q12H    budesonide  0.5 mg Nebulization BID    insulin glargine  8 Units SubCUTAneous BID    medicated lip ointment   Topical Daily    insulin lispro  0-18 Units SubCUTAneous Q4H    sodium chloride flush  5-40 mL IntraVENous 2 times per day     Continuous Infusions:   propofol 35 mcg/kg/min (08/31/21 1335)    fentaNYL 5 mcg/ml in 0.9%  ml infusion 50 mcg/hr (08/31/21 1005)    sodium chloride      dexmedetomidine HCl in NaCl 0.3 mcg/kg/hr (08/31/21 0434)    dextrose      sodium chloride      lactated ringers 50 mL/hr at 08/31/21 1334     PRN Meds:perflutren lipid microspheres, hydrALAZINE, sodium chloride flush, sodium chloride, heparin flush, metoprolol, glucose, dextrose, glucagon (rDNA), dextrose, sodium chloride flush, sodium chloride, ondansetron **OR** ondansetron, polyethylene glycol, acetaminophen **OR** acetaminophen    Objective:  BP (!) 151/74   Pulse 81   Temp 99.7 °F (37.6 °C) (Core)   Resp 24   Ht 5' 4\" (1.626 m)   Wt 197 lb (89.4 kg)   SpO2 96%   BMI 33.81 kg/m²   In: 2394 [I.V.:1285;  NG/GT:859]  Out: 1400    In: 2394   Out: 1400 [Urine:1400]     Intubated and sedated  RRR, pos S1, S2  Tight with no wheeze, rales or rhonchi  bowel sounds present, nontender, nondistended  No clubbing, cyanosis, or edema  No neuro changes   No obvious rashes or lesions. Recent Labs     08/29/21  0554 08/30/21  0456 08/31/21  0602   WBC 12.1* 14.7* 12.6*   HGB 11.4* 11.6 10.4*    201 205     Recent Labs     08/29/21  0554 08/30/21  0456 08/31/21  0602    139 138   K 4.4 4.5 4.7    100 100   CO2 29 29 31*   BUN 21 22 23   CREATININE 0.8 0.8 0.7   GLUCOSE 251* 180* 226*     Recent Labs     08/29/21  0554 08/30/21  0456 08/31/21  0602   BILITOT 0.3 0.4 0.2   ALKPHOS 54 54 58   AST 15 15 10   ALT 14 13 12     No results for input(s): INR in the last 72 hours. Invalid input(s): PT  No results for input(s): CKTOTAL, CKMB, CKMBINDEX, TROPONINI in the last 72 hours. XR CHEST PORTABLE    Result Date: 8/25/2021  EXAMINATION: ONE XRAY VIEW OF THE CHEST 8/24/2021 8:52 pm COMPARISON: 08/24/2021 HISTORY: ORDERING SYSTEM PROVIDED HISTORY: et tube placement TECHNOLOGIST PROVIDED HISTORY: Reason for exam:->et tube placement FINDINGS: Stable position of endotracheal tube. Cardiomediastinal silhouette is unremarkable. No infiltrate, effusion, or pneumothorax. No acute osseous abnormality. No pulmonary infiltrates. No significant change since the prior study     XR CHEST PORTABLE    Result Date: 8/24/2021  EXAMINATION: ONE XRAY VIEW OF THE CHEST 8/24/2021 5:30 pm COMPARISON: January 3, 2020 HISTORY: ORDERING SYSTEM PROVIDED HISTORY: SOB (shortness of breath) TECHNOLOGIST PROVIDED HISTORY: Reason for exam:->ETT placement, tongue swelling FINDINGS: Endotracheal tube is approximately 6.8 cm above the chivo. No airspace opacity or pleural effusion. The heart is normal in size. No pneumothorax. 1. Endotracheal tube is approximately 6.8 cm above the chivo. 2. No airspace opacity or pleural effusion.      Assessment:  Carri Arshad is a 78y.o. year old female who presented on 8/24/2021 and is being treated for:  Principal Problem:    Acute respiratory failure (HCC)  Active Problems:    Essential hypertension    Diabetes mellitus (HCC)    Myocardiopathy (HCC)    ACE inhibitor-aggravated angioedema    COPD (chronic obstructive pulmonary disease) (HCC)    Acquired angioedema    SOB (shortness of breath)    Disorder of airway    Acquired hypertrophy of tongue  Resolved Problems:    * No resolved hospital problems. *    Plan  · Mechanical ventilation/vent weaning as per pulmonary  · Cont IV steroids/nebs  · Monitor BS and cover with SSI  · Please see orders for further management and care.     Pam Blanco MD  6:01 PM  8/31/2021

## 2021-08-31 NOTE — PROGRESS NOTES
Pharmacy Consultation Note  (Antibiotic Dosing and Monitoring)    Initial consult date: 21  Consulting physician: AUSTIN Walsh  Drug(s): Vancomycin  Indication: HAP/VAP    Ht Readings from Last 1 Encounters:   21 5' 4\" (1.626 m)     Wt Readings from Last 1 Encounters:   21 197 lb (89.4 kg)         Age/  Gender IBW DW  Allergy Information   78 y.o.     female 54.7 kg 68.4 kg  Ace inhibitors, Angiotensin receptor blockers, Lisinopril, and Pcn [penicillins]                 Date  WBC BUN/CR UOP Drug/Dose Time   Given Level(s)   (Time) Comments     (#1) 12.1 21/0.8 -- Vancomycin 1,250 mg IV   (#2) 14.7 22/0.8 2.4 Vancomycin 1,000 mg IV Q12H 1007    (#3) 12.6 23/0.7 0.9 Vancomycin 1,000 mg IV Q12H <1000> Trough @ 0917 = 10.5 mcg/mL Hold dose if trough is >20 mcg/mL     (#4)            (#5)            (#6)            (#7)            Estimated Creatinine Clearance: 84 mL/min (based on SCr of 0.7 mg/dL). UOP over the past 24 hours:       Intake/Output Summary (Last 24 hours) at 2021 1004  Last data filed at 2021 0600  Gross per 24 hour   Intake 4452 ml   Output 1950 ml   Net 2502 ml       Temp max: Temp (24hrs), Av.4 °F (36.9 °C), Min:97.2 °F (36.2 °C), Max:100.3 °F (37.9 °C)      Antibiotic Regimen:  Antibiotic Dose Date Initiated   Meropenem 1 g Q8H      Cultures:  available culture and sensitivity results were reviewed in EPIC  Cultures sent and are pending. Culture Date Result    Resp cx  Abundant PMN's  No epi cells  Rare gram + diplococci  Few GNR  Staph aureus   Urine  E coli   Blood x 2  NGTD     Assessment:  · Consulted by AUSTIN Walsh to dose/monitor vancomycin  · Goal trough level:  10-20 mcg/mL, AUC/JEN: 400-600  · Pt is a 78 yof admitted with angioedema requiring intubation in the ED. Patient was extubated to [facility] and was re-intubated overnight for respiratory distress.  Empiric antibiotics have been initiated for pneumonia.   · Serum creatinine today: 0.7; CrCl ~ 84 mL/min; baseline Scr ~ 0.8  · 8/31: Trough @ 0917 = 10.5 mcg/mL, AUC/    Plan:  · Continue Vancomycin 1,000 mg IV Q12H  · Follow renal function  · Pharmacist will follow and monitor/adjust dosing as necessary      Thank you for the consult,    Aby Rodriguez [facility] PharmD, BCPS 8/31/2021 10:04 AM

## 2021-08-31 NOTE — PLAN OF CARE
Problem: Non-Violent Restraints  Goal: No harm/injury to patient while restraints in use  Outcome: Met This Shift     Problem: Non-Violent Restraints  Goal: Patient's dignity will be maintained  Outcome: Met This Shift     Problem: Falls - Risk of:  Goal: Will remain free from falls  Description: Will remain free from falls  Outcome: Met This Shift     Problem: Falls - Risk of:  Goal: Absence of physical injury  Description: Absence of physical injury  Outcome: Met This Shift     Problem: Skin Integrity:  Goal: Will show no infection signs and symptoms  Description: Will show no infection signs and symptoms  Outcome: Met This Shift     Problem: Skin Integrity:  Goal: Absence of new skin breakdown  Description: Absence of new skin breakdown  Outcome: Met This Shift     Problem: Breathing Pattern - Ineffective:  Goal: Ability to achieve and maintain a regular respiratory rate will improve  Description: Ability to achieve and maintain a regular respiratory rate will improve  Outcome: Met This Shift     Problem: Airway Clearance - Ineffective:  Goal: Ability to maintain a clear airway will improve  Description: Ability to maintain a clear airway will improve  Outcome: Met This Shift     Problem: Non-Violent Restraints  Goal: Removal from restraints as soon as assessed to be safe  Outcome: Not Met This Shift

## 2021-08-31 NOTE — PLAN OF CARE
Problem: Non-Violent Restraints  Goal: Removal from restraints as soon as assessed to be safe  8/31/2021 1252 by Brodie Monroy RN  Outcome: Not Met This Shift  8/31/2021 0700 by Fern Lin RN  Outcome: Not Met This Shift  Goal: No harm/injury to patient while restraints in use  8/31/2021 1252 by Brodie Monroy RN  Outcome: Met This Shift  8/31/2021 0700 by Fern Lin RN  Outcome: Met This Shift  Goal: Patient's dignity will be maintained  8/31/2021 1252 by Brodie Monroy RN  Outcome: Met This Shift  8/31/2021 0700 by Fern Lin RN  Outcome: Met This Shift  Goal: Removal from restraints as soon as assessed to be safe  Outcome: Not Met This Shift  Goal: No harm/injury to patient while restraints in use  Outcome: Met This Shift  Goal: Patient's dignity will be maintained  Outcome: Met This Shift     Problem: Falls - Risk of:  Goal: Will remain free from falls  Description: Will remain free from falls  8/31/2021 1252 by Brodie Monroy RN  Outcome: Met This Shift  8/31/2021 0700 by Fern Lin RN  Outcome: Met This Shift  Goal: Absence of physical injury  Description: Absence of physical injury  8/31/2021 1252 by Brodie Monroy RN  Outcome: Met This Shift  8/31/2021 0700 by Fern Lin RN  Outcome: Met This Shift     Problem: Skin Integrity:  Goal: Will show no infection signs and symptoms  Description: Will show no infection signs and symptoms  8/31/2021 1252 by Brodie Monroy RN  Outcome: Met This Shift  8/31/2021 0700 by Fenr Lin RN  Outcome: Met This Shift  Goal: Absence of new skin breakdown  Description: Absence of new skin breakdown  8/31/2021 1252 by Brodie Monory RN  Outcome: Met This Shift  8/31/2021 0700 by Fern Lin RN  Outcome: Met This Shift

## 2021-08-31 NOTE — PROGRESS NOTES
Pulmonary/Critical Care Progress Note    We are following patient for angioedema of tongue with upper airway obstruction secondary to ACE inhibitors, acute respiratory failure, Staph aureus respiratory infection, E. coli urinary tract infection, severe COPD with exacerbation secondary to cigarette smoking    SUBJECTIVE:  The patient is still sedated on propofol and fentanyl and is also receiving Precedex, albeit at a low dose. Chest x-ray today does not show any specific infiltrates. Her E. coli is sensitive to meropenem and the staph aureus is being treated with vancomycin although sensitivities are not available yet. There is significantly less wheezing today so that I think she is a candidate for weaning from the ventilator. We will put a hold on her sedation and reevaluate when she is awake. If she does well, she will be placed on pressure support/CPAP in an effort to move toward extubation.     MEDICATIONS:   enoxaparin  40 mg SubCUTAneous Daily    albuterol  2.5 mg Nebulization Q4H    methylPREDNISolone  20 mg IntraVENous Q8H    famotidine (PEPCID) injection  20 mg IntraVENous Daily    meropenem  1,000 mg IntraVENous Q8H    QUEtiapine  25 mg Oral BID    lidocaine PF  5 mL IntraDERmal Once    sodium chloride flush  5-40 mL IntraVENous 2 times per day    heparin flush  3 mL IntraVENous 2 times per day    vancomycin  1,000 mg IntraVENous Q12H    budesonide  0.5 mg Nebulization BID    insulin glargine  8 Units SubCUTAneous BID    medicated lip ointment   Topical Daily    insulin lispro  0-18 Units SubCUTAneous Q4H    sodium chloride flush  5-40 mL IntraVENous 2 times per day      propofol 45 mcg/kg/min (08/31/21 0531)    fentaNYL 5 mcg/ml in 0.9%  ml infusion 50 mcg/hr (08/31/21 1790)    sodium chloride      dexmedetomidine HCl in NaCl 0.3 mcg/kg/hr (08/31/21 3224)    dextrose      sodium chloride      lactated ringers 50 mL/hr at 08/30/21 5649     perflutren lipid microspheres, hydrALAZINE, sodium chloride flush, sodium chloride, heparin flush, metoprolol, glucose, dextrose, glucagon (rDNA), dextrose, sodium chloride flush, sodium chloride, ondansetron **OR** ondansetron, polyethylene glycol, acetaminophen **OR** acetaminophen      REVIEW OF SYSTEMS:  Patient is intubated and sedated and cannot respond to questions  OBJECTIVE:  Vitals:    08/31/21 0800   BP: (!) 142/99   Pulse: 85   Resp: 30   Temp: 98.9 °F (37.2 °C)   SpO2: 97%     FiO2 : 40 %  O2 Flow Rate (L/min): 7 L/min  O2 Device: Ventilator    PHYSICAL EXAM:  Constitutional: No fever, chills, diaphoresis  Skin: Skin rash, no skin breakdown  HEENT: Endotracheal tube secured at lips  Neck: No JVD, lymphadenopathy, thyromegaly  Cardiovascular: S1, S2 regular. No S3 murmurs rubs present  Respiratory: Soft expiratory wheeze with improved airflow from yesterday.   No crackles or rubs heard  Gastrointestinal: Soft, obese, nontender  Genitourinary: No grossly bloody urine  Extremities: Clubbing, cyanosis, or edema  Neurological: Sedated  Psychological: Sedated    LABS:  WBC   Date Value Ref Range Status   08/31/2021 12.6 (H) 4.5 - 11.5 E9/L Final   08/30/2021 14.7 (H) 4.5 - 11.5 E9/L Final   08/29/2021 12.1 (H) 4.5 - 11.5 E9/L Final     Hemoglobin   Date Value Ref Range Status   08/31/2021 10.4 (L) 11.5 - 15.5 g/dL Final   08/30/2021 11.6 11.5 - 15.5 g/dL Final   08/29/2021 11.4 (L) 11.5 - 15.5 g/dL Final     Hematocrit   Date Value Ref Range Status   08/31/2021 32.0 (L) 34.0 - 48.0 % Final   08/30/2021 35.7 34.0 - 48.0 % Final   08/29/2021 35.5 34.0 - 48.0 % Final     MCV   Date Value Ref Range Status   08/31/2021 95.8 80.0 - 99.9 fL Final   08/30/2021 95.5 80.0 - 99.9 fL Final   08/29/2021 96.5 80.0 - 99.9 fL Final     Platelets   Date Value Ref Range Status   08/31/2021 205 130 - 450 E9/L Final   08/30/2021 201 130 - 450 E9/L Final   08/29/2021 186 130 - 450 E9/L Final     Sodium   Date Value Ref Range Status   08/31/2021 138 132 - 146 mmol/L Final   08/30/2021 139 132 - 146 mmol/L Final   08/29/2021 137 132 - 146 mmol/L Final     Potassium   Date Value Ref Range Status   08/31/2021 4.7 3.5 - 5.0 mmol/L Final   08/30/2021 4.5 3.5 - 5.0 mmol/L Final   08/29/2021 4.4 3.5 - 5.0 mmol/L Final     Potassium reflex Magnesium   Date Value Ref Range Status   08/25/2021 4.7 3.5 - 5.0 mmol/L Final   08/24/2021 4.4 3.5 - 5.0 mmol/L Final   07/28/2020 3.9 3.5 - 5.0 mmol/L Final     Chloride   Date Value Ref Range Status   08/31/2021 100 98 - 107 mmol/L Final   08/30/2021 100 98 - 107 mmol/L Final   08/29/2021 100 98 - 107 mmol/L Final     CO2   Date Value Ref Range Status   08/31/2021 31 (H) 22 - 29 mmol/L Final   08/30/2021 29 22 - 29 mmol/L Final   08/29/2021 29 22 - 29 mmol/L Final     BUN   Date Value Ref Range Status   08/31/2021 23 6 - 23 mg/dL Final   08/30/2021 22 6 - 23 mg/dL Final   08/29/2021 21 6 - 23 mg/dL Final     CREATININE   Date Value Ref Range Status   08/31/2021 0.7 0.5 - 1.0 mg/dL Final   08/30/2021 0.8 0.5 - 1.0 mg/dL Final   08/29/2021 0.8 0.5 - 1.0 mg/dL Final     Glucose   Date Value Ref Range Status   08/31/2021 226 (H) 74 - 99 mg/dL Final   08/30/2021 180 (H) 74 - 99 mg/dL Final   08/29/2021 251 (H) 74 - 99 mg/dL Final     Calcium   Date Value Ref Range Status   08/31/2021 8.4 (L) 8.6 - 10.2 mg/dL Final   08/30/2021 8.2 (L) 8.6 - 10.2 mg/dL Final   08/29/2021 8.2 (L) 8.6 - 10.2 mg/dL Final     Total Protein   Date Value Ref Range Status   08/31/2021 5.3 (L) 6.4 - 8.3 g/dL Final   08/30/2021 5.6 (L) 6.4 - 8.3 g/dL Final   08/29/2021 5.5 (L) 6.4 - 8.3 g/dL Final     Albumin   Date Value Ref Range Status   08/31/2021 2.5 (L) 3.5 - 5.2 g/dL Final   08/30/2021 2.7 (L) 3.5 - 5.2 g/dL Final   08/29/2021 2.7 (L) 3.5 - 5.2 g/dL Final     Total Bilirubin   Date Value Ref Range Status   08/31/2021 0.2 0.0 - 1.2 mg/dL Final   08/30/2021 0.4 0.0 - 1.2 mg/dL Final   08/29/2021 0.3 0.0 - 1.2 mg/dL Final     Alkaline Phosphatase   Date Value Ref the chivo. It could be withdrawn 1-2 cm for   better tube position. XR CHEST PORTABLE   Final Result   1. There is no acute cardiopulmonary disease   2. Stable position of support lines and tubes. CT SOFT TISSUE NECK W CONTRAST   Final Result   Limited evaluation of the pharynx, larynx and hypopharynx due to presence of   ETT and OG tube. Prominence of the bilateral adenoids, likely related to   infection/inflammation. Mild prominence of the bilateral palatine and lingual tonsils, and narrowing   of the bilateral piriform sinuses, likely reactive. Mild infiltration of fat in the bilateral carotid spaces, particularly at the   level of carotid bulbs, right more than left, likely related to extension of   inflammatory changes. Acute sinusitis. XR CHEST PORTABLE   Final Result   1. Endotracheal tube is present with distal tip approximately 4.5 cm above   the chivo. 2. No airspace opacity or pleural effusion. XR ABDOMEN FOR NG/OG/NE TUBE PLACEMENT   Final Result   1. Satisfactory position of the NG tube within the stomach         XR CHEST PORTABLE   Final Result   No pulmonary infiltrates. No significant change since the prior study         XR CHEST PORTABLE   Final Result   1. Endotracheal tube is approximately 6.8 cm above the chivo. 2. No airspace opacity or pleural effusion. PROBLEM LIST:  Principal Problem:    Acute respiratory failure (HCC)  Active Problems:    Essential hypertension    Diabetes mellitus ([facility] [address]    Myocardiopathy (HCC)    ACE inhibitor-aggravated angioedema    COPD (chronic obstructive pulmonary disease) (HCC)    Acquired angioedema    SOB (shortness of breath)    Disorder of airway    Acquired hypertrophy of tongue  Resolved Problems:    * No resolved hospital problems. *      IMPRESSION:  1. Acute hypoxemic respiratory failure  2. Severe COPD with exacerbation  3. Status post angioedema secondary to ACE I use  4.  Diabetes mellitus type 2  5. Staphylococcal aureus bronchitis versus pneumonia  6. E. coli urinary tract infection    PLAN:  1. Hope for echocardiogram today  2. Continue IV vancomycin and meropenem for now  3. Sedation vacation possibly to be followed by CPAP/pressure support weaning trial  4. Continue IV steroids  5. Continue small amounts of IV fluids  6. Consider keeping low-dose Precedex drip through the weaning process    ATTESTATION:  ICU Staff Physician note of personal involvement in Care  As the attending physician, I certify that I personally reviewed the patients history and personally examined the patient to confirm the physical findings described above,  And that I reviewed the relevant imaging studies and available reports. I also discussed the differential diagnosis and all of the proposed management plans with the patient and individuals accompanying the patient to this visit. They had the opportunity to ask questions about the proposed management plans and to have those questions answered. This patient has a high probability of sudden, clinically significant deterioration, which requires the highest level of physician preparedness to intervene urgently. I managed/supervised life or organ supporting interventions that required frequent physician assessment. I devoted my full attention to the direct care of this patient for the amount of time indicated below. Time I spent with the family or surrogate(s) is included only if the patient was incapable of providing the necessary information or participating in medical decisions - Time devoted to teaching and to any procedures I billed separately is not included.     CRITICAL CARE TIME:  35 minutes    Electronically signed by Darlene Stephenson MD on 8/31/2021 at 9:08 AM

## 2021-08-31 NOTE — PROGRESS NOTES
Sedation stopped for SBT, pt became tachypneic, tachycardic and hypertensive on AC mode, SBT not done, sedation restarted.

## 2021-09-01 LAB
ALBUMIN SERPL-MCNC: 2.7 G/DL (ref 3.5–5.2)
ALP BLD-CCNC: 58 U/L (ref 35–104)
ALT SERPL-CCNC: 14 U/L (ref 0–32)
ANION GAP SERPL CALCULATED.3IONS-SCNC: 8 MMOL/L (ref 7–16)
AST SERPL-CCNC: 13 U/L (ref 0–31)
BASOPHILS ABSOLUTE: 0.03 E9/L (ref 0–0.2)
BASOPHILS RELATIVE PERCENT: 0.2 % (ref 0–2)
BILIRUB SERPL-MCNC: <0.2 MG/DL (ref 0–1.2)
BUN BLDV-MCNC: 26 MG/DL (ref 6–23)
CALCIUM SERPL-MCNC: 8.4 MG/DL (ref 8.6–10.2)
CHLORIDE BLD-SCNC: 98 MMOL/L (ref 98–107)
CO2: 30 MMOL/L (ref 22–29)
CREAT SERPL-MCNC: 0.8 MG/DL (ref 0.5–1)
CULTURE, RESPIRATORY: ABNORMAL
EOSINOPHILS ABSOLUTE: 0 E9/L (ref 0.05–0.5)
EOSINOPHILS RELATIVE PERCENT: 0 % (ref 0–6)
GFR AFRICAN AMERICAN: >60
GFR NON-AFRICAN AMERICAN: >60 ML/MIN/1.73
GLUCOSE BLD-MCNC: 305 MG/DL (ref 74–99)
HCT VFR BLD CALC: 33.2 % (ref 34–48)
HEMOGLOBIN: 10.5 G/DL (ref 11.5–15.5)
IMMATURE GRANULOCYTES #: 0.19 E9/L
IMMATURE GRANULOCYTES %: 1.5 % (ref 0–5)
LYMPHOCYTES ABSOLUTE: 0.7 E9/L (ref 1.5–4)
LYMPHOCYTES RELATIVE PERCENT: 5.4 % (ref 20–42)
MAGNESIUM: 2.1 MG/DL (ref 1.6–2.6)
MCH RBC QN AUTO: 30.6 PG (ref 26–35)
MCHC RBC AUTO-ENTMCNC: 31.6 % (ref 32–34.5)
MCV RBC AUTO: 96.8 FL (ref 80–99.9)
METER GLUCOSE: 264 MG/DL (ref 74–99)
METER GLUCOSE: 265 MG/DL (ref 74–99)
METER GLUCOSE: 277 MG/DL (ref 74–99)
METER GLUCOSE: 281 MG/DL (ref 74–99)
METER GLUCOSE: 295 MG/DL (ref 74–99)
METER GLUCOSE: 326 MG/DL (ref 74–99)
MONOCYTES ABSOLUTE: 1.04 E9/L (ref 0.1–0.95)
MONOCYTES RELATIVE PERCENT: 8.1 % (ref 2–12)
MRSA CULTURE ONLY: NORMAL
NEUTROPHILS ABSOLUTE: 10.9 E9/L (ref 1.8–7.3)
NEUTROPHILS RELATIVE PERCENT: 84.8 % (ref 43–80)
ORGANISM: ABNORMAL
ORGANISM: ABNORMAL
PDW BLD-RTO: 12.3 FL (ref 11.5–15)
PHOSPHORUS: 3.5 MG/DL (ref 2.5–4.5)
PLATELET # BLD: 240 E9/L (ref 130–450)
PMV BLD AUTO: 10.2 FL (ref 7–12)
POTASSIUM SERPL-SCNC: 5 MMOL/L (ref 3.5–5)
RBC # BLD: 3.43 E12/L (ref 3.5–5.5)
SMEAR, RESPIRATORY: ABNORMAL
SODIUM BLD-SCNC: 136 MMOL/L (ref 132–146)
TOTAL PROTEIN: 5.6 G/DL (ref 6.4–8.3)
WBC # BLD: 12.9 E9/L (ref 4.5–11.5)

## 2021-09-01 PROCEDURE — 83735 ASSAY OF MAGNESIUM: CPT

## 2021-09-01 PROCEDURE — 6360000002 HC RX W HCPCS: Performed by: NURSE PRACTITIONER

## 2021-09-01 PROCEDURE — 6360000002 HC RX W HCPCS: Performed by: INTERNAL MEDICINE

## 2021-09-01 PROCEDURE — 6370000000 HC RX 637 (ALT 250 FOR IP)

## 2021-09-01 PROCEDURE — 2580000003 HC RX 258: Performed by: INTERNAL MEDICINE

## 2021-09-01 PROCEDURE — 6360000002 HC RX W HCPCS

## 2021-09-01 PROCEDURE — 6370000000 HC RX 637 (ALT 250 FOR IP): Performed by: INTERNAL MEDICINE

## 2021-09-01 PROCEDURE — 85025 COMPLETE CBC W/AUTO DIFF WBC: CPT

## 2021-09-01 PROCEDURE — 2500000003 HC RX 250 WO HCPCS: Performed by: INTERNAL MEDICINE

## 2021-09-01 PROCEDURE — 2580000003 HC RX 258

## 2021-09-01 PROCEDURE — 94640 AIRWAY INHALATION TREATMENT: CPT

## 2021-09-01 PROCEDURE — 2000000000 HC ICU R&B

## 2021-09-01 PROCEDURE — 82962 GLUCOSE BLOOD TEST: CPT

## 2021-09-01 PROCEDURE — 84100 ASSAY OF PHOSPHORUS: CPT

## 2021-09-01 PROCEDURE — 94003 VENT MGMT INPAT SUBQ DAY: CPT

## 2021-09-01 PROCEDURE — 80053 COMPREHEN METABOLIC PANEL: CPT

## 2021-09-01 RX ORDER — SODIUM CHLORIDE 9 MG/ML
INJECTION, SOLUTION INTRAVENOUS EVERY 8 HOURS
Status: DISCONTINUED | OUTPATIENT
Start: 2021-09-01 | End: 2021-09-10

## 2021-09-01 RX ORDER — METHYLPREDNISOLONE SODIUM SUCCINATE 40 MG/ML
40 INJECTION, POWDER, LYOPHILIZED, FOR SOLUTION INTRAMUSCULAR; INTRAVENOUS EVERY 6 HOURS
Status: DISCONTINUED | OUTPATIENT
Start: 2021-09-01 | End: 2021-09-07

## 2021-09-01 RX ORDER — IPRATROPIUM BROMIDE AND ALBUTEROL SULFATE 2.5; .5 MG/3ML; MG/3ML
1 SOLUTION RESPIRATORY (INHALATION) EVERY 4 HOURS
Status: DISCONTINUED | OUTPATIENT
Start: 2021-09-01 | End: 2021-09-15 | Stop reason: HOSPADM

## 2021-09-01 RX ADMIN — INSULIN LISPRO 9 UNITS: 100 INJECTION, SOLUTION INTRAVENOUS; SUBCUTANEOUS at 01:51

## 2021-09-01 RX ADMIN — METHYLPREDNISOLONE SODIUM SUCCINATE 40 MG: 40 INJECTION, POWDER, FOR SOLUTION INTRAMUSCULAR; INTRAVENOUS at 13:00

## 2021-09-01 RX ADMIN — INSULIN LISPRO 9 UNITS: 100 INJECTION, SOLUTION INTRAVENOUS; SUBCUTANEOUS at 18:36

## 2021-09-01 RX ADMIN — BUDESONIDE 500 MCG: 0.5 INHALANT RESPIRATORY (INHALATION) at 18:41

## 2021-09-01 RX ADMIN — INSULIN LISPRO 9 UNITS: 100 INJECTION, SOLUTION INTRAVENOUS; SUBCUTANEOUS at 06:16

## 2021-09-01 RX ADMIN — Medication 10 ML: at 08:23

## 2021-09-01 RX ADMIN — QUETIAPINE FUMARATE 25 MG: 25 TABLET ORAL at 08:23

## 2021-09-01 RX ADMIN — ALBUTEROL SULFATE 2.5 MG: 2.5 SOLUTION RESPIRATORY (INHALATION) at 13:05

## 2021-09-01 RX ADMIN — Medication 10 ML: at 08:24

## 2021-09-01 RX ADMIN — IPRATROPIUM BROMIDE AND ALBUTEROL SULFATE 1 AMPULE: .5; 3 SOLUTION RESPIRATORY (INHALATION) at 22:27

## 2021-09-01 RX ADMIN — DIMETHICONE, CAMPHOR (SYNTHETIC), MENTHOL, AND PHENOL: 1.1; .5; .625; .5 OINTMENT TOPICAL at 08:26

## 2021-09-01 RX ADMIN — FENTANYL CITRATE 50 MCG/HR: 50 INJECTION, SOLUTION INTRAMUSCULAR; INTRAVENOUS at 06:09

## 2021-09-01 RX ADMIN — PROPOFOL 25 MCG/KG/MIN: 10 INJECTION, EMULSION INTRAVENOUS at 14:29

## 2021-09-01 RX ADMIN — ALBUTEROL SULFATE 2.5 MG: 2.5 SOLUTION RESPIRATORY (INHALATION) at 01:32

## 2021-09-01 RX ADMIN — SODIUM CHLORIDE: 9 INJECTION, SOLUTION INTRAVENOUS at 15:16

## 2021-09-01 RX ADMIN — PIPERACILLIN SODIUM AND TAZOBACTAM SODIUM 3375 MG: 3; .375 INJECTION, POWDER, LYOPHILIZED, FOR SOLUTION INTRAVENOUS at 12:30

## 2021-09-01 RX ADMIN — ENOXAPARIN SODIUM 40 MG: 40 INJECTION SUBCUTANEOUS at 08:30

## 2021-09-01 RX ADMIN — METHYLPREDNISOLONE SODIUM SUCCINATE 40 MG: 40 INJECTION, POWDER, FOR SOLUTION INTRAMUSCULAR; INTRAVENOUS at 06:21

## 2021-09-01 RX ADMIN — HYDRALAZINE HYDROCHLORIDE 10 MG: 20 INJECTION INTRAMUSCULAR; INTRAVENOUS at 21:38

## 2021-09-01 RX ADMIN — SODIUM CHLORIDE, PRESERVATIVE FREE 10 ML: 5 INJECTION INTRAVENOUS at 14:28

## 2021-09-01 RX ADMIN — PROPOFOL 35 MCG/KG/MIN: 10 INJECTION, EMULSION INTRAVENOUS at 07:32

## 2021-09-01 RX ADMIN — INSULIN LISPRO 9 UNITS: 100 INJECTION, SOLUTION INTRAVENOUS; SUBCUTANEOUS at 21:05

## 2021-09-01 RX ADMIN — VANCOMYCIN HYDROCHLORIDE 1000 MG: 1 INJECTION, POWDER, LYOPHILIZED, FOR SOLUTION INTRAVENOUS at 21:44

## 2021-09-01 RX ADMIN — INSULIN LISPRO 12 UNITS: 100 INJECTION, SOLUTION INTRAVENOUS; SUBCUTANEOUS at 11:00

## 2021-09-01 RX ADMIN — ALBUTEROL SULFATE 2.5 MG: 2.5 SOLUTION RESPIRATORY (INHALATION) at 06:32

## 2021-09-01 RX ADMIN — PROPOFOL 30 MCG/KG/MIN: 10 INJECTION, EMULSION INTRAVENOUS at 01:31

## 2021-09-01 RX ADMIN — SODIUM CHLORIDE, PRESERVATIVE FREE 300 UNITS: 5 INJECTION INTRAVENOUS at 08:30

## 2021-09-01 RX ADMIN — BUDESONIDE 500 MCG: 0.5 INHALANT RESPIRATORY (INHALATION) at 06:32

## 2021-09-01 RX ADMIN — INSULIN GLARGINE 8 UNITS: 100 INJECTION, SOLUTION SUBCUTANEOUS at 08:21

## 2021-09-01 RX ADMIN — INSULIN GLARGINE 8 UNITS: 100 INJECTION, SOLUTION SUBCUTANEOUS at 20:51

## 2021-09-01 RX ADMIN — PIPERACILLIN SODIUM AND TAZOBACTAM SODIUM 3375 MG: 3; .375 INJECTION, POWDER, LYOPHILIZED, FOR SOLUTION INTRAVENOUS at 20:49

## 2021-09-01 RX ADMIN — METHYLPREDNISOLONE SODIUM SUCCINATE 40 MG: 40 INJECTION, POWDER, FOR SOLUTION INTRAMUSCULAR; INTRAVENOUS at 18:36

## 2021-09-01 RX ADMIN — VANCOMYCIN HYDROCHLORIDE 1000 MG: 1 INJECTION, POWDER, LYOPHILIZED, FOR SOLUTION INTRAVENOUS at 10:30

## 2021-09-01 RX ADMIN — QUETIAPINE FUMARATE 25 MG: 25 TABLET ORAL at 20:49

## 2021-09-01 RX ADMIN — ALBUTEROL SULFATE 2.5 MG: 2.5 SOLUTION RESPIRATORY (INHALATION) at 09:15

## 2021-09-01 RX ADMIN — Medication 10 ML: at 20:49

## 2021-09-01 RX ADMIN — FAMOTIDINE 20 MG: 10 INJECTION INTRAVENOUS at 08:24

## 2021-09-01 RX ADMIN — PROPOFOL 35 MCG/KG/MIN: 10 INJECTION, EMULSION INTRAVENOUS at 19:35

## 2021-09-01 RX ADMIN — SODIUM CHLORIDE, POTASSIUM CHLORIDE, SODIUM LACTATE AND CALCIUM CHLORIDE: 600; 310; 30; 20 INJECTION, SOLUTION INTRAVENOUS at 08:36

## 2021-09-01 RX ADMIN — Medication 0.3 MCG/KG/HR: at 14:28

## 2021-09-01 RX ADMIN — INSULIN LISPRO 9 UNITS: 100 INJECTION, SOLUTION INTRAVENOUS; SUBCUTANEOUS at 14:32

## 2021-09-01 RX ADMIN — IPRATROPIUM BROMIDE AND ALBUTEROL SULFATE 1 AMPULE: .5; 3 SOLUTION RESPIRATORY (INHALATION) at 18:41

## 2021-09-01 ASSESSMENT — PULMONARY FUNCTION TESTS
PIF_VALUE: 36
PIF_VALUE: 24
PIF_VALUE: 27
PIF_VALUE: 25
PIF_VALUE: 26
PIF_VALUE: 35
PIF_VALUE: 30
PIF_VALUE: 27
PIF_VALUE: 27
PIF_VALUE: 36
PIF_VALUE: 32
PIF_VALUE: 30
PIF_VALUE: 33
PIF_VALUE: 27
PIF_VALUE: 39
PIF_VALUE: 36
PIF_VALUE: 34
PIF_VALUE: 36
PIF_VALUE: 37
PIF_VALUE: 34
PIF_VALUE: 35
PIF_VALUE: 25
PIF_VALUE: 25
PIF_VALUE: 33
PIF_VALUE: 33
PIF_VALUE: 31
PIF_VALUE: 29
PIF_VALUE: 30
PIF_VALUE: 32
PIF_VALUE: 35

## 2021-09-01 ASSESSMENT — PAIN SCALES - GENERAL: PAINLEVEL_OUTOF10: 0

## 2021-09-01 NOTE — PROGRESS NOTES
Pharmacy Consultation Note  (Antibiotic Dosing and Monitoring)    Initial consult date: 21  Consulting physician: AUSTIN Alejandre  Drug(s): Vancomycin  Indication: HAP/VAP    Ht Readings from Last 1 Encounters:   21 5' 4\" (1.626 m)     Wt Readings from Last 1 Encounters:   21 197 lb (89.4 kg)         Age/  Gender IBW DW  Allergy Information   78 y.o.     female 54.7 kg 68.4 kg  Ace inhibitors, Angiotensin receptor blockers, Lisinopril, and Pcn [penicillins]                 Date  WBC BUN/CR UOP Drug/Dose Time   Given Level(s)   (Time) Comments     (#1) 12.1 21/0.8 -- Vancomycin 1,250 mg   (#2) 14.7 22/0.8 2.4 Vancomycin 1,000 mg IV Q12H 1007  2104       (#3) 12.6 23/0.7 0.9 Vancomycin 1,000 mg IV Q12H 1049  2100 Trough @ 0917 = 10.5 mcg/mL Hold dose if trough is >20 mcg/mL     (#4) 12.9 26/0.8 1.6 Vancomycin 1,000 mg IV Q12H 1030  <2200>       (#5)            (#6)            (#7)            Estimated Creatinine Clearance: 74 mL/min (based on SCr of 0.8 mg/dL). UOP over the past 24 hours:       Intake/Output Summary (Last 24 hours) at 2021 1713  Last data filed at 2021 1600  Gross per 24 hour   Intake 3353 ml   Output 3370 ml   Net -17 ml       Temp max: Temp (24hrs), Av °F (36.7 °C), Min:97 °F (36.1 °C), Max:99.8 °F (37.7 °C)      Antibiotic Regimen:  Antibiotic Dose Date Initiated   Meropenem 1 g Q8H      Cultures:  available culture and sensitivity results were reviewed in EPIC  Cultures sent and are pending. Culture Date Result    Resp cx  Abundant PMN's  No epi cells  Rare gram + diplococci  Few GNR  MSSA  Haemophilus unfluenzae   Urine  E coli   Blood x 2  NGTD   Mrsa Nares  Not isolated     Assessment:  · Consulted by AUSTIN Alejandre to dose/monitor vancomycin  · Goal trough level:  10-20 mcg/mL, AUC/JEN: 400-600  · Pt is a 78 yof admitted with angioedema requiring intubation in the ED.  Patient was extubated to [facility] and was re-intubated overnight for respiratory distress. Empiric antibiotics have been initiated for pneumonia.   · Serum creatinine today: 0.8; CrCl ~ 74 mL/min; baseline Scr ~ 0.8  · 8/31: Trough @ 0917 = 10.5 mcg/mL, AUC/    Plan:  · Restart previous dosing of Vancomycin 1,000 mg IV Q12H  · Follow renal function  · Pharmacist will follow and monitor/adjust dosing as necessary      Thank you for the consult,    Shital Montes, PharmD 9/1/2021 5:14 PM   [phone]

## 2021-09-01 NOTE — PLAN OF CARE
Problem: Non-Violent Restraints  Goal: No harm/injury to patient while restraints in use  Outcome: Met This Shift     Problem: Non-Violent Restraints  Goal: Patient's dignity will be maintained  Outcome: Met This Shift     Problem: Falls - Risk of:  Goal: Will remain free from falls  Description: Will remain free from falls  Outcome: Met This Shift     Problem: Falls - Risk of:  Goal: Absence of physical injury  Description: Absence of physical injury  Outcome: Met This Shift     Problem: Skin Integrity:  Goal: Will show no infection signs and symptoms  Description: Will show no infection signs and symptoms  Outcome: Met This Shift     Problem: Skin Integrity:  Goal: Absence of new skin breakdown  Description: Absence of new skin breakdown  Outcome: Met This Shift     Problem: Breathing Pattern - Ineffective:  Goal: Ability to achieve and maintain a regular respiratory rate will improve  Description: Ability to achieve and maintain a regular respiratory rate will improve  Outcome: Met This Shift     Problem: Non-Violent Restraints  Goal: Removal from restraints as soon as assessed to be safe  Outcome: Not Met This Shift

## 2021-09-01 NOTE — PROGRESS NOTES
Subjective:  Kerri Shilpi was seen and examined in the ICU today. The patient remains reintubated today. There were no new problems reported except now growing H influenza in sputum  No family at bedside    A complete review of systems and social history was completed on admission and remains unchanged unless otherwise noted    Scheduled Meds:   methylPREDNISolone  40 mg IntraVENous Q6H    piperacillin-tazobactam  3,375 mg IntraVENous Q8H    enoxaparin  40 mg SubCUTAneous Daily    albuterol  2.5 mg Nebulization Q4H    famotidine (PEPCID) injection  20 mg IntraVENous Daily    QUEtiapine  25 mg Oral BID    lidocaine PF  5 mL IntraDERmal Once    sodium chloride flush  5-40 mL IntraVENous 2 times per day    heparin flush  3 mL IntraVENous 2 times per day    budesonide  0.5 mg Nebulization BID    insulin glargine  8 Units SubCUTAneous BID    medicated lip ointment   Topical Daily    insulin lispro  0-18 Units SubCUTAneous Q4H    sodium chloride flush  5-40 mL IntraVENous 2 times per day     Continuous Infusions:   sodium chloride 12.5 mL/hr at 09/01/21 1516    propofol 25 mcg/kg/min (09/01/21 1429)    fentaNYL 5 mcg/ml in 0.9%  ml infusion 50 mcg/hr (09/01/21 0609)    sodium chloride      dexmedetomidine HCl in NaCl 0.3 mcg/kg/hr (09/01/21 1428)    dextrose      sodium chloride      lactated ringers 50 mL/hr at 09/01/21 0836     PRN Meds:perflutren lipid microspheres, hydrALAZINE, sodium chloride flush, sodium chloride, heparin flush, metoprolol, glucose, dextrose, glucagon (rDNA), dextrose, sodium chloride flush, sodium chloride, ondansetron **OR** ondansetron, polyethylene glycol, acetaminophen **OR** acetaminophen    Objective:  BP (!) 170/71   Pulse 74   Temp 97.9 °F (36.6 °C)   Resp 25   Ht 5' 4\" (1.626 m)   Wt 197 lb (89.4 kg)   SpO2 96%   BMI 33.81 kg/m²   In: 1827 [I.V.:1314;  NG/GT:513]  Out: 2370    In: 1827   Out: 2370 [Urine:2370]     Intubated and sedated  RRR, pos S1, S2  Tight with no wheeze, rales or rhonchi  bowel sounds present, nontender, nondistended  No clubbing, cyanosis, or edema  No neuro changes   No obvious rashes or lesions. Recent Labs     08/30/21 0456 08/31/21 0602 09/01/21 0439   WBC 14.7* 12.6* 12.9*   HGB 11.6 10.4* 10.5*    205 240     Recent Labs     08/30/21 0456 08/31/21 0602 09/01/21  0439    138 136   K 4.5 4.7 5.0    100 98   CO2 29 31* 30*   BUN 22 23 26*   CREATININE 0.8 0.7 0.8   GLUCOSE 180* 226* 305*     Recent Labs     08/30/21 0456 08/31/21 0602 09/01/21 0439   BILITOT 0.4 0.2 <0.2   ALKPHOS 54 58 58   AST 15 10 13   ALT 13 12 14     No results for input(s): INR in the last 72 hours. Invalid input(s): PT  No results for input(s): CKTOTAL, CKMB, CKMBINDEX, TROPONINI in the last 72 hours. XR CHEST PORTABLE    Result Date: 8/25/2021  EXAMINATION: ONE XRAY VIEW OF THE CHEST 8/24/2021 8:52 pm COMPARISON: 08/24/2021 HISTORY: ORDERING SYSTEM PROVIDED HISTORY: et tube placement TECHNOLOGIST PROVIDED HISTORY: Reason for exam:->et tube placement FINDINGS: Stable position of endotracheal tube. Cardiomediastinal silhouette is unremarkable. No infiltrate, effusion, or pneumothorax. No acute osseous abnormality. No pulmonary infiltrates. No significant change since the prior study     XR CHEST PORTABLE    Result Date: 8/24/2021  EXAMINATION: ONE XRAY VIEW OF THE CHEST 8/24/2021 5:30 pm COMPARISON: January 3, 2020 HISTORY: ORDERING SYSTEM PROVIDED HISTORY: SOB (shortness of breath) TECHNOLOGIST PROVIDED HISTORY: Reason for exam:->ETT placement, tongue swelling FINDINGS: Endotracheal tube is approximately 6.8 cm above the chivo. No airspace opacity or pleural effusion. The heart is normal in size. No pneumothorax. 1. Endotracheal tube is approximately 6.8 cm above the chivo. 2. No airspace opacity or pleural effusion.      Assessment:  Faraz Velasquez is a 78y.o. year old female who presented on 8/24/2021 and is being treated for:  Principal Problem:    Acute respiratory failure ([facility] [address]  Active Problems:    Essential hypertension    Diabetes mellitus ([facility] [address]    Myocardiopathy (HCC)    ACE inhibitor-aggravated angioedema    COPD (chronic obstructive pulmonary disease) (HCC)    Acquired angioedema    SOB (shortness of breath)    Disorder of airway    Acquired hypertrophy of tongue  Resolved Problems:    * No resolved hospital problems. *    Plan  · Mechanical ventilation/vent weaning as per pulmonary  · Cont IV steroids/nebs/abx  · Monitor BS and cover with SSI  · Please see orders for further management and care.   · D/w and updated      Chadd Vicente MD  4:09 PM  9/1/2021

## 2021-09-01 NOTE — PROGRESS NOTES
Pulmonary/Critical Care Progress Note    We are following patient for angioedema of tongue, resolved, severe COPD with exacerbation, Haemophilus influenza respiratory infection, Staph aureus respiratory infection, E. coli urinary tract infection, diabetes mellitus type 2    SUBJECTIVE:  She remains very wheezy and therefore not a suitable candidate for weaning. We have increased her IV steroids slightly and changed her aerosolized bronchodilators to include ipratropium. She is also tolerating tube feedings and IV fluids but does not appear to be fluid overloaded.     MEDICATIONS:   methylPREDNISolone  40 mg IntraVENous Q6H    piperacillin-tazobactam  3,375 mg IntraVENous Q8H    ipratropium-albuterol  1 ampule Inhalation Q4H    enoxaparin  40 mg SubCUTAneous Daily    famotidine (PEPCID) injection  20 mg IntraVENous Daily    QUEtiapine  25 mg Oral BID    lidocaine PF  5 mL IntraDERmal Once    sodium chloride flush  5-40 mL IntraVENous 2 times per day    heparin flush  3 mL IntraVENous 2 times per day    budesonide  0.5 mg Nebulization BID    insulin glargine  8 Units SubCUTAneous BID    medicated lip ointment   Topical Daily    insulin lispro  0-18 Units SubCUTAneous Q4H    sodium chloride flush  5-40 mL IntraVENous 2 times per day      sodium chloride 12.5 mL/hr at 09/01/21 1516    propofol 25 mcg/kg/min (09/01/21 1429)    fentaNYL 5 mcg/ml in 0.9%  ml infusion 50 mcg/hr (09/01/21 0609)    sodium chloride      dexmedetomidine HCl in NaCl 0.3 mcg/kg/hr (09/01/21 1428)    dextrose      sodium chloride      lactated ringers 50 mL/hr at 09/01/21 0836     perflutren lipid microspheres, hydrALAZINE, sodium chloride flush, sodium chloride, heparin flush, metoprolol, glucose, dextrose, glucagon (rDNA), dextrose, sodium chloride flush, sodium chloride, ondansetron **OR** ondansetron, polyethylene glycol, acetaminophen **OR** acetaminophen      REVIEW OF SYSTEMS:  Patient is sedated and intubated therefore cannot answer questions    OBJECTIVE:  Vitals:    09/01/21 1500   BP: (!) 170/71   Pulse: 74   Resp: 25   Temp:    SpO2: 96%     FiO2 : 40 %  O2 Flow Rate (L/min): 7 L/min  O2 Device: Ventilator    PHYSICAL EXAM:  Constitutional: Fever, chills, diaphoresis  Skin: Skin rash, no skin breakdown  HEENT: Endotracheal tube secured at lips  Neck: No JVD, lymphadenopathy, thyromegaly  Cardiovascular: S1-S2 regular. No S3 murmurs rubs present  Respiratory: Tight expiratory wheezes over both posterior lung fields with poor air exit.   Few crackles are present at the bases  Gastrointestinal: Soft, obese, nontender, nondistended  Genitourinary: No CVA tenderness  Extremities: Clubbing, cyanosis, or edema  Neurological: Sedated  Psychological: Sedated    LABS:  WBC   Date Value Ref Range Status   09/01/2021 12.9 (H) 4.5 - 11.5 E9/L Final   08/31/2021 12.6 (H) 4.5 - 11.5 E9/L Final   08/30/2021 14.7 (H) 4.5 - 11.5 E9/L Final     Hemoglobin   Date Value Ref Range Status   09/01/2021 10.5 (L) 11.5 - 15.5 g/dL Final   08/31/2021 10.4 (L) 11.5 - 15.5 g/dL Final   08/30/2021 11.6 11.5 - 15.5 g/dL Final     Hematocrit   Date Value Ref Range Status   09/01/2021 33.2 (L) 34.0 - 48.0 % Final   08/31/2021 32.0 (L) 34.0 - 48.0 % Final   08/30/2021 35.7 34.0 - 48.0 % Final     MCV   Date Value Ref Range Status   09/01/2021 96.8 80.0 - 99.9 fL Final   08/31/2021 95.8 80.0 - 99.9 fL Final   08/30/2021 95.5 80.0 - 99.9 fL Final     Platelets   Date Value Ref Range Status   09/01/2021 240 130 - 450 E9/L Final   08/31/2021 205 130 - 450 E9/L Final   08/30/2021 201 130 - 450 E9/L Final     Sodium   Date Value Ref Range Status   09/01/2021 136 132 - 146 mmol/L Final   08/31/2021 138 132 - 146 mmol/L Final   08/30/2021 139 132 - 146 mmol/L Final     Potassium   Date Value Ref Range Status   09/01/2021 5.0 3.5 - 5.0 mmol/L Final   08/31/2021 4.7 3.5 - 5.0 mmol/L Final   08/30/2021 4.5 3.5 - 5.0 mmol/L Final     Potassium reflex Magnesium Date Value Ref Range Status   08/25/2021 4.7 3.5 - 5.0 mmol/L Final   08/24/2021 4.4 3.5 - 5.0 mmol/L Final   07/28/2020 3.9 3.5 - 5.0 mmol/L Final     Chloride   Date Value Ref Range Status   09/01/2021 98 98 - 107 mmol/L Final   08/31/2021 100 98 - 107 mmol/L Final   08/30/2021 100 98 - 107 mmol/L Final     CO2   Date Value Ref Range Status   09/01/2021 30 (H) 22 - 29 mmol/L Final   08/31/2021 31 (H) 22 - 29 mmol/L Final   08/30/2021 29 22 - 29 mmol/L Final     BUN   Date Value Ref Range Status   09/01/2021 26 (H) 6 - 23 mg/dL Final   08/31/2021 23 6 - 23 mg/dL Final   08/30/2021 22 6 - 23 mg/dL Final     CREATININE   Date Value Ref Range Status   09/01/2021 0.8 0.5 - 1.0 mg/dL Final   08/31/2021 0.7 0.5 - 1.0 mg/dL Final   08/30/2021 0.8 0.5 - 1.0 mg/dL Final     Glucose   Date Value Ref Range Status   09/01/2021 305 (H) 74 - 99 mg/dL Final   08/31/2021 226 (H) 74 - 99 mg/dL Final   08/30/2021 180 (H) 74 - 99 mg/dL Final     Calcium   Date Value Ref Range Status   09/01/2021 8.4 (L) 8.6 - 10.2 mg/dL Final   08/31/2021 8.4 (L) 8.6 - 10.2 mg/dL Final   08/30/2021 8.2 (L) 8.6 - 10.2 mg/dL Final     Total Protein   Date Value Ref Range Status   09/01/2021 5.6 (L) 6.4 - 8.3 g/dL Final   08/31/2021 5.3 (L) 6.4 - 8.3 g/dL Final   08/30/2021 5.6 (L) 6.4 - 8.3 g/dL Final     Albumin   Date Value Ref Range Status   09/01/2021 2.7 (L) 3.5 - 5.2 g/dL Final   08/31/2021 2.5 (L) 3.5 - 5.2 g/dL Final   08/30/2021 2.7 (L) 3.5 - 5.2 g/dL Final     Total Bilirubin   Date Value Ref Range Status   09/01/2021 <0.2 0.0 - 1.2 mg/dL Final   08/31/2021 0.2 0.0 - 1.2 mg/dL Final   08/30/2021 0.4 0.0 - 1.2 mg/dL Final     Alkaline Phosphatase   Date Value Ref Range Status   09/01/2021 58 35 - 104 U/L Final   08/31/2021 58 35 - 104 U/L Final   08/30/2021 54 35 - 104 U/L Final     AST   Date Value Ref Range Status   09/01/2021 13 0 - 31 U/L Final   08/31/2021 10 0 - 31 U/L Final   08/30/2021 15 0 - 31 U/L Final     ALT   Date Value Ref Range Status   09/01/2021 14 0 - 32 U/L Final   08/31/2021 12 0 - 32 U/L Final   08/30/2021 13 0 - 32 U/L Final     GFR Non-   Date Value Ref Range Status   09/01/2021 >60 >=60 mL/min/1.73 Final     Comment:     Chronic Kidney Disease: less than 60 ml/min/1.73 sq.m. Kidney Failure: less than 15 ml/min/1.73 sq.m. Results valid for patients 18 years and older. 08/31/2021 >60 >=60 mL/min/1.73 Final     Comment:     Chronic Kidney Disease: less than 60 ml/min/1.73 sq.m. Kidney Failure: less than 15 ml/min/1.73 sq.m. Results valid for patients 18 years and older. 08/30/2021 >60 >=60 mL/min/1.73 Final     Comment:     Chronic Kidney Disease: less than 60 ml/min/1.73 sq.m. Kidney Failure: less than 15 ml/min/1.73 sq.m. Results valid for patients 18 years and older. GFR    Date Value Ref Range Status   09/01/2021 >60  Final   08/31/2021 >60  Final   08/30/2021 >60  Final     Magnesium   Date Value Ref Range Status   09/01/2021 2.1 1.6 - 2.6 mg/dL Final   08/31/2021 2.0 1.6 - 2.6 mg/dL Final   08/30/2021 1.9 1.6 - 2.6 mg/dL Final     Phosphorus   Date Value Ref Range Status   09/01/2021 3.5 2.5 - 4.5 mg/dL Final   08/31/2021 2.6 2.5 - 4.5 mg/dL Final   08/30/2021 2.9 2.5 - 4.5 mg/dL Final     Recent Labs     08/31/21  0754   PH 7.432   PO2 105.5*   PCO2 45.0   HCO3 29.3*   BE 4.5*   O2SAT 97.4       RADIOLOGY:  XR CHEST PORTABLE   Final Result   No acute process         XR ABDOMEN FOR NG/OG/NE TUBE PLACEMENT   Final Result   NG tube tip is at the level of the gastric antrum, well within the gastric   lumen. XR CHEST PORTABLE   Final Result   ETT tip is within 1 cm of the chivo. It could be withdrawn 1-2 cm for   better tube position. XR CHEST PORTABLE   Final Result   1. There is no acute cardiopulmonary disease   2. Stable position of support lines and tubes.          CT SOFT TISSUE NECK W CONTRAST   Final Result   Limited evaluation of the pharynx, larynx and hypopharynx due to presence of   ETT and OG tube. Prominence of the bilateral adenoids, likely related to   infection/inflammation. Mild prominence of the bilateral palatine and lingual tonsils, and narrowing   of the bilateral piriform sinuses, likely reactive. Mild infiltration of fat in the bilateral carotid spaces, particularly at the   level of carotid bulbs, right more than left, likely related to extension of   inflammatory changes. Acute sinusitis. XR CHEST PORTABLE   Final Result   1. Endotracheal tube is present with distal tip approximately 4.5 cm above   the chivo. 2. No airspace opacity or pleural effusion. XR ABDOMEN FOR NG/OG/NE TUBE PLACEMENT   Final Result   1. Satisfactory position of the NG tube within the stomach         XR CHEST PORTABLE   Final Result   No pulmonary infiltrates. No significant change since the prior study         XR CHEST PORTABLE   Final Result   1. Endotracheal tube is approximately 6.8 cm above the chivo. 2. No airspace opacity or pleural effusion. XR CHEST PORTABLE    (Results Pending)           PROBLEM LIST:  Principal Problem:    Acute respiratory failure (HCC)  Active Problems:    Essential hypertension    Diabetes mellitus ([facility] [address]    Myocardiopathy (HCC)    ACE inhibitor-aggravated angioedema    COPD (chronic obstructive pulmonary disease) (HCC)    Acquired angioedema    SOB (shortness of breath)    Disorder of airway    Acquired hypertrophy of tongue  Resolved Problems:    * No resolved hospital problems. *      IMPRESSION:  1. Acute, probably superimposed on chronic respiratory failure  2. Haemophilus influenza respiratory infection  3. Staph aureus respiratory infection  4. E. coli urinary tract infection  5. Status post angioedema secondary to ACE inhibitors  6. Systemic hypertension  7. Diabetes mellitus type 2    PLAN:  1.  Continue methylprednisolone 40 mg IV every 6  2. Continue modest amounts of IV fluids  3. Continue tube feedings  4. Continue Zosyn  5. Add vancomycin for staph aureus    ATTESTATION:  ICU Staff Physician note of personal involvement in Care  As the attending physician, I certify that I personally reviewed the patients history and personally examined the patient to confirm the physical findings described above,  And that I reviewed the relevant imaging studies and available reports. I also discussed the differential diagnosis and all of the proposed management plans with the patient and individuals accompanying the patient to this visit. They had the opportunity to ask questions about the proposed management plans and to have those questions answered. This patient has a high probability of sudden, clinically significant deterioration, which requires the highest level of physician preparedness to intervene urgently. I managed/supervised life or organ supporting interventions that required frequent physician assessment. I devoted my full attention to the direct care of this patient for the amount of time indicated below. Time I spent with the family or surrogate(s) is included only if the patient was incapable of providing the necessary information or participating in medical decisions - Time devoted to teaching and to any procedures I billed separately is not included.     CRITICAL CARE TIME:  32 minutes    Electronically signed by Teresa Lord MD on 9/1/2021 at 4:17 PM

## 2021-09-01 NOTE — PROGRESS NOTES
Pharmacy Consultation Note  (Antibiotic Dosing and Monitoring)    Vancomycin has been discontinued; pharmacy will sign-off. Please reconsult if needed.     Thank you,  Kourtney Park PharmD, BCPS 9/1/2021 1:29 PM

## 2021-09-01 NOTE — PLAN OF CARE
Problem: Non-Violent Restraints  Goal: No harm/injury to patient while restraints in use  9/1/2021 0718 by Ailyn Pham RN  Outcome: Met This Shift  9/1/2021 0645 by Fabiola Cerda RN  Outcome: Met This Shift  Goal: Patient's dignity will be maintained  9/1/2021 0718 by Ailyn Pham RN  Outcome: Met This Shift  9/1/2021 0645 by Fabiola Cerda RN  Outcome: Met This Shift     Problem: Falls - Risk of:  Goal: Will remain free from falls  Description: Will remain free from falls  9/1/2021 0718 by Ailyn Pham RN  Outcome: Met This Shift  9/1/2021 0645 by Fabiola Cerda RN  Outcome: Met This Shift  Goal: Absence of physical injury  Description: Absence of physical injury  9/1/2021 0718 by Ailyn Pham RN  Outcome: Met This Shift  9/1/2021 0645 by Fabiola Cerda RN  Outcome: Met This Shift     Problem: Skin Integrity:  Goal: Will show no infection signs and symptoms  Description: Will show no infection signs and symptoms  9/1/2021 0718 by iAlyn Pham RN  Outcome: Met This Shift  9/1/2021 0645 by Fabiola Cerda RN  Outcome: Met This Shift  Goal: Absence of new skin breakdown  Description: Absence of new skin breakdown  9/1/2021 0718 by Ailyn Pham RN  Outcome: Met This Shift  9/1/2021 0645 by Fabiola Cerda RN  Outcome: Met This Shift     Problem: Breathing Pattern - Ineffective:  Goal: Ability to achieve and maintain a regular respiratory rate will improve  Description: Ability to achieve and maintain a regular respiratory rate will improve  9/1/2021 0645 by Fabiola Cerda RN  Outcome: Met This Shift     Problem: Airway Clearance - Ineffective:  Goal: Ability to maintain a clear airway will improve  Description: Ability to maintain a clear airway will improve  9/1/2021 0645 by Fabiola Cerda RN  Outcome: Met This Shift

## 2021-09-02 ENCOUNTER — APPOINTMENT (OUTPATIENT)
Dept: GENERAL RADIOLOGY | Age: 66
DRG: 207 | End: 2021-09-02
Payer: MEDICARE

## 2021-09-02 LAB
ALBUMIN SERPL-MCNC: 2.7 G/DL (ref 3.5–5.2)
ALP BLD-CCNC: 50 U/L (ref 35–104)
ALT SERPL-CCNC: 20 U/L (ref 0–32)
ANION GAP SERPL CALCULATED.3IONS-SCNC: 6 MMOL/L (ref 7–16)
AST SERPL-CCNC: 18 U/L (ref 0–31)
BASOPHILS ABSOLUTE: 0.02 E9/L (ref 0–0.2)
BASOPHILS RELATIVE PERCENT: 0.2 % (ref 0–2)
BILIRUB SERPL-MCNC: <0.2 MG/DL (ref 0–1.2)
BUN BLDV-MCNC: 30 MG/DL (ref 6–23)
CALCIUM SERPL-MCNC: 8.5 MG/DL (ref 8.6–10.2)
CHLORIDE BLD-SCNC: 98 MMOL/L (ref 98–107)
CO2: 32 MMOL/L (ref 22–29)
CREAT SERPL-MCNC: 0.7 MG/DL (ref 0.5–1)
EOSINOPHILS ABSOLUTE: 0 E9/L (ref 0.05–0.5)
EOSINOPHILS RELATIVE PERCENT: 0 % (ref 0–6)
GFR AFRICAN AMERICAN: >60
GFR NON-AFRICAN AMERICAN: >60 ML/MIN/1.73
GLUCOSE BLD-MCNC: 375 MG/DL (ref 74–99)
HCT VFR BLD CALC: 32.5 % (ref 34–48)
HEMOGLOBIN: 10.6 G/DL (ref 11.5–15.5)
IMMATURE GRANULOCYTES #: 0.19 E9/L
IMMATURE GRANULOCYTES %: 2 % (ref 0–5)
LYMPHOCYTES ABSOLUTE: 0.88 E9/L (ref 1.5–4)
LYMPHOCYTES RELATIVE PERCENT: 9.5 % (ref 20–42)
MAGNESIUM: 2.1 MG/DL (ref 1.6–2.6)
MCH RBC QN AUTO: 31.2 PG (ref 26–35)
MCHC RBC AUTO-ENTMCNC: 32.6 % (ref 32–34.5)
MCV RBC AUTO: 95.6 FL (ref 80–99.9)
METER GLUCOSE: 235 MG/DL (ref 74–99)
METER GLUCOSE: 252 MG/DL (ref 74–99)
METER GLUCOSE: 270 MG/DL (ref 74–99)
METER GLUCOSE: 295 MG/DL (ref 74–99)
METER GLUCOSE: 340 MG/DL (ref 74–99)
METER GLUCOSE: 345 MG/DL (ref 74–99)
MONOCYTES ABSOLUTE: 0.83 E9/L (ref 0.1–0.95)
MONOCYTES RELATIVE PERCENT: 9 % (ref 2–12)
NEUTROPHILS ABSOLUTE: 7.35 E9/L (ref 1.8–7.3)
NEUTROPHILS RELATIVE PERCENT: 79.3 % (ref 43–80)
PDW BLD-RTO: 12 FL (ref 11.5–15)
PHOSPHORUS: 3.6 MG/DL (ref 2.5–4.5)
PLATELET # BLD: 240 E9/L (ref 130–450)
PMV BLD AUTO: 10.1 FL (ref 7–12)
POTASSIUM SERPL-SCNC: 5 MMOL/L (ref 3.5–5)
RBC # BLD: 3.4 E12/L (ref 3.5–5.5)
SODIUM BLD-SCNC: 136 MMOL/L (ref 132–146)
TOTAL PROTEIN: 5.5 G/DL (ref 6.4–8.3)
WBC # BLD: 9.3 E9/L (ref 4.5–11.5)

## 2021-09-02 PROCEDURE — 83735 ASSAY OF MAGNESIUM: CPT

## 2021-09-02 PROCEDURE — 6370000000 HC RX 637 (ALT 250 FOR IP): Performed by: INTERNAL MEDICINE

## 2021-09-02 PROCEDURE — 2500000003 HC RX 250 WO HCPCS: Performed by: INTERNAL MEDICINE

## 2021-09-02 PROCEDURE — 6370000000 HC RX 637 (ALT 250 FOR IP)

## 2021-09-02 PROCEDURE — 6360000002 HC RX W HCPCS

## 2021-09-02 PROCEDURE — 6360000002 HC RX W HCPCS: Performed by: NURSE PRACTITIONER

## 2021-09-02 PROCEDURE — 2580000003 HC RX 258: Performed by: INTERNAL MEDICINE

## 2021-09-02 PROCEDURE — 80053 COMPREHEN METABOLIC PANEL: CPT

## 2021-09-02 PROCEDURE — 6360000002 HC RX W HCPCS: Performed by: INTERNAL MEDICINE

## 2021-09-02 PROCEDURE — 6370000000 HC RX 637 (ALT 250 FOR IP): Performed by: NURSE PRACTITIONER

## 2021-09-02 PROCEDURE — 84100 ASSAY OF PHOSPHORUS: CPT

## 2021-09-02 PROCEDURE — 2580000003 HC RX 258

## 2021-09-02 PROCEDURE — 85025 COMPLETE CBC W/AUTO DIFF WBC: CPT

## 2021-09-02 PROCEDURE — 2000000000 HC ICU R&B

## 2021-09-02 PROCEDURE — 82962 GLUCOSE BLOOD TEST: CPT

## 2021-09-02 PROCEDURE — 36592 COLLECT BLOOD FROM PICC: CPT

## 2021-09-02 PROCEDURE — 71045 X-RAY EXAM CHEST 1 VIEW: CPT

## 2021-09-02 PROCEDURE — 94003 VENT MGMT INPAT SUBQ DAY: CPT

## 2021-09-02 PROCEDURE — 94640 AIRWAY INHALATION TREATMENT: CPT

## 2021-09-02 RX ORDER — INSULIN GLARGINE 100 [IU]/ML
20 INJECTION, SOLUTION SUBCUTANEOUS 2 TIMES DAILY
Status: DISCONTINUED | OUTPATIENT
Start: 2021-09-02 | End: 2021-09-05

## 2021-09-02 RX ORDER — ACETYLCYSTEINE 200 MG/ML
600 SOLUTION ORAL; RESPIRATORY (INHALATION) EVERY 4 HOURS
Status: DISCONTINUED | OUTPATIENT
Start: 2021-09-02 | End: 2021-09-11

## 2021-09-02 RX ORDER — INSULIN GLARGINE 100 [IU]/ML
15 INJECTION, SOLUTION SUBCUTANEOUS 2 TIMES DAILY
Status: DISCONTINUED | OUTPATIENT
Start: 2021-09-02 | End: 2021-09-02

## 2021-09-02 RX ORDER — LACTOBACILLUS RHAMNOSUS GG 10B CELL
1 CAPSULE ORAL DAILY
Status: DISCONTINUED | OUTPATIENT
Start: 2021-09-02 | End: 2021-09-15 | Stop reason: HOSPADM

## 2021-09-02 RX ADMIN — IPRATROPIUM BROMIDE AND ALBUTEROL SULFATE 1 AMPULE: .5; 3 SOLUTION RESPIRATORY (INHALATION) at 06:40

## 2021-09-02 RX ADMIN — METHYLPREDNISOLONE SODIUM SUCCINATE 40 MG: 40 INJECTION, POWDER, FOR SOLUTION INTRAMUSCULAR; INTRAVENOUS at 06:07

## 2021-09-02 RX ADMIN — INSULIN LISPRO 9 UNITS: 100 INJECTION, SOLUTION INTRAVENOUS; SUBCUTANEOUS at 15:22

## 2021-09-02 RX ADMIN — ACETYLCYSTEINE 600 MG: 200 SOLUTION ORAL; RESPIRATORY (INHALATION) at 15:27

## 2021-09-02 RX ADMIN — ROFLUMILAST 500 MCG: 500 TABLET ORAL at 10:20

## 2021-09-02 RX ADMIN — SODIUM CHLORIDE: 9 INJECTION, SOLUTION INTRAVENOUS at 00:49

## 2021-09-02 RX ADMIN — IPRATROPIUM BROMIDE AND ALBUTEROL SULFATE 1 AMPULE: .5; 3 SOLUTION RESPIRATORY (INHALATION) at 19:22

## 2021-09-02 RX ADMIN — HYDRALAZINE HYDROCHLORIDE 10 MG: 20 INJECTION INTRAMUSCULAR; INTRAVENOUS at 18:02

## 2021-09-02 RX ADMIN — INSULIN GLARGINE 15 UNITS: 100 INJECTION, SOLUTION SUBCUTANEOUS at 10:11

## 2021-09-02 RX ADMIN — IPRATROPIUM BROMIDE AND ALBUTEROL SULFATE 1 AMPULE: .5; 3 SOLUTION RESPIRATORY (INHALATION) at 10:09

## 2021-09-02 RX ADMIN — INSULIN GLARGINE 20 UNITS: 100 INJECTION, SOLUTION SUBCUTANEOUS at 21:49

## 2021-09-02 RX ADMIN — METFORMIN HYDROCHLORIDE 500 MG: 500 TABLET ORAL at 21:51

## 2021-09-02 RX ADMIN — PIPERACILLIN SODIUM AND TAZOBACTAM SODIUM 3375 MG: 3; .375 INJECTION, POWDER, LYOPHILIZED, FOR SOLUTION INTRAVENOUS at 13:34

## 2021-09-02 RX ADMIN — DIMETHICONE, CAMPHOR (SYNTHETIC), MENTHOL, AND PHENOL: 1.1; .5; .625; .5 OINTMENT TOPICAL at 10:26

## 2021-09-02 RX ADMIN — INSULIN LISPRO 6 UNITS: 100 INJECTION, SOLUTION INTRAVENOUS; SUBCUTANEOUS at 21:49

## 2021-09-02 RX ADMIN — IPRATROPIUM BROMIDE AND ALBUTEROL SULFATE 1 AMPULE: .5; 3 SOLUTION RESPIRATORY (INHALATION) at 22:30

## 2021-09-02 RX ADMIN — INSULIN LISPRO 12 UNITS: 100 INJECTION, SOLUTION INTRAVENOUS; SUBCUTANEOUS at 03:03

## 2021-09-02 RX ADMIN — PROPOFOL 35 MCG/KG/MIN: 10 INJECTION, EMULSION INTRAVENOUS at 23:27

## 2021-09-02 RX ADMIN — PROPOFOL 35 MCG/KG/MIN: 10 INJECTION, EMULSION INTRAVENOUS at 01:12

## 2021-09-02 RX ADMIN — FENTANYL CITRATE 75 MCG/HR: 50 INJECTION, SOLUTION INTRAMUSCULAR; INTRAVENOUS at 05:12

## 2021-09-02 RX ADMIN — ENOXAPARIN SODIUM 40 MG: 40 INJECTION SUBCUTANEOUS at 10:21

## 2021-09-02 RX ADMIN — IPRATROPIUM BROMIDE AND ALBUTEROL SULFATE 1 AMPULE: .5; 3 SOLUTION RESPIRATORY (INHALATION) at 15:28

## 2021-09-02 RX ADMIN — METHYLPREDNISOLONE SODIUM SUCCINATE 40 MG: 40 INJECTION, POWDER, FOR SOLUTION INTRAMUSCULAR; INTRAVENOUS at 13:13

## 2021-09-02 RX ADMIN — ACETAMINOPHEN 650 MG: 325 TABLET ORAL at 15:13

## 2021-09-02 RX ADMIN — QUETIAPINE FUMARATE 25 MG: 25 TABLET ORAL at 21:51

## 2021-09-02 RX ADMIN — METFORMIN HYDROCHLORIDE 500 MG: 500 TABLET ORAL at 13:13

## 2021-09-02 RX ADMIN — ACETAMINOPHEN 650 MG: 325 TABLET ORAL at 21:50

## 2021-09-02 RX ADMIN — IPRATROPIUM BROMIDE AND ALBUTEROL SULFATE 1 AMPULE: .5; 3 SOLUTION RESPIRATORY (INHALATION) at 02:19

## 2021-09-02 RX ADMIN — INSULIN LISPRO 9 UNITS: 100 INJECTION, SOLUTION INTRAVENOUS; SUBCUTANEOUS at 10:11

## 2021-09-02 RX ADMIN — BUDESONIDE 500 MCG: 0.5 INHALANT RESPIRATORY (INHALATION) at 19:22

## 2021-09-02 RX ADMIN — Medication 10 ML: at 21:51

## 2021-09-02 RX ADMIN — BUDESONIDE 500 MCG: 0.5 INHALANT RESPIRATORY (INHALATION) at 06:40

## 2021-09-02 RX ADMIN — QUETIAPINE FUMARATE 25 MG: 25 TABLET ORAL at 10:20

## 2021-09-02 RX ADMIN — METHYLPREDNISOLONE SODIUM SUCCINATE 40 MG: 40 INJECTION, POWDER, FOR SOLUTION INTRAMUSCULAR; INTRAVENOUS at 17:59

## 2021-09-02 RX ADMIN — INSULIN LISPRO 12 UNITS: 100 INJECTION, SOLUTION INTRAVENOUS; SUBCUTANEOUS at 06:07

## 2021-09-02 RX ADMIN — Medication 10 ML: at 20:08

## 2021-09-02 RX ADMIN — PROPOFOL 20 MCG/KG/MIN: 10 INJECTION, EMULSION INTRAVENOUS at 13:11

## 2021-09-02 RX ADMIN — Medication 10 ML: at 10:24

## 2021-09-02 RX ADMIN — METOPROLOL TARTRATE 5 MG: 1 INJECTION, SOLUTION INTRAVENOUS at 20:08

## 2021-09-02 RX ADMIN — PIPERACILLIN SODIUM AND TAZOBACTAM SODIUM 3375 MG: 3; .375 INJECTION, POWDER, LYOPHILIZED, FOR SOLUTION INTRAVENOUS at 04:38

## 2021-09-02 RX ADMIN — Medication 1 CAPSULE: at 13:13

## 2021-09-02 RX ADMIN — ACETYLCYSTEINE 600 MG: 200 SOLUTION ORAL; RESPIRATORY (INHALATION) at 19:22

## 2021-09-02 RX ADMIN — PROPOFOL 35 MCG/KG/MIN: 10 INJECTION, EMULSION INTRAVENOUS at 05:32

## 2021-09-02 RX ADMIN — SODIUM CHLORIDE, POTASSIUM CHLORIDE, SODIUM LACTATE AND CALCIUM CHLORIDE: 600; 310; 30; 20 INJECTION, SOLUTION INTRAVENOUS at 05:11

## 2021-09-02 RX ADMIN — SODIUM CHLORIDE: 9 INJECTION, SOLUTION INTRAVENOUS at 13:15

## 2021-09-02 RX ADMIN — VANCOMYCIN HYDROCHLORIDE 1000 MG: 1 INJECTION, POWDER, LYOPHILIZED, FOR SOLUTION INTRAVENOUS at 10:26

## 2021-09-02 RX ADMIN — METOPROLOL TARTRATE 5 MG: 1 INJECTION, SOLUTION INTRAVENOUS at 13:15

## 2021-09-02 RX ADMIN — ACETYLCYSTEINE 600 MG: 200 SOLUTION ORAL; RESPIRATORY (INHALATION) at 22:31

## 2021-09-02 RX ADMIN — FAMOTIDINE 20 MG: 10 INJECTION INTRAVENOUS at 10:20

## 2021-09-02 RX ADMIN — Medication 0.3 MCG/KG/HR: at 20:08

## 2021-09-02 RX ADMIN — METHYLPREDNISOLONE SODIUM SUCCINATE 40 MG: 40 INJECTION, POWDER, FOR SOLUTION INTRAMUSCULAR; INTRAVENOUS at 00:12

## 2021-09-02 RX ADMIN — SODIUM CHLORIDE: 9 INJECTION, SOLUTION INTRAVENOUS at 10:27

## 2021-09-02 RX ADMIN — INSULIN LISPRO 9 UNITS: 100 INJECTION, SOLUTION INTRAVENOUS; SUBCUTANEOUS at 18:05

## 2021-09-02 RX ADMIN — Medication 10 ML: at 10:21

## 2021-09-02 RX ADMIN — PROPOFOL 35 MCG/KG/MIN: 10 INJECTION, EMULSION INTRAVENOUS at 18:35

## 2021-09-02 RX ADMIN — PIPERACILLIN SODIUM AND TAZOBACTAM SODIUM 3375 MG: 3; .375 INJECTION, POWDER, LYOPHILIZED, FOR SOLUTION INTRAVENOUS at 20:12

## 2021-09-02 ASSESSMENT — PULMONARY FUNCTION TESTS
PIF_VALUE: 29
PIF_VALUE: 28
PIF_VALUE: 35
PIF_VALUE: 28
PIF_VALUE: 35
PIF_VALUE: 36
PIF_VALUE: 40
PIF_VALUE: 27
PIF_VALUE: 28
PIF_VALUE: 31
PIF_VALUE: 27
PIF_VALUE: 36
PIF_VALUE: 28
PIF_VALUE: 29
PIF_VALUE: 39
PIF_VALUE: 26
PIF_VALUE: 36
PIF_VALUE: 30
PIF_VALUE: 30
PIF_VALUE: 27
PIF_VALUE: 38
PIF_VALUE: 29
PIF_VALUE: 39
PIF_VALUE: 28
PIF_VALUE: 27
PIF_VALUE: 37
PIF_VALUE: 30
PIF_VALUE: 27
PIF_VALUE: 34
PIF_VALUE: 28
PIF_VALUE: 27

## 2021-09-02 ASSESSMENT — PAIN SCALES - GENERAL
PAINLEVEL_OUTOF10: 0

## 2021-09-02 NOTE — CARE COORDINATION
9/2/21 No covid testing- vaccinated. CM transition of care: isolation for influenza. Intubated (8/24/21) extubated and reintubated on 8/29/21. fio2 at 40%, peep 8. Pt's angioedema resolved- however, has not yet been appropriate for vent weaning. Icu/vent/sedation/isolation. Plans to return home may need home care or other discharge planning needs. Therapies when pt can participate. CM/SS to follow.   Electronically signed by MUARO Alejandra on 9/2/2021 at 9:16 AM

## 2021-09-02 NOTE — PLAN OF CARE
Problem: Non-Violent Restraints  Goal: No harm/injury to patient while restraints in use  Outcome: Met This Shift     Problem: Non-Violent Restraints  Goal: Patient's dignity will be maintained  Outcome: Met This Shift     Problem: Non-Violent Restraints  Goal: No harm/injury to patient while restraints in use  Outcome: Met This Shift     Problem: Non-Violent Restraints  Goal: Patient's dignity will be maintained  Outcome: Met This Shift     Problem: Non-Violent Restraints  Goal: Removal from restraints as soon as assessed to be safe  Outcome: Not Met This Shift     Problem: Breathing Pattern - Ineffective:  Goal: Ability to achieve and maintain a regular respiratory rate will improve  Description: Ability to achieve and maintain a regular respiratory rate will improve  Outcome: Not Met This Shift

## 2021-09-02 NOTE — PROGRESS NOTES
Spontaneous awakening trial requested by dr Tila Epps, patients sedation weaned and held then evaluated by dr Tila Epps. Ordered to return sedation, patient not ready for SBT.  See mar

## 2021-09-02 NOTE — PLAN OF CARE
Problem: Non-Violent Restraints  Goal: No harm/injury to patient while restraints in use  Outcome: Met This Shift  Goal: Patient's dignity will be maintained  Outcome: Met This Shift     Problem: Falls - Risk of:  Goal: Will remain free from falls  Description: Will remain free from falls  Outcome: Met This Shift  Goal: Absence of physical injury  Description: Absence of physical injury  Outcome: Met This Shift     Problem: Skin Integrity:  Goal: Will show no infection signs and symptoms  Description: Will show no infection signs and symptoms  Outcome: Met This Shift  Goal: Absence of new skin breakdown  Description: Absence of new skin breakdown  Outcome: Met This Shift     Problem: Breathing Pattern - Ineffective:  Goal: Ability to achieve and maintain a regular respiratory rate will improve  Description: Ability to achieve and maintain a regular respiratory rate will improve  Outcome: Ongoing     Problem: Airway Clearance - Ineffective:  Goal: Ability to maintain a clear airway will improve  Description: Ability to maintain a clear airway will improve  Outcome: Ongoing     Problem: Non-Violent Restraints  Goal: Removal from restraints as soon as assessed to be safe  Outcome: Not Met This Shift

## 2021-09-02 NOTE — PROGRESS NOTES
Subjective:  Ricardo Verduzco was seen and examined in the ICU today. The patient remains intubated today.   There were no new problems reported except now growing H influenza in sputum  D/w ICU nursing staff today    A complete review of systems and social history was completed on admission and remains unchanged unless otherwise noted    Scheduled Meds:   Roflumilast  500 mcg Oral Daily    insulin glargine  15 Units SubCUTAneous BID    methylPREDNISolone  40 mg IntraVENous Q6H    piperacillin-tazobactam  3,375 mg IntraVENous Q8H    ipratropium-albuterol  1 ampule Inhalation Q4H    vancomycin  1,000 mg IntraVENous Q12H    enoxaparin  40 mg SubCUTAneous Daily    famotidine (PEPCID) injection  20 mg IntraVENous Daily    QUEtiapine  25 mg Oral BID    lidocaine PF  5 mL IntraDERmal Once    sodium chloride flush  5-40 mL IntraVENous 2 times per day    heparin flush  3 mL IntraVENous 2 times per day    budesonide  0.5 mg Nebulization BID    medicated lip ointment   Topical Daily    insulin lispro  0-18 Units SubCUTAneous Q4H    sodium chloride flush  5-40 mL IntraVENous 2 times per day     Continuous Infusions:   sodium chloride 12.5 mL/hr at 09/02/21 1027    propofol Stopped (09/02/21 1051)    fentaNYL 5 mcg/ml in 0.9%  ml infusion 75 mcg/hr (09/02/21 0512)    sodium chloride      dexmedetomidine HCl in NaCl 0.3 mcg/kg/hr (09/01/21 1428)    dextrose      sodium chloride      lactated ringers 50 mL/hr at 09/02/21 0511     PRN Meds:perflutren lipid microspheres, hydrALAZINE, sodium chloride flush, sodium chloride, heparin flush, metoprolol, glucose, dextrose, glucagon (rDNA), dextrose, sodium chloride flush, sodium chloride, ondansetron **OR** ondansetron, polyethylene glycol, acetaminophen **OR** acetaminophen    Objective:  BP (!) 157/66   Pulse 75   Temp 98.1 °F (36.7 °C) (Bladder)   Resp 22   Ht 5' 4\" (1.626 m)   Wt 197 lb (89.4 kg)   SpO2 98%   BMI 33.81 kg/m²   In: 2106 [I.V.:1293; NG/GT:813]  Out: 2220    In: 2106   Out: 2220 [Urine:2220]     Intubated and sedated  RRR, pos S1, S2  Tight with no wheeze, rales or rhonchi  bowel sounds present, nontender, nondistended  No clubbing, cyanosis, or edema  No neuro changes   No obvious rashes or lesions. Recent Labs     08/31/21  0602 09/01/21  0439 09/02/21  0524   WBC 12.6* 12.9* 9.3   HGB 10.4* 10.5* 10.6*    240 240     Recent Labs     08/31/21 0602 09/01/21  0439 09/02/21  0524    136 136   K 4.7 5.0 5.0    98 98   CO2 31* 30* 32*   BUN 23 26* 30*   CREATININE 0.7 0.8 0.7   GLUCOSE 226* 305* 375*     Recent Labs     08/31/21 0602 09/01/21  0439 09/02/21  0524   BILITOT 0.2 <0.2 <0.2   ALKPHOS 58 58 50   AST 10 13 18   ALT 12 14 20     No results for input(s): INR in the last 72 hours. Invalid input(s): PT  No results for input(s): CKTOTAL, CKMB, CKMBINDEX, TROPONINI in the last 72 hours. XR CHEST PORTABLE    Result Date: 8/25/2021  EXAMINATION: ONE XRAY VIEW OF THE CHEST 8/24/2021 8:52 pm COMPARISON: 08/24/2021 HISTORY: ORDERING SYSTEM PROVIDED HISTORY: et tube placement TECHNOLOGIST PROVIDED HISTORY: Reason for exam:->et tube placement FINDINGS: Stable position of endotracheal tube. Cardiomediastinal silhouette is unremarkable. No infiltrate, effusion, or pneumothorax. No acute osseous abnormality. No pulmonary infiltrates. No significant change since the prior study     XR CHEST PORTABLE    Result Date: 8/24/2021  EXAMINATION: ONE XRAY VIEW OF THE CHEST 8/24/2021 5:30 pm COMPARISON: January 3, 2020 HISTORY: ORDERING SYSTEM PROVIDED HISTORY: SOB (shortness of breath) TECHNOLOGIST PROVIDED HISTORY: Reason for exam:->ETT placement, tongue swelling FINDINGS: Endotracheal tube is approximately 6.8 cm above the chivo. No airspace opacity or pleural effusion. The heart is normal in size. No pneumothorax. 1. Endotracheal tube is approximately 6.8 cm above the chivo.  2. No airspace opacity or pleural

## 2021-09-02 NOTE — PROGRESS NOTES
dexmedetomidine HCl in NaCl 0.3 mcg/kg/hr (09/02/21 0800)    dextrose      sodium chloride      lactated ringers 50 mL/hr at 09/02/21 0800     perflutren lipid microspheres, hydrALAZINE, sodium chloride flush, sodium chloride, heparin flush, metoprolol, glucose, dextrose, glucagon (rDNA), dextrose, sodium chloride flush, sodium chloride, ondansetron **OR** ondansetron, polyethylene glycol, acetaminophen **OR** acetaminophen      REVIEW OF SYSTEMS:  Unable to answer questions because the patient is sedated and intubated    OBJECTIVE:  Vitals:    09/02/21 1100   BP: (!) 162/69   Pulse: 89   Resp: 20   Temp:    SpO2: 97%     FiO2 : 40 %  O2 Flow Rate (L/min): 7 L/min  O2 Device: Ventilator    PHYSICAL EXAM:  Constitutional: No fever, chills, diaphoresis  Skin: Skin rash, no skin breakdown  HEENT: Endotracheal tube secured at lips  Neck: No JVD, lymphadenopathy, thyromegaly  Cardiovascular: S1, S2 regular.   No S3 murmurs rubs present  Respiratory: Tight expiratory wheezes over both posterior lung fields  Gastrointestinal: Soft, flat, nontender  Genitourinary: No CVA tenderness apparent  Extremities: No clubbing, cyanosis, or edema  Neurological: Sedated  Psychological: Sedated    LABS:  WBC   Date Value Ref Range Status   09/02/2021 9.3 4.5 - 11.5 E9/L Final   09/01/2021 12.9 (H) 4.5 - 11.5 E9/L Final   08/31/2021 12.6 (H) 4.5 - 11.5 E9/L Final     Hemoglobin   Date Value Ref Range Status   09/02/2021 10.6 (L) 11.5 - 15.5 g/dL Final   09/01/2021 10.5 (L) 11.5 - 15.5 g/dL Final   08/31/2021 10.4 (L) 11.5 - 15.5 g/dL Final     Hematocrit   Date Value Ref Range Status   09/02/2021 32.5 (L) 34.0 - 48.0 % Final   09/01/2021 33.2 (L) 34.0 - 48.0 % Final   08/31/2021 32.0 (L) 34.0 - 48.0 % Final     MCV   Date Value Ref Range Status   09/02/2021 95.6 80.0 - 99.9 fL Final   09/01/2021 96.8 80.0 - 99.9 fL Final   08/31/2021 95.8 80.0 - 99.9 fL Final     Platelets   Date Value Ref Range Status   09/02/2021 240 130 - 450 E9/L Final   09/01/2021 240 130 - 450 E9/L Final   08/31/2021 205 130 - 450 E9/L Final     Sodium   Date Value Ref Range Status   09/02/2021 136 132 - 146 mmol/L Final   09/01/2021 136 132 - 146 mmol/L Final   08/31/2021 138 132 - 146 mmol/L Final     Potassium   Date Value Ref Range Status   09/02/2021 5.0 3.5 - 5.0 mmol/L Final   09/01/2021 5.0 3.5 - 5.0 mmol/L Final   08/31/2021 4.7 3.5 - 5.0 mmol/L Final     Potassium reflex Magnesium   Date Value Ref Range Status   08/25/2021 4.7 3.5 - 5.0 mmol/L Final   08/24/2021 4.4 3.5 - 5.0 mmol/L Final   07/28/2020 3.9 3.5 - 5.0 mmol/L Final     Chloride   Date Value Ref Range Status   09/02/2021 98 98 - 107 mmol/L Final   09/01/2021 98 98 - 107 mmol/L Final   08/31/2021 100 98 - 107 mmol/L Final     CO2   Date Value Ref Range Status   09/02/2021 32 (H) 22 - 29 mmol/L Final   09/01/2021 30 (H) 22 - 29 mmol/L Final   08/31/2021 31 (H) 22 - 29 mmol/L Final     BUN   Date Value Ref Range Status   09/02/2021 30 (H) 6 - 23 mg/dL Final   09/01/2021 26 (H) 6 - 23 mg/dL Final   08/31/2021 23 6 - 23 mg/dL Final     CREATININE   Date Value Ref Range Status   09/02/2021 0.7 0.5 - 1.0 mg/dL Final   09/01/2021 0.8 0.5 - 1.0 mg/dL Final   08/31/2021 0.7 0.5 - 1.0 mg/dL Final     Glucose   Date Value Ref Range Status   09/02/2021 375 (H) 74 - 99 mg/dL Final   09/01/2021 305 (H) 74 - 99 mg/dL Final   08/31/2021 226 (H) 74 - 99 mg/dL Final     Calcium   Date Value Ref Range Status   09/02/2021 8.5 (L) 8.6 - 10.2 mg/dL Final   09/01/2021 8.4 (L) 8.6 - 10.2 mg/dL Final   08/31/2021 8.4 (L) 8.6 - 10.2 mg/dL Final     Total Protein   Date Value Ref Range Status   09/02/2021 5.5 (L) 6.4 - 8.3 g/dL Final   09/01/2021 5.6 (L) 6.4 - 8.3 g/dL Final   08/31/2021 5.3 (L) 6.4 - 8.3 g/dL Final     Albumin   Date Value Ref Range Status   09/02/2021 2.7 (L) 3.5 - 5.2 g/dL Final   09/01/2021 2.7 (L) 3.5 - 5.2 g/dL Final   08/31/2021 2.5 (L) 3.5 - 5.2 g/dL Final     Total Bilirubin   Date Value Ref Range Status   09/02/2021 <0.2 0.0 - 1.2 mg/dL Final   09/01/2021 <0.2 0.0 - 1.2 mg/dL Final   08/31/2021 0.2 0.0 - 1.2 mg/dL Final     Alkaline Phosphatase   Date Value Ref Range Status   09/02/2021 50 35 - 104 U/L Final   09/01/2021 58 35 - 104 U/L Final   08/31/2021 58 35 - 104 U/L Final     AST   Date Value Ref Range Status   09/02/2021 18 0 - 31 U/L Final   09/01/2021 13 0 - 31 U/L Final   08/31/2021 10 0 - 31 U/L Final     ALT   Date Value Ref Range Status   09/02/2021 20 0 - 32 U/L Final   09/01/2021 14 0 - 32 U/L Final   08/31/2021 12 0 - 32 U/L Final     GFR Non-   Date Value Ref Range Status   09/02/2021 >60 >=60 mL/min/1.73 Final     Comment:     Chronic Kidney Disease: less than 60 ml/min/1.73 sq.m. Kidney Failure: less than 15 ml/min/1.73 sq.m. Results valid for patients 18 years and older. 09/01/2021 >60 >=60 mL/min/1.73 Final     Comment:     Chronic Kidney Disease: less than 60 ml/min/1.73 sq.m. Kidney Failure: less than 15 ml/min/1.73 sq.m. Results valid for patients 18 years and older. 08/31/2021 >60 >=60 mL/min/1.73 Final     Comment:     Chronic Kidney Disease: less than 60 ml/min/1.73 sq.m. Kidney Failure: less than 15 ml/min/1.73 sq.m. Results valid for patients 18 years and older.        GFR    Date Value Ref Range Status   09/02/2021 >60  Final   09/01/2021 >60  Final   08/31/2021 >60  Final     Magnesium   Date Value Ref Range Status   09/02/2021 2.1 1.6 - 2.6 mg/dL Final   09/01/2021 2.1 1.6 - 2.6 mg/dL Final   08/31/2021 2.0 1.6 - 2.6 mg/dL Final     Phosphorus   Date Value Ref Range Status   09/02/2021 3.6 2.5 - 4.5 mg/dL Final   09/01/2021 3.5 2.5 - 4.5 mg/dL Final   08/31/2021 2.6 2.5 - 4.5 mg/dL Final     Recent Labs     08/31/21  0754   PH 7.432   PO2 105.5*   PCO2 45.0   HCO3 29.3*   BE 4.5*   O2SAT 97.4       RADIOLOGY:  XR CHEST PORTABLE   Final Result   No acute process and unchanged         XR CHEST PORTABLE   Final Result   No acute process         XR ABDOMEN FOR NG/OG/NE TUBE PLACEMENT   Final Result   NG tube tip is at the level of the gastric antrum, well within the gastric   lumen. XR CHEST PORTABLE   Final Result   ETT tip is within 1 cm of the chivo. It could be withdrawn 1-2 cm for   better tube position. XR CHEST PORTABLE   Final Result   1. There is no acute cardiopulmonary disease   2. Stable position of support lines and tubes. CT SOFT TISSUE NECK W CONTRAST   Final Result   Limited evaluation of the pharynx, larynx and hypopharynx due to presence of   ETT and OG tube. Prominence of the bilateral adenoids, likely related to   infection/inflammation. Mild prominence of the bilateral palatine and lingual tonsils, and narrowing   of the bilateral piriform sinuses, likely reactive. Mild infiltration of fat in the bilateral carotid spaces, particularly at the   level of carotid bulbs, right more than left, likely related to extension of   inflammatory changes. Acute sinusitis. XR CHEST PORTABLE   Final Result   1. Endotracheal tube is present with distal tip approximately 4.5 cm above   the chivo. 2. No airspace opacity or pleural effusion. XR ABDOMEN FOR NG/OG/NE TUBE PLACEMENT   Final Result   1. Satisfactory position of the NG tube within the stomach         XR CHEST PORTABLE   Final Result   No pulmonary infiltrates. No significant change since the prior study         XR CHEST PORTABLE   Final Result   1. Endotracheal tube is approximately 6.8 cm above the chivo. 2. No airspace opacity or pleural effusion.                  PROBLEM LIST:  Principal Problem:    Acute respiratory failure (HCC)  Active Problems:    Essential hypertension    Diabetes mellitus (HCC)    Myocardiopathy (HCC)    ACE inhibitor-aggravated angioedema    COPD (chronic obstructive pulmonary disease) (HCC)    Acquired angioedema    SOB (shortness of breath)    Disorder of airway Acquired hypertrophy of tongue  Resolved Problems:    * No resolved hospital problems. *      IMPRESSION:  1. Acute superimposed on chronic respiratory failure secondary to severe COPD with exacerbation  2. Haemophilus influenza respiratory infection  3. Staph aureus (methicillin sensitive) respiratory infection  4. E. coli urinary tract infection  5. Status post ACE related angioedema  6. Systemic hypertension  7. Diabetes mellitus type 2    PLAN:  1. Increase Lantus  2. Add Metformin  3. Change tube feedings to Glucerna  4. Add Mucomyst to aerosol treatments  5. Consider bronchoscopy if no improvement is seen  6. Sedation vacation  7. Add lactobacillus or similar to attenuate the possibility of C. difficile infection    ATTESTATION:  ICU Staff Physician note of personal involvement in Care  As the attending physician, I certify that I personally reviewed the patients history and personally examined the patient to confirm the physical findings described above,  And that I reviewed the relevant imaging studies and available reports. I also discussed the differential diagnosis and all of the proposed management plans with the patient and individuals accompanying the patient to this visit. They had the opportunity to ask questions about the proposed management plans and to have those questions answered. This patient has a high probability of sudden, clinically significant deterioration, which requires the highest level of physician preparedness to intervene urgently. I managed/supervised life or organ supporting interventions that required frequent physician assessment. I devoted my full attention to the direct care of this patient for the amount of time indicated below.   Time I spent with the family or surrogate(s) is included only if the patient was incapable of providing the necessary information or participating in medical decisions - Time devoted to teaching and to any procedures I billed separately is not included.     CRITICAL CARE TIME:  37 minutes    Electronically signed by Brandin Clemens MD on 9/2/2021 at 11:47 AM

## 2021-09-02 NOTE — PROGRESS NOTES
Pharmacy Consultation Note  (Antibiotic Dosing and Monitoring)    Vancomycin has been discontinued; pharmacy will sign-off. Please reconsult if needed.     Thank you,  Enmanuel Nelson PharmD, BCPS 9/2/2021 11:49 AM

## 2021-09-03 LAB
ALBUMIN SERPL-MCNC: 2.8 G/DL (ref 3.5–5.2)
ALP BLD-CCNC: 47 U/L (ref 35–104)
ALT SERPL-CCNC: 24 U/L (ref 0–32)
ANION GAP SERPL CALCULATED.3IONS-SCNC: 7 MMOL/L (ref 7–16)
AST SERPL-CCNC: 14 U/L (ref 0–31)
B.E.: 5.7 MMOL/L (ref -3–3)
BASOPHILS ABSOLUTE: 0.02 E9/L (ref 0–0.2)
BASOPHILS RELATIVE PERCENT: 0.2 % (ref 0–2)
BILIRUB SERPL-MCNC: 0.3 MG/DL (ref 0–1.2)
BLOOD CULTURE, ROUTINE: NORMAL
BUN BLDV-MCNC: 29 MG/DL (ref 6–23)
CALCIUM SERPL-MCNC: 8.4 MG/DL (ref 8.6–10.2)
CHLORIDE BLD-SCNC: 97 MMOL/L (ref 98–107)
CO2: 33 MMOL/L (ref 22–29)
COHB: 0.8 % (ref 0–1.5)
CREAT SERPL-MCNC: 0.7 MG/DL (ref 0.5–1)
CRITICAL: ABNORMAL
CULTURE, BLOOD 2: NORMAL
DATE ANALYZED: ABNORMAL
DATE OF COLLECTION: ABNORMAL
EOSINOPHILS ABSOLUTE: 0 E9/L (ref 0.05–0.5)
EOSINOPHILS RELATIVE PERCENT: 0 % (ref 0–6)
FIO2: 40 %
GFR AFRICAN AMERICAN: >60
GFR NON-AFRICAN AMERICAN: >60 ML/MIN/1.73
GLUCOSE BLD-MCNC: 230 MG/DL (ref 74–99)
HCO3: 31.2 MMOL/L (ref 22–26)
HCT VFR BLD CALC: 34.6 % (ref 34–48)
HEMOGLOBIN: 11.3 G/DL (ref 11.5–15.5)
HHB: 2.6 % (ref 0–5)
IMMATURE GRANULOCYTES #: 0.2 E9/L
IMMATURE GRANULOCYTES %: 1.9 % (ref 0–5)
LAB: ABNORMAL
LYMPHOCYTES ABSOLUTE: 1.27 E9/L (ref 1.5–4)
LYMPHOCYTES RELATIVE PERCENT: 12 % (ref 20–42)
Lab: ABNORMAL
MAGNESIUM: 2.2 MG/DL (ref 1.6–2.6)
MCH RBC QN AUTO: 31.5 PG (ref 26–35)
MCHC RBC AUTO-ENTMCNC: 32.7 % (ref 32–34.5)
MCV RBC AUTO: 96.4 FL (ref 80–99.9)
METER GLUCOSE: 124 MG/DL (ref 74–99)
METER GLUCOSE: 190 MG/DL (ref 74–99)
METER GLUCOSE: 228 MG/DL (ref 74–99)
METER GLUCOSE: 245 MG/DL (ref 74–99)
METER GLUCOSE: 256 MG/DL (ref 74–99)
METER GLUCOSE: 99 MG/DL (ref 74–99)
METHB: 0.3 % (ref 0–1.5)
MODE: AC
MONOCYTES ABSOLUTE: 0.85 E9/L (ref 0.1–0.95)
MONOCYTES RELATIVE PERCENT: 8 % (ref 2–12)
NEUTROPHILS ABSOLUTE: 8.23 E9/L (ref 1.8–7.3)
NEUTROPHILS RELATIVE PERCENT: 77.9 % (ref 43–80)
O2 CONTENT: 16.3 ML/DL
O2 SATURATION: 97.4 % (ref 92–98.5)
O2HB: 96.3 % (ref 94–97)
OPERATOR ID: 3300
PATIENT TEMP: 37 C
PCO2: 49.7 MMHG (ref 35–45)
PDW BLD-RTO: 12 FL (ref 11.5–15)
PEEP/CPAP: 8 CMH2O
PFO2: 2.72 MMHG/%
PH BLOOD GAS: 7.42 (ref 7.35–7.45)
PHOSPHORUS: 4.1 MG/DL (ref 2.5–4.5)
PLATELET # BLD: 253 E9/L (ref 130–450)
PMV BLD AUTO: 9.8 FL (ref 7–12)
PO2: 108.7 MMHG (ref 75–100)
POTASSIUM SERPL-SCNC: 4.9 MMOL/L (ref 3.5–5)
RBC # BLD: 3.59 E12/L (ref 3.5–5.5)
RI(T): 1.01
RR MECHANICAL: 18 B/MIN
SODIUM BLD-SCNC: 137 MMOL/L (ref 132–146)
SOURCE, BLOOD GAS: ABNORMAL
THB: 11.9 G/DL (ref 11.5–16.5)
TIME ANALYZED: 508
TOTAL PROTEIN: 5.3 G/DL (ref 6.4–8.3)
VT MECHANICAL: 400 ML
WBC # BLD: 10.6 E9/L (ref 4.5–11.5)

## 2021-09-03 PROCEDURE — 6360000002 HC RX W HCPCS

## 2021-09-03 PROCEDURE — 6360000002 HC RX W HCPCS: Performed by: INTERNAL MEDICINE

## 2021-09-03 PROCEDURE — 2500000003 HC RX 250 WO HCPCS

## 2021-09-03 PROCEDURE — 36592 COLLECT BLOOD FROM PICC: CPT

## 2021-09-03 PROCEDURE — 94003 VENT MGMT INPAT SUBQ DAY: CPT

## 2021-09-03 PROCEDURE — 94640 AIRWAY INHALATION TREATMENT: CPT

## 2021-09-03 PROCEDURE — 84100 ASSAY OF PHOSPHORUS: CPT

## 2021-09-03 PROCEDURE — 2580000003 HC RX 258: Performed by: INTERNAL MEDICINE

## 2021-09-03 PROCEDURE — 6360000002 HC RX W HCPCS: Performed by: NURSE PRACTITIONER

## 2021-09-03 PROCEDURE — 6370000000 HC RX 637 (ALT 250 FOR IP): Performed by: INTERNAL MEDICINE

## 2021-09-03 PROCEDURE — 82962 GLUCOSE BLOOD TEST: CPT

## 2021-09-03 PROCEDURE — 2500000003 HC RX 250 WO HCPCS: Performed by: INTERNAL MEDICINE

## 2021-09-03 PROCEDURE — 2000000000 HC ICU R&B

## 2021-09-03 PROCEDURE — 82805 BLOOD GASES W/O2 SATURATION: CPT

## 2021-09-03 PROCEDURE — 83735 ASSAY OF MAGNESIUM: CPT

## 2021-09-03 PROCEDURE — 36600 WITHDRAWAL OF ARTERIAL BLOOD: CPT

## 2021-09-03 PROCEDURE — 6370000000 HC RX 637 (ALT 250 FOR IP)

## 2021-09-03 PROCEDURE — 85025 COMPLETE CBC W/AUTO DIFF WBC: CPT

## 2021-09-03 PROCEDURE — 2580000003 HC RX 258

## 2021-09-03 PROCEDURE — 80053 COMPREHEN METABOLIC PANEL: CPT

## 2021-09-03 PROCEDURE — 6370000000 HC RX 637 (ALT 250 FOR IP): Performed by: NURSE PRACTITIONER

## 2021-09-03 RX ORDER — LABETALOL HYDROCHLORIDE 5 MG/ML
10 INJECTION, SOLUTION INTRAVENOUS ONCE
Status: COMPLETED | OUTPATIENT
Start: 2021-09-03 | End: 2021-09-03

## 2021-09-03 RX ORDER — FUROSEMIDE 10 MG/ML
20 INJECTION INTRAMUSCULAR; INTRAVENOUS ONCE
Status: COMPLETED | OUTPATIENT
Start: 2021-09-03 | End: 2021-09-03

## 2021-09-03 RX ORDER — METOPROLOL TARTRATE 5 MG/5ML
10 INJECTION INTRAVENOUS EVERY 6 HOURS
Status: DISCONTINUED | OUTPATIENT
Start: 2021-09-03 | End: 2021-09-04

## 2021-09-03 RX ORDER — CLONIDINE HYDROCHLORIDE 0.1 MG/1
TABLET ORAL
Status: COMPLETED
Start: 2021-09-03 | End: 2021-09-03

## 2021-09-03 RX ORDER — LABETALOL HYDROCHLORIDE 5 MG/ML
INJECTION, SOLUTION INTRAVENOUS
Status: COMPLETED
Start: 2021-09-03 | End: 2021-09-03

## 2021-09-03 RX ORDER — CLONIDINE HYDROCHLORIDE 0.2 MG/1
0.2 TABLET ORAL 2 TIMES DAILY
Status: DISCONTINUED | OUTPATIENT
Start: 2021-09-03 | End: 2021-09-06

## 2021-09-03 RX ORDER — CLONIDINE HYDROCHLORIDE 0.1 MG/1
0.1 TABLET ORAL ONCE
Status: DISCONTINUED | OUTPATIENT
Start: 2021-09-03 | End: 2021-09-11

## 2021-09-03 RX ORDER — FUROSEMIDE 10 MG/ML
INJECTION INTRAMUSCULAR; INTRAVENOUS
Status: COMPLETED
Start: 2021-09-03 | End: 2021-09-03

## 2021-09-03 RX ADMIN — SODIUM CHLORIDE, POTASSIUM CHLORIDE, SODIUM LACTATE AND CALCIUM CHLORIDE: 600; 310; 30; 20 INJECTION, SOLUTION INTRAVENOUS at 03:10

## 2021-09-03 RX ADMIN — METHYLPREDNISOLONE SODIUM SUCCINATE 40 MG: 40 INJECTION, POWDER, FOR SOLUTION INTRAMUSCULAR; INTRAVENOUS at 00:15

## 2021-09-03 RX ADMIN — CLONIDINE HYDROCHLORIDE 0.1 MG: 0.2 TABLET ORAL at 11:08

## 2021-09-03 RX ADMIN — METHYLPREDNISOLONE SODIUM SUCCINATE 40 MG: 40 INJECTION, POWDER, FOR SOLUTION INTRAMUSCULAR; INTRAVENOUS at 11:47

## 2021-09-03 RX ADMIN — METFORMIN HYDROCHLORIDE 500 MG: 500 TABLET ORAL at 08:30

## 2021-09-03 RX ADMIN — ACETYLCYSTEINE 600 MG: 200 SOLUTION ORAL; RESPIRATORY (INHALATION) at 02:14

## 2021-09-03 RX ADMIN — METHYLPREDNISOLONE SODIUM SUCCINATE 40 MG: 40 INJECTION, POWDER, FOR SOLUTION INTRAMUSCULAR; INTRAVENOUS at 06:24

## 2021-09-03 RX ADMIN — BUDESONIDE 500 MCG: 0.5 INHALANT RESPIRATORY (INHALATION) at 05:59

## 2021-09-03 RX ADMIN — FENTANYL CITRATE 50 MCG/HR: 50 INJECTION, SOLUTION INTRAMUSCULAR; INTRAVENOUS at 23:36

## 2021-09-03 RX ADMIN — ACETYLCYSTEINE 600 MG: 200 SOLUTION ORAL; RESPIRATORY (INHALATION) at 16:09

## 2021-09-03 RX ADMIN — QUETIAPINE FUMARATE 25 MG: 25 TABLET ORAL at 08:31

## 2021-09-03 RX ADMIN — PIPERACILLIN SODIUM AND TAZOBACTAM SODIUM 3375 MG: 3; .375 INJECTION, POWDER, LYOPHILIZED, FOR SOLUTION INTRAVENOUS at 11:46

## 2021-09-03 RX ADMIN — INSULIN LISPRO 3 UNITS: 100 INJECTION, SOLUTION INTRAVENOUS; SUBCUTANEOUS at 15:45

## 2021-09-03 RX ADMIN — METHYLPREDNISOLONE SODIUM SUCCINATE 40 MG: 40 INJECTION, POWDER, FOR SOLUTION INTRAMUSCULAR; INTRAVENOUS at 19:44

## 2021-09-03 RX ADMIN — FAMOTIDINE 20 MG: 10 INJECTION INTRAVENOUS at 09:02

## 2021-09-03 RX ADMIN — METFORMIN HYDROCHLORIDE 500 MG: 500 TABLET ORAL at 20:37

## 2021-09-03 RX ADMIN — INSULIN LISPRO 6 UNITS: 100 INJECTION, SOLUTION INTRAVENOUS; SUBCUTANEOUS at 06:18

## 2021-09-03 RX ADMIN — Medication 1.4 MCG/KG/HR: at 23:13

## 2021-09-03 RX ADMIN — INSULIN GLARGINE 20 UNITS: 100 INJECTION, SOLUTION SUBCUTANEOUS at 10:22

## 2021-09-03 RX ADMIN — BUDESONIDE 500 MCG: 0.5 INHALANT RESPIRATORY (INHALATION) at 16:09

## 2021-09-03 RX ADMIN — Medication 10 ML: at 09:03

## 2021-09-03 RX ADMIN — QUETIAPINE FUMARATE 25 MG: 25 TABLET ORAL at 20:37

## 2021-09-03 RX ADMIN — CLONIDINE HYDROCHLORIDE 0.1 MG: 0.1 TABLET ORAL at 11:08

## 2021-09-03 RX ADMIN — HYDRALAZINE HYDROCHLORIDE 10 MG: 20 INJECTION INTRAMUSCULAR; INTRAVENOUS at 16:13

## 2021-09-03 RX ADMIN — PROPOFOL 40 MCG/KG/MIN: 10 INJECTION, EMULSION INTRAVENOUS at 03:09

## 2021-09-03 RX ADMIN — INSULIN LISPRO 9 UNITS: 100 INJECTION, SOLUTION INTRAVENOUS; SUBCUTANEOUS at 10:21

## 2021-09-03 RX ADMIN — CLONIDINE HYDROCHLORIDE 0.2 MG: 0.2 TABLET ORAL at 20:37

## 2021-09-03 RX ADMIN — FUROSEMIDE 20 MG: 20 INJECTION, SOLUTION INTRAMUSCULAR; INTRAVENOUS at 10:40

## 2021-09-03 RX ADMIN — METOPROLOL TARTRATE 10 MG: 5 INJECTION, SOLUTION INTRAVENOUS at 23:13

## 2021-09-03 RX ADMIN — IPRATROPIUM BROMIDE AND ALBUTEROL SULFATE 1 AMPULE: .5; 3 SOLUTION RESPIRATORY (INHALATION) at 12:33

## 2021-09-03 RX ADMIN — METOPROLOL TARTRATE 5 MG: 1 INJECTION, SOLUTION INTRAVENOUS at 08:30

## 2021-09-03 RX ADMIN — SODIUM CHLORIDE, POTASSIUM CHLORIDE, SODIUM LACTATE AND CALCIUM CHLORIDE: 600; 310; 30; 20 INJECTION, SOLUTION INTRAVENOUS at 23:39

## 2021-09-03 RX ADMIN — ENOXAPARIN SODIUM 40 MG: 40 INJECTION SUBCUTANEOUS at 08:31

## 2021-09-03 RX ADMIN — METOPROLOL TARTRATE 10 MG: 5 INJECTION, SOLUTION INTRAVENOUS at 19:44

## 2021-09-03 RX ADMIN — LABETALOL HYDROCHLORIDE 10 MG: 5 INJECTION INTRAVENOUS at 10:42

## 2021-09-03 RX ADMIN — FUROSEMIDE 20 MG: 10 INJECTION INTRAMUSCULAR; INTRAVENOUS at 10:40

## 2021-09-03 RX ADMIN — SODIUM CHLORIDE: 9 INJECTION, SOLUTION INTRAVENOUS at 00:16

## 2021-09-03 RX ADMIN — SODIUM CHLORIDE, PRESERVATIVE FREE 300 UNITS: 5 INJECTION INTRAVENOUS at 20:39

## 2021-09-03 RX ADMIN — IPRATROPIUM BROMIDE AND ALBUTEROL SULFATE 1 AMPULE: .5; 3 SOLUTION RESPIRATORY (INHALATION) at 22:39

## 2021-09-03 RX ADMIN — PIPERACILLIN SODIUM AND TAZOBACTAM SODIUM 3375 MG: 3; .375 INJECTION, POWDER, LYOPHILIZED, FOR SOLUTION INTRAVENOUS at 19:46

## 2021-09-03 RX ADMIN — IPRATROPIUM BROMIDE AND ALBUTEROL SULFATE 1 AMPULE: .5; 3 SOLUTION RESPIRATORY (INHALATION) at 05:59

## 2021-09-03 RX ADMIN — IPRATROPIUM BROMIDE AND ALBUTEROL SULFATE 1 AMPULE: .5; 3 SOLUTION RESPIRATORY (INHALATION) at 02:14

## 2021-09-03 RX ADMIN — SODIUM CHLORIDE, PRESERVATIVE FREE 300 UNITS: 5 INJECTION INTRAVENOUS at 08:31

## 2021-09-03 RX ADMIN — PIPERACILLIN SODIUM AND TAZOBACTAM SODIUM 3375 MG: 3; .375 INJECTION, POWDER, LYOPHILIZED, FOR SOLUTION INTRAVENOUS at 04:22

## 2021-09-03 RX ADMIN — SODIUM CHLORIDE: 9 INJECTION, SOLUTION INTRAVENOUS at 16:16

## 2021-09-03 RX ADMIN — SODIUM CHLORIDE: 9 INJECTION, SOLUTION INTRAVENOUS at 08:30

## 2021-09-03 RX ADMIN — IPRATROPIUM BROMIDE AND ALBUTEROL SULFATE 1 AMPULE: .5; 3 SOLUTION RESPIRATORY (INHALATION) at 09:25

## 2021-09-03 RX ADMIN — SODIUM CHLORIDE, PRESERVATIVE FREE 10 ML: 5 INJECTION INTRAVENOUS at 04:22

## 2021-09-03 RX ADMIN — LABETALOL HYDROCHLORIDE 10 MG: 5 INJECTION, SOLUTION INTRAVENOUS at 10:42

## 2021-09-03 RX ADMIN — ACETYLCYSTEINE 600 MG: 200 SOLUTION ORAL; RESPIRATORY (INHALATION) at 12:36

## 2021-09-03 RX ADMIN — PROPOFOL 35 MCG/KG/MIN: 10 INJECTION, EMULSION INTRAVENOUS at 07:27

## 2021-09-03 RX ADMIN — ACETYLCYSTEINE 600 MG: 200 SOLUTION ORAL; RESPIRATORY (INHALATION) at 09:26

## 2021-09-03 RX ADMIN — IPRATROPIUM BROMIDE AND ALBUTEROL SULFATE 1 AMPULE: .5; 3 SOLUTION RESPIRATORY (INHALATION) at 16:09

## 2021-09-03 RX ADMIN — Medication 1.4 MCG/KG/HR: at 19:43

## 2021-09-03 RX ADMIN — ACETYLCYSTEINE 600 MG: 200 SOLUTION ORAL; RESPIRATORY (INHALATION) at 22:39

## 2021-09-03 RX ADMIN — Medication 1 CAPSULE: at 08:31

## 2021-09-03 RX ADMIN — ROFLUMILAST 500 MCG: 500 TABLET ORAL at 08:31

## 2021-09-03 RX ADMIN — ACETYLCYSTEINE 600 MG: 200 SOLUTION ORAL; RESPIRATORY (INHALATION) at 05:59

## 2021-09-03 RX ADMIN — INSULIN LISPRO 6 UNITS: 100 INJECTION, SOLUTION INTRAVENOUS; SUBCUTANEOUS at 02:25

## 2021-09-03 RX ADMIN — DIMETHICONE, CAMPHOR (SYNTHETIC), MENTHOL, AND PHENOL: 1.1; .5; .625; .5 OINTMENT TOPICAL at 09:02

## 2021-09-03 RX ADMIN — METHYLPREDNISOLONE SODIUM SUCCINATE 40 MG: 40 INJECTION, POWDER, FOR SOLUTION INTRAMUSCULAR; INTRAVENOUS at 23:13

## 2021-09-03 RX ADMIN — Medication 10 ML: at 20:39

## 2021-09-03 RX ADMIN — FENTANYL CITRATE 50 MCG/HR: 50 INJECTION, SOLUTION INTRAMUSCULAR; INTRAVENOUS at 03:06

## 2021-09-03 RX ADMIN — HYDRALAZINE HYDROCHLORIDE 10 MG: 20 INJECTION INTRAMUSCULAR; INTRAVENOUS at 09:28

## 2021-09-03 ASSESSMENT — PAIN SCALES - GENERAL
PAINLEVEL_OUTOF10: 0

## 2021-09-03 ASSESSMENT — PULMONARY FUNCTION TESTS
PIF_VALUE: 32
PIF_VALUE: 26
PIF_VALUE: 29
PIF_VALUE: 29
PIF_VALUE: 21
PIF_VALUE: 20
PIF_VALUE: 31
PIF_VALUE: 33
PIF_VALUE: 31
PIF_VALUE: 13
PIF_VALUE: 33
PIF_VALUE: 34
PIF_VALUE: 33
PIF_VALUE: 34
PIF_VALUE: 28
PIF_VALUE: 27
PIF_VALUE: 31
PIF_VALUE: 29
PIF_VALUE: 40
PIF_VALUE: 26
PIF_VALUE: 33
PIF_VALUE: 32
PIF_VALUE: 13
PIF_VALUE: 27
PIF_VALUE: 33
PIF_VALUE: 38
PIF_VALUE: 34
PIF_VALUE: 18
PIF_VALUE: 36
PIF_VALUE: 28
PIF_VALUE: 32

## 2021-09-03 ASSESSMENT — PAIN SCALES - WONG BAKER
WONGBAKER_NUMERICALRESPONSE: 0

## 2021-09-03 NOTE — PLAN OF CARE
Problem: Non-Violent Restraints  Goal: No harm/injury to patient while restraints in use  9/3/2021 0351 by Ariana Silverman RN  Outcome: Met This Shift     Problem: Non-Violent Restraints  Goal: Patient's dignity will be maintained  9/3/2021 0351 by Ariana Silverman RN  Outcome: Met This Shift     Problem: Falls - Risk of:  Goal: Will remain free from falls  Description: Will remain free from falls  Outcome: Met This Shift     Problem: Falls - Risk of:  Goal: Absence of physical injury  Description: Absence of physical injury  Outcome: Met This Shift     Problem: Skin Integrity:  Goal: Absence of new skin breakdown  Description: Absence of new skin breakdown  Outcome: Met This Shift     Problem: Breathing Pattern - Ineffective:  Goal: Ability to achieve and maintain a regular respiratory rate will improve  Description: Ability to achieve and maintain a regular respiratory rate will improve  9/3/2021 0351 by Ariana Silverman RN  Outcome: Met This Shift     Problem: Airway Clearance - Ineffective:  Goal: Ability to maintain a clear airway will improve  Description: Ability to maintain a clear airway will improve  Outcome: Met This Shift     Problem: Non-Violent Restraints  Goal: Removal from restraints as soon as assessed to be safe  9/3/2021 0351 by Ariana Silverman RN  Outcome: Not Met This Shift

## 2021-09-03 NOTE — PROGRESS NOTES
SBT tried twice today. SBP remains high. Per Dr. Shawn Chen back on previous vent settings and sedation. Increase Clonidine to 0.2 mg PO Q12 hours via NGT.  SHANI

## 2021-09-03 NOTE — PLAN OF CARE
Problem: Non-Violent Restraints  Goal: Removal from restraints as soon as assessed to be safe  Outcome: Ongoing  Goal: No harm/injury to patient while restraints in use  Outcome: Ongoing  Goal: Patient's dignity will be maintained  Outcome: Ongoing  Goal: Removal from restraints as soon as assessed to be safe  Outcome: Ongoing  Goal: No harm/injury to patient while restraints in use  Outcome: Ongoing  Goal: Patient's dignity will be maintained  Outcome: Ongoing     Problem: Falls - Risk of:  Goal: Will remain free from falls  Description: Will remain free from falls  Outcome: Ongoing  Goal: Absence of physical injury  Description: Absence of physical injury  Outcome: Ongoing     Problem: Skin Integrity:  Goal: Will show no infection signs and symptoms  Description: Will show no infection signs and symptoms  Outcome: Ongoing  Goal: Absence of new skin breakdown  Description: Absence of new skin breakdown  Outcome: Ongoing     Problem: Breathing Pattern - Ineffective:  Goal: Ability to achieve and maintain a regular respiratory rate will improve  Description: Ability to achieve and maintain a regular respiratory rate will improve  Outcome: Ongoing     Problem: Airway Clearance - Ineffective:  Goal: Ability to maintain a clear airway will improve  Description: Ability to maintain a clear airway will improve  Outcome: Ongoing

## 2021-09-03 NOTE — PROGRESS NOTES
Pulmonary/Critical Care Progress Note    We are following patient for resolved angioedema secondary to ACE inhibitors, severe COPD with exacerbation, Haemophilus influenza respiratory infection, hypertension, methicillin sensitive staph aureus respiratory infection, E. coli urinary tract infection, diabetes mellitus type 2    SUBJECTIVE:  Patient appears quite comfortable today. She is not wheezing is moving a good deal more air than she has over the last couple of days. However, her blood pressure is high at 200/110 although she is quite comfortable on pressure support and CPAP 5/5. We will give 1 dose of Lasix and 1 dose of labetalol and see where her blood pressure is. We will therefore try to still extubate her if her blood pressure responds and if her respiratory muscles do not fatigue. Blood sugars are much better on Metformin added to the increased dose of Lantus.     MEDICATIONS:   metoprolol  10 mg IntraVENous Q6H    labetalol        labetalol  10 mg IntraVENous Once    Roflumilast  500 mcg Oral Daily    insulin glargine  20 Units SubCUTAneous BID    metFORMIN  500 mg Oral BID    lactobacillus  1 capsule Oral Daily    acetylcysteine  600 mg Inhalation Q4H    methylPREDNISolone  40 mg IntraVENous Q6H    piperacillin-tazobactam  3,375 mg IntraVENous Q8H    ipratropium-albuterol  1 ampule Inhalation Q4H    enoxaparin  40 mg SubCUTAneous Daily    famotidine (PEPCID) injection  20 mg IntraVENous Daily    QUEtiapine  25 mg Oral BID    lidocaine PF  5 mL IntraDERmal Once    sodium chloride flush  5-40 mL IntraVENous 2 times per day    heparin flush  3 mL IntraVENous 2 times per day    budesonide  0.5 mg Nebulization BID    medicated lip ointment   Topical Daily    insulin lispro  0-18 Units SubCUTAneous Q4H    sodium chloride flush  5-40 mL IntraVENous 2 times per day      sodium chloride Stopped (09/03/21 1041)    propofol 35 mcg/kg/min (09/03/21 0727)    fentaNYL 5 mcg/ml in 0.9% NS 250 ml infusion 50 mcg/hr (09/03/21 0306)    sodium chloride      dexmedetomidine HCl in NaCl 0.3 mcg/kg/hr (09/02/21 2008)    dextrose      sodium chloride      lactated ringers 50 mL/hr at 09/03/21 0310     perflutren lipid microspheres, hydrALAZINE, sodium chloride flush, sodium chloride, heparin flush, metoprolol, glucose, dextrose, glucagon (rDNA), dextrose, sodium chloride flush, sodium chloride, ondansetron **OR** ondansetron, polyethylene glycol, acetaminophen **OR** acetaminophen      REVIEW OF SYSTEMS:  Constitutional: Denies fever, weight loss, night sweats, and fatigue  Skin: Denies pigmentation, dark lesions, and rashes   HEENT: Denies hearing loss, tinnitus, ear drainage, epistaxis, sore throat, and hoarseness. Cardiovascular: Denies palpitations, chest pain, and chest pressure. Respiratory: Denies cough, dyspnea at rest, hemoptysis, apnea, and choking. Gastrointestinal: Denies nausea, vomiting, poor appetite, diarrhea, heartburn or reflux  Genitourinary: Denies dysuria, frequency, urgency or hematuria  Musculoskeletal: Denies myalgias, muscle weakness, and bone pain  Neurological: Denies dizziness, vertigo, headache, and focal weakness  Psychological: Denies anxiety and depression  Endocrine: Denies heat intolerance and cold intolerance  Hematopoietic/Lymphatic: Denies bleeding problems and blood transfusions    OBJECTIVE:  Vitals:    09/03/21 0900   BP: (!) 196/94   Pulse: 66   Resp: 22   Temp:    SpO2: 96%     FiO2 : 40 %  O2 Flow Rate (L/min): 7 L/min  O2 Device: Ventilator    PHYSICAL EXAM:  Constitutional: No fevers, chills, diaphoresis  Skin: Skin rash, no skin breakdown  HEENT: Endotracheal tube secured at lips  Neck: No JVD, lymphadenopathy or thyromegaly. I do not see use of accessory muscles of breathing  Cardiovascular: S1, S2 regular. No S3 murmurs rubs present  Respiratory: His breath sounds bilaterally.   Only very faint end expiratory wheezes heard  Gastrointestinal: Soft, obese, nontender  Genitourinary: No CVA tenderness  Extremities: No clubbing, cyanosis, or edema  Neurological: Awake, alert, cooperative, in no acute distress  Psychological: Appropriate affect    LABS:  WBC   Date Value Ref Range Status   09/03/2021 10.6 4.5 - 11.5 E9/L Final   09/02/2021 9.3 4.5 - 11.5 E9/L Final   09/01/2021 12.9 (H) 4.5 - 11.5 E9/L Final     Hemoglobin   Date Value Ref Range Status   09/03/2021 11.3 (L) 11.5 - 15.5 g/dL Final   09/02/2021 10.6 (L) 11.5 - 15.5 g/dL Final   09/01/2021 10.5 (L) 11.5 - 15.5 g/dL Final     Hematocrit   Date Value Ref Range Status   09/03/2021 34.6 34.0 - 48.0 % Final   09/02/2021 32.5 (L) 34.0 - 48.0 % Final   09/01/2021 33.2 (L) 34.0 - 48.0 % Final     MCV   Date Value Ref Range Status   09/03/2021 96.4 80.0 - 99.9 fL Final   09/02/2021 95.6 80.0 - 99.9 fL Final   09/01/2021 96.8 80.0 - 99.9 fL Final     Platelets   Date Value Ref Range Status   09/03/2021 253 130 - 450 E9/L Final   09/02/2021 240 130 - 450 E9/L Final   09/01/2021 240 130 - 450 E9/L Final     Sodium   Date Value Ref Range Status   09/03/2021 137 132 - 146 mmol/L Final   09/02/2021 136 132 - 146 mmol/L Final   09/01/2021 136 132 - 146 mmol/L Final     Potassium   Date Value Ref Range Status   09/03/2021 4.9 3.5 - 5.0 mmol/L Final   09/02/2021 5.0 3.5 - 5.0 mmol/L Final   09/01/2021 5.0 3.5 - 5.0 mmol/L Final     Potassium reflex Magnesium   Date Value Ref Range Status   08/25/2021 4.7 3.5 - 5.0 mmol/L Final   08/24/2021 4.4 3.5 - 5.0 mmol/L Final   07/28/2020 3.9 3.5 - 5.0 mmol/L Final     Chloride   Date Value Ref Range Status   09/03/2021 97 (L) 98 - 107 mmol/L Final   09/02/2021 98 98 - 107 mmol/L Final   09/01/2021 98 98 - 107 mmol/L Final     CO2   Date Value Ref Range Status   09/03/2021 33 (H) 22 - 29 mmol/L Final   09/02/2021 32 (H) 22 - 29 mmol/L Final   09/01/2021 30 (H) 22 - 29 mmol/L Final     BUN   Date Value Ref Range Status   09/03/2021 29 (H) 6 - 23 mg/dL Final   09/02/2021 30 (H) 6 - 23 mg/dL Final   09/01/2021 26 (H) 6 - 23 mg/dL Final     CREATININE   Date Value Ref Range Status   09/03/2021 0.7 0.5 - 1.0 mg/dL Final   09/02/2021 0.7 0.5 - 1.0 mg/dL Final   09/01/2021 0.8 0.5 - 1.0 mg/dL Final     Glucose   Date Value Ref Range Status   09/03/2021 230 (H) 74 - 99 mg/dL Final   09/02/2021 375 (H) 74 - 99 mg/dL Final   09/01/2021 305 (H) 74 - 99 mg/dL Final     Calcium   Date Value Ref Range Status   09/03/2021 8.4 (L) 8.6 - 10.2 mg/dL Final   09/02/2021 8.5 (L) 8.6 - 10.2 mg/dL Final   09/01/2021 8.4 (L) 8.6 - 10.2 mg/dL Final     Total Protein   Date Value Ref Range Status   09/03/2021 5.3 (L) 6.4 - 8.3 g/dL Final   09/02/2021 5.5 (L) 6.4 - 8.3 g/dL Final   09/01/2021 5.6 (L) 6.4 - 8.3 g/dL Final     Albumin   Date Value Ref Range Status   09/03/2021 2.8 (L) 3.5 - 5.2 g/dL Final   09/02/2021 2.7 (L) 3.5 - 5.2 g/dL Final   09/01/2021 2.7 (L) 3.5 - 5.2 g/dL Final     Total Bilirubin   Date Value Ref Range Status   09/03/2021 0.3 0.0 - 1.2 mg/dL Final   09/02/2021 <0.2 0.0 - 1.2 mg/dL Final   09/01/2021 <0.2 0.0 - 1.2 mg/dL Final     Alkaline Phosphatase   Date Value Ref Range Status   09/03/2021 47 35 - 104 U/L Final   09/02/2021 50 35 - 104 U/L Final   09/01/2021 58 35 - 104 U/L Final     AST   Date Value Ref Range Status   09/03/2021 14 0 - 31 U/L Final   09/02/2021 18 0 - 31 U/L Final   09/01/2021 13 0 - 31 U/L Final     ALT   Date Value Ref Range Status   09/03/2021 24 0 - 32 U/L Final   09/02/2021 20 0 - 32 U/L Final   09/01/2021 14 0 - 32 U/L Final     GFR Non-   Date Value Ref Range Status   09/03/2021 >60 >=60 mL/min/1.73 Final     Comment:     Chronic Kidney Disease: less than 60 ml/min/1.73 sq.m. Kidney Failure: less than 15 ml/min/1.73 sq.m. Results valid for patients 18 years and older. 09/02/2021 >60 >=60 mL/min/1.73 Final     Comment:     Chronic Kidney Disease: less than 60 ml/min/1.73 sq.m.           Kidney Failure: less than 15 ml/min/1.73 sq.m.  Results valid for patients 18 years and older. 09/01/2021 >60 >=60 mL/min/1.73 Final     Comment:     Chronic Kidney Disease: less than 60 ml/min/1.73 sq.m. Kidney Failure: less than 15 ml/min/1.73 sq.m. Results valid for patients 18 years and older. GFR    Date Value Ref Range Status   09/03/2021 >60  Final   09/02/2021 >60  Final   09/01/2021 >60  Final     Magnesium   Date Value Ref Range Status   09/03/2021 2.2 1.6 - 2.6 mg/dL Final   09/02/2021 2.1 1.6 - 2.6 mg/dL Final   09/01/2021 2.1 1.6 - 2.6 mg/dL Final     Phosphorus   Date Value Ref Range Status   09/03/2021 4.1 2.5 - 4.5 mg/dL Final   09/02/2021 3.6 2.5 - 4.5 mg/dL Final   09/01/2021 3.5 2.5 - 4.5 mg/dL Final     Recent Labs     09/03/21  0508   PH 7.416   PO2 108.7*   PCO2 49.7*   HCO3 31.2*   BE 5.7*   O2SAT 97.4       RADIOLOGY:  XR CHEST PORTABLE   Final Result   No acute process and unchanged         XR CHEST PORTABLE   Final Result   No acute process         XR ABDOMEN FOR NG/OG/NE TUBE PLACEMENT   Final Result   NG tube tip is at the level of the gastric antrum, well within the gastric   lumen. XR CHEST PORTABLE   Final Result   ETT tip is within 1 cm of the chivo. It could be withdrawn 1-2 cm for   better tube position. XR CHEST PORTABLE   Final Result   1. There is no acute cardiopulmonary disease   2. Stable position of support lines and tubes. CT SOFT TISSUE NECK W CONTRAST   Final Result   Limited evaluation of the pharynx, larynx and hypopharynx due to presence of   ETT and OG tube. Prominence of the bilateral adenoids, likely related to   infection/inflammation. Mild prominence of the bilateral palatine and lingual tonsils, and narrowing   of the bilateral piriform sinuses, likely reactive.       Mild infiltration of fat in the bilateral carotid spaces, particularly at the   level of carotid bulbs, right more than left, likely related to extension of inflammatory changes. Acute sinusitis. XR CHEST PORTABLE   Final Result   1. Endotracheal tube is present with distal tip approximately 4.5 cm above   the chivo. 2. No airspace opacity or pleural effusion. XR ABDOMEN FOR NG/OG/NE TUBE PLACEMENT   Final Result   1. Satisfactory position of the NG tube within the stomach         XR CHEST PORTABLE   Final Result   No pulmonary infiltrates. No significant change since the prior study         XR CHEST PORTABLE   Final Result   1. Endotracheal tube is approximately 6.8 cm above the chivo. 2. No airspace opacity or pleural effusion. PROBLEM LIST:  Principal Problem:    Acute respiratory failure (HCC)  Active Problems:    Essential hypertension    Diabetes mellitus ([address]    Myocardiopathy (HCC)    ACE inhibitor-aggravated angioedema    COPD (chronic obstructive pulmonary disease) (HCC)    Acquired angioedema    SOB (shortness of breath)    Disorder of airway    Acquired hypertrophy of tongue  Resolved Problems:    * No resolved hospital problems. *      IMPRESSION:  1. Acute superimposed on chronic respiratory failure secondary to COPD exacerbation  2. Resolved ACE related angioedema of the tongue causing upper airway obstruction  3. Systemic hypertension  4. Diabetes mellitus type 2  5. E. coli urinary tract infection  6. Staph aureus methicillin sensitive respiratory infection  7. Haemophilus influenza respiratory infection    PLAN:  1. Pressure support/CPAP trial today  2. Patient will need additional antihypertensives as well as diuretics prior to extubation. Blood pressure control will need to be better prior to her liberation from mechanical ventilation  3. Continue IV antibiotics  4. Continue IV steroids at current dose  5. Continue current dose of Lantus and Metformin  6.  Continue Mucomyst    ATTESTATION:  ICU Staff Physician note of personal involvement in Care  As the attending physician, I certify that I personally reviewed the patients history and personally examined the patient to confirm the physical findings described above,  And that I reviewed the relevant imaging studies and available reports. I also discussed the differential diagnosis and all of the proposed management plans with the patient and individuals accompanying the patient to this visit. They had the opportunity to ask questions about the proposed management plans and to have those questions answered. This patient has a high probability of sudden, clinically significant deterioration, which requires the highest level of physician preparedness to intervene urgently. I managed/supervised life or organ supporting interventions that required frequent physician assessment. I devoted my full attention to the direct care of this patient for the amount of time indicated below. Time I spent with the family or surrogate(s) is included only if the patient was incapable of providing the necessary information or participating in medical decisions - Time devoted to teaching and to any procedures I billed separately is not included.     CRITICAL CARE TIME:  36 minutes    Electronically signed by Flako Sanchez MD on 9/3/2021 at 10:43 AM

## 2021-09-03 NOTE — PROGRESS NOTES
Subjective:  Kerri Shilpi was seen and examined in the ICU today. The patient remains intubated today.   There were no new problems reported except now growing H influenza in sputum  D/w ICU nursing staff today - failed weaning trial but improving    A complete review of systems and social history was completed on admission and remains unchanged unless otherwise noted    Scheduled Meds:   metoprolol  10 mg IntraVENous Q6H    cloNIDine  0.1 mg Oral Once    cloNIDine  0.2 mg Oral BID    Roflumilast  500 mcg Oral Daily    insulin glargine  20 Units SubCUTAneous BID    metFORMIN  500 mg Oral BID    lactobacillus  1 capsule Oral Daily    acetylcysteine  600 mg Inhalation Q4H    methylPREDNISolone  40 mg IntraVENous Q6H    piperacillin-tazobactam  3,375 mg IntraVENous Q8H    ipratropium-albuterol  1 ampule Inhalation Q4H    enoxaparin  40 mg SubCUTAneous Daily    famotidine (PEPCID) injection  20 mg IntraVENous Daily    QUEtiapine  25 mg Oral BID    lidocaine PF  5 mL IntraDERmal Once    sodium chloride flush  5-40 mL IntraVENous 2 times per day    heparin flush  3 mL IntraVENous 2 times per day    budesonide  0.5 mg Nebulization BID    medicated lip ointment   Topical Daily    insulin lispro  0-18 Units SubCUTAneous Q4H    sodium chloride flush  5-40 mL IntraVENous 2 times per day     Continuous Infusions:   sodium chloride 12.5 mL/hr at 09/03/21 1616    propofol 35 mcg/kg/min (09/03/21 0727)    fentaNYL 5 mcg/ml in 0.9%  ml infusion 50 mcg/hr (09/03/21 0306)    sodium chloride      dexmedetomidine HCl in NaCl 0.3 mcg/kg/hr (09/03/21 1210)    dextrose      sodium chloride      lactated ringers 50 mL/hr at 09/03/21 0310     PRN Meds:perflutren lipid microspheres, hydrALAZINE, sodium chloride flush, sodium chloride, heparin flush, metoprolol, glucose, dextrose, glucagon (rDNA), dextrose, sodium chloride flush, sodium chloride, ondansetron **OR** ondansetron, polyethylene glycol, acetaminophen **OR** acetaminophen    Objective:  BP (!) 197/102   Pulse 90   Temp 97 °F (36.1 °C) (Core)   Resp 25   Ht 5' 4\" (1.626 m)   Wt 198 lb 12.8 oz (90.2 kg)   SpO2 94%   BMI 34.12 kg/m²   In: 2986 [I.V.:1877; NG/GT:1059]  Out: 0629    In: 2986   Out: 9407 [Urine:6350]     Intubated and sedated  RRR, pos S1, S2  Tight with no wheeze, rales or rhonchi  bowel sounds present, nontender, nondistended  No clubbing, cyanosis, or edema  No neuro changes   No obvious rashes or lesions. Recent Labs     09/01/21 0439 09/02/21 0524 09/03/21  0600   WBC 12.9* 9.3 10.6   HGB 10.5* 10.6* 11.3*    240 253     Recent Labs     09/01/21 0439 09/02/21 0524 09/03/21  0600    136 137   K 5.0 5.0 4.9   CL 98 98 97*   CO2 30* 32* 33*   BUN 26* 30* 29*   CREATININE 0.8 0.7 0.7   GLUCOSE 305* 375* 230*     Recent Labs     09/01/21 0439 09/02/21 0524 09/03/21  0600   BILITOT <0.2 <0.2 0.3   ALKPHOS 58 50 47   AST 13 18 14   ALT 14 20 24     No results for input(s): INR in the last 72 hours. Invalid input(s): PT  No results for input(s): CKTOTAL, CKMB, CKMBINDEX, TROPONINI in the last 72 hours. XR CHEST PORTABLE    Result Date: 8/25/2021  EXAMINATION: ONE XRAY VIEW OF THE CHEST 8/24/2021 8:52 pm COMPARISON: 08/24/2021 HISTORY: ORDERING SYSTEM PROVIDED HISTORY: et tube placement TECHNOLOGIST PROVIDED HISTORY: Reason for exam:->et tube placement FINDINGS: Stable position of endotracheal tube. Cardiomediastinal silhouette is unremarkable. No infiltrate, effusion, or pneumothorax. No acute osseous abnormality. No pulmonary infiltrates.   No significant change since the prior study     XR CHEST PORTABLE    Result Date: 8/24/2021  EXAMINATION: ONE XRAY VIEW OF THE CHEST 8/24/2021 5:30 pm COMPARISON: January 3, 2020 HISTORY: ORDERING SYSTEM PROVIDED HISTORY: SOB (shortness of breath) TECHNOLOGIST PROVIDED HISTORY: Reason for exam:->ETT placement, tongue swelling FINDINGS: Endotracheal tube is approximately 6.8 cm above the chivo. No airspace opacity or pleural effusion. The heart is normal in size. No pneumothorax. 1. Endotracheal tube is approximately 6.8 cm above the chivo. 2. No airspace opacity or pleural effusion. Assessment:  Eliot Talavera is a 78y.o. year old female who presented on 8/24/2021 and is being treated for:  Principal Problem:    Acute respiratory failure ([address]  Active Problems:    Essential hypertension    Diabetes mellitus ([facility] [address]    Myocardiopathy ([address]    ACE inhibitor-aggravated angioedema    COPD (chronic obstructive pulmonary disease) (HCC)    Acquired angioedema    SOB (shortness of breath)    Disorder of airway    Acquired hypertrophy of tongue  Resolved Problems:    * No resolved hospital problems. *    Plan  · Mechanical ventilation/vent weaning as per pulmonary - possible extubation tomorrow  · Cont IV steroids/nebs/abx  · Monitor BS and cover with SSI  · Please see orders for further management and care.   · No family at bedside today     Deandra Gregory MD  5:01 PM  9/3/2021

## 2021-09-04 ENCOUNTER — APPOINTMENT (OUTPATIENT)
Dept: GENERAL RADIOLOGY | Age: 66
DRG: 207 | End: 2021-09-04
Payer: MEDICARE

## 2021-09-04 LAB
ALBUMIN SERPL-MCNC: 2.9 G/DL (ref 3.5–5.2)
ALP BLD-CCNC: 50 U/L (ref 35–104)
ALT SERPL-CCNC: 24 U/L (ref 0–32)
ANION GAP SERPL CALCULATED.3IONS-SCNC: 8 MMOL/L (ref 7–16)
AST SERPL-CCNC: 17 U/L (ref 0–31)
B.E.: 6.8 MMOL/L (ref -3–3)
BASOPHILS ABSOLUTE: 0.01 E9/L (ref 0–0.2)
BASOPHILS RELATIVE PERCENT: 0.1 % (ref 0–2)
BILIRUB SERPL-MCNC: 0.4 MG/DL (ref 0–1.2)
BUN BLDV-MCNC: 32 MG/DL (ref 6–23)
CALCIUM SERPL-MCNC: 8.6 MG/DL (ref 8.6–10.2)
CHLORIDE BLD-SCNC: 98 MMOL/L (ref 98–107)
CO2: 34 MMOL/L (ref 22–29)
COHB: 0.9 % (ref 0–1.5)
CREAT SERPL-MCNC: 0.8 MG/DL (ref 0.5–1)
CRITICAL: ABNORMAL
DATE ANALYZED: ABNORMAL
DATE OF COLLECTION: ABNORMAL
EOSINOPHILS ABSOLUTE: 0.04 E9/L (ref 0.05–0.5)
EOSINOPHILS RELATIVE PERCENT: 0.2 % (ref 0–6)
FIO2: 40 %
GFR AFRICAN AMERICAN: >60
GFR NON-AFRICAN AMERICAN: >60 ML/MIN/1.73
GLUCOSE BLD-MCNC: 91 MG/DL (ref 74–99)
HCO3: 31.6 MMOL/L (ref 22–26)
HCT VFR BLD CALC: 37.6 % (ref 34–48)
HEMOGLOBIN: 11.8 G/DL (ref 11.5–15.5)
HHB: 3.2 % (ref 0–5)
IMMATURE GRANULOCYTES #: 0.22 E9/L
IMMATURE GRANULOCYTES %: 1.3 % (ref 0–5)
LAB: ABNORMAL
LYMPHOCYTES ABSOLUTE: 1.38 E9/L (ref 1.5–4)
LYMPHOCYTES RELATIVE PERCENT: 8.3 % (ref 20–42)
Lab: ABNORMAL
MAGNESIUM: 2 MG/DL (ref 1.6–2.6)
MCH RBC QN AUTO: 30.3 PG (ref 26–35)
MCHC RBC AUTO-ENTMCNC: 31.4 % (ref 32–34.5)
MCV RBC AUTO: 96.7 FL (ref 80–99.9)
METER GLUCOSE: 136 MG/DL (ref 74–99)
METER GLUCOSE: 165 MG/DL (ref 74–99)
METER GLUCOSE: 181 MG/DL (ref 74–99)
METER GLUCOSE: 265 MG/DL (ref 74–99)
METER GLUCOSE: 85 MG/DL (ref 74–99)
METHB: 0.3 % (ref 0–1.5)
MODE: AC
MONOCYTES ABSOLUTE: 1.4 E9/L (ref 0.1–0.95)
MONOCYTES RELATIVE PERCENT: 8.4 % (ref 2–12)
NEUTROPHILS ABSOLUTE: 13.55 E9/L (ref 1.8–7.3)
NEUTROPHILS RELATIVE PERCENT: 81.7 % (ref 43–80)
O2 CONTENT: 16.5 ML/DL
O2 SATURATION: 96.8 % (ref 92–98.5)
O2HB: 95.6 % (ref 94–97)
OPERATOR ID: 9689
PATIENT TEMP: 37 C
PCO2: 45.5 MMHG (ref 35–45)
PDW BLD-RTO: 11.9 FL (ref 11.5–15)
PEEP/CPAP: 8 CMH2O
PFO2: 2.35 MMHG/%
PH BLOOD GAS: 7.46 (ref 7.35–7.45)
PHOSPHORUS: 3.1 MG/DL (ref 2.5–4.5)
PLATELET # BLD: 292 E9/L (ref 130–450)
PMV BLD AUTO: 10.2 FL (ref 7–12)
PO2: 93.8 MMHG (ref 75–100)
POTASSIUM SERPL-SCNC: 4 MMOL/L (ref 3.5–5)
RBC # BLD: 3.89 E12/L (ref 3.5–5.5)
RI(T): 1.38
RR MECHANICAL: 18 B/MIN
SODIUM BLD-SCNC: 140 MMOL/L (ref 132–146)
SOURCE, BLOOD GAS: ABNORMAL
THB: 12.2 G/DL (ref 11.5–16.5)
TIME ANALYZED: 509
TOTAL PROTEIN: 5.5 G/DL (ref 6.4–8.3)
TRIGL SERPL-MCNC: 221 MG/DL (ref 0–149)
VT MECHANICAL: 400 ML
WBC # BLD: 16.6 E9/L (ref 4.5–11.5)

## 2021-09-04 PROCEDURE — 2700000000 HC OXYGEN THERAPY PER DAY

## 2021-09-04 PROCEDURE — 2580000003 HC RX 258

## 2021-09-04 PROCEDURE — 6370000000 HC RX 637 (ALT 250 FOR IP): Performed by: INTERNAL MEDICINE

## 2021-09-04 PROCEDURE — 2580000003 HC RX 258: Performed by: INTERNAL MEDICINE

## 2021-09-04 PROCEDURE — 2500000003 HC RX 250 WO HCPCS: Performed by: INTERNAL MEDICINE

## 2021-09-04 PROCEDURE — 85025 COMPLETE CBC W/AUTO DIFF WBC: CPT

## 2021-09-04 PROCEDURE — 6360000002 HC RX W HCPCS: Performed by: INTERNAL MEDICINE

## 2021-09-04 PROCEDURE — 84100 ASSAY OF PHOSPHORUS: CPT

## 2021-09-04 PROCEDURE — 94640 AIRWAY INHALATION TREATMENT: CPT

## 2021-09-04 PROCEDURE — 2000000000 HC ICU R&B

## 2021-09-04 PROCEDURE — 84478 ASSAY OF TRIGLYCERIDES: CPT

## 2021-09-04 PROCEDURE — 82962 GLUCOSE BLOOD TEST: CPT

## 2021-09-04 PROCEDURE — 83735 ASSAY OF MAGNESIUM: CPT

## 2021-09-04 PROCEDURE — 6370000000 HC RX 637 (ALT 250 FOR IP)

## 2021-09-04 PROCEDURE — 36415 COLL VENOUS BLD VENIPUNCTURE: CPT

## 2021-09-04 PROCEDURE — 80053 COMPREHEN METABOLIC PANEL: CPT

## 2021-09-04 PROCEDURE — 94003 VENT MGMT INPAT SUBQ DAY: CPT

## 2021-09-04 PROCEDURE — 82805 BLOOD GASES W/O2 SATURATION: CPT

## 2021-09-04 PROCEDURE — 6360000002 HC RX W HCPCS: Performed by: NURSE PRACTITIONER

## 2021-09-04 PROCEDURE — 6360000002 HC RX W HCPCS

## 2021-09-04 PROCEDURE — 71045 X-RAY EXAM CHEST 1 VIEW: CPT

## 2021-09-04 PROCEDURE — 6370000000 HC RX 637 (ALT 250 FOR IP): Performed by: NURSE PRACTITIONER

## 2021-09-04 RX ORDER — METOPROLOL TARTRATE 5 MG/5ML
5 INJECTION INTRAVENOUS EVERY 6 HOURS
Status: DISCONTINUED | OUTPATIENT
Start: 2021-09-04 | End: 2021-09-06

## 2021-09-04 RX ORDER — HYDRALAZINE HYDROCHLORIDE 20 MG/ML
10 INJECTION INTRAMUSCULAR; INTRAVENOUS EVERY 4 HOURS
Status: DISCONTINUED | OUTPATIENT
Start: 2021-09-04 | End: 2021-09-05

## 2021-09-04 RX ORDER — HYDRALAZINE HYDROCHLORIDE 25 MG/1
25 TABLET, FILM COATED ORAL EVERY 6 HOURS SCHEDULED
Status: DISCONTINUED | OUTPATIENT
Start: 2021-09-04 | End: 2021-09-04

## 2021-09-04 RX ORDER — METOPROLOL TARTRATE 50 MG/1
50 TABLET, FILM COATED ORAL 2 TIMES DAILY
Status: DISCONTINUED | OUTPATIENT
Start: 2021-09-04 | End: 2021-09-04

## 2021-09-04 RX ADMIN — Medication 0.8 MCG/KG/HR: at 05:56

## 2021-09-04 RX ADMIN — ACETYLCYSTEINE 600 MG: 200 SOLUTION ORAL; RESPIRATORY (INHALATION) at 06:30

## 2021-09-04 RX ADMIN — ACETYLCYSTEINE 600 MG: 200 SOLUTION ORAL; RESPIRATORY (INHALATION) at 01:57

## 2021-09-04 RX ADMIN — SODIUM CHLORIDE: 9 INJECTION, SOLUTION INTRAVENOUS at 00:00

## 2021-09-04 RX ADMIN — PIPERACILLIN SODIUM AND TAZOBACTAM SODIUM 3375 MG: 3; .375 INJECTION, POWDER, LYOPHILIZED, FOR SOLUTION INTRAVENOUS at 20:36

## 2021-09-04 RX ADMIN — INSULIN LISPRO 3 UNITS: 100 INJECTION, SOLUTION INTRAVENOUS; SUBCUTANEOUS at 21:30

## 2021-09-04 RX ADMIN — BUDESONIDE 500 MCG: 0.5 INHALANT RESPIRATORY (INHALATION) at 06:30

## 2021-09-04 RX ADMIN — ACETYLCYSTEINE 600 MG: 200 SOLUTION ORAL; RESPIRATORY (INHALATION) at 13:20

## 2021-09-04 RX ADMIN — SODIUM CHLORIDE: 9 INJECTION, SOLUTION INTRAVENOUS at 08:00

## 2021-09-04 RX ADMIN — METHYLPREDNISOLONE SODIUM SUCCINATE 40 MG: 40 INJECTION, POWDER, FOR SOLUTION INTRAMUSCULAR; INTRAVENOUS at 05:30

## 2021-09-04 RX ADMIN — ACETYLCYSTEINE 600 MG: 200 SOLUTION ORAL; RESPIRATORY (INHALATION) at 22:41

## 2021-09-04 RX ADMIN — METHYLPREDNISOLONE SODIUM SUCCINATE 40 MG: 40 INJECTION, POWDER, FOR SOLUTION INTRAMUSCULAR; INTRAVENOUS at 18:26

## 2021-09-04 RX ADMIN — METHYLPREDNISOLONE SODIUM SUCCINATE 40 MG: 40 INJECTION, POWDER, FOR SOLUTION INTRAMUSCULAR; INTRAVENOUS at 13:47

## 2021-09-04 RX ADMIN — Medication 10 ML: at 10:58

## 2021-09-04 RX ADMIN — IPRATROPIUM BROMIDE AND ALBUTEROL SULFATE 1 AMPULE: .5; 3 SOLUTION RESPIRATORY (INHALATION) at 18:55

## 2021-09-04 RX ADMIN — PIPERACILLIN SODIUM AND TAZOBACTAM SODIUM 3375 MG: 3; .375 INJECTION, POWDER, LYOPHILIZED, FOR SOLUTION INTRAVENOUS at 11:45

## 2021-09-04 RX ADMIN — METOPROLOL TARTRATE 50 MG: 50 TABLET, FILM COATED ORAL at 12:03

## 2021-09-04 RX ADMIN — Medication 1 CAPSULE: at 10:47

## 2021-09-04 RX ADMIN — FAMOTIDINE 20 MG: 10 INJECTION INTRAVENOUS at 10:54

## 2021-09-04 RX ADMIN — METFORMIN HYDROCHLORIDE 500 MG: 500 TABLET ORAL at 10:46

## 2021-09-04 RX ADMIN — IPRATROPIUM BROMIDE AND ALBUTEROL SULFATE 1 AMPULE: .5; 3 SOLUTION RESPIRATORY (INHALATION) at 09:19

## 2021-09-04 RX ADMIN — BUDESONIDE 500 MCG: 0.5 INHALANT RESPIRATORY (INHALATION) at 18:55

## 2021-09-04 RX ADMIN — SODIUM CHLORIDE, POTASSIUM CHLORIDE, SODIUM LACTATE AND CALCIUM CHLORIDE: 600; 310; 30; 20 INJECTION, SOLUTION INTRAVENOUS at 20:37

## 2021-09-04 RX ADMIN — METOPROLOL TARTRATE 10 MG: 5 INJECTION, SOLUTION INTRAVENOUS at 05:30

## 2021-09-04 RX ADMIN — QUETIAPINE FUMARATE 25 MG: 25 TABLET ORAL at 10:47

## 2021-09-04 RX ADMIN — ACETYLCYSTEINE 600 MG: 200 SOLUTION ORAL; RESPIRATORY (INHALATION) at 18:56

## 2021-09-04 RX ADMIN — SODIUM CHLORIDE: 9 INJECTION, SOLUTION INTRAVENOUS at 15:49

## 2021-09-04 RX ADMIN — Medication 10 ML: at 10:59

## 2021-09-04 RX ADMIN — CLONIDINE HYDROCHLORIDE 0.2 MG: 0.2 TABLET ORAL at 10:46

## 2021-09-04 RX ADMIN — ACETYLCYSTEINE 600 MG: 200 SOLUTION ORAL; RESPIRATORY (INHALATION) at 09:20

## 2021-09-04 RX ADMIN — HYDRALAZINE HYDROCHLORIDE 10 MG: 20 INJECTION INTRAMUSCULAR; INTRAVENOUS at 20:37

## 2021-09-04 RX ADMIN — ENOXAPARIN SODIUM 40 MG: 40 INJECTION SUBCUTANEOUS at 10:57

## 2021-09-04 RX ADMIN — METOPROLOL TARTRATE 5 MG: 1 INJECTION, SOLUTION INTRAVENOUS at 20:37

## 2021-09-04 RX ADMIN — HYDRALAZINE HYDROCHLORIDE 10 MG: 20 INJECTION INTRAMUSCULAR; INTRAVENOUS at 06:54

## 2021-09-04 RX ADMIN — INSULIN LISPRO 9 UNITS: 100 INJECTION, SOLUTION INTRAVENOUS; SUBCUTANEOUS at 11:05

## 2021-09-04 RX ADMIN — IPRATROPIUM BROMIDE AND ALBUTEROL SULFATE 1 AMPULE: .5; 3 SOLUTION RESPIRATORY (INHALATION) at 06:31

## 2021-09-04 RX ADMIN — IPRATROPIUM BROMIDE AND ALBUTEROL SULFATE 1 AMPULE: .5; 3 SOLUTION RESPIRATORY (INHALATION) at 22:40

## 2021-09-04 RX ADMIN — DIMETHICONE, CAMPHOR (SYNTHETIC), MENTHOL, AND PHENOL: 1.1; .5; .625; .5 OINTMENT TOPICAL at 11:48

## 2021-09-04 RX ADMIN — IPRATROPIUM BROMIDE AND ALBUTEROL SULFATE 1 AMPULE: .5; 3 SOLUTION RESPIRATORY (INHALATION) at 01:57

## 2021-09-04 RX ADMIN — SODIUM CHLORIDE, PRESERVATIVE FREE 300 UNITS: 5 INJECTION INTRAVENOUS at 10:59

## 2021-09-04 RX ADMIN — INSULIN LISPRO 3 UNITS: 100 INJECTION, SOLUTION INTRAVENOUS; SUBCUTANEOUS at 18:32

## 2021-09-04 RX ADMIN — ROFLUMILAST 500 MCG: 500 TABLET ORAL at 10:47

## 2021-09-04 RX ADMIN — PIPERACILLIN SODIUM AND TAZOBACTAM SODIUM 3375 MG: 3; .375 INJECTION, POWDER, LYOPHILIZED, FOR SOLUTION INTRAVENOUS at 03:26

## 2021-09-04 RX ADMIN — INSULIN GLARGINE 20 UNITS: 100 INJECTION, SOLUTION SUBCUTANEOUS at 21:31

## 2021-09-04 RX ADMIN — HYDRALAZINE HYDROCHLORIDE 25 MG: 25 TABLET, FILM COATED ORAL at 12:03

## 2021-09-04 RX ADMIN — IPRATROPIUM BROMIDE AND ALBUTEROL SULFATE 1 AMPULE: .5; 3 SOLUTION RESPIRATORY (INHALATION) at 13:19

## 2021-09-04 RX ADMIN — SODIUM CHLORIDE, PRESERVATIVE FREE 300 UNITS: 5 INJECTION INTRAVENOUS at 20:50

## 2021-09-04 ASSESSMENT — PAIN SCALES - WONG BAKER

## 2021-09-04 ASSESSMENT — PULMONARY FUNCTION TESTS
PIF_VALUE: 17
PIF_VALUE: 40
PIF_VALUE: 30
PIF_VALUE: 33
PIF_VALUE: 12
PIF_VALUE: 11
PIF_VALUE: 37
PIF_VALUE: 29
PIF_VALUE: 36
PIF_VALUE: 37
PIF_VALUE: 38
PIF_VALUE: 22
PIF_VALUE: 35
PIF_VALUE: 47
PIF_VALUE: 31
PIF_VALUE: 38
PIF_VALUE: 36
PIF_VALUE: 12
PIF_VALUE: 11

## 2021-09-04 ASSESSMENT — PAIN SCALES - GENERAL
PAINLEVEL_OUTOF10: 2
PAINLEVEL_OUTOF10: 8
PAINLEVEL_OUTOF10: 0

## 2021-09-04 NOTE — PROGRESS NOTES
Internal Medicine Progress Note    [facility]    Gregoria Robby. Ephraim Luis., F.A.DEBBIEO.I. Swetha Springer D.O., MANJITOWENDY Grant, MSN, APRN, NP-C  [place] Bazan. Marybeth Rivas, MSN, APRN-CNP     Primary Care Physician: Noman Guerin MD   Admitting Physician:  Noman Guerin MD  Admission date and time: 8/24/2021  4:25 PM    Room:  [place] [address]  Admitting diagnosis: SOB (shortness of breath) [R06.02]  Acquired angioedema [T78. 3XXA]    Patient Name: Sumanth Galdamez  MRN: [id number]    Date of Service: 9/4/2021     Covering for Dr. Karin Lau:  Shila Cole is a 78 y.o. female who was seen and examined today,9/4/2021, at the bedside. Patient currently intubated on the ventilator. Failed trial of weaning        Review of System: Limited at the present time  Constitutional:   Denies fever or chills, weight loss or gain, fatigue or malaise. HEENT:   Denies ear pain, sore throat, sinus or eye problems. Cardiovascular:   Denies any chest pain, irregular heartbeats, or palpitations. Respiratory:   Denies shortness of breath, coughing, sputum production, hemoptysis, or wheezing. Gastrointestinal:   Denies nausea, vomiting, diarrhea, or constipation. Denies any abdominal pain. Genitourinary:    Denies any urgency, frequency, hematuria. Voiding  without difficulty. Extremities:   Denies lower extremity swelling, edema or cyanosis. Neurology:    Denies any headache or focal neurological deficits, Denies generalized weakness or memory difficulty. Psch:   Denies being anxious or depressed. Musculoskeletal:    Denies  myalgias, joint complaints or back pain. Integumentary:   Denies any rashes, ulcers, or excoriations. Denies bruising. Hematologic/Lymphatic:  Denies bruising or bleeding. Physical Exam:  I/O this shift:   In: 477 [NG/GT:477]  Out: -     Intake/Output Summary (Last 24 hours) at 9/4/2021 1552  Last data filed at 9/4/2021 1545  Gross per 24 hour   Intake 1775 ml   Output 5800 ml   Net -4025 ml   I/O last 3 completed shifts: In: 9424 [I.V.:1198; NG/GT:100]  Out: 5800 [Urine:5800]  Patient Vitals for the past 96 hrs (Last 3 readings):   Weight   09/03/21 0608 198 lb 12.8 oz (90.2 kg)     Vital Signs:   Blood pressure (!) 154/75, pulse 123, temperature 96.4 °F (35.8 °C), temperature source Infrared, resp. rate 18, height 5' 4\" (1.626 m), weight 198 lb 12.8 oz (90.2 kg), SpO2 96 %. General appearance:  Sedated on pressors  Head:  Normocephalic. No masses, lesions or tenderness. Eyes:  PERRLA. EOMI. Sclera clear. Buccal mucosa moist.  ENT:  Ears normal. Mucosa normal. Endotracheal tube  Neck:    Supple. Trachea midline. No thyromegaly. No JVD. No bruits. Heart:    Rhythm regular. Rate controlled. Lungs:    Diminished posteriorly  Abdomen:   Soft. Non-tender. Non-distended. Bowel sounds positive. No organomegaly or masses. No pain on palpation. Extremities:    Peripheral pulses present. No peripheral edema. No ulcers. No cyanosis. No clubbing. Integumentary:  No rashes  Skin normal color and texture.   Genitalia/Breast:  Vogel    Medication:  Scheduled Meds:   metoprolol tartrate  50 mg Per NG tube BID    hydrALAZINE  25 mg Per NG tube 4 times per day    cloNIDine  0.1 mg Oral Once    cloNIDine  0.2 mg Oral BID    Roflumilast  500 mcg Oral Daily    insulin glargine  20 Units SubCUTAneous BID    lactobacillus  1 capsule Oral Daily    acetylcysteine  600 mg Inhalation Q4H    methylPREDNISolone  40 mg IntraVENous Q6H    piperacillin-tazobactam  3,375 mg IntraVENous Q8H    ipratropium-albuterol  1 ampule Inhalation Q4H    enoxaparin  40 mg SubCUTAneous Daily    famotidine (PEPCID) injection  20 mg IntraVENous Daily    QUEtiapine  25 mg Oral BID    lidocaine PF  5 mL IntraDERmal Once    sodium chloride flush  5-40 mL IntraVENous 2 times per day    heparin flush  3 mL IntraVENous 2 times per day    budesonide  0.5 mg Nebulization BID    medicated lip ointment   Topical Daily    insulin lispro  0-18 Units SubCUTAneous Q4H    sodium chloride flush  5-40 mL IntraVENous 2 times per day     Continuous Infusions:   sodium chloride 12.5 mL/hr at 09/04/21 1549    propofol 35 mcg/kg/min (09/03/21 0727)    fentaNYL 5 mcg/ml in 0.9%  ml infusion Stopped (09/04/21 1151)    sodium chloride      dexmedetomidine HCl in NaCl Stopped (09/04/21 1152)    dextrose      sodium chloride      lactated ringers 50 mL/hr at 09/03/21 2339       Objective Data:  CBC with Differential:    Lab Results   Component Value Date    WBC 16.6 09/04/2021    RBC 3.89 09/04/2021    HGB 11.8 09/04/2021    HCT 37.6 09/04/2021     09/04/2021    MCV 96.7 09/04/2021    MCH 30.3 09/04/2021    MCHC 31.4 09/04/2021    RDW 11.9 09/04/2021    LYMPHOPCT 8.3 09/04/2021    MONOPCT 8.4 09/04/2021    BASOPCT 0.1 09/04/2021    MONOSABS 1.40 09/04/2021    LYMPHSABS 1.38 09/04/2021    EOSABS 0.04 09/04/2021    BASOSABS 0.01 09/04/2021     CMP:    Lab Results   Component Value Date     09/04/2021    K 4.0 09/04/2021    K 4.7 08/25/2021    CL 98 09/04/2021    CO2 34 09/04/2021    BUN 32 09/04/2021    CREATININE 0.8 09/04/2021    GFRAA >60 09/04/2021    LABGLOM >60 09/04/2021    GLUCOSE 91 09/04/2021    PROT 5.5 09/04/2021    LABALBU 2.9 09/04/2021    CALCIUM 8.6 09/04/2021    BILITOT 0.4 09/04/2021    ALKPHOS 50 09/04/2021    AST 17 09/04/2021    ALT 24 09/04/2021              Assessment:    · Acute progressive respiratory distress on mechanical ventilator FiO2 of 40% with O2 sat of 90  · Essential hypertension  · Type 2 diabetes mellitus  · Myocardial myopathy  · Angioedema secondary to ACE inhibitor  · Severe COPD with acute exacerbation with hemoptysis influenza infection  · Essential hypertension  · MRSA respiratory infection  · E. coli urinary tract infection        Plan:       · Currently still intubated.  Weaning per pulmonary  · Continue supportive care at this time with controlling blood pressure  · Pulse dose diuretic therapy  · DVT prophylaxis  · Continue antibiotics    Greater than 35 minutes of critical care time was spent with the patient. This includes chart review, , and discussion with those consultants involved in the patient's care. More than 50% of my  time was spent at the bedside counseling/coordinating care with the patient and/or family with face to face contact. This time was spent reviewing notes and laboratory data as well as instructing and counseling the patient. Time I spent with the family or surrogate(s) is included only if the patient was incapable of providing the necessary information or participating in medical decisions. I also discussed the differential diagnosis and all of the proposed management plans with the patient and individuals accompanying the patient. Qiana Cardoso requires this high level of physician care and nursing on the ICU unit due the complexity of decision management and chance of rapid decline or death. Continued cardiac monitoring and higher level of nursing are required. I am readily available for any further decision-making and intervention.      Marshall Pink DO, F.A.C.O.I.  9/4/2021  3:52 PM

## 2021-09-04 NOTE — PLAN OF CARE
Problem: Non-Violent Restraints  Goal: Removal from restraints as soon as assessed to be safe  9/4/2021 1842 by Adrian Del Rosario RN  Outcome: Met This Shift  9/4/2021 0632 by Kwame Escalante RN  Outcome: Met This Shift  Goal: No harm/injury to patient while restraints in use  9/4/2021 1842 by Adrian Del Rosario RN  Outcome: Met This Shift  9/4/2021 0632 by Kwame Escalante RN  Outcome: Met This Shift  Goal: Patient's dignity will be maintained  9/4/2021 1842 by Adrian Del Rosario RN  Outcome: Met This Shift  9/4/2021 0632 by Kwame Escalante RN  Outcome: Met This Shift  Goal: Removal from restraints as soon as assessed to be safe  9/4/2021 1842 by Adrian Del Rosario RN  Outcome: Met This Shift  9/4/2021 0632 by Kwame Escalante RN  Outcome: Met This Shift  Goal: No harm/injury to patient while restraints in use  9/4/2021 1842 by Adrian Del Rosario RN  Outcome: Met This Shift  9/4/2021 0632 by Kwame Escalante RN  Outcome: Met This Shift  Goal: Patient's dignity will be maintained  9/4/2021 1842 by Adrian Del Rosario RN  Outcome: Met This Shift  9/4/2021 0632 by Kwame Escalante RN  Outcome: Met This Shift     Problem: Falls - Risk of:  Goal: Will remain free from falls  Description: Will remain free from falls  9/4/2021 1842 by Adrian Del Rosario RN  Outcome: Met This Shift  9/4/2021 0632 by Kwame Escalante RN  Outcome: Met This Shift  Goal: Absence of physical injury  Description: Absence of physical injury  9/4/2021 1842 by Adrian Del Rosario RN  Outcome: Met This Shift  9/4/2021 0632 by Kwame Escalante RN  Outcome: Met This Shift     Problem: Skin Integrity:  Goal: Will show no infection signs and symptoms  Description: Will show no infection signs and symptoms  9/4/2021 1842 by Adrian Del Rosario RN  Outcome: Met This Shift  9/4/2021 0632 by Kwame Escalante RN  Outcome: Met This Shift  Goal: Absence of new skin breakdown  Description: Absence of new skin breakdown  9/4/2021 1842 by Adrian Del Rosario RN  Outcome: Met This Shift  9/4/2021 7587

## 2021-09-04 NOTE — PROGRESS NOTES
CRITICAL CARE PROGRESS NOTE    The patient's case was discussed in multidisciplinary rounds including critical care specialist, nursing, RT and pharmacy. Her evaluation is as follows:      78year old woman with PMH as described below admitted to ICU for management of resolved angioedema secondary to ACE inhibitors, severe COPD with exacerbation, Haemophilus influenza and MSSA pneumonia hypertension, E. coli urinary tract infection, diabetes mellitus type 2. --Yesterday failed SBT due to hypertension --> Start enteral metoprolol and hydralazine  --SBT today     /83   Pulse 102   Temp 97 °F (36.1 °C) (Infrared)   Resp 18   Ht 5' 4\" (1.626 m)   Wt 198 lb 12.8 oz (90.2 kg)   SpO2 92%   BMI 34.12 kg/m²   General: Awake , follows commands, ventilated  HEENT: No head lesions, PERRL, EOMI, mouth with ETT, no nasal lesions, no cervical adenopathy palpated  Respiratory: Lungs with decreased breath sounds bilaterally, no adventitious sounds auscultated, no accessory muscle use  CV: Regular rate, no murmurs, no JVD, trace leg edema  Abdomen: Soft, non tender, + bowel sounds, no lesions  Skin: Hydrated, adequate turgor, no rash, capillary refill <2 seconds  Extremities: Muscular strength 4/5 in 4 limbs, moves 4 limbs spontaneously, distal pulses present  Neurology: Awake and alert, follows commands, moves 4 limbs on command and spontaneously, neck is supple, no meningitic signs present. Acute hypoxemic respiratory failure secondary to H. Influenza and MSSA pneumonia  --Continue with mechanical ventilation with lung protective strategy.   SBT today   --Antibiotic regimen: Piperacillin/tazobactam  --Cultures reviewed  --Spontaneous breathing trial today     Hypertension  --On antihypertensives    Diabetes mellitus  --Management with insulin administration     DVT prophylaxis     Full code    Last 3 CMP:  Recent Labs     09/02/21  0524 09/03/21  0600 09/04/21  0436    137 140   K 5.0 4.9 4.0   CL 98 97* 98   CO2 32* 33* 34*   BUN 30* 29* 32*   CREATININE 0.7 0.7 0.8   GLUCOSE 375* 230* 91   CALCIUM 8.5* 8.4* 8.6   PROT 5.5* 5.3* 5.5*   LABALBU 2.7* 2.8* 2.9*   BILITOT <0.2 0.3 0.4   ALKPHOS 50 47 50   AST 18 14 17   ALT 20 24 24     Recent Labs     09/04/21  0436   WBC 16.6*   RBC 3.89   HGB 11.8   HCT 37.6   MCV 96.7   MCH 30.3   MCHC 31.4*   RDW 11.9      MPV 10.2       No results for input(s): BC in the last 72 hours. No results for input(s): Irene Plan in the last 72 hours.     24 HR INTAKE/OUTPUT:      Intake/Output Summary (Last 24 hours) at 9/4/2021 1127  Last data filed at 9/4/2021 0534  Gross per 24 hour   Intake 2332 ml   Output 7950 ml   Net -5618 ml     MEDICATIONS:   metoprolol tartrate  50 mg Per NG tube BID    hydrALAZINE  25 mg Per NG tube 4 times per day    cloNIDine  0.1 mg Oral Once    cloNIDine  0.2 mg Oral BID    Roflumilast  500 mcg Oral Daily    insulin glargine  20 Units SubCUTAneous BID    lactobacillus  1 capsule Oral Daily    acetylcysteine  600 mg Inhalation Q4H    methylPREDNISolone  40 mg IntraVENous Q6H    piperacillin-tazobactam  3,375 mg IntraVENous Q8H    ipratropium-albuterol  1 ampule Inhalation Q4H    enoxaparin  40 mg SubCUTAneous Daily    famotidine (PEPCID) injection  20 mg IntraVENous Daily    QUEtiapine  25 mg Oral BID    lidocaine PF  5 mL IntraDERmal Once    sodium chloride flush  5-40 mL IntraVENous 2 times per day    heparin flush  3 mL IntraVENous 2 times per day    budesonide  0.5 mg Nebulization BID    medicated lip ointment   Topical Daily    insulin lispro  0-18 Units SubCUTAneous Q4H    sodium chloride flush  5-40 mL IntraVENous 2 times per day      sodium chloride Stopped (09/04/21 5017)    propofol 35 mcg/kg/min (09/03/21 0798)    fentaNYL 5 mcg/ml in 0.9%  ml infusion Stopped (09/04/21 1151)    sodium chloride      dexmedetomidine HCl in NaCl Stopped (09/04/21 7702)    dextrose      sodium chloride      lactated ringers 50 mL/hr at 09/03/21 8749     perflutren lipid microspheres, hydrALAZINE, sodium chloride flush, sodium chloride, heparin flush, glucose, dextrose, glucagon (rDNA), dextrose, sodium chloride flush, sodium chloride, ondansetron **OR** ondansetron, polyethylene glycol, acetaminophen **OR** acetaminophen    OBJECTIVE:  Vitals:    09/04/21 1700   BP: 107/83   Pulse: 102   Resp: 18   Temp:    SpO2: 92%     FiO2 : 40 %  O2 Flow Rate (L/min): 6 L/min  O2 Device: High flow nasal cannula        LABS:  WBC   Date Value Ref Range Status   09/04/2021 16.6 (H) 4.5 - 11.5 E9/L Final   09/03/2021 10.6 4.5 - 11.5 E9/L Final   09/02/2021 9.3 4.5 - 11.5 E9/L Final     Hemoglobin   Date Value Ref Range Status   09/04/2021 11.8 11.5 - 15.5 g/dL Final   09/03/2021 11.3 (L) 11.5 - 15.5 g/dL Final   09/02/2021 10.6 (L) 11.5 - 15.5 g/dL Final     Hematocrit   Date Value Ref Range Status   09/04/2021 37.6 34.0 - 48.0 % Final   09/03/2021 34.6 34.0 - 48.0 % Final   09/02/2021 32.5 (L) 34.0 - 48.0 % Final     MCV   Date Value Ref Range Status   09/04/2021 96.7 80.0 - 99.9 fL Final   09/03/2021 96.4 80.0 - 99.9 fL Final   09/02/2021 95.6 80.0 - 99.9 fL Final     Platelets   Date Value Ref Range Status   09/04/2021 292 130 - 450 E9/L Final   09/03/2021 253 130 - 450 E9/L Final   09/02/2021 240 130 - 450 E9/L Final     Sodium   Date Value Ref Range Status   09/04/2021 140 132 - 146 mmol/L Final   09/03/2021 137 132 - 146 mmol/L Final   09/02/2021 136 132 - 146 mmol/L Final     Potassium   Date Value Ref Range Status   09/04/2021 4.0 3.5 - 5.0 mmol/L Final   09/03/2021 4.9 3.5 - 5.0 mmol/L Final   09/02/2021 5.0 3.5 - 5.0 mmol/L Final     Potassium reflex Magnesium   Date Value Ref Range Status   08/25/2021 4.7 3.5 - 5.0 mmol/L Final   08/24/2021 4.4 3.5 - 5.0 mmol/L Final   07/28/2020 3.9 3.5 - 5.0 mmol/L Final     Chloride   Date Value Ref Range Status   09/04/2021 98 98 - 107 mmol/L Final   09/03/2021 97 (L) 98 - 107 mmol/L Final 09/02/2021 98 98 - 107 mmol/L Final     CO2   Date Value Ref Range Status   09/04/2021 34 (H) 22 - 29 mmol/L Final   09/03/2021 33 (H) 22 - 29 mmol/L Final   09/02/2021 32 (H) 22 - 29 mmol/L Final     BUN   Date Value Ref Range Status   09/04/2021 32 (H) 6 - 23 mg/dL Final   09/03/2021 29 (H) 6 - 23 mg/dL Final   09/02/2021 30 (H) 6 - 23 mg/dL Final     CREATININE   Date Value Ref Range Status   09/04/2021 0.8 0.5 - 1.0 mg/dL Final   09/03/2021 0.7 0.5 - 1.0 mg/dL Final   09/02/2021 0.7 0.5 - 1.0 mg/dL Final     Glucose   Date Value Ref Range Status   09/04/2021 91 74 - 99 mg/dL Final   09/03/2021 230 (H) 74 - 99 mg/dL Final   09/02/2021 375 (H) 74 - 99 mg/dL Final     Calcium   Date Value Ref Range Status   09/04/2021 8.6 8.6 - 10.2 mg/dL Final   09/03/2021 8.4 (L) 8.6 - 10.2 mg/dL Final   09/02/2021 8.5 (L) 8.6 - 10.2 mg/dL Final     Total Protein   Date Value Ref Range Status   09/04/2021 5.5 (L) 6.4 - 8.3 g/dL Final   09/03/2021 5.3 (L) 6.4 - 8.3 g/dL Final   09/02/2021 5.5 (L) 6.4 - 8.3 g/dL Final     Albumin   Date Value Ref Range Status   09/04/2021 2.9 (L) 3.5 - 5.2 g/dL Final   09/03/2021 2.8 (L) 3.5 - 5.2 g/dL Final   09/02/2021 2.7 (L) 3.5 - 5.2 g/dL Final     Total Bilirubin   Date Value Ref Range Status   09/04/2021 0.4 0.0 - 1.2 mg/dL Final   09/03/2021 0.3 0.0 - 1.2 mg/dL Final   09/02/2021 <0.2 0.0 - 1.2 mg/dL Final     Alkaline Phosphatase   Date Value Ref Range Status   09/04/2021 50 35 - 104 U/L Final   09/03/2021 47 35 - 104 U/L Final   09/02/2021 50 35 - 104 U/L Final     AST   Date Value Ref Range Status   09/04/2021 17 0 - 31 U/L Final   09/03/2021 14 0 - 31 U/L Final   09/02/2021 18 0 - 31 U/L Final     ALT   Date Value Ref Range Status   09/04/2021 24 0 - 32 U/L Final   09/03/2021 24 0 - 32 U/L Final   09/02/2021 20 0 - 32 U/L Final     GFR Non-   Date Value Ref Range Status   09/04/2021 >60 >=60 mL/min/1.73 Final     Comment:     Chronic Kidney Disease: less than 60 ml/min/1.73 sq.m. Kidney Failure: less than 15 ml/min/1.73 sq.m. Results valid for patients 18 years and older. 09/03/2021 >60 >=60 mL/min/1.73 Final     Comment:     Chronic Kidney Disease: less than 60 ml/min/1.73 sq.m. Kidney Failure: less than 15 ml/min/1.73 sq.m. Results valid for patients 18 years and older. 09/02/2021 >60 >=60 mL/min/1.73 Final     Comment:     Chronic Kidney Disease: less than 60 ml/min/1.73 sq.m. Kidney Failure: less than 15 ml/min/1.73 sq.m. Results valid for patients 18 years and older. GFR    Date Value Ref Range Status   09/04/2021 >60  Final   09/03/2021 >60  Final   09/02/2021 >60  Final     Magnesium   Date Value Ref Range Status   09/04/2021 2.0 1.6 - 2.6 mg/dL Final   09/03/2021 2.2 1.6 - 2.6 mg/dL Final   09/02/2021 2.1 1.6 - 2.6 mg/dL Final     Phosphorus   Date Value Ref Range Status   09/04/2021 3.1 2.5 - 4.5 mg/dL Final   09/03/2021 4.1 2.5 - 4.5 mg/dL Final   09/02/2021 3.6 2.5 - 4.5 mg/dL Final     Recent Labs     09/04/21  0509   PH 7.459*   PO2 93.8   PCO2 45.5*   HCO3 31.6*   BE 6.8*   O2SAT 96.8       RADIOLOGY:  XR CHEST PORTABLE   Final Result   1. The position and alignment of the tubes and catheters are within normal   range   2. No signs of an acute cardiopulmonary process         XR CHEST PORTABLE   Final Result   No acute process and unchanged         XR CHEST PORTABLE   Final Result   No acute process         XR ABDOMEN FOR NG/OG/NE TUBE PLACEMENT   Final Result   NG tube tip is at the level of the gastric antrum, well within the gastric   lumen. XR CHEST PORTABLE   Final Result   ETT tip is within 1 cm of the chivo. It could be withdrawn 1-2 cm for   better tube position. XR CHEST PORTABLE   Final Result   1. There is no acute cardiopulmonary disease   2. Stable position of support lines and tubes.          CT SOFT TISSUE NECK W CONTRAST   Final Result   Limited evaluation of the pharynx, larynx and hypopharynx due to presence of   ETT and OG tube. Prominence of the bilateral adenoids, likely related to   infection/inflammation. Mild prominence of the bilateral palatine and lingual tonsils, and narrowing   of the bilateral piriform sinuses, likely reactive. Mild infiltration of fat in the bilateral carotid spaces, particularly at the   level of carotid bulbs, right more than left, likely related to extension of   inflammatory changes. Acute sinusitis. XR CHEST PORTABLE   Final Result   1. Endotracheal tube is present with distal tip approximately 4.5 cm above   the chivo. 2. No airspace opacity or pleural effusion. XR ABDOMEN FOR NG/OG/NE TUBE PLACEMENT   Final Result   1. Satisfactory position of the NG tube within the stomach         XR CHEST PORTABLE   Final Result   No pulmonary infiltrates. No significant change since the prior study         XR CHEST PORTABLE   Final Result   1. Endotracheal tube is approximately 6.8 cm above the chivo. 2. No airspace opacity or pleural effusion. PROBLEM LIST:  Principal Problem:    Acute respiratory failure (HCC)  Active Problems:    Essential hypertension    Diabetes mellitus ([address]    Myocardiopathy (HCC)    ACE inhibitor-aggravated angioedema    COPD (chronic obstructive pulmonary disease) (HCC)    Acquired angioedema    SOB (shortness of breath)    Disorder of airway    Acquired hypertrophy of tongue  Resolved Problems:    * No resolved hospital problems. *      ATTESTATION:  ICU Staff Physician note of personal involvement in Care  As the attending physician, I certify that I personally reviewed the patients history and personnally examined the patient to confirm the physical findings described above,  And that I reviewed the relevant imaging studies and available reports.   I also discussed the differential diagnosis and all of the proposed management plans with the patient and individuals accompanying the patient to this visit. They had the opportunity to ask questions about the proposed management plans and to have those questions answered. This patient has a high probability of sudden, clinically significant deterioration, which requires the highest level of physician preparedness to intervene urgently. I managed/supervised life or organ supporting interventions that required frequent physician assessment. I devoted my full attention to the direct care of this patient for the amount of time indicated below. Time I spent with the family or surrogate(s) is included only if the patient was incapable of providing the necessary information or participating in medical decisions - Time devoted to teaching and to any procedures I billed separately is not included.      CRITICAL CARE TIME:  30 minutes    Jason Galindo MD  Pulmonary and Critical Care Medicine

## 2021-09-04 NOTE — PROGRESS NOTES
Patient successfully extubated to high flow nasal cannula @ 6 L O2. RN and RT present at bedside. Spo2 94%, RR-14. Bed alarm armed.

## 2021-09-05 LAB
ALBUMIN SERPL-MCNC: 3.1 G/DL (ref 3.5–5.2)
ALP BLD-CCNC: 50 U/L (ref 35–104)
ALT SERPL-CCNC: 23 U/L (ref 0–32)
ANION GAP SERPL CALCULATED.3IONS-SCNC: 10 MMOL/L (ref 7–16)
AST SERPL-CCNC: 20 U/L (ref 0–31)
BASOPHILS ABSOLUTE: 0 E9/L (ref 0–0.2)
BASOPHILS RELATIVE PERCENT: 0.1 % (ref 0–2)
BILIRUB SERPL-MCNC: 0.7 MG/DL (ref 0–1.2)
BUN BLDV-MCNC: 24 MG/DL (ref 6–23)
CALCIUM SERPL-MCNC: 8.3 MG/DL (ref 8.6–10.2)
CHLORIDE BLD-SCNC: 94 MMOL/L (ref 98–107)
CO2: 30 MMOL/L (ref 22–29)
CREAT SERPL-MCNC: 0.7 MG/DL (ref 0.5–1)
EOSINOPHILS ABSOLUTE: 0 E9/L (ref 0.05–0.5)
EOSINOPHILS RELATIVE PERCENT: 0 % (ref 0–6)
GFR AFRICAN AMERICAN: >60
GFR NON-AFRICAN AMERICAN: >60 ML/MIN/1.73
GLUCOSE BLD-MCNC: 128 MG/DL (ref 74–99)
HCT VFR BLD CALC: 38.7 % (ref 34–48)
HEMOGLOBIN: 13 G/DL (ref 11.5–15.5)
LYMPHOCYTES ABSOLUTE: 1.96 E9/L (ref 1.5–4)
LYMPHOCYTES RELATIVE PERCENT: 8.7 % (ref 20–42)
MAGNESIUM: 1.9 MG/DL (ref 1.6–2.6)
MCH RBC QN AUTO: 31 PG (ref 26–35)
MCHC RBC AUTO-ENTMCNC: 33.6 % (ref 32–34.5)
MCV RBC AUTO: 92.1 FL (ref 80–99.9)
METER GLUCOSE: 157 MG/DL (ref 74–99)
METER GLUCOSE: 169 MG/DL (ref 74–99)
METER GLUCOSE: 201 MG/DL (ref 74–99)
METER GLUCOSE: 203 MG/DL (ref 74–99)
METER GLUCOSE: 208 MG/DL (ref 74–99)
METER GLUCOSE: 239 MG/DL (ref 74–99)
METER GLUCOSE: 61 MG/DL (ref 74–99)
MONOCYTES ABSOLUTE: 1.31 E9/L (ref 0.1–0.95)
MONOCYTES RELATIVE PERCENT: 6.1 % (ref 2–12)
NEUTROPHILS ABSOLUTE: 18.53 E9/L (ref 1.8–7.3)
NEUTROPHILS RELATIVE PERCENT: 85.2 % (ref 43–80)
OVALOCYTES: ABNORMAL
PDW BLD-RTO: 11.8 FL (ref 11.5–15)
PHOSPHORUS: 2.6 MG/DL (ref 2.5–4.5)
PLATELET # BLD: 351 E9/L (ref 130–450)
PMV BLD AUTO: 9.6 FL (ref 7–12)
POIKILOCYTES: ABNORMAL
POTASSIUM SERPL-SCNC: 3.6 MMOL/L (ref 3.5–5)
RBC # BLD: 4.2 E12/L (ref 3.5–5.5)
SODIUM BLD-SCNC: 134 MMOL/L (ref 132–146)
TOTAL PROTEIN: 5.7 G/DL (ref 6.4–8.3)
WBC # BLD: 21.8 E9/L (ref 4.5–11.5)

## 2021-09-05 PROCEDURE — 6370000000 HC RX 637 (ALT 250 FOR IP)

## 2021-09-05 PROCEDURE — 83735 ASSAY OF MAGNESIUM: CPT

## 2021-09-05 PROCEDURE — 94640 AIRWAY INHALATION TREATMENT: CPT

## 2021-09-05 PROCEDURE — 85025 COMPLETE CBC W/AUTO DIFF WBC: CPT

## 2021-09-05 PROCEDURE — 2500000003 HC RX 250 WO HCPCS: Performed by: INTERNAL MEDICINE

## 2021-09-05 PROCEDURE — 6370000000 HC RX 637 (ALT 250 FOR IP): Performed by: INTERNAL MEDICINE

## 2021-09-05 PROCEDURE — 84100 ASSAY OF PHOSPHORUS: CPT

## 2021-09-05 PROCEDURE — 2580000003 HC RX 258: Performed by: INTERNAL MEDICINE

## 2021-09-05 PROCEDURE — 6360000002 HC RX W HCPCS: Performed by: INTERNAL MEDICINE

## 2021-09-05 PROCEDURE — 82962 GLUCOSE BLOOD TEST: CPT

## 2021-09-05 PROCEDURE — 2580000003 HC RX 258

## 2021-09-05 PROCEDURE — 6360000002 HC RX W HCPCS: Performed by: NURSE PRACTITIONER

## 2021-09-05 PROCEDURE — 2700000000 HC OXYGEN THERAPY PER DAY

## 2021-09-05 PROCEDURE — 2060000000 HC ICU INTERMEDIATE R&B

## 2021-09-05 PROCEDURE — 80053 COMPREHEN METABOLIC PANEL: CPT

## 2021-09-05 PROCEDURE — 6360000002 HC RX W HCPCS

## 2021-09-05 RX ORDER — HYDRALAZINE HYDROCHLORIDE 20 MG/ML
20 INJECTION INTRAMUSCULAR; INTRAVENOUS EVERY 4 HOURS
Status: DISCONTINUED | OUTPATIENT
Start: 2021-09-05 | End: 2021-09-06

## 2021-09-05 RX ORDER — INSULIN GLARGINE 100 [IU]/ML
23 INJECTION, SOLUTION SUBCUTANEOUS 2 TIMES DAILY
Status: DISCONTINUED | OUTPATIENT
Start: 2021-09-05 | End: 2021-09-09

## 2021-09-05 RX ADMIN — PIPERACILLIN SODIUM AND TAZOBACTAM SODIUM 3375 MG: 3; .375 INJECTION, POWDER, LYOPHILIZED, FOR SOLUTION INTRAVENOUS at 04:27

## 2021-09-05 RX ADMIN — INSULIN GLARGINE 20 UNITS: 100 INJECTION, SOLUTION SUBCUTANEOUS at 08:48

## 2021-09-05 RX ADMIN — HYDRALAZINE HYDROCHLORIDE 20 MG: 20 INJECTION INTRAMUSCULAR; INTRAVENOUS at 12:27

## 2021-09-05 RX ADMIN — Medication 10 ML: at 08:46

## 2021-09-05 RX ADMIN — METHYLPREDNISOLONE SODIUM SUCCINATE 40 MG: 40 INJECTION, POWDER, FOR SOLUTION INTRAMUSCULAR; INTRAVENOUS at 00:37

## 2021-09-05 RX ADMIN — SODIUM CHLORIDE, PRESERVATIVE FREE 300 UNITS: 5 INJECTION INTRAVENOUS at 08:45

## 2021-09-05 RX ADMIN — QUETIAPINE FUMARATE 25 MG: 25 TABLET ORAL at 21:14

## 2021-09-05 RX ADMIN — HYDRALAZINE HYDROCHLORIDE 20 MG: 20 INJECTION INTRAMUSCULAR; INTRAVENOUS at 16:51

## 2021-09-05 RX ADMIN — ACETYLCYSTEINE 600 MG: 200 SOLUTION ORAL; RESPIRATORY (INHALATION) at 02:25

## 2021-09-05 RX ADMIN — IPRATROPIUM BROMIDE AND ALBUTEROL SULFATE 1 AMPULE: .5; 3 SOLUTION RESPIRATORY (INHALATION) at 14:06

## 2021-09-05 RX ADMIN — Medication 10 ML: at 21:15

## 2021-09-05 RX ADMIN — DIMETHICONE, CAMPHOR (SYNTHETIC), MENTHOL, AND PHENOL: 1.1; .5; .625; .5 OINTMENT TOPICAL at 08:46

## 2021-09-05 RX ADMIN — INSULIN LISPRO 3 UNITS: 100 INJECTION, SOLUTION INTRAVENOUS; SUBCUTANEOUS at 18:12

## 2021-09-05 RX ADMIN — IPRATROPIUM BROMIDE AND ALBUTEROL SULFATE 1 AMPULE: .5; 3 SOLUTION RESPIRATORY (INHALATION) at 11:09

## 2021-09-05 RX ADMIN — METHYLPREDNISOLONE SODIUM SUCCINATE 40 MG: 40 INJECTION, POWDER, FOR SOLUTION INTRAMUSCULAR; INTRAVENOUS at 05:26

## 2021-09-05 RX ADMIN — INSULIN LISPRO 3 UNITS: 100 INJECTION, SOLUTION INTRAVENOUS; SUBCUTANEOUS at 02:20

## 2021-09-05 RX ADMIN — ACETYLCYSTEINE 600 MG: 200 SOLUTION ORAL; RESPIRATORY (INHALATION) at 18:45

## 2021-09-05 RX ADMIN — PIPERACILLIN SODIUM AND TAZOBACTAM SODIUM 3375 MG: 3; .375 INJECTION, POWDER, LYOPHILIZED, FOR SOLUTION INTRAVENOUS at 21:14

## 2021-09-05 RX ADMIN — METOPROLOL TARTRATE 5 MG: 1 INJECTION, SOLUTION INTRAVENOUS at 21:21

## 2021-09-05 RX ADMIN — SODIUM CHLORIDE, PRESERVATIVE FREE 300 UNITS: 5 INJECTION INTRAVENOUS at 21:14

## 2021-09-05 RX ADMIN — INSULIN GLARGINE 23 UNITS: 100 INJECTION, SOLUTION SUBCUTANEOUS at 21:21

## 2021-09-05 RX ADMIN — IPRATROPIUM BROMIDE AND ALBUTEROL SULFATE 1 AMPULE: .5; 3 SOLUTION RESPIRATORY (INHALATION) at 07:35

## 2021-09-05 RX ADMIN — FAMOTIDINE 20 MG: 10 INJECTION INTRAVENOUS at 08:44

## 2021-09-05 RX ADMIN — SODIUM CHLORIDE: 9 INJECTION, SOLUTION INTRAVENOUS at 09:00

## 2021-09-05 RX ADMIN — DEXTROSE MONOHYDRATE 12.5 G: 25 INJECTION, SOLUTION INTRAVENOUS at 14:19

## 2021-09-05 RX ADMIN — Medication 10 ML: at 09:01

## 2021-09-05 RX ADMIN — HYDRALAZINE HYDROCHLORIDE 10 MG: 20 INJECTION INTRAMUSCULAR; INTRAVENOUS at 08:41

## 2021-09-05 RX ADMIN — INSULIN LISPRO 6 UNITS: 100 INJECTION, SOLUTION INTRAVENOUS; SUBCUTANEOUS at 11:40

## 2021-09-05 RX ADMIN — ENOXAPARIN SODIUM 40 MG: 40 INJECTION SUBCUTANEOUS at 08:44

## 2021-09-05 RX ADMIN — METHYLPREDNISOLONE SODIUM SUCCINATE 40 MG: 40 INJECTION, POWDER, FOR SOLUTION INTRAMUSCULAR; INTRAVENOUS at 18:13

## 2021-09-05 RX ADMIN — IPRATROPIUM BROMIDE AND ALBUTEROL SULFATE 1 AMPULE: .5; 3 SOLUTION RESPIRATORY (INHALATION) at 18:43

## 2021-09-05 RX ADMIN — BUDESONIDE 500 MCG: 0.5 INHALANT RESPIRATORY (INHALATION) at 18:43

## 2021-09-05 RX ADMIN — ACETYLCYSTEINE 600 MG: 200 SOLUTION ORAL; RESPIRATORY (INHALATION) at 22:04

## 2021-09-05 RX ADMIN — ACETYLCYSTEINE 600 MG: 200 SOLUTION ORAL; RESPIRATORY (INHALATION) at 14:06

## 2021-09-05 RX ADMIN — ACETYLCYSTEINE 600 MG: 200 SOLUTION ORAL; RESPIRATORY (INHALATION) at 07:36

## 2021-09-05 RX ADMIN — SODIUM CHLORIDE: 9 INJECTION, SOLUTION INTRAVENOUS at 00:39

## 2021-09-05 RX ADMIN — METHYLPREDNISOLONE SODIUM SUCCINATE 40 MG: 40 INJECTION, POWDER, FOR SOLUTION INTRAMUSCULAR; INTRAVENOUS at 12:27

## 2021-09-05 RX ADMIN — IPRATROPIUM BROMIDE AND ALBUTEROL SULFATE 1 AMPULE: .5; 3 SOLUTION RESPIRATORY (INHALATION) at 02:25

## 2021-09-05 RX ADMIN — IPRATROPIUM BROMIDE AND ALBUTEROL SULFATE 1 AMPULE: .5; 3 SOLUTION RESPIRATORY (INHALATION) at 22:03

## 2021-09-05 RX ADMIN — ACETYLCYSTEINE 600 MG: 200 SOLUTION ORAL; RESPIRATORY (INHALATION) at 11:09

## 2021-09-05 RX ADMIN — SODIUM CHLORIDE: 9 INJECTION, SOLUTION INTRAVENOUS at 17:00

## 2021-09-05 RX ADMIN — HYDRALAZINE HYDROCHLORIDE 20 MG: 20 INJECTION INTRAMUSCULAR; INTRAVENOUS at 21:14

## 2021-09-05 RX ADMIN — BUDESONIDE 500 MCG: 0.5 INHALANT RESPIRATORY (INHALATION) at 07:36

## 2021-09-05 RX ADMIN — METOPROLOL TARTRATE 5 MG: 1 INJECTION, SOLUTION INTRAVENOUS at 08:43

## 2021-09-05 RX ADMIN — INSULIN LISPRO 6 UNITS: 100 INJECTION, SOLUTION INTRAVENOUS; SUBCUTANEOUS at 21:21

## 2021-09-05 RX ADMIN — CLONIDINE HYDROCHLORIDE 0.2 MG: 0.2 TABLET ORAL at 21:14

## 2021-09-05 RX ADMIN — HYDRALAZINE HYDROCHLORIDE 10 MG: 20 INJECTION INTRAMUSCULAR; INTRAVENOUS at 04:27

## 2021-09-05 RX ADMIN — HYDRALAZINE HYDROCHLORIDE 10 MG: 20 INJECTION INTRAMUSCULAR; INTRAVENOUS at 00:10

## 2021-09-05 RX ADMIN — PIPERACILLIN SODIUM AND TAZOBACTAM SODIUM 3375 MG: 3; .375 INJECTION, POWDER, LYOPHILIZED, FOR SOLUTION INTRAVENOUS at 12:27

## 2021-09-05 RX ADMIN — METOPROLOL TARTRATE 5 MG: 1 INJECTION, SOLUTION INTRAVENOUS at 00:37

## 2021-09-05 RX ADMIN — METOPROLOL TARTRATE 5 MG: 1 INJECTION, SOLUTION INTRAVENOUS at 12:27

## 2021-09-05 ASSESSMENT — PAIN SCALES - GENERAL
PAINLEVEL_OUTOF10: 0

## 2021-09-05 ASSESSMENT — PAIN SCALES - WONG BAKER
WONGBAKER_NUMERICALRESPONSE: 0

## 2021-09-05 NOTE — PROGRESS NOTES
CRITICAL CARE PROGRESS NOTE    The patient's case was discussed in multidisciplinary rounds including critical care specialist, nursing, RT and pharmacy. Her evaluation is as follows:      78year old woman with PMH as described below admitted to ICU for management of resolved angioedema secondary to ACE inhibitors, severe COPD with exacerbation, Haemophilus influenza and MSSA pneumonia hypertension, E. coli urinary tract infection, diabetes mellitus type 2. --To have evaluation of swallowing today  --Hypertension is better controlled but still high, increased hydralazine to 20 mg q4h       BP (!) 180/92   Pulse 100   Temp 97.5 °F (36.4 °C) (Core)   Resp 20   Ht 5' 4\" (1.626 m)   Wt 198 lb 12.8 oz (90.2 kg)   SpO2 94%   BMI 34.12 kg/m²     General: Awake, oriented to place, time and person, comfortable with nasal cannula  HEENT: No head lesions, PERRL, EOMI, mouth without lesions, no nasal lesions, no cervical adenopathy palpated  Respiratory: Lungs with diminished breath sounds bilaterally, no adventitious sounds auscultated, no accessory muscle use  CV: Regular rate, no murmurs, no JVD, trace leg edema  Abdomen: Soft, non tender, + bowel sounds, no lesions  Skin: Hydrated, adequate turgor, no rash, capillary refill <2 seconds  Extremities: Muscular strength 3/5 in 4 limbs, moves 4 limbs spontaneously, distal pulses present  Neurology: Awake and alert, follows commands, moves 4 limbs on command and spontaneously, neck is supple, no meningitic signs present. A/P:  Acute hypoxemic respiratory failure secondary to H.  Influenza and MSSA pneumonia  --On 5L NC  --Antibiotic regimen: Piperacillin/tazobactam  --Cultures reviewed    Hypertension  --On antihypertensives, restart oral medications if no dysphagia present    Diabetes mellitus  --Management with insulin administration     DVT prophylaxis     Full code    Last 3 CMP:    Recent Labs     09/03/21  0600 09/04/21  0436 09/05/21  0525    140 134   K 4.9 4.0 3.6   CL 97* 98 94*   CO2 33* 34* 30*   BUN 29* 32* 24*   CREATININE 0.7 0.8 0.7   GLUCOSE 230* 91 128*   CALCIUM 8.4* 8.6 8.3*   PROT 5.3* 5.5* 5.7*   LABALBU 2.8* 2.9* 3.1*   BILITOT 0.3 0.4 0.7   ALKPHOS 47 50 50   AST 14 17 20   ALT 24 24 23     Recent Labs     09/05/21  0525   WBC 21.8*   RBC 4.20   HGB 13.0   HCT 38.7   MCV 92.1   MCH 31.0   MCHC 33.6   RDW 11.8      MPV 9.6       No results for input(s): BC in the last 72 hours. No results for input(s): Mariann Griffin in the last 72 hours.     24 HR INTAKE/OUTPUT:      Intake/Output Summary (Last 24 hours) at 9/5/2021 1145  Last data filed at 9/5/2021 0900  Gross per 24 hour   Intake 1668 ml   Output 3550 ml   Net -1882 ml     MEDICATIONS:   insulin glargine  23 Units SubCUTAneous BID    hydrALAZINE  20 mg IntraVENous Q4H    metoprolol  5 mg IntraVENous Q6H    cloNIDine  0.1 mg Oral Once    cloNIDine  0.2 mg Oral BID    Roflumilast  500 mcg Oral Daily    lactobacillus  1 capsule Oral Daily    acetylcysteine  600 mg Inhalation Q4H    methylPREDNISolone  40 mg IntraVENous Q6H    piperacillin-tazobactam  3,375 mg IntraVENous Q8H    ipratropium-albuterol  1 ampule Inhalation Q4H    enoxaparin  40 mg SubCUTAneous Daily    famotidine (PEPCID) injection  20 mg IntraVENous Daily    QUEtiapine  25 mg Oral BID    lidocaine PF  5 mL IntraDERmal Once    sodium chloride flush  5-40 mL IntraVENous 2 times per day    heparin flush  3 mL IntraVENous 2 times per day    budesonide  0.5 mg Nebulization BID    medicated lip ointment   Topical Daily    insulin lispro  0-18 Units SubCUTAneous Q4H    sodium chloride flush  5-40 mL IntraVENous 2 times per day      sodium chloride Stopped (09/05/21 1139)    propofol 35 mcg/kg/min (09/03/21 0727)    fentaNYL 5 mcg/ml in 0.9%  ml infusion Stopped (09/04/21 1151)    sodium chloride      dexmedetomidine HCl in NaCl Stopped (09/04/21 1152)    dextrose      sodium chloride      lactated ringers 50 mL/hr at 09/04/21 2037     perflutren lipid microspheres, sodium chloride flush, sodium chloride, heparin flush, glucose, dextrose, glucagon (rDNA), dextrose, sodium chloride flush, sodium chloride, ondansetron **OR** ondansetron, polyethylene glycol, acetaminophen **OR** acetaminophen    OBJECTIVE:  Vitals:    09/05/21 1000   BP: (!) 180/92   Pulse: 100   Resp: 20   Temp:    SpO2: 94%     FiO2 : 40 %  O2 Flow Rate (L/min): 5 L/min  O2 Device: High flow nasal cannula        LABS:  WBC   Date Value Ref Range Status   09/05/2021 21.8 (H) 4.5 - 11.5 E9/L Final   09/04/2021 16.6 (H) 4.5 - 11.5 E9/L Final   09/03/2021 10.6 4.5 - 11.5 E9/L Final     Hemoglobin   Date Value Ref Range Status   09/05/2021 13.0 11.5 - 15.5 g/dL Final   09/04/2021 11.8 11.5 - 15.5 g/dL Final   09/03/2021 11.3 (L) 11.5 - 15.5 g/dL Final     Hematocrit   Date Value Ref Range Status   09/05/2021 38.7 34.0 - 48.0 % Final   09/04/2021 37.6 34.0 - 48.0 % Final   09/03/2021 34.6 34.0 - 48.0 % Final     MCV   Date Value Ref Range Status   09/05/2021 92.1 80.0 - 99.9 fL Final   09/04/2021 96.7 80.0 - 99.9 fL Final   09/03/2021 96.4 80.0 - 99.9 fL Final     Platelets   Date Value Ref Range Status   09/05/2021 351 130 - 450 E9/L Final   09/04/2021 292 130 - 450 E9/L Final   09/03/2021 253 130 - 450 E9/L Final     Sodium   Date Value Ref Range Status   09/05/2021 134 132 - 146 mmol/L Final   09/04/2021 140 132 - 146 mmol/L Final   09/03/2021 137 132 - 146 mmol/L Final     Potassium   Date Value Ref Range Status   09/05/2021 3.6 3.5 - 5.0 mmol/L Final   09/04/2021 4.0 3.5 - 5.0 mmol/L Final   09/03/2021 4.9 3.5 - 5.0 mmol/L Final     Potassium reflex Magnesium   Date Value Ref Range Status   08/25/2021 4.7 3.5 - 5.0 mmol/L Final   08/24/2021 4.4 3.5 - 5.0 mmol/L Final   07/28/2020 3.9 3.5 - 5.0 mmol/L Final     Chloride   Date Value Ref Range Status   09/05/2021 94 (L) 98 - 107 mmol/L Final   09/04/2021 98 98 - 107 mmol/L Final   09/03/2021 97 (L) 98 - 107 mmol/L Failure: less than 15 ml/min/1.73 sq.m. Results valid for patients 18 years and older. 09/04/2021 >60 >=60 mL/min/1.73 Final     Comment:     Chronic Kidney Disease: less than 60 ml/min/1.73 sq.m. Kidney Failure: less than 15 ml/min/1.73 sq.m. Results valid for patients 18 years and older. 09/03/2021 >60 >=60 mL/min/1.73 Final     Comment:     Chronic Kidney Disease: less than 60 ml/min/1.73 sq.m. Kidney Failure: less than 15 ml/min/1.73 sq.m. Results valid for patients 18 years and older. GFR    Date Value Ref Range Status   09/05/2021 >60  Final   09/04/2021 >60  Final   09/03/2021 >60  Final     Magnesium   Date Value Ref Range Status   09/05/2021 1.9 1.6 - 2.6 mg/dL Final   09/04/2021 2.0 1.6 - 2.6 mg/dL Final   09/03/2021 2.2 1.6 - 2.6 mg/dL Final     Phosphorus   Date Value Ref Range Status   09/05/2021 2.6 2.5 - 4.5 mg/dL Final   09/04/2021 3.1 2.5 - 4.5 mg/dL Final   09/03/2021 4.1 2.5 - 4.5 mg/dL Final     Recent Labs     09/04/21  0509   PH 7.459*   PO2 93.8   PCO2 45.5*   HCO3 31.6*   BE 6.8*   O2SAT 96.8       RADIOLOGY:  XR CHEST PORTABLE   Final Result   1. The position and alignment of the tubes and catheters are within normal   range   2. No signs of an acute cardiopulmonary process         XR CHEST PORTABLE   Final Result   No acute process and unchanged         XR CHEST PORTABLE   Final Result   No acute process         XR ABDOMEN FOR NG/OG/NE TUBE PLACEMENT   Final Result   NG tube tip is at the level of the gastric antrum, well within the gastric   lumen. XR CHEST PORTABLE   Final Result   ETT tip is within 1 cm of the chivo. It could be withdrawn 1-2 cm for   better tube position. XR CHEST PORTABLE   Final Result   1. There is no acute cardiopulmonary disease   2. Stable position of support lines and tubes.          CT SOFT TISSUE NECK W CONTRAST   Final Result   Limited evaluation of the pharynx, larynx and hypopharynx due to presence of   ETT and OG tube. Prominence of the bilateral adenoids, likely related to   infection/inflammation. Mild prominence of the bilateral palatine and lingual tonsils, and narrowing   of the bilateral piriform sinuses, likely reactive. Mild infiltration of fat in the bilateral carotid spaces, particularly at the   level of carotid bulbs, right more than left, likely related to extension of   inflammatory changes. Acute sinusitis. XR CHEST PORTABLE   Final Result   1. Endotracheal tube is present with distal tip approximately 4.5 cm above   the chivo. 2. No airspace opacity or pleural effusion. XR ABDOMEN FOR NG/OG/NE TUBE PLACEMENT   Final Result   1. Satisfactory position of the NG tube within the stomach         XR CHEST PORTABLE   Final Result   No pulmonary infiltrates. No significant change since the prior study         XR CHEST PORTABLE   Final Result   1. Endotracheal tube is approximately 6.8 cm above the chivo. 2. No airspace opacity or pleural effusion. PROBLEM LIST:  Principal Problem:    Acute respiratory failure (HCC)  Active Problems:    Essential hypertension    Diabetes mellitus ([address]    Myocardiopathy (HCC)    ACE inhibitor-aggravated angioedema    COPD (chronic obstructive pulmonary disease) (HCC)    Acquired angioedema    SOB (shortness of breath)    Disorder of airway    Acquired hypertrophy of tongue  Resolved Problems:    * No resolved hospital problems.  *      Senait Carrion MD  Pulmonary and Critical Care Medicine

## 2021-09-05 NOTE — PROGRESS NOTES
Internal Medicine Progress Note    [facility]    Kia Levy. Raissa Perez., MANJITOKellenI. Kp Batista D.O., RON Sherwood D.O. Nadia Merchantbrando, MSN, APRN, NP-C  Kassie Robin. Mancil Severance, MSN, APRN-CNP     Primary Care Physician: Sepideh Britton MD   Admitting Physician:  Sepideh Britton MD  Admission date and time: 8/24/2021  4:25 PM    Room:  [place] [address]  Admitting diagnosis: SOB (shortness of breath) [R06.02]  Acquired angioedema [T78. 3XXA]    Patient Name: Ronny Turner  MRN: [id number]    Date of Service: 9/5/2021     Covering for Dr. Pretty Alcantar:  Azam Mortensen is a 78 y.o. female who was seen and examined today,9/5/2021, at the bedside. Patient doing extremely well today. She was extubated yesterday and currently on nasal cannula. Patient is alert and oriented and seem to be doing well. Still appear to be somewhat weak      Review of System:   Constitutional:   Denies fever or chills, weight loss or gain, fatigue or malaise. HEENT:   Denies ear pain, sore throat, sinus or eye problems. Cardiovascular:   Denies any chest pain, irregular heartbeats, or palpitations. Respiratory:   No significant respiratory distress. On nasal cannula  Gastrointestinal:   Denies nausea, vomiting, diarrhea, or constipation. Denies any abdominal pain. Genitourinary:    Denies any urgency, frequency, hematuria. Voiding  without difficulty. Extremities:   Denies lower extremity swelling, edema or cyanosis. Neurology:    Denies any headache or focal neurological deficits, Denies generalized weakness or memory difficulty. Psch:   Denies being anxious or depressed. Musculoskeletal:    Denies  myalgias, joint complaints or back pain. Integumentary:   Denies any rashes, ulcers, or excoriations. Denies bruising. Hematologic/Lymphatic:  Denies bruising or bleeding.     Physical Exam:  I/O this shift:  In: 626 [P.O.:30; I.V.:496; IV Piggyback:100]  Out: 6892 [[facility]:6210]    Intake/Output Summary (Last 24 hours) at 9/5/2021 1436  Last data filed at 9/5/2021 1426  Gross per 24 hour   Intake 2244 ml   Output 3975 ml   Net -1731 ml   I/O last 3 completed shifts: In: 9554 [I.V.:1141; NG/GT:477]  Out: 3642 [Urine:3550]  Patient Vitals for the past 96 hrs (Last 3 readings):   Weight   09/03/21 0608 198 lb 12.8 oz (90.2 kg)     Vital Signs:   Blood pressure (!) 163/79, pulse 90, temperature 97.9 °F (36.6 °C), temperature source Axillary, resp. rate 27, height 5' 4\" (1.626 m), weight 198 lb 12.8 oz (90.2 kg), SpO2 96 %. General appearance:  Sedated on pressors  Head:  Normocephalic. No masses, lesions or tenderness. Eyes:  PERRLA. EOMI. Sclera clear. Buccal mucosa moist.  ENT:  Ears normal. Mucosa normal.  Nasal cannula  Neck:    Supple. Trachea midline. No thyromegaly. No JVD. No bruits. Heart:    Rhythm regular. Rate controlled. Lungs:    Diminished posteriorly. No significant wheezing  Abdomen:   Soft. Non-tender. Non-distended. Bowel sounds positive. No organomegaly or masses. No pain on palpation. Extremities:    Peripheral pulses present. No peripheral edema. No ulcers. No cyanosis. No clubbing. Integumentary:  No rashes  Skin normal color and texture.   Genitalia/Breast:  Vogel    Medication:  Scheduled Meds:   insulin glargine  23 Units SubCUTAneous BID    hydrALAZINE  20 mg IntraVENous Q4H    metoprolol  5 mg IntraVENous Q6H    cloNIDine  0.1 mg Oral Once    cloNIDine  0.2 mg Oral BID    Roflumilast  500 mcg Oral Daily    lactobacillus  1 capsule Oral Daily    acetylcysteine  600 mg Inhalation Q4H    methylPREDNISolone  40 mg IntraVENous Q6H    piperacillin-tazobactam  3,375 mg IntraVENous Q8H    ipratropium-albuterol  1 ampule Inhalation Q4H    enoxaparin  40 mg SubCUTAneous Daily    famotidine (PEPCID) injection  20 mg IntraVENous Daily    QUEtiapine  25 mg Oral BID    lidocaine PF  5 mL IntraDERmal Once    sodium chloride flush  5-40 mL IntraVENous 2 times per day    heparin flush  3 mL IntraVENous 2 times per day    budesonide  0.5 mg Nebulization BID    medicated lip ointment   Topical Daily    insulin lispro  0-18 Units SubCUTAneous Q4H    sodium chloride flush  5-40 mL IntraVENous 2 times per day     Continuous Infusions:   sodium chloride Stopped (09/05/21 1139)    propofol 35 mcg/kg/min (09/03/21 0727)    fentaNYL 5 mcg/ml in 0.9%  ml infusion Stopped (09/04/21 1151)    sodium chloride      dexmedetomidine HCl in NaCl Stopped (09/04/21 1152)    dextrose      sodium chloride      lactated ringers 50 mL/hr at 09/04/21 2037       Objective Data:  CBC with Differential:    Lab Results   Component Value Date    WBC 21.8 09/05/2021    RBC 4.20 09/05/2021    HGB 13.0 09/05/2021    HCT 38.7 09/05/2021     09/05/2021    MCV 92.1 09/05/2021    MCH 31.0 09/05/2021    MCHC 33.6 09/05/2021    RDW 11.8 09/05/2021    LYMPHOPCT 8.7 09/05/2021    MONOPCT 6.1 09/05/2021    BASOPCT 0.1 09/05/2021    MONOSABS 1.31 09/05/2021    LYMPHSABS 1.96 09/05/2021    EOSABS 0.00 09/05/2021    BASOSABS 0.00 09/05/2021     CMP:    Lab Results   Component Value Date     09/05/2021    K 3.6 09/05/2021    K 4.7 08/25/2021    CL 94 09/05/2021    CO2 30 09/05/2021    BUN 24 09/05/2021    CREATININE 0.7 09/05/2021    GFRAA >60 09/05/2021    LABGLOM >60 09/05/2021    GLUCOSE 128 09/05/2021    PROT 5.7 09/05/2021    LABALBU 3.1 09/05/2021    CALCIUM 8.3 09/05/2021    BILITOT 0.7 09/05/2021    ALKPHOS 50 09/05/2021    AST 20 09/05/2021    ALT 23 09/05/2021              Assessment:    · Acute progressive respiratory distress on mechanical ventilator--extubated September 4  · Essential hypertension  · Type 2 diabetes mellitus  · Myocardial myopathy  · Angioedema secondary to ACE inhibitor  · Severe COPD with acute exacerbation with hemoptysis influenza infection  · Essential hypertension  · MRSA respiratory infection  · E. coli urinary tract

## 2021-09-06 ENCOUNTER — APPOINTMENT (OUTPATIENT)
Dept: GENERAL RADIOLOGY | Age: 66
DRG: 207 | End: 2021-09-06
Payer: MEDICARE

## 2021-09-06 LAB
ALBUMIN SERPL-MCNC: 3.1 G/DL (ref 3.5–5.2)
ALP BLD-CCNC: 63 U/L (ref 35–104)
ALT SERPL-CCNC: 22 U/L (ref 0–32)
ANION GAP SERPL CALCULATED.3IONS-SCNC: 9 MMOL/L (ref 7–16)
AST SERPL-CCNC: 19 U/L (ref 0–31)
B.E.: 2.6 MMOL/L (ref -3–3)
BASOPHILS ABSOLUTE: 0.01 E9/L (ref 0–0.2)
BASOPHILS RELATIVE PERCENT: 0.1 % (ref 0–2)
BILIRUB SERPL-MCNC: 0.7 MG/DL (ref 0–1.2)
BUN BLDV-MCNC: 24 MG/DL (ref 6–23)
CALCIUM SERPL-MCNC: 8.6 MG/DL (ref 8.6–10.2)
CHLORIDE BLD-SCNC: 98 MMOL/L (ref 98–107)
CO2: 29 MMOL/L (ref 22–29)
COHB: 0.6 % (ref 0–1.5)
CREAT SERPL-MCNC: 0.8 MG/DL (ref 0.5–1)
CRITICAL: ABNORMAL
DATE ANALYZED: ABNORMAL
DATE OF COLLECTION: ABNORMAL
EOSINOPHILS ABSOLUTE: 0 E9/L (ref 0.05–0.5)
EOSINOPHILS RELATIVE PERCENT: 0 % (ref 0–6)
GFR AFRICAN AMERICAN: >60
GFR NON-AFRICAN AMERICAN: >60 ML/MIN/1.73
GLUCOSE BLD-MCNC: 86 MG/DL (ref 74–99)
HCO3: 24.9 MMOL/L (ref 22–26)
HCT VFR BLD CALC: 36.1 % (ref 34–48)
HEMOGLOBIN: 11.9 G/DL (ref 11.5–15.5)
HHB: 3.2 % (ref 0–5)
IMMATURE GRANULOCYTES #: 0.15 E9/L
IMMATURE GRANULOCYTES %: 1.1 % (ref 0–5)
LAB: ABNORMAL
LYMPHOCYTES ABSOLUTE: 1.14 E9/L (ref 1.5–4)
LYMPHOCYTES RELATIVE PERCENT: 8.6 % (ref 20–42)
Lab: ABNORMAL
MAGNESIUM: 2 MG/DL (ref 1.6–2.6)
MCH RBC QN AUTO: 30.8 PG (ref 26–35)
MCHC RBC AUTO-ENTMCNC: 33 % (ref 32–34.5)
MCV RBC AUTO: 93.5 FL (ref 80–99.9)
METER GLUCOSE: 117 MG/DL (ref 74–99)
METER GLUCOSE: 130 MG/DL (ref 74–99)
METER GLUCOSE: 172 MG/DL (ref 74–99)
METER GLUCOSE: 198 MG/DL (ref 74–99)
METER GLUCOSE: 86 MG/DL (ref 74–99)
METER GLUCOSE: 92 MG/DL (ref 74–99)
METHB: 0.3 % (ref 0–1.5)
MODE: ABNORMAL
MONOCYTES ABSOLUTE: 0.97 E9/L (ref 0.1–0.95)
MONOCYTES RELATIVE PERCENT: 7.3 % (ref 2–12)
NEUTROPHILS ABSOLUTE: 11.01 E9/L (ref 1.8–7.3)
NEUTROPHILS RELATIVE PERCENT: 82.9 % (ref 43–80)
O2 CONTENT: 16.3 ML/DL
O2 SATURATION: 96.8 % (ref 92–98.5)
O2HB: 95.9 % (ref 94–97)
OPERATOR ID: 7278
PATIENT TEMP: 37 C
PCO2: 31.1 MMHG (ref 35–45)
PDW BLD-RTO: 11.9 FL (ref 11.5–15)
PH BLOOD GAS: 7.52 (ref 7.35–7.45)
PHOSPHORUS: 2.9 MG/DL (ref 2.5–4.5)
PLATELET # BLD: 304 E9/L (ref 130–450)
PMV BLD AUTO: 9.6 FL (ref 7–12)
PO2: 93.5 MMHG (ref 75–100)
POTASSIUM SERPL-SCNC: 4 MMOL/L (ref 3.5–5)
RBC # BLD: 3.86 E12/L (ref 3.5–5.5)
SODIUM BLD-SCNC: 136 MMOL/L (ref 132–146)
SOURCE, BLOOD GAS: ABNORMAL
THB: 12 G/DL (ref 11.5–16.5)
TIME ANALYZED: 1159
TOTAL PROTEIN: 5.6 G/DL (ref 6.4–8.3)
TRIGL SERPL-MCNC: 122 MG/DL (ref 0–149)
WBC # BLD: 13.3 E9/L (ref 4.5–11.5)

## 2021-09-06 PROCEDURE — 2060000000 HC ICU INTERMEDIATE R&B

## 2021-09-06 PROCEDURE — 6360000002 HC RX W HCPCS: Performed by: INTERNAL MEDICINE

## 2021-09-06 PROCEDURE — 6360000002 HC RX W HCPCS: Performed by: NURSE PRACTITIONER

## 2021-09-06 PROCEDURE — 6370000000 HC RX 637 (ALT 250 FOR IP): Performed by: INTERNAL MEDICINE

## 2021-09-06 PROCEDURE — 97161 PT EVAL LOW COMPLEX 20 MIN: CPT | Performed by: PHYSICAL THERAPIST

## 2021-09-06 PROCEDURE — 84478 ASSAY OF TRIGLYCERIDES: CPT

## 2021-09-06 PROCEDURE — 82962 GLUCOSE BLOOD TEST: CPT

## 2021-09-06 PROCEDURE — 82805 BLOOD GASES W/O2 SATURATION: CPT

## 2021-09-06 PROCEDURE — 85025 COMPLETE CBC W/AUTO DIFF WBC: CPT

## 2021-09-06 PROCEDURE — 2500000003 HC RX 250 WO HCPCS: Performed by: INTERNAL MEDICINE

## 2021-09-06 PROCEDURE — 80053 COMPREHEN METABOLIC PANEL: CPT

## 2021-09-06 PROCEDURE — 2700000000 HC OXYGEN THERAPY PER DAY

## 2021-09-06 PROCEDURE — 84100 ASSAY OF PHOSPHORUS: CPT

## 2021-09-06 PROCEDURE — 36415 COLL VENOUS BLD VENIPUNCTURE: CPT

## 2021-09-06 PROCEDURE — 2580000003 HC RX 258: Performed by: INTERNAL MEDICINE

## 2021-09-06 PROCEDURE — 36600 WITHDRAWAL OF ARTERIAL BLOOD: CPT

## 2021-09-06 PROCEDURE — 6370000000 HC RX 637 (ALT 250 FOR IP): Performed by: NURSE PRACTITIONER

## 2021-09-06 PROCEDURE — 83735 ASSAY OF MAGNESIUM: CPT

## 2021-09-06 PROCEDURE — 71045 X-RAY EXAM CHEST 1 VIEW: CPT

## 2021-09-06 PROCEDURE — 6370000000 HC RX 637 (ALT 250 FOR IP)

## 2021-09-06 PROCEDURE — 97530 THERAPEUTIC ACTIVITIES: CPT | Performed by: PHYSICAL THERAPIST

## 2021-09-06 PROCEDURE — 94640 AIRWAY INHALATION TREATMENT: CPT

## 2021-09-06 RX ORDER — METOPROLOL SUCCINATE 25 MG/1
25 TABLET, EXTENDED RELEASE ORAL DAILY
Status: DISCONTINUED | OUTPATIENT
Start: 2021-09-06 | End: 2021-09-07

## 2021-09-06 RX ORDER — FAMOTIDINE 20 MG/1
20 TABLET, FILM COATED ORAL 2 TIMES DAILY
Status: DISCONTINUED | OUTPATIENT
Start: 2021-09-06 | End: 2021-09-12

## 2021-09-06 RX ORDER — HYDRALAZINE HYDROCHLORIDE 50 MG/1
50 TABLET, FILM COATED ORAL EVERY 8 HOURS SCHEDULED
Status: DISCONTINUED | OUTPATIENT
Start: 2021-09-06 | End: 2021-09-15 | Stop reason: HOSPADM

## 2021-09-06 RX ADMIN — HYDRALAZINE HYDROCHLORIDE 20 MG: 20 INJECTION INTRAMUSCULAR; INTRAVENOUS at 08:22

## 2021-09-06 RX ADMIN — HYDRALAZINE HYDROCHLORIDE 20 MG: 20 INJECTION INTRAMUSCULAR; INTRAVENOUS at 04:55

## 2021-09-06 RX ADMIN — METHYLPREDNISOLONE SODIUM SUCCINATE 40 MG: 40 INJECTION, POWDER, FOR SOLUTION INTRAMUSCULAR; INTRAVENOUS at 00:49

## 2021-09-06 RX ADMIN — CLONIDINE HYDROCHLORIDE 0.3 MG: 0.2 TABLET ORAL at 21:20

## 2021-09-06 RX ADMIN — PIPERACILLIN SODIUM AND TAZOBACTAM SODIUM 3375 MG: 3; .375 INJECTION, POWDER, LYOPHILIZED, FOR SOLUTION INTRAVENOUS at 04:55

## 2021-09-06 RX ADMIN — INSULIN LISPRO 3 UNITS: 100 INJECTION, SOLUTION INTRAVENOUS; SUBCUTANEOUS at 11:56

## 2021-09-06 RX ADMIN — ACETYLCYSTEINE 600 MG: 200 SOLUTION ORAL; RESPIRATORY (INHALATION) at 22:15

## 2021-09-06 RX ADMIN — IPRATROPIUM BROMIDE AND ALBUTEROL SULFATE 1 AMPULE: .5; 3 SOLUTION RESPIRATORY (INHALATION) at 18:51

## 2021-09-06 RX ADMIN — Medication 1 CAPSULE: at 08:21

## 2021-09-06 RX ADMIN — ACETYLCYSTEINE 600 MG: 200 SOLUTION ORAL; RESPIRATORY (INHALATION) at 09:55

## 2021-09-06 RX ADMIN — METOPROLOL TARTRATE 5 MG: 1 INJECTION, SOLUTION INTRAVENOUS at 09:00

## 2021-09-06 RX ADMIN — IPRATROPIUM BROMIDE AND ALBUTEROL SULFATE 1 AMPULE: .5; 3 SOLUTION RESPIRATORY (INHALATION) at 02:13

## 2021-09-06 RX ADMIN — IPRATROPIUM BROMIDE AND ALBUTEROL SULFATE 1 AMPULE: .5; 3 SOLUTION RESPIRATORY (INHALATION) at 09:55

## 2021-09-06 RX ADMIN — HYDRALAZINE HYDROCHLORIDE 50 MG: 50 TABLET, FILM COATED ORAL at 14:07

## 2021-09-06 RX ADMIN — INSULIN LISPRO 3 UNITS: 100 INJECTION, SOLUTION INTRAVENOUS; SUBCUTANEOUS at 21:21

## 2021-09-06 RX ADMIN — BUDESONIDE 500 MCG: 0.5 INHALANT RESPIRATORY (INHALATION) at 18:50

## 2021-09-06 RX ADMIN — HYDRALAZINE HYDROCHLORIDE 50 MG: 50 TABLET, FILM COATED ORAL at 21:21

## 2021-09-06 RX ADMIN — QUETIAPINE FUMARATE 25 MG: 25 TABLET ORAL at 08:21

## 2021-09-06 RX ADMIN — METHYLPREDNISOLONE SODIUM SUCCINATE 40 MG: 40 INJECTION, POWDER, FOR SOLUTION INTRAMUSCULAR; INTRAVENOUS at 18:02

## 2021-09-06 RX ADMIN — DIMETHICONE, CAMPHOR (SYNTHETIC), MENTHOL, AND PHENOL: 1.1; .5; .625; .5 OINTMENT TOPICAL at 14:10

## 2021-09-06 RX ADMIN — INSULIN GLARGINE 23 UNITS: 100 INJECTION, SOLUTION SUBCUTANEOUS at 21:21

## 2021-09-06 RX ADMIN — FAMOTIDINE 20 MG: 10 INJECTION INTRAVENOUS at 08:22

## 2021-09-06 RX ADMIN — PIPERACILLIN SODIUM AND TAZOBACTAM SODIUM 3375 MG: 3; .375 INJECTION, POWDER, LYOPHILIZED, FOR SOLUTION INTRAVENOUS at 21:21

## 2021-09-06 RX ADMIN — IPRATROPIUM BROMIDE AND ALBUTEROL SULFATE 1 AMPULE: .5; 3 SOLUTION RESPIRATORY (INHALATION) at 12:56

## 2021-09-06 RX ADMIN — FAMOTIDINE 20 MG: 20 TABLET, FILM COATED ORAL at 21:20

## 2021-09-06 RX ADMIN — ROFLUMILAST 500 MCG: 500 TABLET ORAL at 08:21

## 2021-09-06 RX ADMIN — HYDRALAZINE HYDROCHLORIDE 20 MG: 20 INJECTION INTRAMUSCULAR; INTRAVENOUS at 00:49

## 2021-09-06 RX ADMIN — IPRATROPIUM BROMIDE AND ALBUTEROL SULFATE 1 AMPULE: .5; 3 SOLUTION RESPIRATORY (INHALATION) at 07:03

## 2021-09-06 RX ADMIN — ACETYLCYSTEINE 600 MG: 200 SOLUTION ORAL; RESPIRATORY (INHALATION) at 18:51

## 2021-09-06 RX ADMIN — CLONIDINE HYDROCHLORIDE 0.2 MG: 0.2 TABLET ORAL at 08:21

## 2021-09-06 RX ADMIN — IPRATROPIUM BROMIDE AND ALBUTEROL SULFATE 1 AMPULE: .5; 3 SOLUTION RESPIRATORY (INHALATION) at 22:14

## 2021-09-06 RX ADMIN — METOPROLOL TARTRATE 5 MG: 1 INJECTION, SOLUTION INTRAVENOUS at 02:45

## 2021-09-06 RX ADMIN — ACETYLCYSTEINE 600 MG: 200 SOLUTION ORAL; RESPIRATORY (INHALATION) at 12:56

## 2021-09-06 RX ADMIN — METHYLPREDNISOLONE SODIUM SUCCINATE 40 MG: 40 INJECTION, POWDER, FOR SOLUTION INTRAMUSCULAR; INTRAVENOUS at 11:55

## 2021-09-06 RX ADMIN — ENOXAPARIN SODIUM 40 MG: 40 INJECTION SUBCUTANEOUS at 08:21

## 2021-09-06 RX ADMIN — ACETYLCYSTEINE 600 MG: 200 SOLUTION ORAL; RESPIRATORY (INHALATION) at 02:13

## 2021-09-06 RX ADMIN — METHYLPREDNISOLONE SODIUM SUCCINATE 40 MG: 40 INJECTION, POWDER, FOR SOLUTION INTRAMUSCULAR; INTRAVENOUS at 05:44

## 2021-09-06 RX ADMIN — INSULIN GLARGINE 23 UNITS: 100 INJECTION, SOLUTION SUBCUTANEOUS at 08:31

## 2021-09-06 RX ADMIN — ACETYLCYSTEINE 600 MG: 200 SOLUTION ORAL; RESPIRATORY (INHALATION) at 07:03

## 2021-09-06 RX ADMIN — PIPERACILLIN SODIUM AND TAZOBACTAM SODIUM 3375 MG: 3; .375 INJECTION, POWDER, LYOPHILIZED, FOR SOLUTION INTRAVENOUS at 11:55

## 2021-09-06 RX ADMIN — SODIUM CHLORIDE: 9 INJECTION, SOLUTION INTRAVENOUS at 16:40

## 2021-09-06 RX ADMIN — BUDESONIDE 500 MCG: 0.5 INHALANT RESPIRATORY (INHALATION) at 07:04

## 2021-09-06 RX ADMIN — METOPROLOL SUCCINATE 25 MG: 25 TABLET, EXTENDED RELEASE ORAL at 11:55

## 2021-09-06 ASSESSMENT — PAIN SCALES - GENERAL
PAINLEVEL_OUTOF10: 0

## 2021-09-06 NOTE — PROGRESS NOTES
Physical Therapy Initial Evaluation/Plan of Care    Room #:  5504/1971-16  Patient Name: Judit Frazier  YOB: 1955  MRN: [id number]    Date of Service: 9/6/2021     Tentative placement recommendation: Subacute rehab  Equipment recommendation: To be determined      Evaluating Physical Therapist: Jessica Lopez PT, DPT #[id number]      Specific Provider Orders/Date/Referring Provider :   09/04/21 1145   PT eval and treat Start: 09/04/21 1145, End: 09/04/21 1145, ONE TIME, Standing Count: 1 Occurrences, Zoya Alaniz MD Acknowledge New    Admitting Diagnosis:   SOB (shortness of breath) [R06.02]  Acquired angioedema [T78. 3XXA]      Visit Diagnoses       Codes    Angioedema, initial encounter     T78. 3XXA    Acute respiratory distress     R06.03        Surgery: None    Patient Active Problem List   Diagnosis    Non-rheumatic mitral regurgitation    Hypertensive left ventricular hypertrophy, without heart failure    Dyslipidemia    Non morbid obesity    Essential hypertension    Diabetes mellitus ([facility] [address]    Myocardiopathy ([facility] [address]    Tobacco use    Near syncope    Diarrhea    Generalized abdominal pain    Abdominal pain    Pre-syncope    PVD (peripheral vascular disease) with claudication (HCC)    Chronic pain of both ankles    ACE inhibitor-aggravated angioedema    Acute respiratory failure (HCC)    COPD (chronic obstructive pulmonary disease) (HCC)    Acquired angioedema    SOB (shortness of breath)    Disorder of airway    Acquired hypertrophy of tongue        ASSESSMENT of Current Deficits Patient exhibits decreased strength, balance and endurance impairing functional mobility and tolerance to activity. Pt required MaxA to sit EOB and was able to sit EOB with ModA with a heavy L lateral lean with VC for 2 minutes x 2 times. Pt required MaxA x 2 for repositioning toward [facility].        PHYSICAL THERAPY  PLAN OF CARE       Physical therapy plan of care is established based on physician order,  patient diagnosis and clinical assessment    Current Treatment Recommendations:    -Bed Mobility: Lower extremity exercises  and Trunk control activities   -Sitting Balance: Incorporate reaching activities to activate trunk muscles , Hands on support to maintain midline , Facilitate active trunk muscle engagement , Facilitate postural control in all planes  and Engage in core activities to allow for movement within base of support   -Standing Balance: Perform strengthening exercises in standing to promote motor control with or without upper extremity support , Instruct patient on adequate base of support to maintain balance and Challenge balance utilizing reaching  activities beyond center of gravity    -Transfers: Provide instruction on proper hand and foot position for adequate transfer of weight onto lower extremities and use of gait device, Cues for hand placement, technique and safety, Facilitate weight shift forward on to lower extremities and provide necessary stabilization of bilateral lower extremities , Support transfer of weight on to lower extremities, Assist with extension of knees trunk and hip to accept weight transfer  and Provide stabilization to prevent fall   -Gait: Gait training, Standing activities to improve: base of support, weight shift, weight bearing , Exercises to improve trunk control, Exercises to improve hip and knee control, Performance of protected weight bearing activities and Activities to increase weight bearing   -Endurance: Utilize Supervised activities to increase level of endurance to allow for safe functional mobility including transfers and gait  and Use graduated activities to promote good breathing techniques and provide support and education to maximize respiratory function    PT long term treatment goals are located in below grid    Patient and or family understand(s) diagnosis, prognosis, and plan of care.     Frequency of treatments: Patient will be seen  daily. Prior Level of Function: Patient ambulated unknown. Rehab Potential: fair  for baseline    Past medical history:   Past Medical History:   Diagnosis Date    Arthritis     Asthma     Cerebral artery occlusion with cerebral infarction ([facility] [address] 2015    COPD (chronic obstructive pulmonary disease) ([facility] [address] [facility]     Diabetes mellitus ([facility] [address]     Heart attack ([facility] [address]     Hyperlipidemia     Hypertension     VHD (valvular heart disease)      Past Surgical History:   Procedure Laterality Date    CHOLECYSTECTOMY      COLONOSCOPY  12/2018    HYSTERECTOMY      HYSTERECTOMY, VAGINAL      UPPER GASTROINTESTINAL ENDOSCOPY  12/2018       SUBJECTIVE:    Precautions: Up with assistance, falls, O2 and confusion , AMS  Social history: Patient is a poor historian and stated that she lives alone in a two story home resides first  with 5 steps  to enter with [address]. Equipment owned: None,? AM-PAC Basic Mobility   8/24    AM-[facility] Mobility Inpatient   How much difficulty turning over in bed?: A Lot  How much difficulty sitting down on / standing up from a chair with arms?: Unable  How much difficulty moving from lying on back to sitting on side of bed?: A Lot  How much help from another person moving to and from a bed to a chair?: Total  How much help from another person needed to walk in hospital room?: Total  How much help from another person for climbing 3-5 steps with a railing?: Total  AM-PAC Inpatient Mobility Raw Score : 8  AM-PAC Inpatient T-Scale Score : 28.52  Mobility Inpatient CMS 0-100% Score: 86.62  Mobility Inpatient CMS G-Code Modifier : CM    Nursing cleared patient for PT evaluation. The admitting diagnosis and active problem list as listed above have been reviewed prior to the initiation of this evaluation. OBJECTIVE;   Initial Evaluation  Date: 9/6/2021 Treatment Date:     Short Term/ Long Term   Goals   Was pt agreeable to Eval/treatment?  Yes  To be met in 5 days   Pain level   0/10       Bed Mobility    Rolling: Maximal assist of 1    Supine to sit: Maximal assist of 1    Sit to supine: Maximal assist of 1    Scooting: Maximal assist of 1    Rolling: Minimal assist of 1    Supine to sit: Minimal assist of 1    Sit to supine: Minimal assist of 1    Scooting: Minimal assist of 1     Transfers Sit to stand: Not assessed    Sit to stand: Minimal assist of 1    Ambulation    not assessed    > 25 feet using  wheeled walker with Moderate assist of 1    Stair negotiation: ascended and descended   Not assessed        ROM Within functional limits    Increase range of motion 10% of affected joints    Strength BUE:  refer to OT eval  RLE:  3-/5  LLE:  3-/5  Increase strength in affected mm groups by 1/3 grade   Balance Sitting EOB:  poor+  Dynamic Standing:  not assessed   Sitting EOB:  fair   Dynamic Standing: fair-     Patient is Alert & Oriented x person and follows one step directions    Sensation:  Patient  denies numbness/tingling   Edema:  no   Endurance: poor    Vitals: 4 liters nasal cannula   Blood Pressure at rest  Blood Pressure during session    Heart Rate at rest  Heart Rate during session    SPO2 at rest 94%  SPO2 during session 88-94%     Patient education  Patient educated on role of Physical Therapy, risks of immobility, safety and plan of care, energy conservation,  importance of mobility while in hospital , purse lip breathing and safety      Patient response to education:   Pt verbalized understanding Pt demonstrated skill Pt requires further education in this area   Yes Partial Yes      Treatment:  Patient practiced and was instructed/facilitated in the following treatment: Patient Sat edge of bed 2 x 2 minutes with Moderate assist of 1 to increase dynamic sitting balance and activity tolerance. Pt required MaxA x 2 for repositioning back into bed.       Therapeutic Exercises:  not performed      At end of session, patient in bed with alarm call light and phone within reach,  all lines and tubes intact, nursing notified. Patient would benefit from continued skilled Physical Therapy to improve functional independence and quality of life. Patient's/ family goals   rehab    Time in  1  Time out  939    Total Treatment Time  11 minutes    Evaluation time includes thorough review of current medical information, gathering information on past medical history/social history and prior level of function, completion of standardized testing/informal observation of tasks, assessment of data, and development of Plan of care and goals.      CPT codes:  Low Complexity PT evaluation (55875)  Therapeutic activities (18511)   11 minutes  1 unit(s)    Volodymyr Bo, PT

## 2021-09-06 NOTE — PLAN OF CARE
Problem: Falls - Risk of:  Goal: Will remain free from falls  Description: Will remain free from falls  Outcome: Met This Shift     Problem: Falls - Risk of:  Goal: Absence of physical injury  Description: Absence of physical injury  Outcome: Met This Shift     Problem: Skin Integrity:  Goal: Will show no infection signs and symptoms  Description: Will show no infection signs and symptoms  Outcome: Met This Shift     Problem: Skin Integrity:  Goal: Absence of new skin breakdown  Description: Absence of new skin breakdown  Outcome: Met This Shift     Problem: Breathing Pattern - Ineffective:  Goal: Ability to achieve and maintain a regular respiratory rate will improve  Description: Ability to achieve and maintain a regular respiratory rate will improve  Outcome: Met This Shift     Problem: Airway Clearance - Ineffective:  Goal: Ability to maintain a clear airway will improve  Description: Ability to maintain a clear airway will improve  Outcome: Met This Shift

## 2021-09-06 NOTE — PROGRESS NOTES
Internal Medicine Progress Note    [facility]    Harvis Leventhal. Jojo Robin., HOWARD.ROSACKellenOKellenI. Carri Ward D.O., MANJITOWENDY Gomez, MSN, APRN, NP-C  [facility], MSN, APRN-CNP     Primary Care Physician: Stas Perez MD   Admitting Physician:  Stas Perez MD  Admission date and time: 8/24/2021  4:25 PM    Room:  [address] [place] [address]  Admitting diagnosis: SOB (shortness of breath) [R06.02]  Acquired angioedema [T78. 3XXA]    Patient Name: Bettie Palomares  MRN: [id number]    Date of Service: 9/6/2021     Covering for Dr. Radha Mcghee:  Jocelyne Fair is a 78 y.o. female who was seen and examined today,9/6/2021, at the bedside. Jocelyne Fair is more somnolent today in comparison to previously documented examination. Per RN, the patient is less responsive than yesterday. No family present. ROS: 12 point review of symptoms was Attempted unsuccessfully given the patient's mentation       Physical Exam:  No intake/output data recorded. Intake/Output Summary (Last 24 hours) at 9/6/2021 1047  Last data filed at 9/6/2021 0517  Gross per 24 hour   Intake 589 ml   Output 2350 ml   Net -1761 ml   I/O last 3 completed shifts: In: 639 [P.O.:30; I.V.:509; IV Piggyback:100]  Out: 2350 [Urine:2350]  Patient Vitals for the past 96 hrs (Last 3 readings):   Weight   09/03/21 0608 198 lb 12.8 oz (90.2 kg)     Vital Signs:   Blood pressure (!) 171/69, pulse 106, temperature 99.1 °F (37.3 °C), temperature source Temporal, resp. rate 20, height 5' 4\" (1.626 m), weight 198 lb 12.8 oz (90.2 kg), SpO2 94 %. General appearance:  Lethargic, arouses to verbal stimulation but does not maintain alertness during exam  Head:  Normocephalic. No masses, lesions or tenderness. Eyes:  PERRLA. EOMI. Sclera clear. Buccal mucosa moist.  ENT:  Ears normal. Mucosa normal.  Nasal cannula  Neck:    Supple. Trachea midline. No thyromegaly. No JVD. No bruits.   Heart:    Rhythm regular. Rate mildly tachycardic. H4-C3, systolic murmur. Lungs: Moderately diminished air exchange throughout. Pattern of respiration is mildly tachypneic, shallow but without labor. Abdomen:   Soft. Obese. Non-tender. Non-distended. Bowel sounds positive. No organomegaly or masses. No pain on palpation. Extremities:    Peripheral pulses present. Trace bilateral lower extremity peripheral edema. No ulcers. No cyanosis. No clubbing. Integumentary:  No rashes  Skin normal color and texture.   Genitalia/Breast:  Vogel    Medication:  Scheduled Meds:   insulin glargine  23 Units SubCUTAneous BID    hydrALAZINE  20 mg IntraVENous Q4H    metoprolol  5 mg IntraVENous Q6H    cloNIDine  0.1 mg Oral Once    cloNIDine  0.2 mg Oral BID    Roflumilast  500 mcg Oral Daily    lactobacillus  1 capsule Oral Daily    acetylcysteine  600 mg Inhalation Q4H    methylPREDNISolone  40 mg IntraVENous Q6H    piperacillin-tazobactam  3,375 mg IntraVENous Q8H    ipratropium-albuterol  1 ampule Inhalation Q4H    enoxaparin  40 mg SubCUTAneous Daily    famotidine (PEPCID) injection  20 mg IntraVENous Daily    QUEtiapine  25 mg Oral BID    lidocaine PF  5 mL IntraDERmal Once    sodium chloride flush  5-40 mL IntraVENous 2 times per day    heparin flush  3 mL IntraVENous 2 times per day    budesonide  0.5 mg Nebulization BID    medicated lip ointment   Topical Daily    insulin lispro  0-18 Units SubCUTAneous Q4H    sodium chloride flush  5-40 mL IntraVENous 2 times per day     Continuous Infusions:   sodium chloride Stopped (09/05/21 2115)    sodium chloride      dextrose      sodium chloride      lactated ringers 50 mL/hr at 09/04/21 2037       Objective Data:  CBC with Differential:    Lab Results   Component Value Date    WBC 13.3 09/06/2021    RBC 3.86 09/06/2021    HGB 11.9 09/06/2021    HCT 36.1 09/06/2021     09/06/2021    MCV 93.5 09/06/2021    MCH 30.8 09/06/2021    MCHC 33.0 09/06/2021 RDW 11.9 09/06/2021    LYMPHOPCT 8.6 09/06/2021    MONOPCT 7.3 09/06/2021    BASOPCT 0.1 09/06/2021    MONOSABS 0.97 09/06/2021    LYMPHSABS 1.14 09/06/2021    EOSABS 0.00 09/06/2021    BASOSABS 0.01 09/06/2021     CMP:    Lab Results   Component Value Date     09/06/2021    K 4.0 09/06/2021    K 4.7 08/25/2021    CL 98 09/06/2021    CO2 29 09/06/2021    BUN 24 09/06/2021    CREATININE 0.8 09/06/2021    GFRAA >60 09/06/2021    LABGLOM >60 09/06/2021    GLUCOSE 86 09/06/2021    PROT 5.6 09/06/2021    LABALBU 3.1 09/06/2021    CALCIUM 8.6 09/06/2021    BILITOT 0.7 09/06/2021    ALKPHOS 63 09/06/2021    AST 19 09/06/2021    ALT 22 09/06/2021              Assessment:  · Acute progressive respiratory distress on mechanical ventilator--extubated September 4  · Essential hypertension  · Type 2 diabetes mellitus  · Myocardial myopathy  · Angioedema secondary to ACE inhibitor  · Severe COPD with acute exacerbation with hemoptysis influenza infection  · Essential hypertension  · MRSA respiratory infection  · E. coli urinary tract infection        Plan:   Jase Fernández was extubated and seemed to be improving yesterday. She is more somnolent today and requires repeated stimulation but does not maintain alertness. We will obtain arterial blood gas and chest x-ray. We will assess for response and further treatment/recommendations will be based upon outcomes. We will discuss with the pulmonary team as well. She may require NIPPV if not extended gases well. Otherwise she is afebrile, there has been a significant improvement in her leukocytosis and her baseline labs are improved. Antihypertensive therapy will need eventual adjustment once transition to oral as she is requiring IV antipretenses at present and had a significant adverse response to ACE inhibitor therapy. Chronic morbidities laboratory values and vital signs are being monitored and addressed accordingly.     Greater than 40 minutes of critical care time was spent with the patient. This time included chart review, , and discussion with those consultants involved in the patient's care. More than 50% of my  time was spent at the bedside counseling/coordinating care with the patient and/or family with face to face contact. This time was spent reviewing notes and laboratory data as well as instructing and counseling the patient. Time I spent with the family or surrogate(s) is included only if the patient was incapable of providing the necessary information or participating in medical decisions. I also discussed the differential diagnosis and all of the proposed management plans with the patient and individuals accompanying the patient.          SHELBIE Kenyon - CNP  9/6/2021  10:47 AM

## 2021-09-06 NOTE — PROGRESS NOTES
ABG drawn x 1 from Right Radial. Patient had NormalAllen's Test.  Patient was on 4 liters/min via nasal cannula  at time of puncture. Pressure held for 5. No bleeding or bruising noted at puncture site.   Patient tolerated procedure well      Performed by Laura Liriano RCP

## 2021-09-07 ENCOUNTER — APPOINTMENT (OUTPATIENT)
Dept: MRI IMAGING | Age: 66
DRG: 207 | End: 2021-09-07
Payer: MEDICARE

## 2021-09-07 ENCOUNTER — APPOINTMENT (OUTPATIENT)
Dept: INTERVENTIONAL RADIOLOGY/VASCULAR | Age: 66
DRG: 207 | End: 2021-09-07
Payer: MEDICARE

## 2021-09-07 LAB
ALBUMIN SERPL-MCNC: 3 G/DL (ref 3.5–5.2)
ALP BLD-CCNC: 41 U/L (ref 35–104)
ALT SERPL-CCNC: 26 U/L (ref 0–32)
ANION GAP SERPL CALCULATED.3IONS-SCNC: 8 MMOL/L (ref 7–16)
AST SERPL-CCNC: 20 U/L (ref 0–31)
BASOPHILS ABSOLUTE: 0.01 E9/L (ref 0–0.2)
BASOPHILS RELATIVE PERCENT: 0.1 % (ref 0–2)
BILIRUB SERPL-MCNC: 0.6 MG/DL (ref 0–1.2)
BUN BLDV-MCNC: 22 MG/DL (ref 6–23)
CALCIUM SERPL-MCNC: 8.2 MG/DL (ref 8.6–10.2)
CHLORIDE BLD-SCNC: 102 MMOL/L (ref 98–107)
CO2: 28 MMOL/L (ref 22–29)
CREAT SERPL-MCNC: 0.8 MG/DL (ref 0.5–1)
EOSINOPHILS ABSOLUTE: 0.01 E9/L (ref 0.05–0.5)
EOSINOPHILS RELATIVE PERCENT: 0.1 % (ref 0–6)
GFR AFRICAN AMERICAN: >60
GFR NON-AFRICAN AMERICAN: >60 ML/MIN/1.73
GLUCOSE BLD-MCNC: 59 MG/DL (ref 74–99)
HCT VFR BLD CALC: 33.2 % (ref 34–48)
HEMOGLOBIN: 11.1 G/DL (ref 11.5–15.5)
IMMATURE GRANULOCYTES #: 0.09 E9/L
IMMATURE GRANULOCYTES %: 0.9 % (ref 0–5)
LYMPHOCYTES ABSOLUTE: 0.8 E9/L (ref 1.5–4)
LYMPHOCYTES RELATIVE PERCENT: 7.6 % (ref 20–42)
MAGNESIUM: 2.1 MG/DL (ref 1.6–2.6)
MCH RBC QN AUTO: 31.1 PG (ref 26–35)
MCHC RBC AUTO-ENTMCNC: 33.4 % (ref 32–34.5)
MCV RBC AUTO: 93 FL (ref 80–99.9)
METER GLUCOSE: 148 MG/DL (ref 74–99)
METER GLUCOSE: 159 MG/DL (ref 74–99)
METER GLUCOSE: 213 MG/DL (ref 74–99)
METER GLUCOSE: 94 MG/DL (ref 74–99)
MONOCYTES ABSOLUTE: 0.54 E9/L (ref 0.1–0.95)
MONOCYTES RELATIVE PERCENT: 5.1 % (ref 2–12)
NEUTROPHILS ABSOLUTE: 9.1 E9/L (ref 1.8–7.3)
NEUTROPHILS RELATIVE PERCENT: 86.2 % (ref 43–80)
PDW BLD-RTO: 11.9 FL (ref 11.5–15)
PHOSPHORUS: 4.2 MG/DL (ref 2.5–4.5)
PLATELET # BLD: 259 E9/L (ref 130–450)
PMV BLD AUTO: 9.6 FL (ref 7–12)
POTASSIUM SERPL-SCNC: 4.1 MMOL/L (ref 3.5–5)
RBC # BLD: 3.57 E12/L (ref 3.5–5.5)
SODIUM BLD-SCNC: 138 MMOL/L (ref 132–146)
TOTAL PROTEIN: 5.1 G/DL (ref 6.4–8.3)
WBC # BLD: 10.6 E9/L (ref 4.5–11.5)

## 2021-09-07 PROCEDURE — 36415 COLL VENOUS BLD VENIPUNCTURE: CPT

## 2021-09-07 PROCEDURE — 6370000000 HC RX 637 (ALT 250 FOR IP): Performed by: INTERNAL MEDICINE

## 2021-09-07 PROCEDURE — 85025 COMPLETE CBC W/AUTO DIFF WBC: CPT

## 2021-09-07 PROCEDURE — 2500000003 HC RX 250 WO HCPCS: Performed by: NURSE PRACTITIONER

## 2021-09-07 PROCEDURE — 6360000002 HC RX W HCPCS: Performed by: INTERNAL MEDICINE

## 2021-09-07 PROCEDURE — 70551 MRI BRAIN STEM W/O DYE: CPT

## 2021-09-07 PROCEDURE — 2700000000 HC OXYGEN THERAPY PER DAY

## 2021-09-07 PROCEDURE — 84100 ASSAY OF PHOSPHORUS: CPT

## 2021-09-07 PROCEDURE — 6360000002 HC RX W HCPCS: Performed by: NURSE PRACTITIONER

## 2021-09-07 PROCEDURE — 97166 OT EVAL MOD COMPLEX 45 MIN: CPT | Performed by: OCCUPATIONAL THERAPIST

## 2021-09-07 PROCEDURE — 82962 GLUCOSE BLOOD TEST: CPT

## 2021-09-07 PROCEDURE — 80053 COMPREHEN METABOLIC PANEL: CPT

## 2021-09-07 PROCEDURE — 92610 EVALUATE SWALLOWING FUNCTION: CPT | Performed by: SPEECH-LANGUAGE PATHOLOGIST

## 2021-09-07 PROCEDURE — 2060000000 HC ICU INTERMEDIATE R&B

## 2021-09-07 PROCEDURE — 97530 THERAPEUTIC ACTIVITIES: CPT

## 2021-09-07 PROCEDURE — 6370000000 HC RX 637 (ALT 250 FOR IP): Performed by: NURSE PRACTITIONER

## 2021-09-07 PROCEDURE — 2580000003 HC RX 258: Performed by: INTERNAL MEDICINE

## 2021-09-07 PROCEDURE — 94640 AIRWAY INHALATION TREATMENT: CPT

## 2021-09-07 PROCEDURE — 92523 SPEECH SOUND LANG COMPREHEN: CPT | Performed by: SPEECH-LANGUAGE PATHOLOGIST

## 2021-09-07 PROCEDURE — 83735 ASSAY OF MAGNESIUM: CPT

## 2021-09-07 PROCEDURE — 6360000002 HC RX W HCPCS

## 2021-09-07 PROCEDURE — 97530 THERAPEUTIC ACTIVITIES: CPT | Performed by: OCCUPATIONAL THERAPIST

## 2021-09-07 RX ORDER — DEXTROSE, SODIUM CHLORIDE, AND POTASSIUM CHLORIDE 5; .9; .15 G/100ML; G/100ML; G/100ML
INJECTION INTRAVENOUS CONTINUOUS
Status: DISCONTINUED | OUTPATIENT
Start: 2021-09-07 | End: 2021-09-09

## 2021-09-07 RX ORDER — METOPROLOL SUCCINATE 50 MG/1
50 TABLET, EXTENDED RELEASE ORAL DAILY
Status: DISCONTINUED | OUTPATIENT
Start: 2021-09-07 | End: 2021-09-15 | Stop reason: HOSPADM

## 2021-09-07 RX ORDER — METHYLPREDNISOLONE SODIUM SUCCINATE 40 MG/ML
40 INJECTION, POWDER, LYOPHILIZED, FOR SOLUTION INTRAMUSCULAR; INTRAVENOUS EVERY 8 HOURS
Status: DISCONTINUED | OUTPATIENT
Start: 2021-09-07 | End: 2021-09-09

## 2021-09-07 RX ADMIN — METHYLPREDNISOLONE SODIUM SUCCINATE 40 MG: 40 INJECTION, POWDER, FOR SOLUTION INTRAMUSCULAR; INTRAVENOUS at 23:20

## 2021-09-07 RX ADMIN — IPRATROPIUM BROMIDE AND ALBUTEROL SULFATE 1 AMPULE: .5; 3 SOLUTION RESPIRATORY (INHALATION) at 22:53

## 2021-09-07 RX ADMIN — SODIUM CHLORIDE, PRESERVATIVE FREE 300 UNITS: 5 INJECTION INTRAVENOUS at 22:19

## 2021-09-07 RX ADMIN — PIPERACILLIN SODIUM AND TAZOBACTAM SODIUM 3375 MG: 3; .375 INJECTION, POWDER, LYOPHILIZED, FOR SOLUTION INTRAVENOUS at 05:18

## 2021-09-07 RX ADMIN — METHYLPREDNISOLONE SODIUM SUCCINATE 40 MG: 40 INJECTION, POWDER, FOR SOLUTION INTRAMUSCULAR; INTRAVENOUS at 01:08

## 2021-09-07 RX ADMIN — ACETYLCYSTEINE 600 MG: 200 SOLUTION ORAL; RESPIRATORY (INHALATION) at 01:08

## 2021-09-07 RX ADMIN — DIMETHICONE, CAMPHOR (SYNTHETIC), MENTHOL, AND PHENOL: 1.1; .5; .625; .5 OINTMENT TOPICAL at 08:49

## 2021-09-07 RX ADMIN — METOPROLOL SUCCINATE 50 MG: 50 TABLET, EXTENDED RELEASE ORAL at 08:50

## 2021-09-07 RX ADMIN — ACETYLCYSTEINE 600 MG: 200 SOLUTION ORAL; RESPIRATORY (INHALATION) at 22:54

## 2021-09-07 RX ADMIN — IPRATROPIUM BROMIDE AND ALBUTEROL SULFATE 1 AMPULE: .5; 3 SOLUTION RESPIRATORY (INHALATION) at 09:03

## 2021-09-07 RX ADMIN — INSULIN LISPRO 1 UNITS: 100 INJECTION, SOLUTION INTRAVENOUS; SUBCUTANEOUS at 22:19

## 2021-09-07 RX ADMIN — HYDRALAZINE HYDROCHLORIDE 50 MG: 50 TABLET, FILM COATED ORAL at 23:20

## 2021-09-07 RX ADMIN — CLONIDINE HYDROCHLORIDE 0.3 MG: 0.2 TABLET ORAL at 22:17

## 2021-09-07 RX ADMIN — FAMOTIDINE 20 MG: 20 TABLET, FILM COATED ORAL at 22:18

## 2021-09-07 RX ADMIN — METHYLPREDNISOLONE SODIUM SUCCINATE 40 MG: 40 INJECTION, POWDER, FOR SOLUTION INTRAMUSCULAR; INTRAVENOUS at 05:18

## 2021-09-07 RX ADMIN — IPRATROPIUM BROMIDE AND ALBUTEROL SULFATE 1 AMPULE: .5; 3 SOLUTION RESPIRATORY (INHALATION) at 05:48

## 2021-09-07 RX ADMIN — HYDRALAZINE HYDROCHLORIDE 50 MG: 50 TABLET, FILM COATED ORAL at 13:36

## 2021-09-07 RX ADMIN — HYDRALAZINE HYDROCHLORIDE 50 MG: 50 TABLET, FILM COATED ORAL at 05:18

## 2021-09-07 RX ADMIN — ROFLUMILAST 500 MCG: 500 TABLET ORAL at 08:49

## 2021-09-07 RX ADMIN — IPRATROPIUM BROMIDE AND ALBUTEROL SULFATE 1 AMPULE: .5; 3 SOLUTION RESPIRATORY (INHALATION) at 01:08

## 2021-09-07 RX ADMIN — FAMOTIDINE 20 MG: 20 TABLET, FILM COATED ORAL at 08:50

## 2021-09-07 RX ADMIN — INSULIN LISPRO 4 UNITS: 100 INJECTION, SOLUTION INTRAVENOUS; SUBCUTANEOUS at 18:29

## 2021-09-07 RX ADMIN — ACETYLCYSTEINE 600 MG: 200 SOLUTION ORAL; RESPIRATORY (INHALATION) at 09:04

## 2021-09-07 RX ADMIN — IPRATROPIUM BROMIDE AND ALBUTEROL SULFATE 1 AMPULE: .5; 3 SOLUTION RESPIRATORY (INHALATION) at 19:08

## 2021-09-07 RX ADMIN — SODIUM CHLORIDE: 9 INJECTION, SOLUTION INTRAVENOUS at 08:55

## 2021-09-07 RX ADMIN — CLONIDINE HYDROCHLORIDE 0.3 MG: 0.2 TABLET ORAL at 08:49

## 2021-09-07 RX ADMIN — ACETYLCYSTEINE 600 MG: 200 SOLUTION ORAL; RESPIRATORY (INHALATION) at 13:19

## 2021-09-07 RX ADMIN — ACETYLCYSTEINE 600 MG: 200 SOLUTION ORAL; RESPIRATORY (INHALATION) at 05:49

## 2021-09-07 RX ADMIN — BUDESONIDE 500 MCG: 0.5 INHALANT RESPIRATORY (INHALATION) at 05:48

## 2021-09-07 RX ADMIN — PIPERACILLIN SODIUM AND TAZOBACTAM SODIUM 3375 MG: 3; .375 INJECTION, POWDER, LYOPHILIZED, FOR SOLUTION INTRAVENOUS at 11:32

## 2021-09-07 RX ADMIN — POTASSIUM CHLORIDE, DEXTROSE MONOHYDRATE AND SODIUM CHLORIDE: 150; 5; 900 INJECTION, SOLUTION INTRAVENOUS at 11:32

## 2021-09-07 RX ADMIN — BUDESONIDE 500 MCG: 0.5 INHALANT RESPIRATORY (INHALATION) at 19:08

## 2021-09-07 RX ADMIN — SODIUM CHLORIDE, PRESERVATIVE FREE 300 UNITS: 5 INJECTION INTRAVENOUS at 08:50

## 2021-09-07 RX ADMIN — Medication 1 CAPSULE: at 08:49

## 2021-09-07 RX ADMIN — ENOXAPARIN SODIUM 40 MG: 40 INJECTION SUBCUTANEOUS at 08:50

## 2021-09-07 RX ADMIN — IPRATROPIUM BROMIDE AND ALBUTEROL SULFATE 1 AMPULE: .5; 3 SOLUTION RESPIRATORY (INHALATION) at 13:18

## 2021-09-07 RX ADMIN — ACETYLCYSTEINE 600 MG: 200 SOLUTION ORAL; RESPIRATORY (INHALATION) at 19:08

## 2021-09-07 RX ADMIN — PIPERACILLIN SODIUM AND TAZOBACTAM SODIUM 3375 MG: 3; .375 INJECTION, POWDER, LYOPHILIZED, FOR SOLUTION INTRAVENOUS at 22:29

## 2021-09-07 RX ADMIN — METHYLPREDNISOLONE SODIUM SUCCINATE 40 MG: 40 INJECTION, POWDER, FOR SOLUTION INTRAMUSCULAR; INTRAVENOUS at 13:35

## 2021-09-07 ASSESSMENT — PAIN SCALES - GENERAL
PAINLEVEL_OUTOF10: 0

## 2021-09-07 NOTE — PROGRESS NOTES
SPEECH/LANGUAGE PATHOLOGY  SPEECH/LANGUAGE/COGNITIVE EVALUATION   and PLAN OF CARE      PATIENT NAME:  Esa Sandhu  (female)     MRN:  [id number]    :  1955  (78 y.o.)  STATUS:  Inpatient: Room 0537/0537-02    TODAY'S DATE:  2021  REFERRING PROVIDER:      SLP eval and treat Start: 21 1115, End: 21 1115, ONE TIME, Standing Count: 1 Occurrences, R    Omid Bethea,   REASON FOR REFERRAL:  Confusion   EVALUATING THERAPIST: RAFAT Guajardo    ADMITTING DIAGNOSIS: SOB (shortness of breath) [R06.02]  Acquired angioedema [T78. 3XXA]    VISIT DIAGNOSIS:   Visit Diagnoses       Codes    Angioedema, initial encounter     T78. 3XXA    Acute respiratory distress     R06.03           SPEECH THERAPY  PLAN OF CARE   The speech therapy  POC is established based on physician order, speech pathology diagnosis and results of clinical assessment     SPEECH PATHOLOGY DIAGNOSIS:    Moderate cognitive linguistic deficit     Speech Pathology intervention is recommended 3-6 times per week for LOS or when goals are met with emphasis on the following:      Conditions Requiring Skilled Therapeutic Intervention for speech, language and/or cognition    Cognitive linguistic impairment  Decreased safety awareness  Decreased short term memory  Decreased problem solving skills   Decreased thought organization    Specific Speech Therapy Interventions to Include: Therapeutic tasks for Cognition    Specific instructions for next treatment: To initiate POC    SHORT/LONG TERM GOALS  Pt will improve orientation to spatial and temporal surroundings with use of external memory aides.   Pt will improve immediate, short term, recent memory during structured and unstructured tasks with 80% accuracy   Pt will improve problem solving/thought organization during structured and unstructured tasks with 80% accuracy   Pt will improve receptive and expressive language skills with adequate thought content, organization, and processing time to facilitate improved communication with minimal.    Patient goals: Patient/family involved in developing goals and treatment plan:   Treatment goals discussed with Patient    The Patient did not demonstrate complete understanding of the diagnosis, prognosis and plan of care   The patient/family Did not state,     This plan may be re-evaluated and revised as warranted. Rehabilitation Potential/Prognosis: good                CLINICAL ASSESSMENT:  MOTOR SPEECH       Oral Peripheral Examination   Generalized oral weakness    Parameters of Speech Production  Respiration:  Adequate for speech production  Articulation:  Distortion  Resonance:  Within functional limits  Quality:   Within functional limits  Pitch:     Within functional limits  Intensity: Quiet  Fluency:  Intact  Prosody Intact    RECEPTIVE LANGUAGE    Comprehension of Yes/No Questions:   Latent and Cueing    Process  Simple Verbal Commands:   Latent and Cueing  Process Intermediate Verbal Commands:   Latent and Cueing  Process Complex Verbal Commands:     Latent and Cueing    Comprehension of Conversation:      Latent, Cueing and Impaired      EXPRESSIVE LANGUAGE     Serials: Impaired    Imitation:  Words   Impaired   Sentences Impaired    Naming:  (Modality used:  Verbal)  Confrontation Naming  Impaired  Functional Description  Impaired  Response Naming: Impaired    Conversation:      Confusion was noted during conversation    COGNITION     Attention/Orientation  Attention: Easily Distracted  Orientation:  Oriented to Person    Memory   Immediate Recall: Repeated 0/3    Delayed Recall:   Recalled 0/3    Long Term Recall:   Recalled impaired poor recall     Organization/Problem Solving/Reasoning   Verbal Sequencing:   Impaired        Verbal Problem solving:   Impaired          CLINICAL OBSERVATIONS NOTED DURING THE EVALUATION  Latent responses, Inconsistent responses and Cueing was required                  EDUCATION:   The Speech Language

## 2021-09-07 NOTE — PROGRESS NOTES
[facility]  [facility]         Date:2021                                                   Patient Name: Geoffrey Holcomb     MRN: [id number]     : 1955     Room: [address] [place] [address]       Evaluating OT: Nazanin Reeves OTR/L; [id number]       Referring Provider and Orders/Date:   OT eval and treat Start: 21 1145, End: 21 1145, ONE TIME, Standing Count: 1 Occurrences, Ayanna Arndt MD     Diagnosis:   1. Acute respiratory failure with hypoxia and hypercapnia (HCC)    2. SOB (shortness of breath)    3. Angioedema, initial encounter    4.  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[facility] infarction [facility]     COPD (chronic obstructive pulmonary disease) (HCC)     Diabetes mellitus ([facility]     Heart attack ([facility]     Hyperlipidemia     Hypertension     VHD (valvular heart disease)           Past Surgical History:   Procedure Laterality Date    CHOLECYSTECTOMY      COLONOSCOPY  2018    HYSTERECTOMY      HYSTERECTOMY, VAGINAL      UPPER GASTROINTESTINAL ENDOSCOPY  2018       Precautions:  Fall Risk, 3L O2, confused, montague,  A x 2 recommended     Recommended placement: subacute    Assessment of current deficits     [x] Functional mobility  [x]ADLs  [x] Strength               [x]Cognition     [x] Functional transfers   [x] IADLs         [x] Safety Awareness   [x]Endurance     [x] Fine Coordination              [x] Balance      [x] Vision/perception   []Sensation      [x]Gross Motor Coordination  [] ROM  [] Delirium                   [] Motor Control     OT PLAN OF CARE   OT POC based on physician orders, patient diagnosis and results of clinical assessment    Frequency/Duration 1-3 days/wk for 2 weeks PRN   Specific OT Treatment Interventions to include:   * Instruction/training on adapted ADL techniques and AE recommendations to increase functional independence within precautions       * Training on energy conservation strategies, correct breathing pattern and techniques to improve independence/tolerance for self-care routine  * Functional transfer/mobility training/DME recommendations for increased independence, safety, and fall prevention  * Patient/Family education to increase follow through with safety techniques and functional independence  * Recommendation of environmental modifications for increased safety with functional transfers/mobility and ADLs  * Cognitive retraining/development of therapeutic activities to improve problem solving, judgement, memory, and attention for increased safety/participation in ADL/IADL tasks  * Therapeutic exercise to improve motor endurance, ROM, and functional strength for ADLs/functional transfers  * Therapeutic activities to facilitate/challenge dynamic balance, stand tolerance for increased safety and independence with ADLs  * Therapeutic activities to facilitate gross/fine motor skills for increased independence with ADLs  * Neuro-muscular re-education: facilitation of righting/equilibrium reactions, midline orientation, scapular stability/mobility, normalization of muscle tone, and facilitation of volitional active controled movement     Recommended Adaptive Equipment/DME:  TBD      Home Living: Pt is a poor historian. Pt lives at home with spouse. Reports bed/bath on 2nd floor. She reports that she has 3 sons that live at home, but suspected this as false. Bathroom setup: unknown    DME owned: unknown     Prior Level of Function: indep with ADLs , indep with IADLs; ambulated indep   Driving: yes   Occupation: unknow   Enjoys: no report    Pain Level: none  Cognition: A&O: 1/4 to name only;  Follows 2 step directions-pt is a poor historian   Memory:  Poor+   Sequencing:  Fair-   Problem solving:  Poor+   Judgement/safety:  Fair-    AM-[facility] Daily Activity Inpatient   How much help for putting on and taking off regular lower body clothing?: Total  How much help for Bathing?: A Lot  How much help for Toileting?: Total  How much help for putting on and taking off regular upper body clothing?: Total  How much help for taking care of personal grooming?: A Lot  How much help for eating meals?: A Lot  AM-[facility] Inpatient Daily Activity Raw Score: 9  AM-[facility] Inpatient ADL T-Scale Score : 25.33  ADL Inpatient CMS 0-100% Score: 79.59  ADL Inpatient CMS G-Code Modifier : CL       Functional Assessment:     Initial Eval Status  Date: 9/7/2021   Treatment Status  Date: STGs = LTGs  Time frame: 10-14 days   Feeding Maximal Assist and hand over hand to bring drinks to mouth, use utensils appropriately and eat with finger foods. Positioning interventions in bed required. 75% spillage with attempts on her own. Consumed <25% of intake  Min A   Grooming Maximal Assist for face/hand wash and hair comb from supine  Minimal Assist    UB Dressing Dependent with gown management from supine due to confusion  Moderate Assist    LB Dressing Dependent for prashant socks from supine  Moderate Assist    Bathing Maximal Assist with pt able to wash neck and chest from supine level  Moderate Assist    Toileting Dependent with montague management  Maximal Assist    Bed Mobility  Supine to sit: Maximal Assist   Sit to supine: Moderate Assist  With pt confused and demonstrating fear of falling. Rolling with max A with mod cues and hand over hand for use of bed rails   Supine to sit: Minimal Assist   Sit to supine: Minimal Assist    Functional Transfers  NT due to pt overall debility, decreased activity tolerance, balance deficits, safety and fall risk. Moderate Assist    Functional Mobility  NT due to pt overall debility, decreased activity tolerance, balance deficits, safety and fall risk.      Moderate Assist    Balance Sitting:     Static: Fair-    Dynamic:poor+  Standing: NT  Sitting:     Static:  fair    Dynamic:fair  Standing: fair-   Activity Tolerance Vitals with activity:3 L with 93% and HR 66. Sitting EOB for <2min with pt pushing backwards and confusion. Not safe to attempt standing    Increase sitting tolerance for >15min with stable vital signs for carry over into toileting, functional tranfers and indep in ADLs   Visual/  Perceptual Glasses: not Present; overall visual limitations, but difficult to assess due to confusion. Inaccurate with finding items on tray    Reports change in vision since admission: No     NA   Yunier UE Strengthening  4/5 generally  5/5MMT generally for carry over into self care, functional transfers and functional mobility with AD. Hand Dominance  [] Right  [x] Left    AROM (PROM) Strength Additional Info:    RUE  WFL 4/5 Fair  and  FMC/dexterity noted during ADL tasks  Opposition [x] Intact [] Impaired  Finger to nose [] Intact [x] Impaired     LUE WFL 4/5 good  and fair FMC/dexterity noted during ADL tasks  Opposition [x] Intact [] Impaired  Finger to nose [] Intact [x] Impaired     Hearing: WFL   Sensation:  No c/o numbness or tingling   Tone: WFL   Edema: none    Comments: Upon arrival patient supine in bed watching TV. Pt required dep A for most UB ADLs and depA LB ADLs tasks. Limited with max A for rolling and standing during LB ADLs and functional transfers NT due to pt overall debility, decreased activity tolerance, balance deficits, safety and fall risk. The biggest barriers reflect that of functional transfers, functional mobility, UB/LB ADLs, cognition, activity tolerance, balance, safety and strengthening. At end of session, patient supine with call light and phone within reach, all lines and tubes intact. Overall patient demonstrated decreased independence and safety during completion of ADL/functional transfer/mobility tasks.  Nursing updated on pt position and status following OT eval. Pt would benefit from continued skilled OT to increase safety and independence with completion of ADL/IADL tasks for functional independence and quality of life. Treatment: OT treatment provided this date includes:   Instruction, education and training on safe facilitation and adapted techniques for completion of ADLs. These include neuromuscular reeducation to facilitate balance/righting reactions, proper positioning/alignment to improve interaction with environment and overall functionand on energy conservation/work simplification for completion of ADLs. Education provided on hand/feet placement with bed rail and body mechanics for fall prevention. Cues for energy conservation and safety for in the home at [place], including modifications and DME. Extended time to complete all tasks, including skilled monitoring of patient's response during treatment session and vital signs. Prior to and at the end of session, environmental modifications / line management completed for patients safety and efficiency of treatment session. See above for further details. Rehab Potential: Fair for established goals     Patient / Family Goal: None reported    Patient and/or family were instructed on functional diagnosis, prognosis/goals and OT plan of care. Demonstrated fair- understanding. Eval Complexity: Mod  · History: Expanded review of medical records and additional review of physical, cognitive, or psychosocial history related to current functional performance  · Exam: 3+ performance deficits  · Assistance/Modification: mod assistance or modifications required to perform tasks. May have comorbidities that affect occupational performance.     Time In: 0936  Time Out: 1005  Total Treatment Time: 9    Min Units   OT Eval Low 42069       OT Eval Medium 97166  x 1   OT Eval High 88697      OT Re-Eval [id number]       Therapeutic Ex 19620       Therapeutic Activities 58879  9 1    ADL/Self Care 88 [phone] 24 60       Orthotic Management 35342 Manual 06838     Neuro Re-Ed 06032       Non-Billable Time          Evaluation Time additionally includes thorough review of current medical information, gathering information on past medical history/social history and prior level of function, interpretation of standardized testing/informal observation of tasks, assessment of data and development of plan of care and goals.             Anthony Naqvi OTR/L; [id number]

## 2021-09-07 NOTE — PROGRESS NOTES
SubCUTAneous 4x Daily AC & HS    insulin glargine  23 Units SubCUTAneous BID    cloNIDine  0.1 mg Oral Once    Roflumilast  500 mcg Oral Daily    lactobacillus  1 capsule Oral Daily    acetylcysteine  600 mg Inhalation Q4H    piperacillin-tazobactam  3,375 mg IntraVENous Q8H    ipratropium-albuterol  1 ampule Inhalation Q4H    enoxaparin  40 mg SubCUTAneous Daily    lidocaine PF  5 mL IntraDERmal Once    sodium chloride flush  5-40 mL IntraVENous 2 times per day    heparin flush  3 mL IntraVENous 2 times per day    budesonide  0.5 mg Nebulization BID    medicated lip ointment   Topical Daily    sodium chloride flush  5-40 mL IntraVENous 2 times per day     Continuous Infusions:   sodium chloride Stopped (09/06/21 1803)    sodium chloride      dextrose      sodium chloride      lactated ringers 50 mL/hr at 09/04/21 2037       Objective Data:  CBC with Differential:    Lab Results   Component Value Date    WBC 10.6 09/07/2021    RBC 3.57 09/07/2021    HGB 11.1 09/07/2021    HCT 33.2 09/07/2021     09/07/2021    MCV 93.0 09/07/2021    MCH 31.1 09/07/2021    MCHC 33.4 09/07/2021    RDW 11.9 09/07/2021    LYMPHOPCT 7.6 09/07/2021    MONOPCT 5.1 09/07/2021    BASOPCT 0.1 09/07/2021    MONOSABS 0.54 09/07/2021    LYMPHSABS 0.80 09/07/2021    EOSABS 0.01 09/07/2021    BASOSABS 0.01 09/07/2021     CMP:    Lab Results   Component Value Date     09/07/2021    K 4.1 09/07/2021    K 4.7 08/25/2021     09/07/2021    CO2 28 09/07/2021    BUN 22 09/07/2021    CREATININE 0.8 09/07/2021    GFRAA >60 09/07/2021    LABGLOM >60 09/07/2021    GLUCOSE 59 09/07/2021    PROT 5.1 09/07/2021    LABALBU 3.0 09/07/2021    CALCIUM 8.2 09/07/2021    BILITOT 0.6 09/07/2021    ALKPHOS 41 09/07/2021    AST 20 09/07/2021    ALT 26 09/07/2021              Assessment:  · Acute progressive respiratory distress on mechanical ventilator--extubated September 4  · Essential hypertension  · Type 2 diabetes mellitus  · Myocardial myopathy  · Angioedema secondary to ACE inhibitor  · Severe COPD with acute exacerbation with hemoptysis influenza infection  · Essential hypertension  · MRSA respiratory infection  · E. coli urinary tract infection        Plan:   Ashlee Orourke is less somnolent today but is slightly confused and altered from her baseline. This is confirmed with her . Medication list has been reviewed, we will de-escalate potentially bothersome medications including steroids. We will MRI brain for further evaluation to exclude CVA. Blood gases and chest x-ray were obtained yesterday and were reviewed and discussed with the patient and . Otherwise she remains afebrile, there has been a significant improvement in her leukocytosis and her baseline labs are improved. Antihypertensive therapy has been adjusted due to the adverse event associated with ACE inhibitor. Due to heart rate above goal, metoprolol will be escalated up to 50 mg daily. Further recommendation will be forthcoming based upon the MRI results. If findings of embolic phenomena we may need to repeat echocardiogram with bubble study as the that he perform during this hospitalization did not have bubble study performed. Chronic morbidities laboratory values and vital signs are being monitored and addressed accordingly. Care will be returned to Dr. Felice Benson tomorrow. PT, OT and speech will provide consultation. Of note, the patient was mildly hypoglycemic this morning and has been receiving scheduled basal insulin. This will be placed on hold. Sliding scale be escalated. Fluids will be changed to add dextrose as she is taking in very little nutrition. If this will be prolonged, alternative nutritional supplementation will be required. Greater than 40 minutes of critical care time was spent with the patient.   This time included chart review, , and discussion with those consultants involved in the patient's care.      More than 50% of my  time was spent at the bedside counseling/coordinating care with the patient and/or family with face to face contact. This time was spent reviewing notes and laboratory data as well as instructing and counseling the patient. Time I spent with the family or surrogate(s) is included only if the patient was incapable of providing the necessary information or participating in medical decisions. I also discussed the differential diagnosis and all of the proposed management plans with the patient and individuals accompanying the patient.          SHELBIE Campos CNP  9/7/2021  8:26 AM

## 2021-09-07 NOTE — PROGRESS NOTES
SPEECH/LANGUAGE PATHOLOGY  CLINICAL ASSESSMENT OF SWALLOWING FUNCTION   and PLAN OF CARE    PATIENT NAME:  Judit Frazier  (female)     MRN:  [id number]    :  1955  (78 y.o.)  STATUS:  Inpatient: Room 0537/0537-02    TODAY'S DATE:  2021  REFERRING PROVIDER:  SLP eval and treat Start: 21 1115, End: 21 1115, ONE TIME, Standing Count: 1 Occurrences, R  REASON FOR REFERRAL: dysphagia    EVALUATING THERAPIST: RAFAT Moeller                 RESULTS:    DYSPHAGIA DIAGNOSIS:   Clinical indicators of mild  oropharyngeal phase dysphagia       DIET RECOMMENDATIONS:  Regular consistency solids with  thin liquids     FEEDING RECOMMENDATIONS:     Assistance level:  Full assistance is needed during all oral intake      Compensatory strategies recommended: Small bites/sips and Alternate solids and liquids      Discussed recommendations with nursing and/or faxed report to referring provider: No secondary to no diet/liquid change recommended     SPEECH THERAPY  PLAN OF CARE   The dysphagia POC is established based on physician order, dysphagia diagnosis and results of clinical assessment     Skilled SLP intervention for dysphagia management on acute care 3-5 x per week until goals met, pt plateaus in function and/or discharged from hospital    Conditions Requiring Skilled Therapeutic Intervention for dysphagia:    Patient is performing below her functional baseline d/t her current acute condition, Multiple diagnoses, multiple medications, and increased dependency upon caregivers.     Specific dysphagia interventions to include:     Training in positioning for improved integrity of swallow  Compensatory strategy training   Meal time assessment for 1-2 sessions to provide diet modification and compensatory strategy implementation due to need to ensure proper implementation of compensatory strategies during PO intake     Specific instructions for next treatment:  development and training of compensatory swallow strategies to improve airway protection and swallow function  Patient Treatment Goals:    Short Term Goals:  Pt will implement identified compensatory swallowing strategies on 90% of opportunities or greater to improve airway protection and swallow function. Pt will participate in meal time assessment for 1-2 sessions to provide diet modification and compensatory strategy implementation due to need to ensure proper implementation of compensatory strategies during PO intake     Long Term Goals:   Pt will improve oropharyngeal swallow function to ensure airway protection during PO intake to maintain adequate nutrition/hydration and decrease signs/symptoms of aspiration to less than 1 x/day. Patient/family Goal:    Did not state. Will further assess during treatment. Plan of care discussed with Patient   The Patient did not demonstrate complete understanding of the diagnosis, prognosis and plan of care     Rehabilitation Potential/Prognosis: fair                    ADMITTING DIAGNOSIS: SOB (shortness of breath) [R06.02]  Acquired angioedema [T78. 3XXA]    VISIT DIAGNOSIS:   Visit Diagnoses       Codes    Angioedema, initial encounter     T78. 3XXA    Acute respiratory distress     R06.03           PATIENT REPORT/COMPLAINT: not able     meal tray present during evaluation     PRIOR LEVEL OF SWALLOW FUNCTION:    PAST HISTORY OF DYSPHAGIA?: none reported    Diet during hospital admission: Regular consistency solids with thin liquids    PROCEDURE:  Consistencies Administered During the Evaluation   Liquids: thin liquid Limited intake declined a lot of items offered   Solids:  soft solid foods      Method of Intake:   cup, straw, spoon  Fed by clinician      Position:   Seated, upright    CLINICAL ASSESSMENT:  Oral Stage:       Decreased mastication due to:  cognitive function      Pharyngeal Stage:    Intake was very limited on eval  No signs of aspiration were noted during this evaluation however, silent aspiration cannot be ruled out at bedside. If silent aspiration is suspected, a Videofluoroscopic Study of Swallowing (MBS) is recommended and requires a physician order. Cognition:   Follows 1 - step directions appropriate for this assessment, Confusion noted and Attention impaired,     Oral Peripheral Examination   Generalized oral weakness    Current Respiratory Status    4 liters nasal cannula     Parameters of Speech Production  Respiration:  Adequate for speech production  Quality:   Breathy  Intensity: Quiet    Volitional Swallow: present     Volitional Cough:   present     Pain: No pain reported. EDUCATION:   The Speech Language Pathologist (SLP) completed education regarding results of evaluation and that intervention is warranted at this time. Learner: Patient  Education: Reviewed results and recommendations of this evaluation  Evaluation of Education:  No evidence of learning    This plan may be re-evaluated and revised as warranted. Evaluation Time includes thorough review of current medical information, gathering information on past medical history/social history and prior level of function, completion of standardized testing/informal observation of tasks, assessment of data and education on plan of care and goals. [x]The admitting diagnosis and active problem list, have been reviewed prior to initiation of this evaluation.         ACTIVE PROBLEM LIST:   Patient Active Problem List   Diagnosis    Non-rheumatic mitral regurgitation    Hypertensive left ventricular hypertrophy, without heart failure    Dyslipidemia    Non morbid obesity    Essential hypertension    Diabetes mellitus ([address]    Myocardiopathy ([address]    Tobacco use    Near syncope    Diarrhea    Generalized abdominal pain    Abdominal pain    Pre-syncope    PVD (peripheral vascular disease) with claudication (HCC)    Chronic pain of both ankles    ACE inhibitor-aggravated angioedema    [facility]    COPD (chronic obstructive pulmonary disease) ([facility]    Acquired angioedema    SOB (shortness of breath)    Disorder of airway    Acquired hypertrophy of tongue         CPT code:  17351  bedside swallow pop Jay Lung MSCCC/SLP  Speech Language Pathologist  [id number]

## 2021-09-07 NOTE — PROGRESS NOTES
SPEECH/LANGUAGE PATHOLOGY  CLINICAL ASSESSMENT OF SWALLOWING FUNCTION   and PLAN OF CARE    PATIENT NAME:  Bettie Palomares  (female)     MRN:  [id number]    :  1955  (78 y.o.)  STATUS:  Inpatient: Room 0537/0537-02    TODAY'S DATE:  2021  REFERRING PROVIDER:     SLP swallowing-dysphagia evaluation and treatment Start: 21, End: 21, ONE TIME, Standing Count: 1 Occurrences, R    Joe Barrett APRN - CNP  REASON FOR REFERRAL: dysphagia    EVALUATING THERAPIST: RAFAT Bustillos                 RESULTS:    DYSPHAGIA DIAGNOSIS:   Clinical indicators of mild-moderate oropharyngeal phase dysphagia       DIET RECOMMENDATIONS: need ongoing eval to determine appropriate diet      FEEDING RECOMMENDATIONS:     Assistance level:  Full assistance is needed during all oral intake      Compensatory strategies recommended: No strategies are recommended at this time      Discussed recommendations with nursing and/or faxed report to referring provider: No secondary to no diet/liquid change recommended     SPEECH THERAPY  PLAN OF CARE   The dysphagia POC is established based on physician order, dysphagia diagnosis and results of clinical assessment     Skilled SLP intervention for dysphagia management on acute care 3-5 x per week until goals met, pt plateaus in function and/or discharged from hospital    Conditions Requiring Skilled Therapeutic Intervention for dysphagia:    Patient is performing below her functional baseline d/t her current acute condition, Multiple diagnoses, multiple medications, and increased dependency upon caregivers.     Specific dysphagia interventions to include:     Training in positioning for improved integrity of swallow  Compensatory strategy training   Therapeutic exercises  Meal time assessment for 1-2 sessions to provide diet modification and compensatory strategy implementation due to need to ensure proper implementation of compensatory strategies during PO intake and on going assessment due to poor intake on eval     Specific instructions for next treatment:  development and training of compensatory swallow strategies to improve airway protection and swallow function  Patient Treatment Goals:    Short Term Goals:  Pt will participate in meal time assessment for 1-2 sessions to provide diet modification and compensatory strategy implementation due to need to ensure proper implementation of compensatory strategies during PO intake     Long Term Goals:   Pt will improve oropharyngeal swallow function to ensure airway protection during PO intake to maintain adequate nutrition/hydration and decrease signs/symptoms of aspiration to less than 1 x/day. Patient/family Goal:    Did not state. Will further assess during treatment. Plan of care discussed with Patient   The Patient did not demonstrate complete understanding of the diagnosis, prognosis and plan of care     Rehabilitation Potential/Prognosis: good                    ADMITTING DIAGNOSIS: SOB (shortness of breath) [R06.02]  Acquired angioedema [T78. 3XXA]    VISIT DIAGNOSIS:   Visit Diagnoses       Codes    Angioedema, initial encounter     T78. 3XXA    Acute respiratory distress     R06.03           PATIENT REPORT/COMPLAINT: not able to report accurately     meal tray present during evaluation     PRIOR LEVEL OF SWALLOW FUNCTION:    PAST HISTORY OF DYSPHAGIA?: none reported    Diet during hospital admission: Regular consistency solids with thin liquids    PROCEDURE:  Consistencies Administered During the Evaluation   Liquids: thin liquid   Solids:  pureed foods  refused all items offered     Method of Intake:   straw, spoon  Fed by clinician      Position:   Seated, upright    CLINICAL ASSESSMENT:  Oral Stage:       Delayed A-P transit due to: cognitive function       Pharyngeal Stage:    No signs of aspiration were noted during this evaluation however, silent aspiration cannot be ruled out at bedside.   If silent aspiration is suspected, a Videofluoroscopic Study of Swallowing (MBS) is recommended and requires a physician order. However very poor intake despite max encouragement     Cognition:   Confusion noted and Attention impaired,     Oral Peripheral Examination   Generalized oral weakness    Current Respiratory Status    2 liters nasal cannula     Parameters of Speech Production  Respiration:  Adequate for speech production  Quality:   Breathy  Intensity: Quiet    Volitional Swallow: present     Volitional Cough:   present     Pain: No pain reported. EDUCATION:   The Speech Language Pathologist (SLP) completed education regarding results of evaluation and that intervention is warranted at this time. Learner: Patient  Education: Reviewed results and recommendations of this evaluation  Evaluation of Education:  No evidence of learning    This plan may be re-evaluated and revised as warranted. Evaluation Time includes thorough review of current medical information, gathering information on past medical history/social history and prior level of function, completion of standardized testing/informal observation of tasks, assessment of data and education on plan of care and goals. [x]The admitting diagnosis and active problem list, have been reviewed prior to initiation of this evaluation.         ACTIVE PROBLEM LIST:   Patient Active Problem List   Diagnosis    Non-rheumatic mitral regurgitation    Hypertensive left ventricular hypertrophy, without heart failure    Dyslipidemia    Non morbid obesity    Essential hypertension    Diabetes mellitus ([address]    Myocardiopathy ([address]    Tobacco use    Near syncope    Diarrhea    Generalized abdominal pain    Abdominal pain    Pre-syncope    PVD (peripheral vascular disease) with claudication (HCC)    Chronic pain of both ankles    ACE inhibitor-aggravated angioedema    Acute respiratory failure (HCC)    COPD (chronic obstructive pulmonary disease) ([address]    Acquired angioedema    SOB (shortness of breath)    Disorder of airway    Acquired hypertrophy of tongue         CPT code:  12727  bedside swallow pop Coronel MSCCC/SLP  Speech Language Pathologist  [id number]

## 2021-09-07 NOTE — PROGRESS NOTES
Physical Therapy Treatment Note/Plan of Care    Room #:  3445/3333-98  Patient Name: Eliot Talavera  YOB: 1955  MRN: [id number]    Date of Service: 9/7/2021     Tentative placement recommendation: Subacute rehab  Equipment recommendation: To be determined      Evaluating Physical Therapist: Patsy Taylor, PT, DPT #[id number]      Specific Provider Orders/Date/Referring Provider :   09/04/21 1145   PT eval and treat Start: 09/04/21 1145, End: 09/04/21 1145, ONE TIME, Standing Count: 1 Occurrences, Ela Angel MD Acknowledge New    Admitting Diagnosis:   SOB (shortness of breath) [R06.02]  Acquired angioedema [T78. 3XXA]      Visit Diagnoses       Codes    Angioedema, initial encounter     T78. 3XXA    Acute respiratory distress     R06.03        Surgery: None    Patient Active Problem List   Diagnosis    Non-rheumatic mitral regurgitation    Hypertensive left ventricular hypertrophy, without heart failure    Dyslipidemia    Non morbid obesity    Essential hypertension    [facility]    Myocardiopathy ([facility]    Tobacco use    Near syncope    Diarrhea    Generalized abdominal pain    Abdominal pain    Pre-syncope    PVD (peripheral vascular disease) with claudication (HCC)    Chronic pain of both ankles    ACE inhibitor-aggravated angioedema    Acute respiratory failure (HCC)    COPD (chronic obstructive pulmonary disease) (HCC)    Acquired angioedema    SOB (shortness of breath)    Disorder of airway    Acquired hypertrophy of tongue        ASSESSMENT of Current Deficits Patient exhibits decreased strength, balance and endurance impairing functional mobility and tolerance to activity. Pt required Maximal assist for trunk flexion and bilateral lower extremities for bed mobility. Pt needing minimal/moderate assist for sitting balance due to posterior lean. Pt's confusion limited her progress with all functional mobility.       PHYSICAL THERAPY  PLAN OF CARE plan of care. Frequency of treatments: Patient will be seen  daily. Prior Level of Function: Patient ambulated unknown. Rehab Potential: fair  for baseline    Past medical history:   Past Medical History:   Diagnosis Date    Arthritis     Asthma     Cerebral artery occlusion with cerebral infarction ([facility] 2015    COPD (chronic obstructive pulmonary disease) ([facility]     Diabetes mellitus ([facility]     [facility]     Hyperlipidemia     Hypertension     VHD (valvular heart disease)      Past Surgical History:   Procedure Laterality Date    CHOLECYSTECTOMY      COLONOSCOPY  12/2018    HYSTERECTOMY      HYSTERECTOMY, VAGINAL      UPPER GASTROINTESTINAL ENDOSCOPY  12/2018       SUBJECTIVE:    Precautions: Up with assistance, falls, O2 and confusion , AMS  Social history: Patient is a poor historian and stated that she lives alone in a two story home resides first  with 5 steps  to enter with [address]. Equipment owned: None,? [facility] Basic Mobility   8/24    [facility] Mobility Inpatient   How much difficulty turning over in bed?: A Lot  How much difficulty sitting down on / standing up from a chair with arms?: A Little  How much difficulty moving from lying on back to sitting on side of bed?: A Lot  How much help from another person moving to and from a bed to a chair?: A Little  How much help from another person needed to walk in hospital room?: A Lot  How much help from another person for climbing 3-5 steps with a railing?: A Lot  AM-[facility] Inpatient Mobility Raw Score : 14  AM-P[facility] Inpatient T-Scale Score : 38.1  Mobility Inpatient CMS 0-100% Score: 61.29  Mobility Inpatient CMS G-Code Modifier : CL    Nursing cleared patient for PT treatment. .   OBJECTIVE;   Initial Evaluation  Date: 9/6/2021 Treatment Date:  9/7/2021       Short Term/ Long Term   Goals   Was pt agreeable to Eval/treatment?  Yes yes To be met in 5 days   Pain level   0/10   0/10    Bed Mobility    Rolling: Maximal assist of 1 Supine to sit: Maximal assist of 1    Sit to supine: Maximal assist of 1    Scooting: Maximal assist of 1   Rolling: Maximal assist of 1   Supine to sit: Maximal assist of 1   Sit to supine: Not assessed    Scooting: Maximal assist of 1    Rolling: Minimal assist of 1    Supine to sit: Minimal assist of 1    Sit to supine: Minimal assist of 1    Scooting: Minimal assist of 1     Transfers Sit to stand: Not assessed   Sit to stand: Minimal assist of 1 Cues for hand placement and safety       Sit to stand: Minimal assist of 1    Ambulation    not assessed  4 feet using  hand held assist with Minimal assist of 1   with cues for upright posture, increased base of support, increased step length and safety    > 25 feet using  wheeled walker with Moderate assist of 1    Stair negotiation: ascended and descended   Not assessed        ROM Within functional limits    Increase range of motion 10% of affected joints    Strength BUE:  refer to OT eval  RLE:  3-/5  LLE:  3-/5  Increase strength in affected mm groups by 1/3 grade   Balance Sitting EOB:  poor+  Dynamic Standing:  not assessed  Sitting EOB: poor + posterior lean  Dynamic Standing: fair -hand held assist   Sitting EOB:  fair   Dynamic Standing: fair-     Patient is Alert & Oriented x person and follows one step directions    Sensation:  Patient  denies numbness/tingling   Edema:  no   Endurance: poor    Vitals: 2 liters nasal cannula   Blood Pressure at rest  Blood Pressure during session    Heart Rate at rest  Heart Rate during session    SPO2 at rest 95%  SPO2 during session %     Patient education  Patient educated on role of Physical Therapy, risks of immobility, safety and plan of care, energy conservation,  importance of mobility while in hospital , purse lip breathing and safety      Patient response to education:   Pt verbalized understanding Pt demonstrated skill Pt requires further education in this area   Yes Partial Yes      Treatment:  Patient practiced and was instructed/facilitated in the following treatment: Patient assisted to edge of bed. Pt  Sat edge of bed 20 minutes with Moderate assist of 1 to increase dynamic sitting balance and activity tolerance. Pt needing varying assist for sitting balance minimal/moderate due to posterior lean. Pt stood, took steps to the transport cart and assisted pt on the cart. Therapeutic Exercises:  not performed      At end of session, patient in care of transport with . nursing present. Patient would benefit from continued skilled Physical Therapy to improve functional independence and quality of life. Patient's/ family goals   rehab    Time in  1:22  Time out  1:45    Total Treatment Time  23 minutes        CPT codes:    Therapeutic activities (25812)   23 minutes  2 unit(s)   Abran Bhatt  [facility]  LIC # [id number]

## 2021-09-07 NOTE — PROGRESS NOTES
Patient refusing carotid exam today x 2. Request exam tomorrow due to fatigue. Notified nurse Malissa Severin.

## 2021-09-07 NOTE — PROGRESS NOTES
Comprehensive Nutrition Assessment    Type and Reason for Visit:  Reassess    Nutrition Recommendations/Plan: Continue to monitor, increased ONS to TID(Glucerna)    Nutrition Assessment:  Pt admitted w/ SOB r/t tongue swelling, PMH of COPD, CVA, DM. Pt at nutritional compromise AEB poor oral intakes. Will increase ONS to TID, continue to monitor. Malnutrition Assessment:  Malnutrition Status: At risk for malnutrition (Comment)    Context:  Acute Illness     Findings of the 6 clinical characteristics of malnutrition:  Energy Intake:  Mild decrease in energy intake (Comment)  Weight Loss:  No significant weight loss     Body Fat Loss:  No significant body fat loss     Muscle Mass Loss:  No significant muscle mass loss    Fluid Accumulation:  No significant fluid accumulation     Strength:  Not Performed    Estimated Daily Nutrient Needs:  Energy (kcal):  0395-1724; Weight Used for Energy Requirements:  Admission     Protein (g):  80-90 (x1.4-1.4gm/kg); Weight Used for Protein Requirements:  Ideal        Fluid (ml/day):  2254-0864; Method Used for Fluid Requirements:  1 ml/kcal      Nutrition Related Findings:  Pt alert only, abd WDL, bowel sounds present, BUE +2 non-pitting edema, BLE +1 edema, -I/O -5.9L      Wounds:  None       Current Nutrition Therapies:    ADULT DIET; Regular; 4 carb choices (60 gm/meal)  Adult Oral Nutrition Supplement; Diabetic Oral Supplement    Anthropometric Measures:  · Height: 5' 4\" (162.6 cm)  · Current Body Weight: 181 lb (82.1 kg) (Cpntinue to use admit wt as # increase likely r/t fluid status)   · Admission Body Weight: 181 lb (82.1 kg) (8/24 bed scale)    · Usual Body Weight: 188 lb 6.4 oz (85.5 kg) (3/10/21 no method per EMR)     · Ideal Body Weight: 120 lbs; % Ideal Body Weight 150.8 %   · BMI: 31.1  · Adjusted Body Weight:  ; No Adjustment   · BMI Categories: Obese Class 1 (BMI 30.0-34. 9)       Nutrition Diagnosis:   · Inadequate oral intake related to impaired respiratory function as evidenced by intake 0-25%      Nutrition Interventions:   Food and/or Nutrient Delivery:  Continue Current Diet, Modify Oral Nutrition Supplement  Nutrition Education/Counseling:  No recommendation at this time   Coordination of Nutrition Care:  Continue to monitor while inpatient    Goals:  Pt to consume >50% meals/ONS       Nutrition Monitoring and Evaluation:   Behavioral-Environmental Outcomes:  None Identified   Food/Nutrient Intake Outcomes:  Food and Nutrient Intake, Supplement Intake  Physical Signs/Symptoms Outcomes:  Biochemical Data, GI Status, Fluid Status or Edema, Nutrition Focused Physical Findings, Skin, Weight     Discharge Planning:     Too soon to determine     Electronically signed by Agustina Bragg RD, LD on 9/7/21 at 12:46 PM EDT    Contact: Will Smiley

## 2021-09-08 LAB
ALBUMIN SERPL-MCNC: 3 G/DL (ref 3.5–5.2)
ALP BLD-CCNC: 43 U/L (ref 35–104)
ALT SERPL-CCNC: 47 U/L (ref 0–32)
ANION GAP SERPL CALCULATED.3IONS-SCNC: 6 MMOL/L (ref 7–16)
AST SERPL-CCNC: 35 U/L (ref 0–31)
BASOPHILS ABSOLUTE: 0.01 E9/L (ref 0–0.2)
BASOPHILS RELATIVE PERCENT: 0.1 % (ref 0–2)
BILIRUB SERPL-MCNC: 0.7 MG/DL (ref 0–1.2)
BUN BLDV-MCNC: 17 MG/DL (ref 6–23)
CALCIUM SERPL-MCNC: 7.9 MG/DL (ref 8.6–10.2)
CHLORIDE BLD-SCNC: 104 MMOL/L (ref 98–107)
CO2: 26 MMOL/L (ref 22–29)
CREAT SERPL-MCNC: 0.7 MG/DL (ref 0.5–1)
EOSINOPHILS ABSOLUTE: 0 E9/L (ref 0.05–0.5)
EOSINOPHILS RELATIVE PERCENT: 0 % (ref 0–6)
GFR AFRICAN AMERICAN: >60
GFR NON-AFRICAN AMERICAN: >60 ML/MIN/1.73
GLUCOSE BLD-MCNC: 286 MG/DL (ref 74–99)
HCT VFR BLD CALC: 33.3 % (ref 34–48)
HEMOGLOBIN: 10.7 G/DL (ref 11.5–15.5)
IMMATURE GRANULOCYTES #: 0.06 E9/L
IMMATURE GRANULOCYTES %: 0.6 % (ref 0–5)
LYMPHOCYTES ABSOLUTE: 0.98 E9/L (ref 1.5–4)
LYMPHOCYTES RELATIVE PERCENT: 10.3 % (ref 20–42)
MAGNESIUM: 2 MG/DL (ref 1.6–2.6)
MCH RBC QN AUTO: 30.6 PG (ref 26–35)
MCHC RBC AUTO-ENTMCNC: 32.1 % (ref 32–34.5)
MCV RBC AUTO: 95.1 FL (ref 80–99.9)
METER GLUCOSE: 148 MG/DL (ref 74–99)
METER GLUCOSE: 267 MG/DL (ref 74–99)
METER GLUCOSE: 306 MG/DL (ref 74–99)
MONOCYTES ABSOLUTE: 0.51 E9/L (ref 0.1–0.95)
MONOCYTES RELATIVE PERCENT: 5.4 % (ref 2–12)
NEUTROPHILS ABSOLUTE: 7.94 E9/L (ref 1.8–7.3)
NEUTROPHILS RELATIVE PERCENT: 83.6 % (ref 43–80)
PDW BLD-RTO: 11.8 FL (ref 11.5–15)
PHOSPHORUS: 2.9 MG/DL (ref 2.5–4.5)
PLATELET # BLD: 260 E9/L (ref 130–450)
PMV BLD AUTO: 9.7 FL (ref 7–12)
POTASSIUM SERPL-SCNC: 4.7 MMOL/L (ref 3.5–5)
RBC # BLD: 3.5 E12/L (ref 3.5–5.5)
SODIUM BLD-SCNC: 136 MMOL/L (ref 132–146)
TOTAL PROTEIN: 5.2 G/DL (ref 6.4–8.3)
WBC # BLD: 9.5 E9/L (ref 4.5–11.5)

## 2021-09-08 PROCEDURE — 2580000003 HC RX 258: Performed by: INTERNAL MEDICINE

## 2021-09-08 PROCEDURE — 6370000000 HC RX 637 (ALT 250 FOR IP): Performed by: NURSE PRACTITIONER

## 2021-09-08 PROCEDURE — 94640 AIRWAY INHALATION TREATMENT: CPT

## 2021-09-08 PROCEDURE — 6370000000 HC RX 637 (ALT 250 FOR IP): Performed by: INTERNAL MEDICINE

## 2021-09-08 PROCEDURE — 36592 COLLECT BLOOD FROM PICC: CPT

## 2021-09-08 PROCEDURE — 6360000002 HC RX W HCPCS: Performed by: INTERNAL MEDICINE

## 2021-09-08 PROCEDURE — 6360000002 HC RX W HCPCS

## 2021-09-08 PROCEDURE — 2700000000 HC OXYGEN THERAPY PER DAY

## 2021-09-08 PROCEDURE — 85025 COMPLETE CBC W/AUTO DIFF WBC: CPT

## 2021-09-08 PROCEDURE — 2060000000 HC ICU INTERMEDIATE R&B

## 2021-09-08 PROCEDURE — 36415 COLL VENOUS BLD VENIPUNCTURE: CPT

## 2021-09-08 PROCEDURE — 6360000002 HC RX W HCPCS: Performed by: NURSE PRACTITIONER

## 2021-09-08 PROCEDURE — 97110 THERAPEUTIC EXERCISES: CPT

## 2021-09-08 PROCEDURE — 82962 GLUCOSE BLOOD TEST: CPT

## 2021-09-08 PROCEDURE — 80053 COMPREHEN METABOLIC PANEL: CPT

## 2021-09-08 PROCEDURE — 92526 ORAL FUNCTION THERAPY: CPT | Performed by: SPEECH-LANGUAGE PATHOLOGIST

## 2021-09-08 PROCEDURE — 84100 ASSAY OF PHOSPHORUS: CPT

## 2021-09-08 PROCEDURE — 92507 TX SP LANG VOICE COMM INDIV: CPT | Performed by: SPEECH-LANGUAGE PATHOLOGIST

## 2021-09-08 PROCEDURE — 2580000003 HC RX 258

## 2021-09-08 PROCEDURE — 83735 ASSAY OF MAGNESIUM: CPT

## 2021-09-08 PROCEDURE — 97530 THERAPEUTIC ACTIVITIES: CPT

## 2021-09-08 RX ADMIN — HYDRALAZINE HYDROCHLORIDE 50 MG: 50 TABLET, FILM COATED ORAL at 21:20

## 2021-09-08 RX ADMIN — ENOXAPARIN SODIUM 40 MG: 40 INJECTION SUBCUTANEOUS at 10:32

## 2021-09-08 RX ADMIN — HYDRALAZINE HYDROCHLORIDE 50 MG: 50 TABLET, FILM COATED ORAL at 13:42

## 2021-09-08 RX ADMIN — ROFLUMILAST 500 MCG: 500 TABLET ORAL at 10:32

## 2021-09-08 RX ADMIN — ACETYLCYSTEINE 600 MG: 200 SOLUTION ORAL; RESPIRATORY (INHALATION) at 02:51

## 2021-09-08 RX ADMIN — IPRATROPIUM BROMIDE AND ALBUTEROL SULFATE 1 AMPULE: .5; 3 SOLUTION RESPIRATORY (INHALATION) at 06:56

## 2021-09-08 RX ADMIN — IPRATROPIUM BROMIDE AND ALBUTEROL SULFATE 1 AMPULE: .5; 3 SOLUTION RESPIRATORY (INHALATION) at 10:25

## 2021-09-08 RX ADMIN — IPRATROPIUM BROMIDE AND ALBUTEROL SULFATE 1 AMPULE: .5; 3 SOLUTION RESPIRATORY (INHALATION) at 13:57

## 2021-09-08 RX ADMIN — INSULIN LISPRO 2 UNITS: 100 INJECTION, SOLUTION INTRAVENOUS; SUBCUTANEOUS at 18:19

## 2021-09-08 RX ADMIN — PIPERACILLIN SODIUM AND TAZOBACTAM SODIUM 3375 MG: 3; .375 INJECTION, POWDER, LYOPHILIZED, FOR SOLUTION INTRAVENOUS at 21:14

## 2021-09-08 RX ADMIN — ACETYLCYSTEINE 600 MG: 200 SOLUTION ORAL; RESPIRATORY (INHALATION) at 19:37

## 2021-09-08 RX ADMIN — PIPERACILLIN SODIUM AND TAZOBACTAM SODIUM 3375 MG: 3; .375 INJECTION, POWDER, LYOPHILIZED, FOR SOLUTION INTRAVENOUS at 05:16

## 2021-09-08 RX ADMIN — METHYLPREDNISOLONE SODIUM SUCCINATE 40 MG: 40 INJECTION, POWDER, FOR SOLUTION INTRAMUSCULAR; INTRAVENOUS at 22:07

## 2021-09-08 RX ADMIN — ACETYLCYSTEINE 600 MG: 200 SOLUTION ORAL; RESPIRATORY (INHALATION) at 10:25

## 2021-09-08 RX ADMIN — FAMOTIDINE 20 MG: 20 TABLET, FILM COATED ORAL at 10:32

## 2021-09-08 RX ADMIN — INSULIN LISPRO 4 UNITS: 100 INJECTION, SOLUTION INTRAVENOUS; SUBCUTANEOUS at 21:20

## 2021-09-08 RX ADMIN — BUDESONIDE 500 MCG: 0.5 INHALANT RESPIRATORY (INHALATION) at 19:37

## 2021-09-08 RX ADMIN — IPRATROPIUM BROMIDE AND ALBUTEROL SULFATE 1 AMPULE: .5; 3 SOLUTION RESPIRATORY (INHALATION) at 19:37

## 2021-09-08 RX ADMIN — HYDRALAZINE HYDROCHLORIDE 50 MG: 50 TABLET, FILM COATED ORAL at 05:16

## 2021-09-08 RX ADMIN — ACETYLCYSTEINE 600 MG: 200 SOLUTION ORAL; RESPIRATORY (INHALATION) at 22:05

## 2021-09-08 RX ADMIN — Medication 10 ML: at 22:07

## 2021-09-08 RX ADMIN — ACETYLCYSTEINE 600 MG: 200 SOLUTION ORAL; RESPIRATORY (INHALATION) at 13:58

## 2021-09-08 RX ADMIN — PIPERACILLIN SODIUM AND TAZOBACTAM SODIUM 3375 MG: 3; .375 INJECTION, POWDER, LYOPHILIZED, FOR SOLUTION INTRAVENOUS at 13:42

## 2021-09-08 RX ADMIN — BUDESONIDE 500 MCG: 0.5 INHALANT RESPIRATORY (INHALATION) at 06:56

## 2021-09-08 RX ADMIN — SODIUM CHLORIDE, PRESERVATIVE FREE 300 UNITS: 5 INJECTION INTRAVENOUS at 10:33

## 2021-09-08 RX ADMIN — SODIUM CHLORIDE, PRESERVATIVE FREE 300 UNITS: 5 INJECTION INTRAVENOUS at 22:07

## 2021-09-08 RX ADMIN — IPRATROPIUM BROMIDE AND ALBUTEROL SULFATE 1 AMPULE: .5; 3 SOLUTION RESPIRATORY (INHALATION) at 22:04

## 2021-09-08 RX ADMIN — METHYLPREDNISOLONE SODIUM SUCCINATE 40 MG: 40 INJECTION, POWDER, FOR SOLUTION INTRAMUSCULAR; INTRAVENOUS at 13:42

## 2021-09-08 RX ADMIN — SODIUM CHLORIDE: 9 INJECTION, SOLUTION INTRAVENOUS at 16:58

## 2021-09-08 RX ADMIN — IPRATROPIUM BROMIDE AND ALBUTEROL SULFATE 1 AMPULE: .5; 3 SOLUTION RESPIRATORY (INHALATION) at 02:51

## 2021-09-08 RX ADMIN — FAMOTIDINE 20 MG: 20 TABLET, FILM COATED ORAL at 21:19

## 2021-09-08 RX ADMIN — INSULIN LISPRO 6 UNITS: 100 INJECTION, SOLUTION INTRAVENOUS; SUBCUTANEOUS at 10:40

## 2021-09-08 RX ADMIN — CLONIDINE HYDROCHLORIDE 0.3 MG: 0.2 TABLET ORAL at 10:31

## 2021-09-08 RX ADMIN — CLONIDINE HYDROCHLORIDE 0.3 MG: 0.2 TABLET ORAL at 21:18

## 2021-09-08 RX ADMIN — INSULIN LISPRO 6 UNITS: 100 INJECTION, SOLUTION INTRAVENOUS; SUBCUTANEOUS at 13:42

## 2021-09-08 RX ADMIN — METOPROLOL SUCCINATE 50 MG: 50 TABLET, EXTENDED RELEASE ORAL at 10:32

## 2021-09-08 RX ADMIN — METHYLPREDNISOLONE SODIUM SUCCINATE 40 MG: 40 INJECTION, POWDER, FOR SOLUTION INTRAMUSCULAR; INTRAVENOUS at 05:17

## 2021-09-08 RX ADMIN — ACETYLCYSTEINE 600 MG: 200 SOLUTION ORAL; RESPIRATORY (INHALATION) at 06:57

## 2021-09-08 RX ADMIN — Medication 1 CAPSULE: at 10:32

## 2021-09-08 RX ADMIN — DIMETHICONE, CAMPHOR (SYNTHETIC), MENTHOL, AND PHENOL: 1.1; .5; .625; .5 OINTMENT TOPICAL at 10:33

## 2021-09-08 ASSESSMENT — PAIN SCALES - GENERAL
PAINLEVEL_OUTOF10: 0
PAINLEVEL_OUTOF10: 0

## 2021-09-08 ASSESSMENT — PAIN SCALES - WONG BAKER: WONGBAKER_NUMERICALRESPONSE: 0

## 2021-09-08 NOTE — PLAN OF CARE
Problem: Non-Violent Restraints  Goal: Removal from restraints as soon as assessed to be safe  9/8/2021 1204 by Mor Cerda RN  Outcome: Met This Shift     Problem: Non-Violent Restraints  Goal: Patient's dignity will be maintained  9/8/2021 1204 by Mor Cerda RN  Outcome: Met This Shift

## 2021-09-08 NOTE — PROGRESS NOTES
Subjective:  Swetha Hussein was seen and examined at bedside today. The patient's questions were answered and tests were reviewed. There were no new problems reported overnight. Patient is tolerating current diet.   She is more awake and alert and less combative    A complete review of systems and social history was completed on admission and remains unchanged unless otherwise noted    Scheduled Meds:   metoprolol succinate  50 mg Oral Daily    methylPREDNISolone  40 mg IntraVENous Q8H    insulin lispro  0-12 Units SubCUTAneous TID WC    insulin lispro  0-6 Units SubCUTAneous Nightly    cloNIDine  0.3 mg Oral BID    famotidine  20 mg Oral BID    hydrALAZINE  50 mg Oral 3 times per day    [Held by provider] insulin glargine  23 Units SubCUTAneous BID    cloNIDine  0.1 mg Oral Once    Roflumilast  500 mcg Oral Daily    lactobacillus  1 capsule Oral Daily    acetylcysteine  600 mg Inhalation Q4H    piperacillin-tazobactam  3,375 mg IntraVENous Q8H    ipratropium-albuterol  1 ampule Inhalation Q4H    enoxaparin  40 mg SubCUTAneous Daily    lidocaine PF  5 mL IntraDERmal Once    sodium chloride flush  5-40 mL IntraVENous 2 times per day    heparin flush  3 mL IntraVENous 2 times per day    budesonide  0.5 mg Nebulization BID    medicated lip ointment   Topical Daily    sodium chloride flush  5-40 mL IntraVENous 2 times per day     Continuous Infusions:   dextrose 5% and 0.9% NaCl with KCl 20 mEq 60 mL/hr at 09/07/21 1132    sodium chloride Stopped (09/07/21 1138)    sodium chloride      dextrose      sodium chloride       PRN Meds:perflutren lipid microspheres, sodium chloride flush, sodium chloride, heparin flush, glucose, dextrose, glucagon (rDNA), dextrose, sodium chloride flush, sodium chloride, ondansetron **OR** ondansetron, polyethylene glycol, acetaminophen **OR** acetaminophen    Objective:  BP (!) 165/82   Pulse 82   Temp 100 °F (37.8 °C) (Axillary)   Resp 18   Ht 5' 4\" (1.626 m)   Wt 198 lb 12.8 oz (90.2 kg)   SpO2 98%   BMI 34.12 kg/m²   In: -   Out: 2800    In: -   Out: 2800 [Urine:2800]     AAO x 3, currently in NAD  RRR, pos S1, S2  Tight, no wheeze, rales or rhonchi  bowel sounds present, nontender, nondistended  No clubbing, cyanosis, or edema  No neuro changes o/w seen at this time  No obvious rashes or lesions. Recent Labs     09/06/21  0533 09/07/21  0530 09/08/21  0530   WBC 13.3* 10.6 9.5   HGB 11.9 11.1* 10.7*    259 260     Recent Labs     09/06/21  0533 09/07/21  0530 09/08/21  0530    138 136   K 4.0 4.1 4.7   CL 98 102 104   CO2 29 28 26   BUN 24* 22 17   CREATININE 0.8 0.8 0.7   GLUCOSE 86 59* 286*     Recent Labs     09/06/21 0533 09/07/21  0530 09/08/21  0530   BILITOT 0.7 0.6 0.7   ALKPHOS 63 41 43   AST 19 20 35*   ALT 22 26 47*     No results for input(s): INR in the last 72 hours. Invalid input(s): PT  No results for input(s): CKTOTAL, CKMB, CKMBINDEX, TROPONINI in the last 72 hours. XR CHEST PORTABLE    Result Date: 8/25/2021  EXAMINATION: ONE XRAY VIEW OF THE CHEST 8/24/2021 8:52 pm COMPARISON: 08/24/2021 HISTORY: ORDERING SYSTEM PROVIDED HISTORY: et tube placement TECHNOLOGIST PROVIDED HISTORY: Reason for exam:->et tube placement FINDINGS: Stable position of endotracheal tube. Cardiomediastinal silhouette is unremarkable. No infiltrate, effusion, or pneumothorax. No acute osseous abnormality. No pulmonary infiltrates. No significant change since the prior study     XR CHEST PORTABLE    Result Date: 8/24/2021  EXAMINATION: ONE XRAY VIEW OF THE CHEST 8/24/2021 5:30 pm COMPARISON: January 3, 2020 HISTORY: ORDERING SYSTEM PROVIDED HISTORY: SOB (shortness of breath) TECHNOLOGIST PROVIDED HISTORY: Reason for exam:->ETT placement, tongue swelling FINDINGS: Endotracheal tube is approximately 6.8 cm above the chivo. No airspace opacity or pleural effusion. The heart is normal in size. No pneumothorax.      1. Endotracheal tube is approximately 6.8 cm above the chivo. 2. No airspace opacity or pleural effusion. Assessment:  Abdon Wilson is a 78y.o. year old female who presented on 8/24/2021 and is being treated for:  Principal Problem:    Acute respiratory failure ([address]  Active Problems:    Essential hypertension    Diabetes mellitus ([address]    Myocardiopathy ([facility] [address]    ACE inhibitor-aggravated angioedema    COPD (chronic obstructive pulmonary disease) (HCC)    Acquired angioedema    SOB (shortness of breath)    Disorder of airway    Acquired hypertrophy of tongue  Resolved Problems:    * No resolved hospital problems. *    Plan  · Cont supportive care including steroids and abx post extubation  · Monitor BS and mentation which are better today, MRI reviewed   · Cont PT/OT  · Dc planning soon if remains stable  · Please see orders for further management and care. >35 minutes of critical care time was spent with the patient. This includes chart review, , and discussion with those consultants involved in the patient's care. More than 50% of my  time was spent at the bedside counseling/coordinating care with the patient and/or family with face to face contact. This time was spent reviewing notes and laboratory data as well as instructing and counseling the patient. Time I spent with the family or surrogate(s) is included only if the patient was incapable of providing the necessary information or participating in medical decisions. I also discussed the differential diagnosis and all of the proposed management plans with the patient and individuals accompanying the patient. This patient requires this high level of physician care and nursing on the [facility] unit due the complexity of decision management and chance of rapid decline or death. Continued cardiac monitoring and higher level of nursing are required. I am readily available for any further decision-making and intervention.      Brian Blair MD  10:16 AM  9/8/2021

## 2021-09-08 NOTE — PROGRESS NOTES
Speech Language Pathology      NAME:  Johann Ochoa  :  1955  DATE: 2021  ROOM:  [id number]-    Patient seen for speech and swallowing. Patient not interested in eating declined PO intake despite max encouragement. Patient with poor follow through of directions during attempts at dysphagia exercises. Kept closing eyes. Speech was intelligible but patient again frequently refused to complete speech tasks. Poor insight into current deficits despite max encouragement and explanation regarding importance of participation in tasks. Will continue POC     SOB (shortness of breath) [R06.02]  Acquired angioedema [T78. 3XXA]    [id number]  speech/language tx  [id number]  dysphagia tx    Yonatan Fabian MSCCC/SLP  Speech Language Pathologist  [id number]

## 2021-09-08 NOTE — PROGRESS NOTES
Physical Therapy Treatment Note/Plan of Care    Room #:  7372/0216-15  Patient Name: Cholo Garcia  YOB: 1955  MRN: [id number]    Date of Service: 9/8/2021     Tentative placement recommendation: Subacute rehab  Equipment recommendation: To be determined      Evaluating Physical Therapist: Alix Red PT, DPT #[id number]      Specific Provider Orders/Date/Referring Provider :   09/04/21 1145   PT eval and treat Start: 09/04/21 1145, End: 09/04/21 1145, ONE TIME, Standing Count: 1 Occurrences, Umu Rodriguez MD Acknowledge New    Admitting Diagnosis:   SOB (shortness of breath) [R06.02]  Acquired angioedema [T78. 3XXA]      Visit Diagnoses       Codes    Angioedema, initial encounter     T78. 3XXA    Acute respiratory distress     R06.03        Surgery: None    Patient Active Problem List   Diagnosis    Non-rheumatic mitral regurgitation    Hypertensive left ventricular hypertrophy, without heart failure    Dyslipidemia    Non morbid obesity    Essential hypertension    [facility] mellitus [facility] [address]    Myocardiopathy ([facility] [address]    Tobacco use    Near syncope    Diarrhea    Generalized abdominal pain    Abdominal pain    Pre-syncope    PVD (peripheral vascular disease) with claudication (HCC)    Chronic pain of both ankles    ACE inhibitor-aggravated angioedema    Acute respiratory failure (HCC)    COPD (chronic obstructive pulmonary disease) (HCC)    Acquired angioedema    SOB (shortness of breath)    Disorder of airway    Acquired hypertrophy of tongue        ASSESSMENT of Current Deficits Patient exhibits decreased strength, balance and endurance impairing functional mobility and tolerance to activity. PROM/AAROM with all exercise. Able to tolerate chair position however patient tends to lateral lean to left cues to maintain midline. Pt's confusion limited her progress with all functional mobility.  Patient needs continued PT to improve strength and endurance to tolerate and complete functional mobility with decreased assist required.         PHYSICAL THERAPY  PLAN OF CARE       Physical therapy plan of care is established based on physician order,  patient diagnosis and clinical assessment    Current Treatment Recommendations:    -Bed Mobility: Lower extremity exercises  and Trunk control activities   -Sitting Balance: Incorporate reaching activities to activate trunk muscles , Hands on support to maintain midline , Facilitate active trunk muscle engagement , Facilitate postural control in all planes  and Engage in core activities to allow for movement within base of support   -Standing Balance: Perform strengthening exercises in standing to promote motor control with or without upper extremity support , Instruct patient on adequate base of support to maintain balance and Challenge balance utilizing reaching  activities beyond center of gravity    -Transfers: Provide instruction on proper hand and foot position for adequate transfer of weight onto lower extremities and use of gait device, Cues for hand placement, technique and safety, Facilitate weight shift forward on to lower extremities and provide necessary stabilization of bilateral lower extremities , Support transfer of weight on to lower extremities, Assist with extension of knees trunk and hip to accept weight transfer  and Provide stabilization to prevent fall   -Gait: Gait training, Standing activities to improve: base of support, weight shift, weight bearing , Exercises to improve trunk control, Exercises to improve hip and knee control, Performance of protected weight bearing activities and Activities to increase weight bearing   -Endurance: Utilize Supervised activities to increase level of endurance to allow for safe functional mobility including transfers and gait  and Use graduated activities to promote good breathing techniques and provide support and education to maximize respiratory function    PT long term treatment goals are located in below grid    Patient and or family understand(s) diagnosis, prognosis, and plan of care. Frequency of treatments: Patient will be seen  daily. Prior Level of Function: Patient ambulated unknown. Rehab Potential: fair  for baseline    Past medical history:   Past Medical History:   Diagnosis Date    Arthritis     Asthma     Cerebral artery occlusion with cerebral infarction ([facility] [address] 2015    COPD (chronic obstructive pulmonary disease) ([facility] [address]     Diabetes mellitus ([facility] [address]     Heart attack ([facility] [address]     Hyperlipidemia     Hypertension     VHD (valvular heart disease)      Past Surgical History:   Procedure Laterality Date    CHOLECYSTECTOMY      COLONOSCOPY  12/2018    HYSTERECTOMY      HYSTERECTOMY, VAGINAL      UPPER GASTROINTESTINAL ENDOSCOPY  12/2018       SUBJECTIVE:    Precautions: Up with assistance, falls, O2 and confusion , AMS  Social history: Patient is a poor historian and stated that she lives alone in a two story home resides first  with 5 steps  to enter with [address]. Equipment owned: None,? AM-PAC Basic Mobility   8/24    AM-[facility] Mobility Inpatient   How much difficulty turning over in bed?: A Lot  How much difficulty sitting down on / standing up from a chair with arms?: A Lot  How much difficulty moving from lying on back to sitting on side of bed?: A Lot  How much help from another person moving to and from a bed to a chair?: A Lot  How much help from another person needed to walk in hospital room?: A Lot  How much help from another person for climbing 3-5 steps with a railing?: Total  AM-PAC Inpatient Mobility Raw Score : 11  AM-PAC Inpatient T-Scale Score : 33.86  Mobility Inpatient CMS 0-100% Score: 72.57  Mobility Inpatient CMS G-Code Modifier : CL    Nursing cleared patient for PT treatment. .   OBJECTIVE;   Initial Evaluation  Date: 9/6/2021 Treatment Date:  9/8/2021       Short Term/ Long Term   Goals   Was pt agreeable to Eval/treatment?  Yes yes To be met in 5 days   Pain level   0/10   0/10    Bed Mobility    Rolling: Maximal assist of 1    Supine to sit: Maximal assist of 1    Sit to supine: Maximal assist of 1    Scooting: Maximal assist of 1   Rolling: Maximal assist of 1   Supine to sit: Not assessed  attempted  X 2   Sit to supine: Not assessed    Scooting: Not assessed     Rolling: Minimal assist of 1    Supine to sit: Minimal assist of 1    Sit to supine: Minimal assist of 1    Scooting: Minimal assist of 1     Transfers Sit to stand: Not assessed   Sit to stand: Not assessed         Sit to stand: Minimal assist of 1    Ambulation    not assessed  not assessed     > 25 feet using  wheeled walker with Moderate assist of 1    Stair negotiation: ascended and descended   Not assessed        ROM Within functional limits    Increase range of motion 10% of affected joints    Strength BUE:  refer to OT eval  RLE:  3-/5  LLE:  3-/5  Increase strength in affected mm groups by 1/3 grade   Balance Sitting EOB:  poor+  Dynamic Standing:  not assessed  Sitting EOB: not assessed   Dynamic Standing: not assessed    Sitting EOB:  fair   Dynamic Standing: fair-     Patient is Alert & Oriented x person and follows one step directions    Sensation:  Patient  denies numbness/tingling   Edema:  no   Endurance: poor    Vitals: 2 liters nasal cannula   Blood Pressure at rest  Blood Pressure during session    Heart Rate at rest 71 Heart Rate during session    SPO2 at rest %  SPO2 during session %     Patient education  Patient educated on role of Physical Therapy, risks of immobility, safety and plan of care, energy conservation,  importance of mobility while in hospital , purse lip breathing and safety      Patient response to education:   Pt verbalized understanding Pt demonstrated skill Pt requires further education in this area   Yes Partial Yes      Treatment:  Patient practiced and was instructed/facilitated in the following treatment: Patient attempted to assist  to edge of bed x 2 with ok from patient however when trying to attempt patient refused, bringing bilateral lower extremities back on the bed. Patient positioned back to supine, rolled multiple times for chux and taps, use of upper extremities to help assist. Max of 2 to head of bed. In chair position to perform exercise. respiratory present for breathing treatment. At end of session patient back in supine position and positioned for comfort. Therapeutic Exercises:  ankle pumps, heel slide, hip abduction/adduction, straight leg raise and SAQ x 10-12 reps      At end of session, patient in bed with alarm all lines and tubes in tact, call light within reach of patient and  nursing notified. Patient would benefit from continued skilled Physical Therapy to improve functional independence and quality of life.          Patient's/ family goals   rehab    Time in  136  Time out  201    Total Treatment Time  25 minutes        CPT codes:    Therapeutic activities (70568)   10 minutes  1 unit(s)  Therapeutic exercises (72192)   15 minutes  1 unit(s)   Sully Andersen PTA  AEG#[id number]

## 2021-09-09 LAB
ALBUMIN SERPL-MCNC: 3.3 G/DL (ref 3.5–5.2)
ALP BLD-CCNC: 48 U/L (ref 35–104)
ALT SERPL-CCNC: 67 U/L (ref 0–32)
ANION GAP SERPL CALCULATED.3IONS-SCNC: 9 MMOL/L (ref 7–16)
AST SERPL-CCNC: 32 U/L (ref 0–31)
BASOPHILS ABSOLUTE: 0.01 E9/L (ref 0–0.2)
BASOPHILS RELATIVE PERCENT: 0.1 % (ref 0–2)
BILIRUB SERPL-MCNC: 0.9 MG/DL (ref 0–1.2)
BUN BLDV-MCNC: 19 MG/DL (ref 6–23)
CALCIUM SERPL-MCNC: 8.3 MG/DL (ref 8.6–10.2)
CHLORIDE BLD-SCNC: 100 MMOL/L (ref 98–107)
CO2: 25 MMOL/L (ref 22–29)
CREAT SERPL-MCNC: 0.7 MG/DL (ref 0.5–1)
EOSINOPHILS ABSOLUTE: 0.01 E9/L (ref 0.05–0.5)
EOSINOPHILS RELATIVE PERCENT: 0.1 % (ref 0–6)
GFR AFRICAN AMERICAN: >60
GFR NON-AFRICAN AMERICAN: >60 ML/MIN/1.73
GLUCOSE BLD-MCNC: 396 MG/DL (ref 74–99)
HCT VFR BLD CALC: 34.6 % (ref 34–48)
HEMOGLOBIN: 11.7 G/DL (ref 11.5–15.5)
IMMATURE GRANULOCYTES #: 0.07 E9/L
IMMATURE GRANULOCYTES %: 0.7 % (ref 0–5)
LYMPHOCYTES ABSOLUTE: 1.13 E9/L (ref 1.5–4)
LYMPHOCYTES RELATIVE PERCENT: 11.1 % (ref 20–42)
MAGNESIUM: 1.9 MG/DL (ref 1.6–2.6)
MCH RBC QN AUTO: 31 PG (ref 26–35)
MCHC RBC AUTO-ENTMCNC: 33.8 % (ref 32–34.5)
MCV RBC AUTO: 91.5 FL (ref 80–99.9)
METER GLUCOSE: 174 MG/DL (ref 74–99)
METER GLUCOSE: 290 MG/DL (ref 74–99)
METER GLUCOSE: 348 MG/DL (ref 74–99)
METER GLUCOSE: 389 MG/DL (ref 74–99)
MONOCYTES ABSOLUTE: 0.83 E9/L (ref 0.1–0.95)
MONOCYTES RELATIVE PERCENT: 8.1 % (ref 2–12)
NEUTROPHILS ABSOLUTE: 8.15 E9/L (ref 1.8–7.3)
NEUTROPHILS RELATIVE PERCENT: 79.9 % (ref 43–80)
PDW BLD-RTO: 11.4 FL (ref 11.5–15)
PHOSPHORUS: 3 MG/DL (ref 2.5–4.5)
PLATELET # BLD: 273 E9/L (ref 130–450)
PMV BLD AUTO: 9.4 FL (ref 7–12)
POTASSIUM SERPL-SCNC: 5 MMOL/L (ref 3.5–5)
RBC # BLD: 3.78 E12/L (ref 3.5–5.5)
SODIUM BLD-SCNC: 134 MMOL/L (ref 132–146)
TOTAL PROTEIN: 5.6 G/DL (ref 6.4–8.3)
WBC # BLD: 10.2 E9/L (ref 4.5–11.5)

## 2021-09-09 PROCEDURE — 97530 THERAPEUTIC ACTIVITIES: CPT

## 2021-09-09 PROCEDURE — 84100 ASSAY OF PHOSPHORUS: CPT

## 2021-09-09 PROCEDURE — 6370000000 HC RX 637 (ALT 250 FOR IP): Performed by: NURSE PRACTITIONER

## 2021-09-09 PROCEDURE — 2580000003 HC RX 258

## 2021-09-09 PROCEDURE — 85025 COMPLETE CBC W/AUTO DIFF WBC: CPT

## 2021-09-09 PROCEDURE — 36415 COLL VENOUS BLD VENIPUNCTURE: CPT

## 2021-09-09 PROCEDURE — 6370000000 HC RX 637 (ALT 250 FOR IP): Performed by: INTERNAL MEDICINE

## 2021-09-09 PROCEDURE — 2580000003 HC RX 258: Performed by: INTERNAL MEDICINE

## 2021-09-09 PROCEDURE — 94640 AIRWAY INHALATION TREATMENT: CPT

## 2021-09-09 PROCEDURE — 6360000002 HC RX W HCPCS: Performed by: INTERNAL MEDICINE

## 2021-09-09 PROCEDURE — 2700000000 HC OXYGEN THERAPY PER DAY

## 2021-09-09 PROCEDURE — 6360000002 HC RX W HCPCS: Performed by: NURSE PRACTITIONER

## 2021-09-09 PROCEDURE — 82962 GLUCOSE BLOOD TEST: CPT

## 2021-09-09 PROCEDURE — 2060000000 HC ICU INTERMEDIATE R&B

## 2021-09-09 PROCEDURE — 6360000002 HC RX W HCPCS

## 2021-09-09 PROCEDURE — 2500000003 HC RX 250 WO HCPCS: Performed by: NURSE PRACTITIONER

## 2021-09-09 PROCEDURE — 80053 COMPREHEN METABOLIC PANEL: CPT

## 2021-09-09 PROCEDURE — 83735 ASSAY OF MAGNESIUM: CPT

## 2021-09-09 RX ORDER — PREDNISONE 20 MG/1
40 TABLET ORAL DAILY
Status: DISCONTINUED | OUTPATIENT
Start: 2021-09-09 | End: 2021-09-11

## 2021-09-09 RX ORDER — INSULIN GLARGINE 100 [IU]/ML
23 INJECTION, SOLUTION SUBCUTANEOUS NIGHTLY
Status: DISCONTINUED | OUTPATIENT
Start: 2021-09-09 | End: 2021-09-11

## 2021-09-09 RX ADMIN — SODIUM CHLORIDE: 9 INJECTION, SOLUTION INTRAVENOUS at 16:00

## 2021-09-09 RX ADMIN — FAMOTIDINE 20 MG: 20 TABLET, FILM COATED ORAL at 21:39

## 2021-09-09 RX ADMIN — INSULIN LISPRO 8 UNITS: 100 INJECTION, SOLUTION INTRAVENOUS; SUBCUTANEOUS at 13:19

## 2021-09-09 RX ADMIN — CLONIDINE HYDROCHLORIDE 0.3 MG: 0.2 TABLET ORAL at 21:39

## 2021-09-09 RX ADMIN — SODIUM CHLORIDE, PRESERVATIVE FREE 300 UNITS: 5 INJECTION INTRAVENOUS at 09:53

## 2021-09-09 RX ADMIN — FAMOTIDINE 20 MG: 20 TABLET, FILM COATED ORAL at 09:52

## 2021-09-09 RX ADMIN — DIMETHICONE, CAMPHOR (SYNTHETIC), MENTHOL, AND PHENOL: 1.1; .5; .625; .5 OINTMENT TOPICAL at 09:55

## 2021-09-09 RX ADMIN — PIPERACILLIN SODIUM AND TAZOBACTAM SODIUM 3375 MG: 3; .375 INJECTION, POWDER, LYOPHILIZED, FOR SOLUTION INTRAVENOUS at 21:20

## 2021-09-09 RX ADMIN — IPRATROPIUM BROMIDE AND ALBUTEROL SULFATE 1 AMPULE: .5; 3 SOLUTION RESPIRATORY (INHALATION) at 01:48

## 2021-09-09 RX ADMIN — POTASSIUM CHLORIDE, DEXTROSE MONOHYDRATE AND SODIUM CHLORIDE: 150; 5; 900 INJECTION, SOLUTION INTRAVENOUS at 06:28

## 2021-09-09 RX ADMIN — ACETYLCYSTEINE 600 MG: 200 SOLUTION ORAL; RESPIRATORY (INHALATION) at 10:09

## 2021-09-09 RX ADMIN — ACETYLCYSTEINE 600 MG: 200 SOLUTION ORAL; RESPIRATORY (INHALATION) at 23:39

## 2021-09-09 RX ADMIN — IPRATROPIUM BROMIDE AND ALBUTEROL SULFATE 1 AMPULE: .5; 3 SOLUTION RESPIRATORY (INHALATION) at 23:39

## 2021-09-09 RX ADMIN — METOPROLOL SUCCINATE 50 MG: 50 TABLET, EXTENDED RELEASE ORAL at 09:53

## 2021-09-09 RX ADMIN — INSULIN LISPRO 3 UNITS: 100 INJECTION, SOLUTION INTRAVENOUS; SUBCUTANEOUS at 21:37

## 2021-09-09 RX ADMIN — BUDESONIDE 500 MCG: 0.5 INHALANT RESPIRATORY (INHALATION) at 06:59

## 2021-09-09 RX ADMIN — IPRATROPIUM BROMIDE AND ALBUTEROL SULFATE 1 AMPULE: .5; 3 SOLUTION RESPIRATORY (INHALATION) at 17:53

## 2021-09-09 RX ADMIN — Medication 1 CAPSULE: at 09:53

## 2021-09-09 RX ADMIN — IPRATROPIUM BROMIDE AND ALBUTEROL SULFATE 1 AMPULE: .5; 3 SOLUTION RESPIRATORY (INHALATION) at 13:10

## 2021-09-09 RX ADMIN — Medication 10 ML: at 09:54

## 2021-09-09 RX ADMIN — HYDRALAZINE HYDROCHLORIDE 50 MG: 50 TABLET, FILM COATED ORAL at 13:19

## 2021-09-09 RX ADMIN — SODIUM CHLORIDE, PRESERVATIVE FREE 300 UNITS: 5 INJECTION INTRAVENOUS at 21:38

## 2021-09-09 RX ADMIN — Medication 10 ML: at 21:21

## 2021-09-09 RX ADMIN — INSULIN GLARGINE 23 UNITS: 100 INJECTION, SOLUTION SUBCUTANEOUS at 21:37

## 2021-09-09 RX ADMIN — SODIUM CHLORIDE 25 ML: 9 INJECTION, SOLUTION INTRAVENOUS at 00:46

## 2021-09-09 RX ADMIN — CLONIDINE HYDROCHLORIDE 0.3 MG: 0.2 TABLET ORAL at 09:52

## 2021-09-09 RX ADMIN — HYDRALAZINE HYDROCHLORIDE 50 MG: 50 TABLET, FILM COATED ORAL at 21:40

## 2021-09-09 RX ADMIN — ACETYLCYSTEINE 600 MG: 200 SOLUTION ORAL; RESPIRATORY (INHALATION) at 06:59

## 2021-09-09 RX ADMIN — ACETYLCYSTEINE 600 MG: 200 SOLUTION ORAL; RESPIRATORY (INHALATION) at 01:49

## 2021-09-09 RX ADMIN — Medication 10 ML: at 09:55

## 2021-09-09 RX ADMIN — ENOXAPARIN SODIUM 40 MG: 40 INJECTION SUBCUTANEOUS at 09:53

## 2021-09-09 RX ADMIN — ACETYLCYSTEINE 600 MG: 200 SOLUTION ORAL; RESPIRATORY (INHALATION) at 17:54

## 2021-09-09 RX ADMIN — SODIUM CHLORIDE: 9 INJECTION, SOLUTION INTRAVENOUS at 01:15

## 2021-09-09 RX ADMIN — BUDESONIDE 500 MCG: 0.5 INHALANT RESPIRATORY (INHALATION) at 17:52

## 2021-09-09 RX ADMIN — Medication 10 ML: at 21:40

## 2021-09-09 RX ADMIN — HYDRALAZINE HYDROCHLORIDE 50 MG: 50 TABLET, FILM COATED ORAL at 06:21

## 2021-09-09 RX ADMIN — PIPERACILLIN SODIUM AND TAZOBACTAM SODIUM 3375 MG: 3; .375 INJECTION, POWDER, LYOPHILIZED, FOR SOLUTION INTRAVENOUS at 13:19

## 2021-09-09 RX ADMIN — ACETYLCYSTEINE 600 MG: 200 SOLUTION ORAL; RESPIRATORY (INHALATION) at 13:10

## 2021-09-09 RX ADMIN — INSULIN LISPRO 10 UNITS: 100 INJECTION, SOLUTION INTRAVENOUS; SUBCUTANEOUS at 09:55

## 2021-09-09 RX ADMIN — METHYLPREDNISOLONE SODIUM SUCCINATE 40 MG: 40 INJECTION, POWDER, FOR SOLUTION INTRAMUSCULAR; INTRAVENOUS at 06:15

## 2021-09-09 RX ADMIN — IPRATROPIUM BROMIDE AND ALBUTEROL SULFATE 1 AMPULE: .5; 3 SOLUTION RESPIRATORY (INHALATION) at 10:09

## 2021-09-09 RX ADMIN — IPRATROPIUM BROMIDE AND ALBUTEROL SULFATE 1 AMPULE: .5; 3 SOLUTION RESPIRATORY (INHALATION) at 06:59

## 2021-09-09 RX ADMIN — ROFLUMILAST 500 MCG: 500 TABLET ORAL at 09:52

## 2021-09-09 RX ADMIN — PIPERACILLIN SODIUM AND TAZOBACTAM SODIUM 3375 MG: 3; .375 INJECTION, POWDER, LYOPHILIZED, FOR SOLUTION INTRAVENOUS at 06:12

## 2021-09-09 RX ADMIN — SODIUM CHLORIDE: 9 INJECTION, SOLUTION INTRAVENOUS at 09:54

## 2021-09-09 ASSESSMENT — PAIN SCALES - WONG BAKER
WONGBAKER_NUMERICALRESPONSE: 0

## 2021-09-09 ASSESSMENT — PAIN SCALES - GENERAL
PAINLEVEL_OUTOF10: 0

## 2021-09-09 NOTE — CARE COORDINATION
9-9-Cm note: ( no covid testing) PT/OT recommending [facility] , pt kept going back to sleep, called pt's , he chose [facility], referral called, will await response. 2nd choice is [facility]. For SNF PRECERT required, pt will need a signed AHMET, updated PT/OT notes, and a HENS.  Electronically signed by Sang Clarke RN on 9/9/2021 at 10:13 AM

## 2021-09-09 NOTE — PROGRESS NOTES
Physical Therapy Treatment Note/Plan of Care    Room #:  1163/4487-61  Patient Name: Davion Goodwin  YOB: 1955  MRN: [id number]    Date of Service: 9/9/2021     Tentative placement recommendation: Subacute rehab  Equipment recommendation: To be determined      Evaluating Physical Therapist: Roderick Dumas PT, DPT #[id number]      Specific Provider Orders/Date/Referring Provider :   09/04/21 1145   PT eval and treat Start: 09/04/21 1145, End: 09/04/21 1145, ONE TIME, Standing Count: 1 Occurrences, Rinku Hayes MD Acknowledge New    Admitting Diagnosis:   SOB (shortness of breath) [R06.02]  Acquired angioedema [T78. 3XXA]      Visit Diagnoses       Codes    Angioedema, initial encounter     T78. 3XXA    Acute respiratory distress     R06.03        Surgery: None    Patient Active Problem List   Diagnosis    Non-rheumatic mitral regurgitation    Hypertensive left ventricular hypertrophy, without heart failure    Dyslipidemia    Non morbid obesity    Essential hypertension    [facility]    Myocardiopathy ([facility] [facility]    Tobacco use    Near syncope    Diarrhea    Generalized abdominal pain    Abdominal pain    Pre-syncope    PVD (peripheral vascular disease) with claudication (HCC)    Chronic pain of both ankles    ACE inhibitor-aggravated angioedema    Acute respiratory failure (HCC)    COPD (chronic obstructive pulmonary disease) (HCC)    Acquired angioedema    SOB (shortness of breath)    Disorder of airway    Acquired hypertrophy of tongue        ASSESSMENT of Current Deficits Patient exhibits decreased strength, balance and endurance impairing functional mobility and tolerance to activity. Able to sit on side of bed with motivation from  and therapist, multi plane instability and upright posture during sitting  Pt's confusion limited her progress with all functional mobility.  Patient needs continued PT to improve strength and endurance to tolerate and complete functional mobility with decreased assist required.         PHYSICAL THERAPY  PLAN OF CARE       Physical therapy plan of care is established based on physician order,  patient diagnosis and clinical assessment    Current Treatment Recommendations:    -Bed Mobility: Lower extremity exercises  and Trunk control activities   -Sitting Balance: Incorporate reaching activities to activate trunk muscles , Hands on support to maintain midline , Facilitate active trunk muscle engagement , Facilitate postural control in all planes  and Engage in core activities to allow for movement within base of support   -Standing Balance: Perform strengthening exercises in standing to promote motor control with or without upper extremity support , Instruct patient on adequate base of support to maintain balance and Challenge balance utilizing reaching  activities beyond center of gravity    -Transfers: Provide instruction on proper hand and foot position for adequate transfer of weight onto lower extremities and use of gait device, Cues for hand placement, technique and safety, Facilitate weight shift forward on to lower extremities and provide necessary stabilization of bilateral lower extremities , Support transfer of weight on to lower extremities, Assist with extension of knees trunk and hip to accept weight transfer  and Provide stabilization to prevent fall   -Gait: Gait training, Standing activities to improve: base of support, weight shift, weight bearing , Exercises to improve trunk control, Exercises to improve hip and knee control, Performance of protected weight bearing activities and Activities to increase weight bearing   -Endurance: Utilize Supervised activities to increase level of endurance to allow for safe functional mobility including transfers and gait  and Use graduated activities to promote good breathing techniques and provide support and education to maximize respiratory function    PT long term treatment goals are located in below grid    Patient and or family understand(s) diagnosis, prognosis, and plan of care. Frequency of treatments: Patient will be seen  daily. Prior Level of Function: Patient ambulated unknown. Rehab Potential: fair  for baseline    Past medical history:   Past Medical History:   Diagnosis Date    Arthritis     Asthma     Cerebral artery occlusion with cerebral infarction ([facility] [address] 2015    COPD (chronic obstructive pulmonary disease) ([facility] [address] [facility]     Diabetes mellitus ([facility] [address] [facility]     Heart attack ([facility] [address] [facility]     Hyperlipidemia     Hypertension     VHD (valvular heart disease)      Past Surgical History:   Procedure Laterality Date    CHOLECYSTECTOMY      COLONOSCOPY  12/2018    HYSTERECTOMY      HYSTERECTOMY, VAGINAL      UPPER GASTROINTESTINAL ENDOSCOPY  12/2018       SUBJECTIVE:    Precautions: Up with assistance, falls, O2 and confusion , AMS  Social history: Patient is a poor historian and stated that she lives alone in a two story home resides first  with 5 steps  to enter with [address]. Equipment owned: None    AM-[facility] Basic Mobility   8/24    AM-[facility] Mobility Inpatient   How much difficulty turning over in bed?: A Lot  How much difficulty sitting down on / standing up from a chair with arms?: A Lot  How much difficulty moving from lying on back to sitting on side of bed?: A Lot  How much help from another person moving to and from a bed to a chair?: A Lot  How much help from another person needed to walk in hospital room?: A Lot  How much help from another person for climbing 3-5 steps with a railing?: Total  AM-[facility] Inpatient Mobility Raw Score : 11  AM-PAC Inpatient T-Scale Score : 33.86  Mobility Inpatient CMS 0-100% Score: 72.57  Mobility Inpatient CMS G-Code Modifier : CL    Nursing cleared patient for PT treatment. .   OBJECTIVE;   Initial Evaluation  Date: 9/6/2021 Treatment Date:  9/9/2021       Short Term/ Long Term   Goals   Was pt agreeable to Eval/treatment?  Yes yes To be met in 5 days   Pain level   0/10   Not stated     Bed Mobility    Rolling: Maximal assist of 1    Supine to sit: Maximal assist of 1    Sit to supine: Maximal assist of 1    Scooting: Maximal assist of 1   Rolling: Maximal assist of 1   Supine to sit: Maximal assist of 1   Sit to supine: Maximal assist of 1   Scooting: Dependent of  2    Rolling: Minimal assist of 1    Supine to sit: Minimal assist of 1    Sit to supine: Minimal assist of 1    Scooting: Minimal assist of 1     Transfers Sit to stand: Not assessed   Sit to stand: Not assessed         Sit to stand: Minimal assist of 1    Ambulation    not assessed  not assessed     > 25 feet using  wheeled walker with Moderate assist of 1    Stair negotiation: ascended and descended   Not assessed        ROM Within functional limits    Increase range of motion 10% of affected joints    Strength BUE:  refer to OT eval  RLE:  3-/5  LLE:  3-/5  Increase strength in affected mm groups by 1/3 grade   Balance Sitting EOB:  poor+  Dynamic Standing:  not assessed  Sitting EOB: not assessed   Dynamic Standing: not assessed    Sitting EOB:  fair   Dynamic Standing: fair-     Patient is Alert & Oriented x person and follows one step directions    Sensation:  Patient  denies numbness/tingling   Edema:  no   Endurance: poor    Vitals: 4 liters nasal cannula   Blood Pressure at rest  Blood Pressure during session    Heart Rate at rest 71 Heart Rate during session    SPO2 at rest %  SPO2 during session %     Patient education  Patient educated on role of Physical Therapy, risks of immobility, safety and plan of care, energy conservation,  importance of mobility while in hospital , purse lip breathing and safety      Patient response to education:   Pt verbalized understanding Pt demonstrated skill Pt requires further education in this area   Yes Partial Yes      Treatment:  Patient practiced and was instructed/facilitated in the following treatment: patient side lying and then   Sat edge of bed 10 minutes with Maximal assist of 1 to increase dynamic sitting balance and activity tolerance. due to multi plane instability and not being able to maintain upright posture. Patient confused and hallucinating and not willing to participate any further. Assisted back to supine, dependent of 2 to head of bed and positioned for comfort       Therapeutic Exercises:  not performed x 10-12 reps      At end of session, patient in bed with  all lines and tubes in tact, call light within reach of patient and  nursing notified. Patient would benefit from continued skilled Physical Therapy to improve functional independence and quality of life.          Patient's/ family goals   rehab    Time in  153  Time out  211    Total Treatment Time  18 minutes        CPT codes:    Therapeutic activities (69104)   15 minutes  1 unit(s)  Non billable time 3 minutes   Hugo Mcneal PTA  LWK#[id number]

## 2021-09-09 NOTE — PLAN OF CARE
Problem: Non-Violent Restraints  Goal: Removal from restraints as soon as assessed to be safe  Outcome: Met This Shift     Problem: Falls - Risk of:  Goal: Will remain free from falls  Description: Will remain free from falls  Outcome: Met This Shift  Goal: Absence of physical injury  Description: Absence of physical injury  Outcome: Met This Shift     Problem: Skin Integrity:  Goal: Will show no infection signs and symptoms  Description: Will show no infection signs and symptoms  Outcome: Met This Shift  Goal: Absence of new skin breakdown  Description: Absence of new skin breakdown  Outcome: Met This Shift     Problem: Breathing Pattern - Ineffective:  Goal: Ability to achieve and maintain a regular respiratory rate will improve  Description: Ability to achieve and maintain a regular respiratory rate will improve  Outcome: Met This Shift     Problem: Airway Clearance - Ineffective:  Goal: Ability to maintain a clear airway will improve  Description: Ability to maintain a clear airway will improve  Outcome: Met This Shift

## 2021-09-09 NOTE — PROGRESS NOTES
4\" (1.626 m)   Wt 198 lb 12.8 oz (90.2 kg)   SpO2 99%   BMI 34.12 kg/m²   In: 170 [P.O.:120]  Out: 2200    In: 170   Out: 2200 [Urine:2200]     AAO x 3, currently in NAD  RRR, pos S1, S2  Tight, no wheeze, rales or rhonchi  bowel sounds present, nontender, nondistended  No clubbing, cyanosis, or edema  No neuro changes o/w seen at this time  No obvious rashes or lesions. Recent Labs     09/07/21 0530 09/08/21  0530 09/09/21  0700   WBC 10.6 9.5 10.2   HGB 11.1* 10.7* 11.7    260 273     Recent Labs     09/07/21 0530 09/08/21  0530 09/09/21  0700    136 134   K 4.1 4.7 5.0    104 100   CO2 28 26 25   BUN 22 17 19   CREATININE 0.8 0.7 0.7   GLUCOSE 59* 286* 396*     Recent Labs     09/07/21 0530 09/08/21  0530 09/09/21  0700   BILITOT 0.6 0.7 0.9   ALKPHOS 41 43 48   AST 20 35* 32*   ALT 26 47* 67*     No results for input(s): INR in the last 72 hours. Invalid input(s): PT  No results for input(s): CKTOTAL, CKMB, CKMBINDEX, TROPONINI in the last 72 hours. XR CHEST PORTABLE    Result Date: 8/25/2021  EXAMINATION: ONE XRAY VIEW OF THE CHEST 8/24/2021 8:52 pm COMPARISON: 08/24/2021 HISTORY: ORDERING SYSTEM PROVIDED HISTORY: et tube placement TECHNOLOGIST PROVIDED HISTORY: Reason for exam:->et tube placement FINDINGS: Stable position of endotracheal tube. Cardiomediastinal silhouette is unremarkable. No infiltrate, effusion, or pneumothorax. No acute osseous abnormality. No pulmonary infiltrates. No significant change since the prior study     XR CHEST PORTABLE    Result Date: 8/24/2021  EXAMINATION: ONE XRAY VIEW OF THE CHEST 8/24/2021 5:30 pm COMPARISON: January 3, 2020 HISTORY: ORDERING SYSTEM PROVIDED HISTORY: SOB (shortness of breath) TECHNOLOGIST PROVIDED HISTORY: Reason for exam:->ETT placement, tongue swelling FINDINGS: Endotracheal tube is approximately 6.8 cm above the chvio. No airspace opacity or pleural effusion. The heart is normal in size. No pneumothorax. 1. Endotracheal tube is approximately 6.8 cm above the chivo. 2. No airspace opacity or pleural effusion. Assessment:  Kisha Chiu is a 78y.o. year old female who presented on 8/24/2021 and is being treated for:  Principal Problem:    Acute respiratory failure ([address]  Active Problems:    Essential hypertension    Diabetes mellitus ([address]    Myocardiopathy ([address]    ACE inhibitor-aggravated angioedema    COPD (chronic obstructive pulmonary disease) (HCC)    Acquired angioedema    SOB (shortness of breath)    Disorder of airway    Acquired hypertrophy of tongue  Resolved Problems:    * No resolved hospital problems. *    Plan  · Cont supportive care including steroids and abx post extubation - but will wean cut down steroids and transition abx to po  · Monitor BS which have been elevated - will restart insulin and stop ivfs   · Cont PT/OT - get out of bed to chair  · Dc to rehab in the next 1-3 days  · Please see orders for further management and care. >35 minutes of critical care time was spent with the patient. This includes chart review, , and discussion with those consultants involved in the patient's care. More than 50% of my  time was spent at the bedside counseling/coordinating care with the patient and/or family with face to face contact. This time was spent reviewing notes and laboratory data as well as instructing and counseling the patient. Time I spent with the family or surrogate(s) is included only if the patient was incapable of providing the necessary information or participating in medical decisions. I also discussed the differential diagnosis and all of the proposed management plans with the patient and individuals accompanying the patient. This patient requires this high level of physician care and nursing on the [facility] unit due the complexity of decision management and chance of rapid decline or death.   Continued cardiac monitoring and higher level of nursing are required. I am readily available for any further decision-making and intervention.      David Allred MD  11:36 AM  9/9/2021

## 2021-09-10 ENCOUNTER — APPOINTMENT (OUTPATIENT)
Dept: CT IMAGING | Age: 66
DRG: 207 | End: 2021-09-10
Payer: MEDICARE

## 2021-09-10 LAB
ALBUMIN SERPL-MCNC: 3 G/DL (ref 3.5–5.2)
ALP BLD-CCNC: 49 U/L (ref 35–104)
ALT SERPL-CCNC: 56 U/L (ref 0–32)
ANION GAP SERPL CALCULATED.3IONS-SCNC: 9 MMOL/L (ref 7–16)
AST SERPL-CCNC: 20 U/L (ref 0–31)
BASOPHILS ABSOLUTE: 0.01 E9/L (ref 0–0.2)
BASOPHILS RELATIVE PERCENT: 0.1 % (ref 0–2)
BILIRUB SERPL-MCNC: 0.8 MG/DL (ref 0–1.2)
BUN BLDV-MCNC: 18 MG/DL (ref 6–23)
CALCIUM SERPL-MCNC: 8.3 MG/DL (ref 8.6–10.2)
CHLORIDE BLD-SCNC: 96 MMOL/L (ref 98–107)
CO2: 27 MMOL/L (ref 22–29)
CREAT SERPL-MCNC: 0.7 MG/DL (ref 0.5–1)
EOSINOPHILS ABSOLUTE: 0.15 E9/L (ref 0.05–0.5)
EOSINOPHILS RELATIVE PERCENT: 1.4 % (ref 0–6)
GFR AFRICAN AMERICAN: >60
GFR NON-AFRICAN AMERICAN: >60 ML/MIN/1.73
GLUCOSE BLD-MCNC: 266 MG/DL (ref 74–99)
HCT VFR BLD CALC: 33.6 % (ref 34–48)
HEMOGLOBIN: 11.5 G/DL (ref 11.5–15.5)
IMMATURE GRANULOCYTES #: 0.05 E9/L
IMMATURE GRANULOCYTES %: 0.5 % (ref 0–5)
LYMPHOCYTES ABSOLUTE: 1.13 E9/L (ref 1.5–4)
LYMPHOCYTES RELATIVE PERCENT: 10.9 % (ref 20–42)
MAGNESIUM: 1.7 MG/DL (ref 1.6–2.6)
MCH RBC QN AUTO: 31.4 PG (ref 26–35)
MCHC RBC AUTO-ENTMCNC: 34.2 % (ref 32–34.5)
MCV RBC AUTO: 91.8 FL (ref 80–99.9)
METER GLUCOSE: 196 MG/DL (ref 74–99)
METER GLUCOSE: 230 MG/DL (ref 74–99)
METER GLUCOSE: 254 MG/DL (ref 74–99)
METER GLUCOSE: 297 MG/DL (ref 74–99)
MONOCYTES ABSOLUTE: 0.93 E9/L (ref 0.1–0.95)
MONOCYTES RELATIVE PERCENT: 9 % (ref 2–12)
NEUTROPHILS ABSOLUTE: 8.08 E9/L (ref 1.8–7.3)
NEUTROPHILS RELATIVE PERCENT: 78.1 % (ref 43–80)
PDW BLD-RTO: 11.2 FL (ref 11.5–15)
PHOSPHORUS: 2.5 MG/DL (ref 2.5–4.5)
PLATELET # BLD: 248 E9/L (ref 130–450)
PMV BLD AUTO: 9.4 FL (ref 7–12)
POTASSIUM SERPL-SCNC: 4.2 MMOL/L (ref 3.5–5)
RBC # BLD: 3.66 E12/L (ref 3.5–5.5)
SODIUM BLD-SCNC: 132 MMOL/L (ref 132–146)
TOTAL PROTEIN: 5.3 G/DL (ref 6.4–8.3)
WBC # BLD: 10.4 E9/L (ref 4.5–11.5)

## 2021-09-10 PROCEDURE — 6360000002 HC RX W HCPCS: Performed by: INTERNAL MEDICINE

## 2021-09-10 PROCEDURE — 6370000000 HC RX 637 (ALT 250 FOR IP): Performed by: INTERNAL MEDICINE

## 2021-09-10 PROCEDURE — 6370000000 HC RX 637 (ALT 250 FOR IP): Performed by: NURSE PRACTITIONER

## 2021-09-10 PROCEDURE — 36415 COLL VENOUS BLD VENIPUNCTURE: CPT

## 2021-09-10 PROCEDURE — 97530 THERAPEUTIC ACTIVITIES: CPT

## 2021-09-10 PROCEDURE — 94640 AIRWAY INHALATION TREATMENT: CPT

## 2021-09-10 PROCEDURE — 2700000000 HC OXYGEN THERAPY PER DAY

## 2021-09-10 PROCEDURE — 80053 COMPREHEN METABOLIC PANEL: CPT

## 2021-09-10 PROCEDURE — 70450 CT HEAD/BRAIN W/O DYE: CPT

## 2021-09-10 PROCEDURE — 84100 ASSAY OF PHOSPHORUS: CPT

## 2021-09-10 PROCEDURE — 2060000000 HC ICU INTERMEDIATE R&B

## 2021-09-10 PROCEDURE — 83735 ASSAY OF MAGNESIUM: CPT

## 2021-09-10 PROCEDURE — 99291 CRITICAL CARE FIRST HOUR: CPT | Performed by: INTERNAL MEDICINE

## 2021-09-10 PROCEDURE — 82962 GLUCOSE BLOOD TEST: CPT

## 2021-09-10 PROCEDURE — 85025 COMPLETE CBC W/AUTO DIFF WBC: CPT

## 2021-09-10 PROCEDURE — 97110 THERAPEUTIC EXERCISES: CPT

## 2021-09-10 PROCEDURE — 6360000002 HC RX W HCPCS

## 2021-09-10 PROCEDURE — 2580000003 HC RX 258: Performed by: INTERNAL MEDICINE

## 2021-09-10 PROCEDURE — 2580000003 HC RX 258

## 2021-09-10 RX ORDER — CLOTRIMAZOLE 10 MG/1
10 LOZENGE ORAL; TOPICAL
Status: DISCONTINUED | OUTPATIENT
Start: 2021-09-10 | End: 2021-09-10

## 2021-09-10 RX ORDER — DOXYCYCLINE HYCLATE 100 MG/1
100 CAPSULE ORAL EVERY 12 HOURS SCHEDULED
Status: DISCONTINUED | OUTPATIENT
Start: 2021-09-10 | End: 2021-09-15 | Stop reason: HOSPADM

## 2021-09-10 RX ADMIN — DOXYCYCLINE HYCLATE 100 MG: 100 CAPSULE ORAL at 22:01

## 2021-09-10 RX ADMIN — Medication 10 ML: at 09:15

## 2021-09-10 RX ADMIN — IPRATROPIUM BROMIDE AND ALBUTEROL SULFATE 1 AMPULE: .5; 3 SOLUTION RESPIRATORY (INHALATION) at 02:41

## 2021-09-10 RX ADMIN — ACETYLCYSTEINE 600 MG: 200 SOLUTION ORAL; RESPIRATORY (INHALATION) at 06:41

## 2021-09-10 RX ADMIN — HYDRALAZINE HYDROCHLORIDE 50 MG: 50 TABLET, FILM COATED ORAL at 15:21

## 2021-09-10 RX ADMIN — IPRATROPIUM BROMIDE AND ALBUTEROL SULFATE 1 AMPULE: .5; 3 SOLUTION RESPIRATORY (INHALATION) at 19:04

## 2021-09-10 RX ADMIN — NYSTATIN 500000 UNITS: 100000 SUSPENSION ORAL at 22:02

## 2021-09-10 RX ADMIN — IPRATROPIUM BROMIDE AND ALBUTEROL SULFATE 1 AMPULE: .5; 3 SOLUTION RESPIRATORY (INHALATION) at 06:40

## 2021-09-10 RX ADMIN — IPRATROPIUM BROMIDE AND ALBUTEROL SULFATE 1 AMPULE: .5; 3 SOLUTION RESPIRATORY (INHALATION) at 13:56

## 2021-09-10 RX ADMIN — SODIUM CHLORIDE: 9 INJECTION, SOLUTION INTRAVENOUS at 09:15

## 2021-09-10 RX ADMIN — INSULIN GLARGINE 23 UNITS: 100 INJECTION, SOLUTION SUBCUTANEOUS at 22:09

## 2021-09-10 RX ADMIN — SODIUM CHLORIDE, PRESERVATIVE FREE 300 UNITS: 5 INJECTION INTRAVENOUS at 09:15

## 2021-09-10 RX ADMIN — FAMOTIDINE 20 MG: 20 TABLET, FILM COATED ORAL at 22:01

## 2021-09-10 RX ADMIN — ACETYLCYSTEINE 600 MG: 200 SOLUTION ORAL; RESPIRATORY (INHALATION) at 22:21

## 2021-09-10 RX ADMIN — ACETYLCYSTEINE 600 MG: 200 SOLUTION ORAL; RESPIRATORY (INHALATION) at 09:57

## 2021-09-10 RX ADMIN — BUDESONIDE 500 MCG: 0.5 INHALANT RESPIRATORY (INHALATION) at 06:40

## 2021-09-10 RX ADMIN — Medication 10 ML: at 22:02

## 2021-09-10 RX ADMIN — PIPERACILLIN SODIUM AND TAZOBACTAM SODIUM 3375 MG: 3; .375 INJECTION, POWDER, LYOPHILIZED, FOR SOLUTION INTRAVENOUS at 05:12

## 2021-09-10 RX ADMIN — HYDRALAZINE HYDROCHLORIDE 50 MG: 50 TABLET, FILM COATED ORAL at 06:34

## 2021-09-10 RX ADMIN — INSULIN LISPRO 1 UNITS: 100 INJECTION, SOLUTION INTRAVENOUS; SUBCUTANEOUS at 22:09

## 2021-09-10 RX ADMIN — CLOTRIMAZOLE 10 MG: 10 LOZENGE ORAL; TOPICAL at 15:40

## 2021-09-10 RX ADMIN — IPRATROPIUM BROMIDE AND ALBUTEROL SULFATE 1 AMPULE: .5; 3 SOLUTION RESPIRATORY (INHALATION) at 22:21

## 2021-09-10 RX ADMIN — INSULIN LISPRO 6 UNITS: 100 INJECTION, SOLUTION INTRAVENOUS; SUBCUTANEOUS at 12:17

## 2021-09-10 RX ADMIN — ACETYLCYSTEINE 600 MG: 200 SOLUTION ORAL; RESPIRATORY (INHALATION) at 19:04

## 2021-09-10 RX ADMIN — DIMETHICONE, CAMPHOR (SYNTHETIC), MENTHOL, AND PHENOL: 1.1; .5; .625; .5 OINTMENT TOPICAL at 12:02

## 2021-09-10 RX ADMIN — HYDRALAZINE HYDROCHLORIDE 50 MG: 50 TABLET, FILM COATED ORAL at 22:02

## 2021-09-10 RX ADMIN — CLONIDINE HYDROCHLORIDE 0.3 MG: 0.2 TABLET ORAL at 22:00

## 2021-09-10 RX ADMIN — SODIUM CHLORIDE, PRESERVATIVE FREE 300 UNITS: 5 INJECTION INTRAVENOUS at 22:02

## 2021-09-10 RX ADMIN — ACETYLCYSTEINE 600 MG: 200 SOLUTION ORAL; RESPIRATORY (INHALATION) at 02:42

## 2021-09-10 RX ADMIN — MICONAZOLE NITRATE: 2 OINTMENT TOPICAL at 15:22

## 2021-09-10 RX ADMIN — IPRATROPIUM BROMIDE AND ALBUTEROL SULFATE 1 AMPULE: .5; 3 SOLUTION RESPIRATORY (INHALATION) at 09:57

## 2021-09-10 RX ADMIN — INSULIN LISPRO 4 UNITS: 100 INJECTION, SOLUTION INTRAVENOUS; SUBCUTANEOUS at 17:49

## 2021-09-10 RX ADMIN — ENOXAPARIN SODIUM 40 MG: 40 INJECTION SUBCUTANEOUS at 09:15

## 2021-09-10 RX ADMIN — SODIUM CHLORIDE: 9 INJECTION, SOLUTION INTRAVENOUS at 16:20

## 2021-09-10 RX ADMIN — ACETYLCYSTEINE 600 MG: 200 SOLUTION ORAL; RESPIRATORY (INHALATION) at 13:56

## 2021-09-10 RX ADMIN — PIPERACILLIN SODIUM AND TAZOBACTAM SODIUM 3375 MG: 3; .375 INJECTION, POWDER, LYOPHILIZED, FOR SOLUTION INTRAVENOUS at 12:11

## 2021-09-10 ASSESSMENT — ENCOUNTER SYMPTOMS: TACHYPNEA: 1

## 2021-09-10 ASSESSMENT — PAIN SCALES - GENERAL
PAINLEVEL_OUTOF10: 5
PAINLEVEL_OUTOF10: 0

## 2021-09-10 ASSESSMENT — PAIN SCALES - WONG BAKER
WONGBAKER_NUMERICALRESPONSE: 0

## 2021-09-10 ASSESSMENT — PAIN SCALES - PAIN ASSESSMENT IN ADVANCED DEMENTIA (PAINAD)
BREATHING: 1
CONSOLABILITY: 1
NEGVOCALIZATION: 1
BODYLANGUAGE: 1
FACIALEXPRESSION: 1
TOTALSCORE: 5

## 2021-09-10 NOTE — PROGRESS NOTES
OT BEDSIDE TREATMENT NOTE      Date:9/10/2021  Patient Name: Bettie Palomares  MRN: [id number]  : 1955  Room: [address] [place] [address]        Evaluating OT: Ivette Bah OTR/DELANEY; [id number]        Referring Provider and Orders/Date:   OT eval and treat Start: 21 1145, End: 21 1145, ONE TIME, Standing Count: 1 Occurrences, Caesar Granda MD     Diagnosis:   1. Acute respiratory failure with hypoxia and hypercapnia (HCC)    2. SOB (shortness of breath)    3. Angioedema, initial encounter    4. Acute respiratory distress          Pertinent Medical History:        Past Medical History        Past Medical History:   Diagnosis Date    Arthritis      Asthma      [facility]erebral artery occlusion with [facility]     COPD (chronic obstructive pulmonary disease) (HCC)      Diabetes mellitus ([facility] [facility]      Heart attack ([facility] [address] [facility]      Hyperlipidemia      Hypertension      VHD (valvular heart disease)               Past Surgical History   Past Surgical History:   Procedure Laterality Date    CHOLECYSTECTOMY        COLONOSCOPY   2018    HYSTERECTOMY        HYSTERECTOMY, VAGINAL        UPPER GASTROINTESTINAL ENDOSCOPY   2018           Precautions:  Fall Risk, 3L O2, confused, montague,  A x 2 recommended     Recommended placement: subacute      Assessment of current deficits     [x]? Functional mobility           [x]? ADLs           [x]? Strength                   [x]? Cognition     [x]? Functional transfers         [x]? IADLs         [x]? Safety Awareness   [x]? Endurance     [x]? Fine Coordination                        [x]? Balance      [x]? Vision/perception    []? Sensation       [x]? Gross Motor Coordination            []? ROM           []?  Delirium                   []? Motor Control      OT PLAN OF CARE   OT POC based on physician orders, patient diagnosis and results of clinical assessment     Frequency/Duration 1-3 days/wk for 2 weeks PRN   Specific OT Treatment Interventions to include:   * Instruction/training on adapted ADL techniques and AE recommendations to increase functional independence within precautions       * Training on energy conservation strategies, correct breathing pattern and techniques to improve independence/tolerance for self-care routine  * Functional transfer/mobility training/DME recommendations for increased independence, safety, and fall prevention  * Patient/Family education to increase follow through with safety techniques and functional independence  * Recommendation of environmental modifications for increased safety with functional transfers/mobility and ADLs  * Cognitive retraining/development of therapeutic activities to improve problem solving, judgement, memory, and attention for increased safety/participation in ADL/IADL tasks  * Therapeutic exercise to improve motor endurance, ROM, and functional strength for ADLs/functional transfers  * Therapeutic activities to facilitate/challenge dynamic balance, stand tolerance for increased safety and independence with ADLs  * Therapeutic activities to facilitate gross/fine motor skills for increased independence with ADLs  * Neuro-muscular re-education: facilitation of righting/equilibrium reactions, midline orientation, scapular stability/mobility, normalization of muscle tone, and facilitation of volitional active controled movement      Recommended Adaptive Equipment/DME:  TBD       Home Living: Pt is a poor historian. Pt lives at home with spouse. Reports bed/bath on 2nd floor. She reports that she has 3 sons that live at home, but suspected this as false. Bathroom setup: unknown    DME owned: unknown      Prior Level of Function: indep with ADLs , indep with IADLs; ambulated indep   Driving: yes   Occupation: unknow   Enjoys: no report     Pain Level: none  Cognition: A&O: 1/4 to name only;  Follows 2 step directions-pt is a poor historian              Memory:  Poor+              Sequencing:  Fair- Problem solving:  Poor+              Judgement/safety:  Fair-     AM-[facility] Daily Activity Inpatient   How much help for putting on and taking off regular lower body clothing?: Total  How much help for Bathing?: A Lot  How much help for Toileting?: Total  How much help for putting on and taking off regular upper body clothing?: Total  How much help for taking care of personal grooming?: A Lot  How much help for eating meals?: A Lot  AM-[facility] Inpatient Daily Activity Raw Score: 9  AM-[facility] Inpatient ADL T-Scale Score : 25.33  ADL Inpatient CMS 0-100% Score: 79.59  ADL Inpatient CMS G-Code Modifier : CL                   Functional Assessment:      Initial Eval Status  Date: 9/7/2021   Treatment Status  Date:9/10/2021 STGs = LTGs  Time frame: 10-14 days   Feeding Maximal Assist and hand over hand to bring drinks to mouth, use utensils appropriately and eat with finger foods. Positioning interventions in bed required. 75% spillage with attempts on her own. Consumed <25% of intake  N/T Min A   Grooming Maximal Assist for face/hand wash and hair comb from supine N/T  Minimal Assist    UB Dressing Dependent with gown management from supine due to confusion Dep to adjust gown Moderate Assist    LB Dressing Dependent for prashant socks from supine Dep to don B socks when pt long seated in bed; pt able to cross LLE over R LE, however required assist for LE dressing Moderate Assist    Bathing Maximal Assist with pt able to wash neck and chest from supine level  N/T Moderate Assist    Toileting Dependent with montague management Dep with montague management Maximal Assist    Bed Mobility  Supine to sit: Maximal Assist   Sit to supine: Moderate Assist  With pt confused and demonstrating fear of falling.  Rolling with max A with mod cues and hand over hand for use of bed rails  Max A for supine to sit; max A for scooting; sit to supine at max A with assist to guide UB and LE's to/from supine  Supine to sit: Minimal Assist   Sit to supine: Minimal Assist    Functional Transfers  NT due to pt overall debility, decreased activity tolerance, balance deficits, safety and fall risk.     N/T d/t multiplane instability when seated EOB with mod/max A for upright position  Moderate Assist    Functional Mobility  NT due to pt overall debility, decreased activity tolerance, balance deficits, safety and fall risk.     N/T  Moderate Assist    Balance Sitting:     Static:  Fair-    Dynamic:poor+  Standing: NT Sitting:     Static:  Fair-/poor    Dynamic:poor  Standing: NT d/t decreased sitting balance and multiplane instability Sitting:     Static:  fair    Dynamic:fair  Standing: fair-   Activity Tolerance Vitals with activity:3 L with 93% and HR 66. Sitting EOB for <2min with pt pushing backwards and confusion. Not safe to attempt standing    Pt on 3L O2 during session; pt demo'd confusion and intermittent laughing;  Pt is not safe to attempt standing  Increase sitting tolerance for >15min with stable vital signs for carry over into toileting, functional tranfers and indep in ADLs   Visual/  Perceptual Glasses: not Present; overall visual limitations, but difficult to assess due to confusion. Inaccurate with finding items on tray     Reports change in vision since admission: No      NA   Yunier UE Strengthening  4/5 generally   5/5MMT generally for carry over into self care, functional transfers and functional mobility with AD.       Hand Dominance  []? Right   [x]? Left     AROM (PROM) Strength Additional Info:    RUE  WFL 4/5 Fair  and  FMC/dexterity noted during ADL tasks  Opposition [x]? Intact []? Impaired  Finger to nose []? Intact [x]? Impaired      LUE WFL 4/5 good  and fair FMC/dexterity noted during ADL tasks  Opposition [x]? Intact []? Impaired  Finger to nose []? Intact [x]? Impaired    - BUE AAROM exercises: 15 reps in all planes of movement to increase ROM/endurance required for functional transfers/ADL participation.  Exercises completed in shoulder and elbow flexion/extension, internal/external rotation, abduction/adduction, supination/pronation, digit and wrist flexion/extension; pt unable to complete  digit opposition d/t confusion. B UE ROM appears to be [facility] with assist. Ex's completed when pt RTB. Trunk Ex's: 10 x trunk flexion/extension and 5 x in R/L lateral flexion with Max A in attempts to increase sitting balance d/t multiplane instability. Hearing: WFL   Sensation:  No c/o numbness or tingling   Tone: WFL   Edema: none      Comments: Patient cleared by nursing staff. Upon arrival pt supine in bed. Pt agreeable to OT tx session. Pt educated with regards to bed mobility, hand placement, safety awareness, static sitting balance, LE/UE dressing, ECT's, trunk ex's, B UE ROM ex's. At end of session pt supine in bed  with all lines and tubes intact, call light within reach. Overall, pt demonstrated decreased independence and safety during completion of ADL/functional transfers/mobility tasks. Pt would benefit from continued skilled OT to increase safety and independence with completion of ADL/IADL tasks for functional independence and quality of life. Pt required cues and education as noted above for safe facilitation and completion of tasks. Therapist provided skilled monitoring of patient's response during treatment session. Prior to and at the end of session, environmental modifications/ line management completed for patients safety and efficiency of treatment session. Overall, patient demonstrates moderate difficulties with completion of BADLs and IADLs. Factors contributing to these difficulties include confusion, multiplane instability, decreased endurance, and generalized weakness. As noted above, patient likely to benefit from further OT intervention to increase independence, safety, and overall quality of life.      Treatment:     ? Bed mobility: Facilitated bed mobility with cues for proper body mechanics and sequencing to prepare for ADL completion. ? ADL completion: Self-care retraining for the above-mentioned ADLs; training on proper hand placement, safety technique, sequencing, and energy conservation techniques. ? Postural Balance: Sitting balance retraining to improve righting reactions with postural changes during ADLs. ? Skilled positioning: Proper positioning to improve interaction with environment, overall functioning and decrease/prevent edema and contractures. ? Therapeutic Ex's: To increase B UE strength, ROM, endurance required for functional transfers and ADL participation. · Pt has made fair progress toward set goals  ·   OT 1-3x/week for 5-7 days during hospitalization       Treatment Time also includes thorough review of current medical information, gathering information on past medical history/social history and prior level of function, informal observation of tasks, assessment of data and education on plan of care and goals.     Treatment Time In: 3:33 PM     Treatment Time Out: 4:01 PM            Treatment Charges: Mins Units   ADL/Home Mgt     [id number]     Thera Activities     [id number] 01 1   Ther Ex                 97088 15 1   Manual Therapy    16209     Neuro Re-ed         [id number]     Orthotic manage/training                               [id number]     Non Billable Time     Total Timed Treatment 28 0415 MIRZA Stevenson/L #[id number]

## 2021-09-10 NOTE — PROGRESS NOTES
Physical Therapy        [id number]    Patient unavailable for physical therapy treatment due to nursing reporting patient is inappropriate for therapy today. Will attempt session at later date.      Kehinde Allen, PTA  #[id number]

## 2021-09-10 NOTE — CARE COORDINATION
9/10/21 No covid testing. CM transition of care: pt in isolation- CM stood by door- patient continues to mumble. Call to [facility]- she will call CM with decision of acceptance when she is out of meeting. Alternate discharge to [facility]- per 's request. NOT a Click \"0\" for those facilities. Pt will need signed AHMET, HENS and PRECERT and Rapid Covid prior to discharge. Pt continues on 2lnc. CM following.  Electronically signed by Westley Solomon RN-BC on 9/10/2021 at 9:17 AM

## 2021-09-10 NOTE — PROGRESS NOTES
Subjective:  Thomas Quinteros was seen and examined at bedside today. The patient's questions were answered and tests were reviewed. There were no new problems reported overnight. Patient is tolerating current diet.   She is drowsy today but awakens appropriately  pts throat is sore   BS have improved    A complete review of systems and social history was completed on admission and remains unchanged unless otherwise noted    Scheduled Meds:   miconazole nitrate   Topical BID    clotrimazole  10 mg Oral 5x Daily    predniSONE  40 mg Oral Daily    insulin glargine  23 Units SubCUTAneous Nightly    metoprolol succinate  50 mg Oral Daily    insulin lispro  0-12 Units SubCUTAneous TID WC    insulin lispro  0-6 Units SubCUTAneous Nightly    cloNIDine  0.3 mg Oral BID    famotidine  20 mg Oral BID    hydrALAZINE  50 mg Oral 3 times per day    cloNIDine  0.1 mg Oral Once    Roflumilast  500 mcg Oral Daily    lactobacillus  1 capsule Oral Daily    acetylcysteine  600 mg Inhalation Q4H    piperacillin-tazobactam  3,375 mg IntraVENous Q8H    ipratropium-albuterol  1 ampule Inhalation Q4H    enoxaparin  40 mg SubCUTAneous Daily    lidocaine PF  5 mL IntraDERmal Once    sodium chloride flush  5-40 mL IntraVENous 2 times per day    heparin flush  3 mL IntraVENous 2 times per day    budesonide  0.5 mg Nebulization BID    medicated lip ointment   Topical Daily    sodium chloride flush  5-40 mL IntraVENous 2 times per day     Continuous Infusions:   sodium chloride 12.5 mL/hr at 09/10/21 1620    sodium chloride 25 mL (09/09/21 0046)    dextrose      sodium chloride       PRN Meds:perflutren lipid microspheres, sodium chloride flush, sodium chloride, heparin flush, glucose, dextrose, glucagon (rDNA), dextrose, sodium chloride flush, sodium chloride, ondansetron **OR** ondansetron, polyethylene glycol, acetaminophen **OR** acetaminophen    Objective:  BP (!) 154/67   Pulse 90   Temp 98.5 °F (36.9 °C) (Oral) Resp 22   Ht 5' 4\" (1.626 m)   Wt 198 lb 12.8 oz (90.2 kg)   SpO2 98%   BMI 34.12 kg/m²   In: 0   Out: 1100    In: 0   Out: 1100 [Urine:1100]     AAO x 3, currently in NAD  Thrush in mouth seen  RRR, pos S1, S2  Tight, no wheeze, rales or rhonchi  bowel sounds present, nontender, nondistended  No clubbing, cyanosis, or edema  No neuro changes o/w seen at this time  No obvious rashes or lesions except for excoriations on LE    Recent Labs     09/08/21  0530 09/09/21  0700 09/10/21  0930   WBC 9.5 10.2 10.4   HGB 10.7* 11.7 11.5    273 248     Recent Labs     09/08/21  0530 09/09/21  0700 09/10/21  0930    134 132   K 4.7 5.0 4.2    100 96*   CO2 26 25 27   BUN 17 19 18   CREATININE 0.7 0.7 0.7   GLUCOSE 286* 396* 266*     Recent Labs     09/08/21  0530 09/09/21  0700 09/10/21  0930   BILITOT 0.7 0.9 0.8   ALKPHOS 43 48 49   AST 35* 32* 20   ALT 47* 67* 56*     No results for input(s): INR in the last 72 hours. Invalid input(s): PT  No results for input(s): CKTOTAL, CKMB, CKMBINDEX, TROPONINI in the last 72 hours. XR CHEST PORTABLE    Result Date: 8/25/2021  EXAMINATION: ONE XRAY VIEW OF THE CHEST 8/24/2021 8:52 pm COMPARISON: 08/24/2021 HISTORY: ORDERING SYSTEM PROVIDED HISTORY: et tube placement TECHNOLOGIST PROVIDED HISTORY: Reason for exam:->et tube placement FINDINGS: Stable position of endotracheal tube. Cardiomediastinal silhouette is unremarkable. No infiltrate, effusion, or pneumothorax. No acute osseous abnormality. No pulmonary infiltrates. No significant change since the prior study     XR CHEST PORTABLE    Result Date: 8/24/2021  EXAMINATION: ONE XRAY VIEW OF THE CHEST 8/24/2021 5:30 pm COMPARISON: January 3, 2020 HISTORY: ORDERING SYSTEM PROVIDED HISTORY: SOB (shortness of breath) TECHNOLOGIST PROVIDED HISTORY: Reason for exam:->ETT placement, tongue swelling FINDINGS: Endotracheal tube is approximately 6.8 cm above the chivo.   No airspace opacity or pleural effusion. The heart is normal in size. No pneumothorax. 1. Endotracheal tube is approximately 6.8 cm above the chivo. 2. No airspace opacity or pleural effusion. Assessment:  Jeronimo Gillis is a 78y.o. year old female who presented on 8/24/2021 and is being treated for:  Principal Problem:    Acute respiratory failure ([address]  Active Problems:    Essential hypertension    Diabetes mellitus ([facility] [address]    Myocardiopathy ([address]    ACE inhibitor-aggravated angioedema    COPD (chronic obstructive pulmonary disease) (HCC)    Acquired angioedema    SOB (shortness of breath)    Disorder of airway    Acquired hypertrophy of tongue  Resolved Problems:    * No resolved hospital problems. *    Plan  · Cont supportive care including steroids and abx post extubation - but will wean cut down steroids and transition abx to po  · Start antifungal for thrush  · Monitor BS which improved   · Cont PT/OT - get out of bed to chair  · Dc to rehab in the next 1-3 days  · Please see orders for further management and care. · D/w and updated  at bedside     More than 50% of my  time was spent at the bedside counseling/coordinating care with the patient and/or family with face to face contact. This time was spent reviewing notes and laboratory data as well as instructing and counseling the patient. Time I spent with the family or surrogate(s) is included only if the patient was incapable of providing the necessary information or participating in medical decisions. I also discussed the differential diagnosis and all of the proposed management plans with the patient and individuals accompanying the patient. This patient requires this high level of physician care and nursing on the [facility] unit due the complexity of decision management and chance of rapid decline or death. Continued cardiac monitoring and higher level of nursing are required. I am readily available for any further decision-making and intervention.      Elsy Chaney Letty Garcia MD  5:24 PM  9/10/2021

## 2021-09-10 NOTE — PLAN OF CARE
Problem: Non-Violent Restraints  Goal: Removal from restraints as soon as assessed to be safe  9/10/2021 0046 by Joseph Bender RN  Outcome: Met This Shift  9/9/2021 1614 by Liz Cardenas RN  Outcome: Met This Shift  Goal: No harm/injury to patient while restraints in use  Outcome: Met This Shift  Goal: Patient's dignity will be maintained  Outcome: Met This Shift  Goal: Removal from restraints as soon as assessed to be safe  Outcome: Met This Shift  Goal: No harm/injury to patient while restraints in use  Outcome: Met This Shift  Goal: Patient's dignity will be maintained  Outcome: Met This Shift     Problem: Falls - Risk of:  Goal: Will remain free from falls  Description: Will remain free from falls  9/10/2021 0046 by Joseph Bender RN  Outcome: Met This Shift  9/9/2021 1614 by Liz Cardenas RN  Outcome: Met This Shift  Goal: Absence of physical injury  Description: Absence of physical injury  9/10/2021 0046 by Joseph Bender RN  Outcome: Met This Shift  9/9/2021 1614 by Liz Cardenas RN  Outcome: Met This Shift     Problem: Skin Integrity:  Goal: Will show no infection signs and symptoms  Description: Will show no infection signs and symptoms  9/10/2021 0046 by Joseph Bender RN  Outcome: Met This Shift  9/9/2021 1614 by Liz Cardenas RN  Outcome: Met This Shift  Goal: Absence of new skin breakdown  Description: Absence of new skin breakdown  9/10/2021 0046 by Joseph Bender RN  Outcome: Met This Shift  9/9/2021 1614 by Liz Cardenas RN  Outcome: Met This Shift     Problem: Breathing Pattern - Ineffective:  Goal: Ability to achieve and maintain a regular respiratory rate will improve  Description: Ability to achieve and maintain a regular respiratory rate will improve  9/10/2021 0046 by Joseph Bender RN  Outcome: Met This Shift  9/9/2021 1614 by Liz Cardenas RN  Outcome: Met This Shift     Problem: Airway Clearance - Ineffective:  Goal: Ability to maintain a clear airway will improve  Description: Ability to maintain a clear airway will improve  9/10/2021 0046 by Tommie Bueno, RN  Outcome: Met This Shift  9/9/2021 1614 by Oanh Thibodeaux RN  Outcome: Met This Shift

## 2021-09-11 PROBLEM — J15.211 MSSA (METHICILLIN SUSCEPTIBLE STAPHYLOCOCCUS AUREUS) PNEUMONIA (HCC): Status: ACTIVE | Noted: 2021-09-11

## 2021-09-11 LAB
METER GLUCOSE: 202 MG/DL (ref 74–99)
METER GLUCOSE: 215 MG/DL (ref 74–99)
METER GLUCOSE: 243 MG/DL (ref 74–99)
METER GLUCOSE: 257 MG/DL (ref 74–99)

## 2021-09-11 PROCEDURE — 6370000000 HC RX 637 (ALT 250 FOR IP): Performed by: INTERNAL MEDICINE

## 2021-09-11 PROCEDURE — 2580000003 HC RX 258: Performed by: SPECIALIST

## 2021-09-11 PROCEDURE — 6370000000 HC RX 637 (ALT 250 FOR IP): Performed by: SPECIALIST

## 2021-09-11 PROCEDURE — 82962 GLUCOSE BLOOD TEST: CPT

## 2021-09-11 PROCEDURE — 6370000000 HC RX 637 (ALT 250 FOR IP): Performed by: NURSE PRACTITIONER

## 2021-09-11 PROCEDURE — 97530 THERAPEUTIC ACTIVITIES: CPT

## 2021-09-11 PROCEDURE — 2700000000 HC OXYGEN THERAPY PER DAY

## 2021-09-11 PROCEDURE — 6360000002 HC RX W HCPCS

## 2021-09-11 PROCEDURE — 94640 AIRWAY INHALATION TREATMENT: CPT

## 2021-09-11 PROCEDURE — 2580000003 HC RX 258: Performed by: INTERNAL MEDICINE

## 2021-09-11 PROCEDURE — 6360000002 HC RX W HCPCS: Performed by: INTERNAL MEDICINE

## 2021-09-11 PROCEDURE — 6360000002 HC RX W HCPCS: Performed by: SPECIALIST

## 2021-09-11 PROCEDURE — 2580000003 HC RX 258

## 2021-09-11 PROCEDURE — 2060000000 HC ICU INTERMEDIATE R&B

## 2021-09-11 RX ORDER — CLONIDINE HYDROCHLORIDE 0.2 MG/1
0.2 TABLET ORAL 2 TIMES DAILY
Status: DISCONTINUED | OUTPATIENT
Start: 2021-09-11 | End: 2021-09-12

## 2021-09-11 RX ORDER — GAUZE BANDAGE 2" X 2"
100 BANDAGE TOPICAL DAILY
Status: DISCONTINUED | OUTPATIENT
Start: 2021-09-11 | End: 2021-09-15 | Stop reason: HOSPADM

## 2021-09-11 RX ORDER — DIPHENHYDRAMINE HYDROCHLORIDE 50 MG/ML
25 INJECTION INTRAMUSCULAR; INTRAVENOUS EVERY 6 HOURS PRN
Status: DISCONTINUED | OUTPATIENT
Start: 2021-09-11 | End: 2021-09-15 | Stop reason: HOSPADM

## 2021-09-11 RX ORDER — VITAMIN B COMPLEX
1000 TABLET ORAL DAILY
Status: DISCONTINUED | OUTPATIENT
Start: 2021-09-11 | End: 2021-09-15 | Stop reason: HOSPADM

## 2021-09-11 RX ORDER — INSULIN GLARGINE 100 [IU]/ML
20 INJECTION, SOLUTION SUBCUTANEOUS 2 TIMES DAILY
Status: DISCONTINUED | OUTPATIENT
Start: 2021-09-11 | End: 2021-09-13

## 2021-09-11 RX ORDER — FLUCONAZOLE 100 MG/1
200 TABLET ORAL DAILY
Status: DISCONTINUED | OUTPATIENT
Start: 2021-09-11 | End: 2021-09-15 | Stop reason: HOSPADM

## 2021-09-11 RX ORDER — LANOLIN ALCOHOL/MO/W.PET/CERES
50 CREAM (GRAM) TOPICAL DAILY
Status: DISCONTINUED | OUTPATIENT
Start: 2021-09-11 | End: 2021-09-15 | Stop reason: HOSPADM

## 2021-09-11 RX ADMIN — CLONIDINE HYDROCHLORIDE 0.3 MG: 0.2 TABLET ORAL at 10:06

## 2021-09-11 RX ADMIN — IPRATROPIUM BROMIDE AND ALBUTEROL SULFATE 1 AMPULE: .5; 3 SOLUTION RESPIRATORY (INHALATION) at 06:44

## 2021-09-11 RX ADMIN — METOPROLOL SUCCINATE 50 MG: 50 TABLET, EXTENDED RELEASE ORAL at 10:07

## 2021-09-11 RX ADMIN — Medication 10 ML: at 21:25

## 2021-09-11 RX ADMIN — NYSTATIN 500000 UNITS: 100000 SUSPENSION ORAL at 21:10

## 2021-09-11 RX ADMIN — FAMOTIDINE 20 MG: 20 TABLET, FILM COATED ORAL at 21:11

## 2021-09-11 RX ADMIN — ACETYLCYSTEINE 600 MG: 200 SOLUTION ORAL; RESPIRATORY (INHALATION) at 06:43

## 2021-09-11 RX ADMIN — NYSTATIN 500000 UNITS: 100000 SUSPENSION ORAL at 13:37

## 2021-09-11 RX ADMIN — IPRATROPIUM BROMIDE AND ALBUTEROL SULFATE 1 AMPULE: .5; 3 SOLUTION RESPIRATORY (INHALATION) at 13:23

## 2021-09-11 RX ADMIN — PYRIDOXINE HCL TAB 50 MG 50 MG: 50 TAB at 21:10

## 2021-09-11 RX ADMIN — ROFLUMILAST 500 MCG: 500 TABLET ORAL at 10:06

## 2021-09-11 RX ADMIN — CLONIDINE HYDROCHLORIDE 0.2 MG: 0.2 TABLET ORAL at 21:10

## 2021-09-11 RX ADMIN — HYDRALAZINE HYDROCHLORIDE 50 MG: 50 TABLET, FILM COATED ORAL at 21:12

## 2021-09-11 RX ADMIN — Medication 10 ML: at 10:07

## 2021-09-11 RX ADMIN — DOXYCYCLINE HYCLATE 100 MG: 100 CAPSULE ORAL at 10:07

## 2021-09-11 RX ADMIN — ACETYLCYSTEINE 600 MG: 200 SOLUTION ORAL; RESPIRATORY (INHALATION) at 13:23

## 2021-09-11 RX ADMIN — INSULIN LISPRO 2 UNITS: 100 INJECTION, SOLUTION INTRAVENOUS; SUBCUTANEOUS at 21:19

## 2021-09-11 RX ADMIN — INSULIN LISPRO 4 UNITS: 100 INJECTION, SOLUTION INTRAVENOUS; SUBCUTANEOUS at 10:00

## 2021-09-11 RX ADMIN — MICONAZOLE NITRATE: 2 OINTMENT TOPICAL at 10:00

## 2021-09-11 RX ADMIN — INSULIN LISPRO 4 UNITS: 100 INJECTION, SOLUTION INTRAVENOUS; SUBCUTANEOUS at 18:11

## 2021-09-11 RX ADMIN — NYSTATIN 500000 UNITS: 100000 SUSPENSION ORAL at 18:12

## 2021-09-11 RX ADMIN — ENOXAPARIN SODIUM 40 MG: 40 INJECTION SUBCUTANEOUS at 10:02

## 2021-09-11 RX ADMIN — IPRATROPIUM BROMIDE AND ALBUTEROL SULFATE 1 AMPULE: .5; 3 SOLUTION RESPIRATORY (INHALATION) at 10:17

## 2021-09-11 RX ADMIN — HYDRALAZINE HYDROCHLORIDE 50 MG: 50 TABLET, FILM COATED ORAL at 06:54

## 2021-09-11 RX ADMIN — HYDRALAZINE HYDROCHLORIDE 50 MG: 50 TABLET, FILM COATED ORAL at 13:37

## 2021-09-11 RX ADMIN — MICONAZOLE NITRATE: 2 OINTMENT TOPICAL at 21:24

## 2021-09-11 RX ADMIN — Medication 10 ML: at 21:12

## 2021-09-11 RX ADMIN — THIAMINE HCL TAB 100 MG 100 MG: 100 TAB at 21:10

## 2021-09-11 RX ADMIN — WATER 2000 MG: 1 INJECTION INTRAMUSCULAR; INTRAVENOUS; SUBCUTANEOUS at 18:41

## 2021-09-11 RX ADMIN — SODIUM CHLORIDE, PRESERVATIVE FREE 300 UNITS: 5 INJECTION INTRAVENOUS at 21:12

## 2021-09-11 RX ADMIN — Medication 1000 UNITS: at 21:10

## 2021-09-11 RX ADMIN — IPRATROPIUM BROMIDE AND ALBUTEROL SULFATE 1 AMPULE: .5; 3 SOLUTION RESPIRATORY (INHALATION) at 19:19

## 2021-09-11 RX ADMIN — DIMETHICONE, CAMPHOR (SYNTHETIC), MENTHOL, AND PHENOL: 1.1; .5; .625; .5 OINTMENT TOPICAL at 10:00

## 2021-09-11 RX ADMIN — INSULIN LISPRO 6 UNITS: 100 INJECTION, SOLUTION INTRAVENOUS; SUBCUTANEOUS at 12:19

## 2021-09-11 RX ADMIN — SODIUM CHLORIDE, PRESERVATIVE FREE 300 UNITS: 5 INJECTION INTRAVENOUS at 10:02

## 2021-09-11 RX ADMIN — PREDNISONE 40 MG: 20 TABLET ORAL at 10:06

## 2021-09-11 RX ADMIN — IPRATROPIUM BROMIDE AND ALBUTEROL SULFATE 1 AMPULE: .5; 3 SOLUTION RESPIRATORY (INHALATION) at 01:22

## 2021-09-11 RX ADMIN — IPRATROPIUM BROMIDE AND ALBUTEROL SULFATE 1 AMPULE: .5; 3 SOLUTION RESPIRATORY (INHALATION) at 22:19

## 2021-09-11 RX ADMIN — DOXYCYCLINE HYCLATE 100 MG: 100 CAPSULE ORAL at 21:10

## 2021-09-11 RX ADMIN — ACETYLCYSTEINE 600 MG: 200 SOLUTION ORAL; RESPIRATORY (INHALATION) at 10:17

## 2021-09-11 RX ADMIN — Medication 1 CAPSULE: at 10:07

## 2021-09-11 RX ADMIN — ACETYLCYSTEINE 600 MG: 200 SOLUTION ORAL; RESPIRATORY (INHALATION) at 01:23

## 2021-09-11 RX ADMIN — INSULIN GLARGINE 20 UNITS: 100 INJECTION, SOLUTION SUBCUTANEOUS at 21:19

## 2021-09-11 RX ADMIN — FAMOTIDINE 20 MG: 20 TABLET, FILM COATED ORAL at 10:03

## 2021-09-11 RX ADMIN — FLUCONAZOLE 200 MG: 100 TABLET ORAL at 21:11

## 2021-09-11 RX ADMIN — NYSTATIN 500000 UNITS: 100000 SUSPENSION ORAL at 10:06

## 2021-09-11 ASSESSMENT — PAIN SCALES - GENERAL
PAINLEVEL_OUTOF10: 0

## 2021-09-11 ASSESSMENT — PAIN SCALES - PAIN ASSESSMENT IN ADVANCED DEMENTIA (PAINAD)
FACIALEXPRESSION: 1
TOTALSCORE: 5
CONSOLABILITY: 1
NEGVOCALIZATION: 1
BODYLANGUAGE: 1
BREATHING: 1

## 2021-09-11 ASSESSMENT — PAIN SCALES - WONG BAKER
WONGBAKER_NUMERICALRESPONSE: 0

## 2021-09-11 NOTE — PROGRESS NOTES
Subjective:  Samy Clemons was seen and examined at bedside today. There were no new problems reported overnight.     Patient is not currently tolerating diet probably due to dysphagia from noted thrush seen on exam  She remains drowsy today but awakens appropriately    A complete review of systems and social history was completed on admission and remains unchanged unless otherwise noted    Scheduled Meds:   miconazole nitrate   Topical BID    nystatin  5 mL Oral 4x Daily    doxycycline hyclate  100 mg Oral 2 times per day    predniSONE  40 mg Oral Daily    insulin glargine  23 Units SubCUTAneous Nightly    metoprolol succinate  50 mg Oral Daily    insulin lispro  0-12 Units SubCUTAneous TID WC    insulin lispro  0-6 Units SubCUTAneous Nightly    cloNIDine  0.3 mg Oral BID    famotidine  20 mg Oral BID    hydrALAZINE  50 mg Oral 3 times per day    cloNIDine  0.1 mg Oral Once    Roflumilast  500 mcg Oral Daily    lactobacillus  1 capsule Oral Daily    acetylcysteine  600 mg Inhalation Q4H    ipratropium-albuterol  1 ampule Inhalation Q4H    enoxaparin  40 mg SubCUTAneous Daily    lidocaine PF  5 mL IntraDERmal Once    sodium chloride flush  5-40 mL IntraVENous 2 times per day    heparin flush  3 mL IntraVENous 2 times per day    medicated lip ointment   Topical Daily    sodium chloride flush  5-40 mL IntraVENous 2 times per day     Continuous Infusions:   sodium chloride 25 mL (09/09/21 0046)    dextrose      sodium chloride       PRN Meds:perflutren lipid microspheres, sodium chloride flush, sodium chloride, heparin flush, glucose, dextrose, glucagon (rDNA), dextrose, sodium chloride flush, sodium chloride, ondansetron **OR** ondansetron, polyethylene glycol, acetaminophen **OR** acetaminophen    Objective:  BP (!) 144/63   Pulse 80   Temp 99.7 °F (37.6 °C) (Axillary)   Resp 22   Ht 5' 4\" (1.626 m)   Wt 198 lb 12.8 oz (90.2 kg)   SpO2 98%   BMI 34.12 kg/m²   In: 480 [P.O.:480]  Out: 1000 In: 480   Out: 1000 [Urine:1000]     AAO x 1 today, currently in NAD  Thrush in mouth seen  RRR, pos S1, S2  Tight, no wheeze, rales or rhonchi  bowel sounds present, nontender, nondistended  No clubbing, cyanosis, or edema  No neuro changes o/w seen at this time  No obvious rashes or lesions except for excoriations on LE    Recent Labs     09/09/21  0700 09/10/21  0930   WBC 10.2 10.4   HGB 11.7 11.5    248     Recent Labs     09/09/21  0700 09/10/21  0930    132   K 5.0 4.2    96*   CO2 25 27   BUN 19 18   CREATININE 0.7 0.7   GLUCOSE 396* 266*     Recent Labs     09/09/21  0700 09/10/21  0930   BILITOT 0.9 0.8   ALKPHOS 48 49   AST 32* 20   ALT 67* 56*     No results for input(s): INR in the last 72 hours. Invalid input(s): PT  No results for input(s): CKTOTAL, CKMB, CKMBINDEX, TROPONINI in the last 72 hours. XR CHEST PORTABLE    Result Date: 8/25/2021  EXAMINATION: ONE XRAY VIEW OF THE CHEST 8/24/2021 8:52 pm COMPARISON: 08/24/2021 HISTORY: ORDERING SYSTEM PROVIDED HISTORY: et tube placement TECHNOLOGIST PROVIDED HISTORY: Reason for exam:->et tube placement FINDINGS: Stable position of endotracheal tube. Cardiomediastinal silhouette is unremarkable. No infiltrate, effusion, or pneumothorax. No acute osseous abnormality. No pulmonary infiltrates. No significant change since the prior study     XR CHEST PORTABLE    Result Date: 8/24/2021  EXAMINATION: ONE XRAY VIEW OF THE CHEST 8/24/2021 5:30 pm COMPARISON: January 3, 2020 HISTORY: ORDERING SYSTEM PROVIDED HISTORY: SOB (shortness of breath) TECHNOLOGIST PROVIDED HISTORY: Reason for exam:->ETT placement, tongue swelling FINDINGS: Endotracheal tube is approximately 6.8 cm above the chivo. No airspace opacity or pleural effusion. The heart is normal in size. No pneumothorax. 1. Endotracheal tube is approximately 6.8 cm above the chivo. 2. No airspace opacity or pleural effusion.      Assessment:  Sumanth Galdamez is a 78 y.o. year old female who presented on 8/24/2021 and is being treated for:  Principal Problem:    Acute respiratory failure ([facility]  Active Problems:    Essential hypertension    Diabetes mellitus ([facility]    Myocardiopathy ([facility]    ACE inhibitor-aggravated angioedema    COPD (chronic obstructive pulmonary disease) ([facility])    Acquired angioedema    SOB (shortness of breath)    Disorder of airway    Acquired hypertrophy of tongue    MSSA (methicillin susceptible Staphylococcus aureus) pneumonia ([facility])  Resolved Problems:    * No resolved hospital problems. *    Plan  · Cont supportive care including steroids and abx post extubation   · Start antifungal for thrush  · ID consult for pneumonia  · Monitor BS -we will increase Lantus to achieve better sugar control  · Cont PT/OT - get out of bed to chair  · Discharge to rehab once cleared from all consultants  · Please see orders for further management and care. · No family at bedside today     More than 50% of my  time was spent at the bedside counseling/coordinating care with the patient and/or family with face to face contact. This time was spent reviewing notes and laboratory data as well as instructing and counseling the patient. Time I spent with the family or surrogate(s) is included only if the patient was incapable of providing the necessary information or participating in medical decisions. I also discussed the differential diagnosis and all of the proposed management plans with the patient and individuals accompanying the patient. This patient requires this high level of physician care and nursing on the [facility] unit due the complexity of decision management and chance of rapid decline or death. Continued cardiac monitoring and higher level of nursing are required. I am readily available for any further decision-making and intervention.      Shelby Wasserman MD  2:49 PM  9/11/2021

## 2021-09-11 NOTE — CONSULTS
[facility]  Consult Note    [address] [address]  [place], [address]  Phone [phone]   [phone]      Admit Date: 2021  4:25 PM  Pt Name: Judit Frazier  MRN: [id number]  : 1955  Reason for Consult:    Chief Complaint   Patient presents with    Oral Swelling     woke up with tongue swelling, worse over the day, took 25 mg benadryl 1.5 hrs ago without relief      Requesting Physician:  Pam Blanco MD  PCP: Pam Blanco MD  History Obtained From:  patient, chart   ID consulted for SOB (shortness of breath) [R06.02]  Acquired angioedema [T78. 3XXA]  on hospital day [address]       Chief Complaint   Patient presents with    Oral Swelling     woke up with tongue swelling, worse over the day, took 25 mg benadryl 1.5 hrs ago without relief      HISTORYOF PRESENT ILLNESS   Judit Frazier is a 78 y.o. female who presents with   has a past medical history of Arthritis, Asthma, Cerebral artery occlusion with cerebral infarction ([address] COPD (chronic obstructive pulmonary disease) ([facility] [address] Diabetes mellitus ([address] Heart attack ([address] Hyperlipidemia, Hypertension, and VHD (valvular heart disease).    ED TRIAGEVITALS  BP: (!) 144/63, Temp: 99.7 °F (37.6 °C), Pulse: 80, Resp: 22, SpO2: 98 %  HPI  Pt was admitted on  with angioedema  Wbc11.4 cr1.4  Pt was intubated   Admitted for ACE inhibitor induced angioedema with severe COPD with exacerbation, Haemophilus influenza respiratory infection, hypertension, methicillin sensitive staph aureus respiratory infection, E. coli urinary tract infection,   extubated  then reintubated   On piptazo -9/10  And vanco   PICC  Jessica Casty    extubated     Id was asked to see for atbx  On doxycycline   Pt is confused  Has a sitter  S/p fall OOB  On Rx for thrush   REVIEW OF SYSTEMS     CONSTITUTIONAL:   As in hpi     Medications Prior to Admission: Insulin Pump - insulin lispro, Inject into the skin continuous  lisinopril (PRINIVIL;ZESTRIL) 20 MG tablet, Take 20 mg by mouth daily  metoprolol succinate (TOPROL XL) 25 MG extended release tablet, Take 25 mg by mouth daily  rosuvastatin (CRESTOR) 40 MG tablet, Take 40 mg by mouth daily  Budeson-Glycopyrrol-Formoterol (BREZTRI AEROSPHERE) 160-9-4.8 MCG/ACT AERO, Inhale 2 puffs into the lungs 2 times daily  donepezil (ARICEPT) 5 MG tablet, Take 5 mg by mouth daily  pantoprazole (PROTONIX) 40 MG tablet, Take 40 mg by mouth daily  leflunomide (ARAVA) 20 MG tablet, Take 20 mg by mouth daily  levothyroxine (SYNTHROID) 25 MCG tablet, Take 25 mcg by mouth Daily  COVID-19 mRNA Vacc, PFIZER, 30 MCG/0.3ML SUSP injection, Inject 0.3 mLs into the muscle once 3/30/2021 - COMPLETED  aspirin EC 81 MG EC tablet, Take 1 tablet by mouth daily  ipratropium-albuterol (DUONEB) 0.5-2.5 (3) MG/3ML SOLN nebulizer solution, Inhale 3 mLs into the lungs every 6 hours as needed for Shortness of Breath (Patient taking differently: Inhale 1 vial into the lungs 2 times daily )  Multiple Vitamins-Minerals (CENTRUM ADULTS) TABS, Take 1 tablet by mouth daily   venlafaxine 150 MG extended release tablet, Take 150 mg by mouth daily (with breakfast)   gabapentin (NEURONTIN) 300 MG capsule, Take 300 mg by mouth 3 times daily. .  cetirizine (ZYRTEC) 10 MG tablet, Take 10 mg by mouth daily  COMBIVENT RESPIMAT  MCG/ACT AERS inhaler, Inhale 1 puff into the lungs every 6 hours as needed for Wheezing or Shortness of Breath   insulin lispro (HUMALOG) 100 UNIT/ML injection vial, Inject into the skin every 4 hours as needed for High Blood Sugar (Checks BS 4-6x/day;  For use with insulin pump; MAX DOSE 50 UNITS)   clopidogrel (PLAVIX) 75 MG tablet, Take 1 tablet by mouth daily  cilostazol (PLETAL) 100 MG tablet, take 1 tablet by mouth twice a day  CURRENT MEDICATIONS     Current Facility-Administered Medications:     miconazole nitrate 2 % ointment, , Topical, BID, Brian Blair MD, Given at 09/11/21 1000    nystatin (MYCOSTATIN) 592697 UNIT/ML suspension 500,000 Units, 5 mL, Oral, 4x Daily, Nicolás Collins MD, 500,000 Units at 09/11/21 1337    doxycycline hyclate (VIBRAMYCIN) capsule 100 mg, 100 mg, Oral, 2 times per day, Nicolás Collins MD, 100 mg at 09/11/21 1007    predniSONE (DELTASONE) tablet 40 mg, 40 mg, Oral, Daily, Nicolás Collins MD, 40 mg at 09/11/21 1006    insulin glargine (LANTUS) injection vial 23 Units, 23 Units, SubCUTAneous, Nightly, Nicolás Collins MD, 23 Units at 09/10/21 2209    metoprolol succinate (TOPROL XL) extended release tablet 50 mg, 50 mg, Oral, Daily, SHELBIE Dowd - CNP, 50 mg at 09/11/21 1007    insulin lispro (HUMALOG) injection vial 0-12 Units, 0-12 Units, SubCUTAneous, TID WC, SHELBIE Dowd - CNP, 6 Units at 09/11/21 1219    insulin lispro (HUMALOG) injection vial 0-6 Units, 0-6 Units, SubCUTAneous, Nightly, SHELBIE Dowd - CNP, 1 Units at 09/10/21 2209    cloNIDine (CATAPRES) tablet 0.3 mg, 0.3 mg, Oral, BID, Jacey Manrique DO, 0.3 mg at 09/11/21 1006    famotidine (PEPCID) tablet 20 mg, 20 mg, Oral, BID, Jacey Manrique DO, 20 mg at 09/11/21 1003    hydrALAZINE (APRESOLINE) tablet 50 mg, 50 mg, Oral, 3 times per day, Jacey Burton DO, 50 mg at 09/11/21 1337    cloNIDine (CATAPRES) tablet 0.1 mg, 0.1 mg, Oral, Once, Jose Juan Morales MD    Roflumilast (DALIRESP) tablet 500 mcg, 500 mcg, Oral, Daily, Arlina Bernheim, APRN - CNP, 500 mcg at 09/11/21 1006    lactobacillus (CULTURELLE) capsule 1 capsule, 1 capsule, Oral, Daily, Jose Juan Morales MD, 1 capsule at 09/11/21 1007    acetylcysteine (MUCOMYST) 20 % solution 600 mg, 600 mg, Inhalation, Q4H, Jose Juan Morales MD, 600 mg at 09/11/21 1323    ipratropium-albuterol (DUONEB) nebulizer solution 1 ampule, 1 ampule, Inhalation, Q4H, Jose Juan Morales MD, 1 ampule at 09/11/21 1323    enoxaparin (LOVENOX) injection 40 mg, 40 mg, SubCUTAneous, Daily, Jose Juan Morales MD, 40 mg at 09/11/21 1002    perflutren lipid microspheres (DEFINITY) injection 1.65 mg, 1.5 mL, IntraVENous, ONCE PRN, Flako Sanchez MD    lidocaine PF 1 % injection 5 mL, 5 mL, IntraDERmal, Once, SHELBIE Moscoso CNP    sodium chloride flush 0.9 % injection 5-40 mL, 5-40 mL, IntraVENous, 2 times per day, SHELBIE Moscoso CNP, 10 mL at 09/11/21 1007    sodium chloride flush 0.9 % injection 5-40 mL, 5-40 mL, IntraVENous, PRN, SHELBIE Moscoso CNP, 10 mL at 09/01/21 1428    0.9 % sodium chloride infusion, 25 mL, IntraVENous, PRN, SHELBIE Moscoso CNP, Last Rate: 100 mL/hr at 09/09/21 0046, 25 mL at 09/09/21 0046    heparin flush 100 UNIT/ML injection 300 Units, 3 mL, IntraVENous, 2 times per day, SHELBIE Moscoso CNP, 300 Units at 09/11/21 1002    heparin flush 100 UNIT/ML injection 300 Units, 3 mL, IntraCATHeter, PRN, SHELBIE Moscoso CNP    medicated lip ointment (BLISTEX), , Topical, Daily, Joel Pink DO, Given at 09/11/21 1000    glucose (GLUTOSE) 40 % oral gel 15 g, 15 g, Oral, PRN, Attila Soriano MD    dextrose 50 % IV solution, 12.5 g, IntraVENous, PRN, Attila Soriano MD, 12.5 g at 09/05/21 1419    glucagon (rDNA) injection 1 mg, 1 mg, IntraMUSCular, PRN, Attila Soriano MD    dextrose 5 % solution, 100 mL/hr, IntraVENous, PRN, Attila Soriano MD    sodium chloride flush 0.9 % injection 5-40 mL, 5-40 mL, IntraVENous, 2 times per day, Attila Soriano MD, 10 mL at 09/09/21 0954    sodium chloride flush 0.9 % injection 5-40 mL, 5-40 mL, IntraVENous, PRN, Attila Soriano MD, 10 mL at 09/03/21 0422    0.9 % sodium chloride infusion, 25 mL, IntraVENous, PRNAttila MD    ondansetron (ZOFRAN-ODT) disintegrating tablet 4 mg, 4 mg, Oral, Q8H PRN **OR** ondansetron (ZOFRAN) injection 4 mg, 4 mg, IntraVENous, Q6H PRN, Attila Soriano MD    polyethylene glycol [facility]) packet 17 g, 17 g, Oral, Daily PRN, Attila Soriano MD    acetaminophen (TYLENOL) tablet 650 mg, 650 mg, Oral, Q6H PRN, 650 mg at 09/02/21 2150 **OR** acetaminophen (TYLENOL) suppository 650 mg, 650 mg, Rectal, Q6H PRN, Juanito Pascual MD, 650 mg at 08/29/21 1520  ALLERGIES     Ace inhibitors, Angiotensin receptor blockers, Lactose intolerance (gi), Lisinopril, and Pcn [penicillins]  There is no immunization history for the selected administration types on file for this patient. Internal Administration   First Dose      Second Dose           Last COVID Lab SARS-CoV-2 (no units)   Date Value   07/23/2020 Not Detected          . cov  PAST MEDICAL HISTORY     Past Medical History:   Diagnosis Date    Arthritis     Asthma     Cerebral artery occlusion with cerebral infarction ([facility] [address] 2015    COPD (chronic obstructive pulmonary disease) ([facility] [address] [facility]     Diabetes mellitus ([facility]     Heart attack ([facility] [facility]     Hyperlipidemia     Hypertension     VHD (valvular heart disease)      SURGICAL HISTORY       Past Surgical History:   Procedure Laterality Date    CHOLECYSTECTOMY      COLONOSCOPY  12/2018    HYSTERECTOMY      HYSTERECTOMY, VAGINAL      UPPER GASTROINTESTINAL ENDOSCOPY  12/2018     FAMILY HISTORY       Family History   Problem Relation Age of Onset    COPD Mother     Alzheimer's Disease Mother     Alcohol Abuse Father     No Known Problems Sister     Diabetes Brother     No Known Problems Brother      SOCIAL HISTORY       Social History     Socioeconomic History    Marital status:      Spouse name: None    Number of children: None    Years of education: None    Highest education level: None   Occupational History    None   Tobacco Use    Smoking status: Current Every Day Smoker     Packs/day: 0.25     Years: 45.00     Pack years: 11.25     Types: Cigarettes    Smokeless tobacco: Never Used   Vaping Use    Vaping Use: Never used   Substance and Sexual Activity    Alcohol use: Yes     Alcohol/week: 7.0 - 8.0 standard drinks     Types: 7 - 8 Glasses of wine per week     Comment: glass of wine with dinner.   1 cup of coffee a day     Drug use: Never    Sexual activity: None   Other Topics Concern    None   Social History Narrative    Drinks 2 cups coffee daily. Social Determinants of Health     Financial Resource Strain:     Difficulty of Paying Living Expenses:    Food Insecurity:     Worried About Running Out of Food in the Last Year:     [address] in the Last Year:    Transportation Needs:     Lack of Transportation (Medical):  Lack of Transportation (Non-Medical):    Physical Activity:     Days of Exercise per Week:     Minutes of Exercise per Session:    Stress:     Feeling of Stress :    Social Connections:     Frequency of Communication with Friends and Family:     Frequency of Social Gatherings with Friends and Family:     Attends [employer] Services:     Active Member of Clubs or Organizations:     Attends Club or Organization Meetings:     Marital Status:    Intimate Partner Violence:     Fear of Current or Ex-Partner:     Emotionally Abused:     Physically Abused:     Sexually Abused:      ·      PHYSICAL EXAM       ED Triage Vitals   BP Temp Temp Source Pulse Resp SpO2 Height Weight   08/24/21 1619 08/24/21 1613 08/24/21 1613 08/24/21 1613 08/24/21 1619 08/24/21 1613 08/26/21 1527 08/24/21 1719   (!) 152/87 97.5 °F (36.4 °C) Infrared 66 18 91 % 5' 4\" (1.626 m) 189 lb 9.5 oz (86 kg)     Vitals:    Vitals:    09/11/21 0000 09/11/21 0645 09/11/21 0845 09/11/21 1337   BP: (!) 156/66 132/63 (!) 145/64 (!) 144/63   Pulse: 109 103 74 80   Resp: 17 22 17 22   Temp: 98.8 °F (37.1 °C)  99.7 °F (37.6 °C)    TempSrc: Oral  Axillary    SpO2: 95% 94% 98%    Weight:       Height:         Physical Exam   Constitutional/General: Arousable NAD  Head: NC/AT  Eyes: PERRL, EOMI  Mouth: Normal mucosa, no thrush   Neck: Supple, full ROM,    Pulmonary: Lungs clear to auscultation bilaterally. Not in respiratory distress  Cardiovascular:  Regular rate and rhythm, no murmurs, gallops, or rubs. distension. Nontender. Extremities: Moves all extremities x 4. Warm and well perfused  Pulses:  Distal pulses intact  Skin: Warm and dry without rash  Neurologic:    No focal deficits  Psych: Normal Affect     DIAGNOSTIC RESULTS   RADIOLOGY:   Echo Limited    Result Date: 8/31/2021  Transthoracic Echocardiography Report (TTE)  Demographics   Patient Name    Collins Vega      Gender            Female                  [address] [facility] Record  [id number]      Room Number       [address]  Number   Account #       [de-identified]     Procedure Date    08/31/2021   Corporate ID                  Ordering                                Physician   Accession       [id number]    Referring         Jessica Navarro  Number                        Physician   Date of Birth   1955    Sonographer       Rayo Gomez [facility]   Age             78 year(s)    Interpreting      [address]                                Physician         Physician Cardiology                                                  Bushra Goldman MD                                 Any Other  Procedure Type of Study   TTE procedure:Echo Limited Study. Procedure Date Date: 08/31/2021 Start: 10:59 AM Study Location: Portable Technical Quality: Poor visualization due to body habitus. Indications:Congestive heart failure. Patient Status: Routine Height: 64 inches Weight: 196 pounds BSA: 1.94 m^2 BMI: 33.64 kg/m^2 BP: 142/99 mmHg  Findings   Left Ventricle  Left ventricular size is grossly normal. Ejection fraction is visually  estimated at 55-60%. Indeterminate diastolic function. Right Ventricle  Normal right ventricular size and function. Left Atrium  Left atrium is of normal size. Right Atrium  Normal right atrium size. Mitral Valve  Physiologic and/or trace mitral regurgitation is present. Tricuspid Valve  Physiologic and/or trace tricuspid regurgitation. Aortic Valve  Physiologic and/or trace aortic regurgitation is noted.  There is mild  aortic stenosis with a mean gradient of 11 mm Hg. Aorta  The aorta is within normal limits. Miscellaneous  Normal Inferior Vena Cava diameter and respiratory variation. Conclusions   Summary  Left ventricular size is grossly normal. Ejection fraction is visually  estimated at 55-60%. Indeterminate diastolic function. Normal right ventricular size and function. Left atrium is of normal size. Normal right atrium size. Normal Inferior Vena Cava diameter and respiratory variation. Unable to estimate PA pressure. No comparison study available.    Signature   ----------------------------------------------------------------  Electronically signed by Miguel Johnson MD(Interpreting  physician) on 08/31/2021 03:54 PM  ----------------------------------------------------------------  M-Mode/2D Measurements & Calculations   LV Diastolic    LV Systolic Dimension: 3 cm  AV Cusp Separation: 1.8 cmLA  Dimension: 5.1  LV Volume Diastolic: 890.2   Dimension: 3 cmAO Root  cm              ml                           Dimension: 2.7 cm  LV FS:41.2 %    LV Volume Systolic: 49.6 ml  LV PW           LV EDV/LV EDV Index: 383.0  Diastolic: 1.4  GR/29 VJ/Y^9QV ESV/LV ESV  cm              Index: 34.2 ml/18ml/ m^2     RV Diastolic Dimension: 2.5  LV PW Systolic: EF Calculated: 72 %          cm  1.7 cm          LV Mass Index: 155 l/min*m^2 RV Systolic Dimension: 2 cm  Septum                                       LA/Aorta: 3.76  Diastolic: 1.4  cm              LVOT: 2 cm                   LA volume/Index: 42.9 ml  Septum  Systolic: 2 cm   LV Mass: 300.44  g  Doppler Measurements & Calculations   MV Peak E-Wave:   AV Peak Velocity: 2.03 m/s    LVOT Peak Velocity: 0.96  0.81 m/s          AV Peak Gradient: 16.4 mmHg   m/s  MV Peak A-Wave:   AV Mean Velocity: 1.54 m/s    LVOT Mean Velocity: 0.58  1.15 m/s          AV Mean Gradient: 10.3 mmHg   m/s  MV E/A Ratio: 0.7 AV VTI: 47.5 cm               LVOT Peak Gradient: 3.7  MV Peak Gradient: AV Area (Continuity):1.51 contour of the mediastinum and heart size are within normal range. There are calcifications within the aortic knob. No focal parenchymal densities are noted within the lungs. There are no signs of pneumothorax or pleural effusion     1. The position and alignment of the tubes and catheters are within normal range 2. No signs of an acute cardiopulmonary process     XR CHEST PORTABLE    Result Date: 9/2/2021  EXAMINATION: ONE XRAY VIEW OF THE CHEST 9/2/2021 6:30 am COMPARISON: 08/31/2021 HISTORY: ORDERING SYSTEM PROVIDED HISTORY: SOB TECHNOLOGIST PROVIDED HISTORY: Reason for exam:->SOB FINDINGS: Heart size is normal.  There are no infiltrates or effusions. Support lines are appropriate. No acute process and unchanged     XR CHEST PORTABLE    Result Date: 8/31/2021  EXAMINATION: ONE XRAY VIEW OF THE CHEST 8/31/2021 7:27 am COMPARISON: 08/29/2021 HISTORY: ORDERING SYSTEM PROVIDED HISTORY: SOB TECHNOLOGIST PROVIDED HISTORY: Reason for exam:->SOB FINDINGS: Heart size is normal.  There are no infiltrates or effusions. Lines/tubes are unchanged. Tip of ET tube is near the chivo and could be withdrawn 1-2 cm. .     No acute process     XR CHEST PORTABLE    Result Date: 8/29/2021  EXAMINATION: ONE XRAY VIEW OF THE CHEST 8/29/2021 9:30 pm COMPARISON: 08/29/2021 about 14 hours earlier HISTORY: ORDERING SYSTEM PROVIDED HISTORY: ETT tube placement TECHNOLOGIST PROVIDED HISTORY: Reason for exam:->ETT tube placement FINDINGS: There is an ET tube with the tip felt to be in the distal trachea but within 1 cm of the chivo. This could be withdrawn 1-2 cm for better tube position. There is an NG tube extending into the stomach. No acute infiltrate or pleural effusion is seen. The heart and mediastinum are unchanged and there is no evidence of vascular congestion. ETT tip is within 1 cm of the chivo. It could be withdrawn 1-2 cm for better tube position.      XR CHEST PORTABLE    Result Date: 8/29/2021  EXAMINATION: ONE XRAY VIEW OF THE CHEST 8/29/2021 6:28 am COMPARISON: 08/28/2021 HISTORY: ORDERING SYSTEM PROVIDED HISTORY: sob TECHNOLOGIST PROVIDED HISTORY: Reason for exam:->sob FINDINGS: The support lines and tubes are unchanged in position. Lungs are clear. There is no focal infiltrate or effusion. 1. There is no acute cardiopulmonary disease 2. Stable position of support lines and tubes. XR CHEST PORTABLE    Result Date: 8/28/2021  EXAMINATION: ONE XRAY VIEW OF THE CHEST 8/28/2021 1:08 pm COMPARISON: August 24, 2021 HISTORY: ORDERING SYSTEM PROVIDED HISTORY: ett and ng placement TECHNOLOGIST PROVIDED HISTORY: Reason for exam:->ett and ng placement FINDINGS: Endotracheal tube is present with distal tip appearing approximately 4.5 cm above the chivo. NG tube courses below the level of the diaphragm. No airspace opacity or pleural effusion. The heart is normal size. No pneumothorax. 1. Endotracheal tube is present with distal tip approximately 4.5 cm above the chivo. 2. No airspace opacity or pleural effusion. XR CHEST PORTABLE    Result Date: 8/25/2021  EXAMINATION: ONE XRAY VIEW OF THE CHEST 8/24/2021 8:52 pm COMPARISON: 08/24/2021 HISTORY: ORDERING SYSTEM PROVIDED HISTORY: et tube placement TECHNOLOGIST PROVIDED HISTORY: Reason for exam:->et tube placement FINDINGS: Stable position of endotracheal tube. Cardiomediastinal silhouette is unremarkable. No infiltrate, effusion, or pneumothorax. No acute osseous abnormality. No pulmonary infiltrates. No significant change since the prior study     XR CHEST PORTABLE    Result Date: 8/24/2021  EXAMINATION: ONE XRAY VIEW OF THE CHEST 8/24/2021 5:30 pm COMPARISON: January 3, 2020 HISTORY: ORDERING SYSTEM PROVIDED HISTORY: SOB (shortness of breath) TECHNOLOGIST PROVIDED HISTORY: Reason for exam:->ETT placement, tongue swelling FINDINGS: Endotracheal tube is approximately 6.8 cm above the chivo. No airspace opacity or pleural effusion.   The heart is normal mucosal thickening is seen in the paranasal sinuses with small air-fluid levels suspected in the sphenoid sinuses. T2 hypointense area in the lateral margin of the right sphenoid sinus is nonspecific and may represent a chronic inflammatory process. A solid mass lesion cannot be excluded. No marrow edema or other destructive changes are seen in the surrounding bones. Small bilateral mastoid effusions are noted. BONES/SOFT TISSUES: The bone marrow signal intensity appears normal. The soft tissues demonstrate no acute abnormality. 1. No acute intracranial abnormality. 2. No mass effect, edema or hemorrhage. 3. Signal abnormality in the right sphenoid bone likely represents chronic, inspissated inflammatory process. A neoplastic etiology cannot be excluded. Further evaluation with contrast enhanced MRI is recommended. 4. Questionable small air-fluid levels in the sphenoid sinuses which may indicate acute sinusitis. LABS  Recent Labs     09/09/21  0700 09/10/21  0930   WBC 10.2 10.4   HGB 11.7 11.5   HCT 34.6 33.6*   MCV 91.5 91.8    248     Recent Labs     09/09/21  0700 09/09/21  0700 09/10/21  0930     --  132   K 5.0   < > 4.2      < > 96*   CO2 25   < > 27   BUN 19  --  18   CREATININE 0.7  --  0.7   GFRAA >60  --  >60   LABGLOM >60  --  >60   GLUCOSE 396*  --  266*   PROT 5.6*  --  5.3*   LABALBU 3.3*  --  3.0*   CALCIUM 8.3*  --  8.3*   BILITOT 0.9  --  0.8   ALKPHOS 48  --  49   AST 32*  --  20   ALT 67*  --  56*    < > = values in this interval not displayed. No results for input(s): PROCAL in the last 72 hours.   Lab Results   Component Value Date    CRP 0.2 01/04/2021     Lab Results   Component Value Date    SEDRATE 10 12/19/2019     No results found for: FGOSIKL3N2  Lab Results   Component Value Date    COVID19 Not Detected 07/23/2020     COVID-19/CAYDEN-COV2 LABS  Recent Labs     09/09/21  0700 09/10/21  0930   AST 32* 20   ALT 67* 56*     Lab Results   Component Value Date CHOL 296 01/04/2021    TRIG 122 09/06/2021    HDL 66 01/04/2021    LDLCALC 196 01/04/2021    LABVLDL 34 01/04/2021          MICROBIOLOGY:     Cultures :   Lab Results   Component Value Date    BC 5 Days no growth 08/29/2021    BC 5 Days- no growth 01/03/2020    ORG Staphylococcus aureus 08/29/2021    ORG Haemophilus influenzae 08/29/2021    ORG Escherichia coli 08/29/2021     Lab Results   Component Value Date    BLOODCULT2 5 Days no growth 08/29/2021    BLOODCULT2 5 Days- no growth 01/03/2020    ORG Staphylococcus aureus 08/29/2021    ORG Haemophilus influenzae 08/29/2021    ORG Escherichia coli 08/29/2021       No results found for: WNDABS    Smear, Respiratory   Date Value Ref Range Status   08/29/2021   Final    Group 5: >25 PMN's/LPF and <10 Epithelial cells/LPF  Abundant Polymorphonuclear leukocytes  Epithelial cells not seen  Rare Gram positive diplococci  Few Gram negative rods       No results found for: MPNEUMO, CLAMYDCU, LABLEGI, AFBCX, FUNGSM, LABFUNG  CULTURE, RESPIRATORY   Date Value Ref Range Status   08/29/2021 Oral Pharyngeal Sepideh reduced (A)  Final   08/29/2021   Final    Heavy growth  This isolate is presumed to be resistant based on the  detection of inducible Clindamycin resistance. Clindamycin  may still be effective in some patients. 08/29/2021 Heavy growth  Beta Lactamase negative    Final        Urine Culture, Routine   Date Value Ref Range Status   08/29/2021 >100,000 CFU/ml  Final     MRSA Culture Only   Date Value Ref Range Status   08/30/2021 Methicillin resistant Staph aureus not isolated  Final          FINAL IMPRESSION    Patient is a 78 y.o. female who presented with   Chief Complaint   Patient presents with    Oral Swelling     woke up with tongue swelling, worse over the day, took 25 mg benadryl 1.5 hrs ago without relief     and admitted for SOB (shortness of breath) [R06.02]  Acquired angioedema [T78. 3XXA]  Cap  Thrush    Encephalopathy      F/u cxry     miconazole nitrate 2 % ointment, BID  nystatin (MYCOSTATIN) 257075 UNIT/ML suspension 500,000 Units, 4x Daily  doxycycline hyclate (VIBRAMYCIN) capsule 100 mg, 2 times per day  predniSONE (DELTASONE) tablet 40 mg, Daily     Start diflucan check ammonium level /rpr/b12/folate/rpr    ceftriaxone for now       Imaging and labs were reviewed per medical records and any ID pertinent labs were addressed with the patient. The patient/FAMILY  was educated about the diagnosis, prognosis, indications, risks and benefits of treatment. An opportunity to ask questions was given to the patient/FAMILY and questions were answered. Thank you for involving me in the care of Kisha Chiu. Please do not hesitate to call for any questions or concerns.          Electronically signed by Edouard Georges MD on 9/11/2021 at 2:53 PM

## 2021-09-11 NOTE — SIGNIFICANT EVENT
RRT Note  Called for unwitnessed fall. When seen patient as laying on the floor. Had her hands below her head. Moving her head freely and denies any pain. Doesn't recall events. Oriented x1 but reportedly she is at her baseline mentation. Placed back on 3L oxygen, pulse ox 94%. bp stable. Plan for CT head. Fall precautions.      cctime 32 mins  Franck Loomis MD 8:33 PM 09/10/21

## 2021-09-11 NOTE — PROGRESS NOTES
Physical Therapy Treatment Note/Plan of Care    Room #:  7056/4350-50  Patient Name: Katelin Bell  YOB: 1955  MRN: [id number]    Date of Service: 9/11/2021     Tentative placement recommendation: Subacute rehab  Equipment recommendation: To be determined      Evaluating Physical Therapist: Shannan Olivier PT, [facility] #[id number]      Specific Provider Orders/Date/Referring Provider :   09/04/21 1145   PT eval and treat Start: 09/04/21 1145, End: 09/04/21 1145, ONE TIME, Standing Count: 1 Occurrences, Shayne Villasenor MD Acknowledge New    Admitting Diagnosis:   SOB (shortness of breath) [R06.02]  Acquired angioedema [T78. 3XXA]      Visit Diagnoses       Codes    Angioedema, initial encounter     T78. 3XXA    Acute respiratory distress     R06.03        Surgery: None    Patient Active Problem List   Diagnosis    Non-rheumatic mitral regurgitation    Hypertensive left ventricular hypertrophy, without heart failure    Dyslipidemia    Non morbid obesity    Essential hypertension    Diabetes mellitus ([address]    [facility]ocardiopathy [facility] [address]    Tobacco use    Near syncope    Diarrhea    Generalized abdominal pain    Abdominal pain    Pre-syncope    PVD (peripheral vascular disease) with claudication (HCC)    Chronic pain of both ankles    ACE inhibitor-aggravated angioedema    Acute respiratory failure (HCC)    COPD (chronic obstructive pulmonary disease) (HCC)    Acquired angioedema    SOB (shortness of breath)    Disorder of airway    Acquired hypertrophy of tongue        ASSESSMENT of Current Deficits Patient exhibits decreased strength, balance and endurance impairing functional mobility and tolerance to activity. Able to sit on side of bed with motivation from  and therapist, multi plane instability and upright posture during sitting  Pt's confusion limited her progress with all functional mobility.  Patient needs continued PT to improve strength and endurance to tolerate and complete functional mobility with decreased assist required.         PHYSICAL THERAPY  PLAN OF CARE       Physical therapy plan of care is established based on physician order,  patient diagnosis and clinical assessment    Current Treatment Recommendations:    -Bed Mobility: Lower extremity exercises  and Trunk control activities   -Sitting Balance: Incorporate reaching activities to activate trunk muscles , Hands on support to maintain midline , Facilitate active trunk muscle engagement , Facilitate postural control in all planes  and Engage in core activities to allow for movement within base of support   -Standing Balance: Perform strengthening exercises in standing to promote motor control with or without upper extremity support , Instruct patient on adequate base of support to maintain balance and Challenge balance utilizing reaching  activities beyond center of gravity    -Transfers: Provide instruction on proper hand and foot position for adequate transfer of weight onto lower extremities and use of gait device, Cues for hand placement, technique and safety, Facilitate weight shift forward on to lower extremities and provide necessary stabilization of bilateral lower extremities , Support transfer of weight on to lower extremities, Assist with extension of knees trunk and hip to accept weight transfer  and Provide stabilization to prevent fall   -Gait: Gait training, Standing activities to improve: base of support, weight shift, weight bearing , Exercises to improve trunk control, Exercises to improve hip and knee control, Performance of protected weight bearing activities and Activities to increase weight bearing   -Endurance: Utilize Supervised activities to increase level of endurance to allow for safe functional mobility including transfers and gait  and Use graduated activities to promote good breathing techniques and provide support and education to maximize respiratory function    PT long term treatment goals are located in below grid    Patient and or family understand(s) diagnosis, prognosis, and plan of care. Frequency of treatments: Patient will be seen  daily. Prior Level of Function: Patient ambulated unknown. Rehab Potential: fair  for baseline    Past medical history:   Past Medical History:   Diagnosis Date    Arthritis     Asthma     Cerebral artery occlusion with cerebral infarction [facility] [address] 2015    COPD (chronic obstructive pulmonary disease) ([facility] [address]     Diabetes mellitus ([facility] [address]     Heart attack ([facility] [address]     Hyperlipidemia     Hypertension     VHD (valvular heart disease)      Past Surgical History:   Procedure Laterality Date    CHOLECYSTECTOMY      COLONOSCOPY  12/2018    HYSTERECTOMY      HYSTERECTOMY, VAGINAL      UPPER GASTROINTESTINAL ENDOSCOPY  12/2018       SUBJECTIVE:    Precautions: Up with assistance, falls, O2 and confusion , AMS  Social history: Patient is a poor historian and stated that she lives alone in a two story home resides first  with 5 steps  to enter with [address]. Equipment owned: None    AM-PAC Basic Mobility   8/24    AM-[facility] Mobility Inpatient   How much difficulty turning over in bed?: A Lot  How much difficulty sitting down on / standing up from a chair with arms?: A Lot  How much difficulty moving from lying on back to sitting on side of bed?: A Lot  How much help from another person moving to and from a bed to a chair?: A Lot  How much help from another person needed to walk in hospital room?: A Lot  How much help from another person for climbing 3-5 steps with a railing?: Total  AM-PAC Inpatient Mobility Raw Score : 11  AM-PAC Inpatient T-Scale Score : 33.86  Mobility Inpatient CMS 0-100% Score: 72.57  Mobility Inpatient CMS G-Code Modifier : CL    Nursing cleared patient for PT treatment. . One on one sitter present. States she has been trying to feed patient, but unable to get patient to participate.   Patient had CT ordered after episode last evening, negative CT, ok to try treatment. Patient responding, stating no mostly with request to participate with activity. Patient participated in moving legs at an attempt to sit on EOB, then stated no. She participated with rolling from side to side. OBJECTIVE;   Initial Evaluation  Date: 9/6/2021 Treatment Date:  9/11/2021       Short Term/ Long Term   Goals   Was pt agreeable to Eval/treatment?  Yes Yes for minimal treatment To be met in 5 days   Pain level   0/10   Not stated     Bed Mobility    Rolling: Maximal assist of 1    Supine to sit: Maximal assist of 1    Sit to supine: Maximal assist of 1    Scooting: Maximal assist of 1   Rolling: Maximal assist of 1   Supine to sit: Not assessed  patient initially  started moving legs to sit, then refused  Sit to supine: Not assessed    Scooting: Not assessed     Rolling: Minimal assist of 1    Supine to sit: Minimal assist of 1    Sit to supine: Minimal assist of 1    Scooting: Minimal assist of 1     Transfers Sit to stand: Not assessed   Sit to stand: Not assessed         Sit to stand: Minimal assist of 1    Ambulation    not assessed  not assessed     > 25 feet using  wheeled walker with Moderate assist of 1    Stair negotiation: ascended and descended   Not assessed        ROM Within functional limits    Increase range of motion 10% of affected joints    Strength BUE:  refer to OT eval  RLE:  3-/5  LLE:  3-/5  Increase strength in affected mm groups by 1/3 grade   Balance Sitting EOB:  poor+  Dynamic Standing:  not assessed  Sitting EOB: not assessed   Dynamic Standing: not assessed    Sitting EOB:  fair   Dynamic Standing: fair-     Patient is Alert & Oriented x person and follows one step directions    Sensation:  Patient  denies numbness/tingling   Edema:  no   Endurance: poor    Vitals: 4 liters nasal cannula   Blood Pressure at rest  Blood Pressure during session    Heart Rate at rest 71 Heart Rate during session    SPO2 at rest %  SPO2 during session % Patient education  Patient educated on role of Physical Therapy, risks of immobility, safety and plan of care, energy conservation,  importance of mobility while in hospital , purse lip breathing and safety      Patient response to education:   Pt verbalized understanding Pt demonstrated skill Pt requires further education in this area   Yes Partial Yes      Treatment:  Patient practiced and was instructed/facilitated in the following treatment: patient side lying, initially participated with movement to EOB, then refused. Patient rolled to back and attempted exercises, but patient refused. Rolled patient to opposite side and positioned for comfort . Therapeutic Exercises:  not performed     At end of session, patient in bed with  all lines and tubes in tact, call light within reach of patient and  nursing notified. One on One Sitter present. Patient would benefit from continued skilled Physical Therapy to improve functional independence and quality of life. Patient's/ family goals   rehab    Time in  0930  Time out  0943    Total Treatment Time  13 minutes    CPT codes:    Therapeutic activities (18248)   13 minutes  1 unit(s)   Iwona Flores PTA  Lic.  #[id number]

## 2021-09-11 NOTE — PLAN OF CARE
Problem: Non-Violent Restraints  Goal: Removal from restraints as soon as assessed to be safe  Outcome: Met This Shift  Goal: No harm/injury to patient while restraints in use  Outcome: Met This Shift  Goal: Patient's dignity will be maintained  Outcome: Met This Shift  Goal: Removal from restraints as soon as assessed to be safe  Outcome: Met This Shift  Goal: No harm/injury to patient while restraints in use  Outcome: Met This Shift  Goal: Patient's dignity will be maintained  Outcome: Met This Shift     Problem: Falls - Risk of:  Goal: Will remain free from falls  Description: Will remain free from falls  9/11/2021 0455 by Juice Zaragoza RN  Outcome: Met This Shift  9/10/2021 2053 by Juice Zaragoza RN  Outcome: Not Met This Shift  Goal: Absence of physical injury  Description: Absence of physical injury  9/11/2021 0455 by Juice Zaragoza RN  Outcome: Met This Shift  9/10/2021 2053 by Juice Zaragoza RN  Outcome: Met This Shift     Problem: Skin Integrity:  Goal: Will show no infection signs and symptoms  Description: Will show no infection signs and symptoms  Outcome: Met This Shift  Goal: Absence of new skin breakdown  Description: Absence of new skin breakdown  Outcome: Met This Shift     Problem: Breathing Pattern - Ineffective:  Goal: Ability to achieve and maintain a regular respiratory rate will improve  Description: Ability to achieve and maintain a regular respiratory rate will improve  Outcome: Met This Shift     Problem: Airway Clearance - Ineffective:  Goal: Ability to maintain a clear airway will improve  Description: Ability to maintain a clear airway will improve  Outcome: Met This Shift     Problem: Pain:  Goal: Pain level will decrease  Description: Pain level will decrease  Outcome: Met This Shift  Goal: Control of acute pain  Description: Control of acute pain  Outcome: Met This Shift  Goal: Control of chronic pain  Description: Control of chronic pain  Outcome: Met This Shift

## 2021-09-11 NOTE — PROGRESS NOTES
Pt assessment completed at 2005. Bed in low position with wheels locked. Side rails up x 4 d/t pt constant mobility of her legs. Vogel catheter patent and draining clear yellow urine. Fátima area remains reddened with cream applied. Pt remains calm and talkative but voice is horse. White crusty film noted to oral mucosa (dx with thrush) is being medicated with nystatin suspension.

## 2021-09-12 ENCOUNTER — APPOINTMENT (OUTPATIENT)
Dept: GENERAL RADIOLOGY | Age: 66
DRG: 207 | End: 2021-09-12
Payer: MEDICARE

## 2021-09-12 LAB
ALBUMIN SERPL-MCNC: 3.3 G/DL (ref 3.5–5.2)
ALP BLD-CCNC: 45 U/L (ref 35–104)
ALT SERPL-CCNC: 40 U/L (ref 0–32)
AMMONIA: <10 UMOL/L (ref 11–51)
ANION GAP SERPL CALCULATED.3IONS-SCNC: 7 MMOL/L (ref 7–16)
AST SERPL-CCNC: 12 U/L (ref 0–31)
BASOPHILS ABSOLUTE: 0.01 E9/L (ref 0–0.2)
BASOPHILS RELATIVE PERCENT: 0.1 % (ref 0–2)
BILIRUB SERPL-MCNC: 0.5 MG/DL (ref 0–1.2)
BUN BLDV-MCNC: 15 MG/DL (ref 6–23)
CALCIUM SERPL-MCNC: 8.7 MG/DL (ref 8.6–10.2)
CHLORIDE BLD-SCNC: 99 MMOL/L (ref 98–107)
CO2: 28 MMOL/L (ref 22–29)
CREAT SERPL-MCNC: 0.7 MG/DL (ref 0.5–1)
EOSINOPHILS ABSOLUTE: 0.08 E9/L (ref 0.05–0.5)
EOSINOPHILS RELATIVE PERCENT: 1.1 % (ref 0–6)
FOLATE: 9.2 NG/ML (ref 4.8–24.2)
GFR AFRICAN AMERICAN: >60
GFR NON-AFRICAN AMERICAN: >60 ML/MIN/1.73
GLUCOSE BLD-MCNC: 192 MG/DL (ref 74–99)
HCT VFR BLD CALC: 32.5 % (ref 34–48)
HEMOGLOBIN: 10.7 G/DL (ref 11.5–15.5)
IMMATURE GRANULOCYTES #: 0.03 E9/L
IMMATURE GRANULOCYTES %: 0.4 % (ref 0–5)
LYMPHOCYTES ABSOLUTE: 1.43 E9/L (ref 1.5–4)
LYMPHOCYTES RELATIVE PERCENT: 19.9 % (ref 20–42)
MAGNESIUM: 1.7 MG/DL (ref 1.6–2.6)
MCH RBC QN AUTO: 30.6 PG (ref 26–35)
MCHC RBC AUTO-ENTMCNC: 32.9 % (ref 32–34.5)
MCV RBC AUTO: 92.9 FL (ref 80–99.9)
METER GLUCOSE: 103 MG/DL (ref 74–99)
METER GLUCOSE: 164 MG/DL (ref 74–99)
METER GLUCOSE: 171 MG/DL (ref 74–99)
METER GLUCOSE: 195 MG/DL (ref 74–99)
MONOCYTES ABSOLUTE: 0.8 E9/L (ref 0.1–0.95)
MONOCYTES RELATIVE PERCENT: 11.1 % (ref 2–12)
NEUTROPHILS ABSOLUTE: 4.85 E9/L (ref 1.8–7.3)
NEUTROPHILS RELATIVE PERCENT: 67.4 % (ref 43–80)
PDW BLD-RTO: 11.6 FL (ref 11.5–15)
PHOSPHORUS: 2.2 MG/DL (ref 2.5–4.5)
PLATELET # BLD: 266 E9/L (ref 130–450)
PMV BLD AUTO: 9.4 FL (ref 7–12)
POTASSIUM SERPL-SCNC: 3.9 MMOL/L (ref 3.5–5)
RBC # BLD: 3.5 E12/L (ref 3.5–5.5)
SODIUM BLD-SCNC: 134 MMOL/L (ref 132–146)
TOTAL PROTEIN: 5.4 G/DL (ref 6.4–8.3)
VITAMIN B-12: 1872 PG/ML (ref 211–946)
VITAMIN D 25-HYDROXY: 56 NG/ML (ref 30–100)
WBC # BLD: 7.2 E9/L (ref 4.5–11.5)

## 2021-09-12 PROCEDURE — 2580000003 HC RX 258

## 2021-09-12 PROCEDURE — 2060000000 HC ICU INTERMEDIATE R&B

## 2021-09-12 PROCEDURE — 6370000000 HC RX 637 (ALT 250 FOR IP): Performed by: INTERNAL MEDICINE

## 2021-09-12 PROCEDURE — 82607 VITAMIN B-12: CPT

## 2021-09-12 PROCEDURE — 80053 COMPREHEN METABOLIC PANEL: CPT

## 2021-09-12 PROCEDURE — 94640 AIRWAY INHALATION TREATMENT: CPT

## 2021-09-12 PROCEDURE — 6360000002 HC RX W HCPCS: Performed by: SPECIALIST

## 2021-09-12 PROCEDURE — 82962 GLUCOSE BLOOD TEST: CPT

## 2021-09-12 PROCEDURE — 86592 SYPHILIS TEST NON-TREP QUAL: CPT

## 2021-09-12 PROCEDURE — 6370000000 HC RX 637 (ALT 250 FOR IP): Performed by: SPECIALIST

## 2021-09-12 PROCEDURE — 84100 ASSAY OF PHOSPHORUS: CPT

## 2021-09-12 PROCEDURE — 2700000000 HC OXYGEN THERAPY PER DAY

## 2021-09-12 PROCEDURE — 6370000000 HC RX 637 (ALT 250 FOR IP): Performed by: NURSE PRACTITIONER

## 2021-09-12 PROCEDURE — 36415 COLL VENOUS BLD VENIPUNCTURE: CPT

## 2021-09-12 PROCEDURE — 82140 ASSAY OF AMMONIA: CPT

## 2021-09-12 PROCEDURE — 6360000002 HC RX W HCPCS

## 2021-09-12 PROCEDURE — 6360000002 HC RX W HCPCS: Performed by: INTERNAL MEDICINE

## 2021-09-12 PROCEDURE — 83735 ASSAY OF MAGNESIUM: CPT

## 2021-09-12 PROCEDURE — 82746 ASSAY OF FOLIC ACID SERUM: CPT

## 2021-09-12 PROCEDURE — 82306 VITAMIN D 25 HYDROXY: CPT

## 2021-09-12 PROCEDURE — 71046 X-RAY EXAM CHEST 2 VIEWS: CPT

## 2021-09-12 PROCEDURE — 85025 COMPLETE CBC W/AUTO DIFF WBC: CPT

## 2021-09-12 PROCEDURE — 2580000003 HC RX 258: Performed by: SPECIALIST

## 2021-09-12 RX ORDER — MECOBALAMIN 5000 MCG
5 TABLET,DISINTEGRATING ORAL NIGHTLY
DISCHARGE
Start: 2021-09-13

## 2021-09-12 RX ORDER — LACTOBACILLUS RHAMNOSUS GG 10B CELL
1 CAPSULE ORAL DAILY
DISCHARGE
Start: 2021-09-13

## 2021-09-12 RX ORDER — CLOTRIMAZOLE 10 MG/1
10 LOZENGE ORAL; TOPICAL
Status: DISCONTINUED | OUTPATIENT
Start: 2021-09-12 | End: 2021-09-15 | Stop reason: HOSPADM

## 2021-09-12 RX ORDER — CLONIDINE HYDROCHLORIDE 0.1 MG/1
0.1 TABLET ORAL 2 TIMES DAILY
Status: DISCONTINUED | OUTPATIENT
Start: 2021-09-13 | End: 2021-09-15 | Stop reason: HOSPADM

## 2021-09-12 RX ORDER — HYDRALAZINE HYDROCHLORIDE 50 MG/1
50 TABLET, FILM COATED ORAL EVERY 8 HOURS SCHEDULED
Qty: 90 TABLET | Refills: 3 | DISCHARGE
Start: 2021-09-13

## 2021-09-12 RX ORDER — MECOBALAMIN 5000 MCG
5 TABLET,DISINTEGRATING ORAL NIGHTLY
Status: DISCONTINUED | OUTPATIENT
Start: 2021-09-12 | End: 2021-09-15 | Stop reason: HOSPADM

## 2021-09-12 RX ORDER — INSULIN GLARGINE 100 [IU]/ML
20 INJECTION, SOLUTION SUBCUTANEOUS 2 TIMES DAILY
Qty: 10 ML | Refills: 3 | DISCHARGE
Start: 2021-09-13

## 2021-09-12 RX ORDER — METOPROLOL SUCCINATE 50 MG/1
50 TABLET, EXTENDED RELEASE ORAL DAILY
Qty: 30 TABLET | Refills: 3 | DISCHARGE
Start: 2021-09-13

## 2021-09-12 RX ORDER — PANTOPRAZOLE SODIUM 40 MG/1
40 TABLET, DELAYED RELEASE ORAL
Status: DISCONTINUED | OUTPATIENT
Start: 2021-09-13 | End: 2021-09-15 | Stop reason: HOSPADM

## 2021-09-12 RX ORDER — PREDNISONE 20 MG/1
20 TABLET ORAL DAILY
Status: DISCONTINUED | OUTPATIENT
Start: 2021-09-13 | End: 2021-09-15 | Stop reason: HOSPADM

## 2021-09-12 RX ADMIN — IPRATROPIUM BROMIDE AND ALBUTEROL SULFATE 1 AMPULE: .5; 3 SOLUTION RESPIRATORY (INHALATION) at 18:16

## 2021-09-12 RX ADMIN — PREDNISONE 30 MG: 20 TABLET ORAL at 10:25

## 2021-09-12 RX ADMIN — Medication 1 CAPSULE: at 10:25

## 2021-09-12 RX ADMIN — Medication 10 ML: at 21:13

## 2021-09-12 RX ADMIN — HYDRALAZINE HYDROCHLORIDE 50 MG: 50 TABLET, FILM COATED ORAL at 13:32

## 2021-09-12 RX ADMIN — CLONIDINE HYDROCHLORIDE 0.2 MG: 0.2 TABLET ORAL at 20:58

## 2021-09-12 RX ADMIN — MICONAZOLE NITRATE: 2 OINTMENT TOPICAL at 15:25

## 2021-09-12 RX ADMIN — ENOXAPARIN SODIUM 40 MG: 40 INJECTION SUBCUTANEOUS at 10:26

## 2021-09-12 RX ADMIN — NYSTATIN 500000 UNITS: 100000 SUSPENSION ORAL at 13:32

## 2021-09-12 RX ADMIN — NYSTATIN 500000 UNITS: 100000 SUSPENSION ORAL at 20:58

## 2021-09-12 RX ADMIN — CLOTRIMAZOLE 10 MG: 10 LOZENGE ORAL; TOPICAL at 18:59

## 2021-09-12 RX ADMIN — INSULIN LISPRO 1 UNITS: 100 INJECTION, SOLUTION INTRAVENOUS; SUBCUTANEOUS at 21:14

## 2021-09-12 RX ADMIN — SODIUM CHLORIDE, PRESERVATIVE FREE 300 UNITS: 5 INJECTION INTRAVENOUS at 10:30

## 2021-09-12 RX ADMIN — IPRATROPIUM BROMIDE AND ALBUTEROL SULFATE 1 AMPULE: .5; 3 SOLUTION RESPIRATORY (INHALATION) at 05:58

## 2021-09-12 RX ADMIN — THIAMINE HCL TAB 100 MG 100 MG: 100 TAB at 10:26

## 2021-09-12 RX ADMIN — SODIUM CHLORIDE, PRESERVATIVE FREE 300 UNITS: 5 INJECTION INTRAVENOUS at 20:59

## 2021-09-12 RX ADMIN — Medication 10 ML: at 20:59

## 2021-09-12 RX ADMIN — METOPROLOL SUCCINATE 50 MG: 50 TABLET, EXTENDED RELEASE ORAL at 10:26

## 2021-09-12 RX ADMIN — IPRATROPIUM BROMIDE AND ALBUTEROL SULFATE 1 AMPULE: .5; 3 SOLUTION RESPIRATORY (INHALATION) at 22:19

## 2021-09-12 RX ADMIN — Medication 5 MG: at 20:58

## 2021-09-12 RX ADMIN — IPRATROPIUM BROMIDE AND ALBUTEROL SULFATE 1 AMPULE: .5; 3 SOLUTION RESPIRATORY (INHALATION) at 02:24

## 2021-09-12 RX ADMIN — PYRIDOXINE HCL TAB 50 MG 50 MG: 50 TAB at 10:25

## 2021-09-12 RX ADMIN — FAMOTIDINE 20 MG: 20 TABLET, FILM COATED ORAL at 10:25

## 2021-09-12 RX ADMIN — Medication 1000 UNITS: at 10:25

## 2021-09-12 RX ADMIN — HYDRALAZINE HYDROCHLORIDE 50 MG: 50 TABLET, FILM COATED ORAL at 06:02

## 2021-09-12 RX ADMIN — CLOTRIMAZOLE 10 MG: 10 LOZENGE ORAL; TOPICAL at 21:02

## 2021-09-12 RX ADMIN — WATER 2000 MG: 1 INJECTION INTRAMUSCULAR; INTRAVENOUS; SUBCUTANEOUS at 15:24

## 2021-09-12 RX ADMIN — INSULIN GLARGINE 20 UNITS: 100 INJECTION, SOLUTION SUBCUTANEOUS at 10:27

## 2021-09-12 RX ADMIN — INSULIN GLARGINE 20 UNITS: 100 INJECTION, SOLUTION SUBCUTANEOUS at 21:13

## 2021-09-12 RX ADMIN — FAMOTIDINE 20 MG: 20 TABLET, FILM COATED ORAL at 21:03

## 2021-09-12 RX ADMIN — HYDRALAZINE HYDROCHLORIDE 50 MG: 50 TABLET, FILM COATED ORAL at 21:02

## 2021-09-12 RX ADMIN — DIMETHICONE, CAMPHOR (SYNTHETIC), MENTHOL, AND PHENOL: 1.1; .5; .625; .5 OINTMENT TOPICAL at 10:30

## 2021-09-12 RX ADMIN — DOXYCYCLINE HYCLATE 100 MG: 100 CAPSULE ORAL at 10:26

## 2021-09-12 RX ADMIN — Medication 10 ML: at 10:31

## 2021-09-12 RX ADMIN — IPRATROPIUM BROMIDE AND ALBUTEROL SULFATE 1 AMPULE: .5; 3 SOLUTION RESPIRATORY (INHALATION) at 09:16

## 2021-09-12 RX ADMIN — NYSTATIN 500000 UNITS: 100000 SUSPENSION ORAL at 17:10

## 2021-09-12 RX ADMIN — CLONIDINE HYDROCHLORIDE 0.2 MG: 0.2 TABLET ORAL at 10:26

## 2021-09-12 RX ADMIN — NYSTATIN 500000 UNITS: 100000 SUSPENSION ORAL at 10:27

## 2021-09-12 RX ADMIN — INSULIN LISPRO 2 UNITS: 100 INJECTION, SOLUTION INTRAVENOUS; SUBCUTANEOUS at 12:27

## 2021-09-12 RX ADMIN — INSULIN LISPRO 2 UNITS: 100 INJECTION, SOLUTION INTRAVENOUS; SUBCUTANEOUS at 10:28

## 2021-09-12 RX ADMIN — FLUCONAZOLE 200 MG: 100 TABLET ORAL at 10:25

## 2021-09-12 RX ADMIN — DOXYCYCLINE HYCLATE 100 MG: 100 CAPSULE ORAL at 20:58

## 2021-09-12 RX ADMIN — MICONAZOLE NITRATE: 2 OINTMENT TOPICAL at 21:05

## 2021-09-12 RX ADMIN — ROFLUMILAST 500 MCG: 500 TABLET ORAL at 10:25

## 2021-09-12 ASSESSMENT — PAIN SCALES - PAIN ASSESSMENT IN ADVANCED DEMENTIA (PAINAD)
TOTALSCORE: 5
NEGVOCALIZATION: 1
CONSOLABILITY: 1
FACIALEXPRESSION: 1
BODYLANGUAGE: 1
BREATHING: 1

## 2021-09-12 ASSESSMENT — PAIN SCALES - GENERAL
PAINLEVEL_OUTOF10: 0

## 2021-09-12 ASSESSMENT — PAIN SCALES - WONG BAKER
WONGBAKER_NUMERICALRESPONSE: 0

## 2021-09-12 NOTE — CARE COORDINATION
9/12/21. UPDATE SNF; [address] [place] [address] and Rehab [facility]st choice- [facility]-they were Denied due to contact with [facility] not starting unitl the 20th of Sept.  2nd choice is [facility]- call to [facility] she will review on Monday- and contact CM/SS assigned to pateint. For SNF PRECERT required, pt will need a signed AHMET, updated PT/OT notes, and a HENS, will need a rapid covid test prior to discharge to SNF.   Electronically signed by Marianela Nguyen RN-BC on 9/12/2021 at 8:34 AM

## 2021-09-12 NOTE — PLAN OF CARE
Problem: Non-Violent Restraints  Goal: Removal from restraints as soon as assessed to be safe  Outcome: Met This Shift  Goal: No harm/injury to patient while restraints in use  Outcome: Met This Shift  Goal: Patient's dignity will be maintained  Outcome: Met This Shift  Goal: Removal from restraints as soon as assessed to be safe  Outcome: Met This Shift  Goal: No harm/injury to patient while restraints in use  Outcome: Met This Shift  Goal: Patient's dignity will be maintained  Outcome: Met This Shift     Problem: Falls - Risk of:  Goal: Will remain free from falls  Description: Will remain free from falls  9/12/2021 0021 by Noé Akhtar RN  Outcome: Met This Shift  9/11/2021 1906 by Amrita Cevallos RN  Outcome: Met This Shift  Goal: Absence of physical injury  Description: Absence of physical injury  9/12/2021 0021 by Noé Akhtar RN  Outcome: Met This Shift  9/11/2021 1906 by Amrita Cevallos RN  Outcome: Met This Shift     Problem: Skin Integrity:  Goal: Will show no infection signs and symptoms  Description: Will show no infection signs and symptoms  9/12/2021 0021 by Noé Akhtar RN  Outcome: Met This Shift  9/11/2021 1906 by Amrita Cevallos RN  Outcome: Met This Shift  Goal: Absence of new skin breakdown  Description: Absence of new skin breakdown  9/12/2021 0021 by Noé Akhtar RN  Outcome: Met This Shift  9/11/2021 1906 by Amrita Cevallos RN  Outcome: Met This Shift     Problem: Breathing Pattern - Ineffective:  Goal: Ability to achieve and maintain a regular respiratory rate will improve  Description: Ability to achieve and maintain a regular respiratory rate will improve  9/12/2021 0021 by Noé Akhtar RN  Outcome: Met This Shift  9/11/2021 1906 by Amrita Cevallos RN  Outcome: Ongoing     Problem: Airway Clearance - Ineffective:  Goal: Ability to maintain a clear airway will improve  Description: Ability to maintain a clear airway will improve  9/12/2021 0021 by Noé Akhtar RN  Outcome: Met This Shift  9/11/2021 1906 by Annmarie Holcomb RN  Outcome: Ongoing     Problem: Pain:  Goal: Pain level will decrease  Description: Pain level will decrease  9/12/2021 0021 by Juan Newell RN  Outcome: Met This Shift  9/11/2021 1906 by Annmarie Holcomb RN  Outcome: Ongoing  Goal: Control of acute pain  Description: Control of acute pain  9/12/2021 0021 by Juan Newell RN  Outcome: Met This Shift  9/11/2021 1906 by Annmarie Holcomb RN  Outcome: Ongoing  Goal: Control of chronic pain  Description: Control of chronic pain  9/12/2021 0021 by Juan Newell RN  Outcome: Met This Shift  9/11/2021 1906 by Annmarie Holcomb RN  Outcome: Ongoing

## 2021-09-12 NOTE — PROGRESS NOTES
Subjective:  Gdynia was seen and examined at bedside today.    There were no new problems reported overnight but still awake most of the night  Patient is not currently tolerating diet probably due to dysphagia  She os more awake and only alert to place    A complete review of systems and social history was completed on admission and remains unchanged unless otherwise noted    Scheduled Meds:   clotrimazole  10 mg Oral 5x Daily    cefTRIAXone (ROCEPHIN) IV  2,000 mg IntraVENous Q24H    predniSONE  30 mg Oral Daily    insulin glargine  20 Units SubCUTAneous BID    cloNIDine  0.2 mg Oral BID    fluconazole  200 mg Oral Daily    thiamine mononitrate  100 mg Oral Daily    vitamin B-6  50 mg Oral Daily    Vitamin D  1,000 Units Oral Daily    miconazole nitrate   Topical BID    nystatin  5 mL Oral 4x Daily    doxycycline hyclate  100 mg Oral 2 times per day    metoprolol succinate  50 mg Oral Daily    insulin lispro  0-12 Units SubCUTAneous TID WC    insulin lispro  0-6 Units SubCUTAneous Nightly    famotidine  20 mg Oral BID    hydrALAZINE  50 mg Oral 3 times per day    Roflumilast  500 mcg Oral Daily    lactobacillus  1 capsule Oral Daily    ipratropium-albuterol  1 ampule Inhalation Q4H    enoxaparin  40 mg SubCUTAneous Daily    lidocaine PF  5 mL IntraDERmal Once    sodium chloride flush  5-40 mL IntraVENous 2 times per day    heparin flush  3 mL IntraVENous 2 times per day    medicated lip ointment   Topical Daily    sodium chloride flush  5-40 mL IntraVENous 2 times per day     Continuous Infusions:   sodium chloride 25 mL (09/09/21 0046)    dextrose      sodium chloride       PRN Meds:diphenhydrAMINE, perflutren lipid microspheres, sodium chloride flush, sodium chloride, heparin flush, glucose, dextrose, glucagon (rDNA), dextrose, sodium chloride flush, sodium chloride, ondansetron **OR** ondansetron, polyethylene glycol, acetaminophen **OR** acetaminophen    Objective:  /60 Pulse 76   Temp 98.6 °F (37 °C) (Oral)   Resp 16   Ht 5' 4\" (1.626 m)   Wt 198 lb 12.8 oz (90.2 kg)   SpO2 95%   BMI 34.12 kg/m²   In: 0   Out: 350    In: 0   Out: 350 [Urine:350]     AAO x 1 today, currently in NAD  Thrush in mouth improving  RRR, pos S1, S2  Tight, no wheeze, rales or rhonchi  bowel sounds present, nontender, nondistended  No clubbing, cyanosis, or edema  No neuro changes o/w seen at this time  No obvious rashes or lesions except for excoriations on LE    Recent Labs     09/10/21  0930 09/12/21  0520   WBC 10.4 7.2   HGB 11.5 10.7*    266     Recent Labs     09/10/21  0930 09/12/21  0520    134   K 4.2 3.9   CL 96* 99   CO2 27 28   BUN 18 15   CREATININE 0.7 0.7   GLUCOSE 266* 192*     Recent Labs     09/10/21  0930 09/12/21  0520   BILITOT 0.8 0.5   ALKPHOS 49 45   AST 20 12   ALT 56* 40*     No results for input(s): INR in the last 72 hours. Invalid input(s): PT  No results for input(s): CKTOTAL, CKMB, CKMBINDEX, TROPONINI in the last 72 hours. XR CHEST PORTABLE    Result Date: 8/25/2021  EXAMINATION: ONE XRAY VIEW OF THE CHEST 8/24/2021 8:52 pm COMPARISON: 08/24/2021 HISTORY: ORDERING SYSTEM PROVIDED HISTORY: et tube placement TECHNOLOGIST PROVIDED HISTORY: Reason for exam:->et tube placement FINDINGS: Stable position of endotracheal tube. Cardiomediastinal silhouette is unremarkable. No infiltrate, effusion, or pneumothorax. No acute osseous abnormality. No pulmonary infiltrates. No significant change since the prior study     XR CHEST PORTABLE    Result Date: 8/24/2021  EXAMINATION: ONE XRAY VIEW OF THE CHEST 8/24/2021 5:30 pm COMPARISON: January 3, 2020 HISTORY: ORDERING SYSTEM PROVIDED HISTORY: SOB (shortness of breath) TECHNOLOGIST PROVIDED HISTORY: Reason for exam:->ETT placement, tongue swelling FINDINGS: Endotracheal tube is approximately 6.8 cm above the chivo. No airspace opacity or pleural effusion. The heart is normal in size. No pneumothorax. are required. I am readily available for any further decision-making and intervention.      Nicolás Collins MD  3:58 PM  9/12/2021

## 2021-09-12 NOTE — PROGRESS NOTES
[address] [facility]  [facility]  Progress Note    NAME: Kemal Vivas  MR:  [id number]  :   1955  DATE OF SERVICE:21    This is a face to face encounter with Kemal Vivas 78 y.o. female on 21    CHIEF COMPLAINT     ID following for   Chief Complaint   Patient presents with    Oral Swelling     woke up with tongue swelling, worse over the day, took 25 mg benadryl 1.5 hrs ago without relief      HISTORY OF PRESENT ILLNESS   Pt seen and examined  21  Has cough   Awake not oriented    Patient is tolerating medications. No reported adverse drug reactions. REVIEW OF SYSTEMS     As stated above in the chief complaint, otherwise negative.   CURRENT MEDICATIONS      cefTRIAXone (ROCEPHIN) IV  2,000 mg IntraVENous Q24H    predniSONE  30 mg Oral Daily    insulin glargine  20 Units SubCUTAneous BID    cloNIDine  0.2 mg Oral BID    fluconazole  200 mg Oral Daily    thiamine mononitrate  100 mg Oral Daily    vitamin B-6  50 mg Oral Daily    Vitamin D  1,000 Units Oral Daily    miconazole nitrate   Topical BID    nystatin  5 mL Oral 4x Daily    doxycycline hyclate  100 mg Oral 2 times per day    metoprolol succinate  50 mg Oral Daily    insulin lispro  0-12 Units SubCUTAneous TID WC    insulin lispro  0-6 Units SubCUTAneous Nightly    famotidine  20 mg Oral BID    hydrALAZINE  50 mg Oral 3 times per day    Roflumilast  500 mcg Oral Daily    lactobacillus  1 capsule Oral Daily    ipratropium-albuterol  1 ampule Inhalation Q4H    enoxaparin  40 mg SubCUTAneous Daily    lidocaine PF  5 mL IntraDERmal Once    sodium chloride flush  5-40 mL IntraVENous 2 times per day    heparin flush  3 mL IntraVENous 2 times per day    medicated lip ointment   Topical Daily    sodium chloride flush  5-40 mL IntraVENous 2 times per day     Continuous Infusions:   sodium chloride 25 mL (21 0046)    dextrose      sodium chloride       PRN Meds:diphenhydrAMINE, perflutren lipid microspheres, sodium chloride flush, sodium chloride, heparin flush, glucose, dextrose, glucagon (rDNA), dextrose, sodium chloride flush, sodium chloride, ondansetron **OR** ondansetron, polyethylene glycol, acetaminophen **OR** acetaminophen    PHYSICAL EXAM     BP (!) 144/63   Pulse 76   Temp 98.6 °F (37 °C) (Oral)   Resp 16   Ht 5' 4\" (1.626 m)   Wt 198 lb 12.8 oz (90.2 kg)   SpO2 95%   BMI 34.12 kg/m²   Temp  Av.2 °F (37.3 °C)  Min: 98.6 °F (37 °C)  Max: 100.1 °F (37.8 °C)  Constitutional:  The patient is awake, alert,  Not oriented. Skin:    Warm and dry. No rashes were noted. HEENT:   Round and reactive pupils. AT/NC  Neck:    Supple to movements. Chest:   No use of accessory muscles to breathe. Symmetrical expansion. Cardiovascular:  S1 and S2 are rhythmic and regular. No murmurs appreciated. Abdomen:   Positive bowel sounds to auscultation. Benign to palpation. Extremities:   No clubbing, no cyanosis, no edema.   CNS    AAxO   Lines: picc rue      DIAGNOSTIC RESULTS   Radiology:    Recent Labs     09/10/21  0930 09/12/21  0520   WBC 10.4 7.2   RBC 3.66 3.50   HGB 11.5 10.7*   HCT 33.6* 32.5*   MCV 91.8 92.9   MCH 31.4 30.6   MCHC 34.2 32.9   RDW 11.2* 11.6    266   MPV 9.4 9.4     Recent Labs     09/10/21  0930 21  0520    134   K 4.2 3.9   CL 96* 99   CO2 27 28   BUN 18 15   CREATININE 0.7 0.7   GLUCOSE 266* 192*   PROT 5.3* 5.4*   LABALBU 3.0* 3.3*   CALCIUM 8.3* 8.7   BILITOT 0.8 0.5   ALKPHOS 49 45   AST 20 12   ALT 56* 40*     Lab Results   Component Value Date    CRP 0.2 2021     Lab Results   Component Value Date    SEDRATE 10 2019     Recent Labs     09/10/21  0930 21  0520   AST 20 12   ALT 56* 40*     Lab Results   Component Value Date    CHOL 296 2021    TRIG 122 2021    HDL 66 2021    LDLCALC 196 2021    LABVLDL 34 2021     Lab Results   Component Value Date/Time    VITD25 56 2021 05:20 AM       Microbiology:   No results for input(s): COVID19 in the last 72 hours. Lab Results   Component Value Date    BC 5 Days no growth 08/29/2021    BC 5 Days- no growth 01/03/2020    BLOODCULT2 5 Days no growth 08/29/2021    BLOODCULT2 5 Days- no growth 01/03/2020    ORG Staphylococcus aureus 08/29/2021    ORG Haemophilus influenzae 08/29/2021    ORG Escherichia coli 08/29/2021     No results found for: WNDABS  Smear, Respiratory   Date Value Ref Range Status   08/29/2021   Final    Group 5: >25 PMN's/LPF and <10 Epithelial cells/LPF  Abundant Polymorphonuclear leukocytes  Epithelial cells not seen  Rare Gram positive diplococci  Few Gram negative rods       No results found for: MPNEUMO, CLAMYDCU, LABLEGI, AFBCX, FUNGSM, LABFUNG    MRSA Culture Only   Date Value Ref Range Status   08/30/2021 Methicillin resistant Staph aureus not isolated  Final     CULTURE, RESPIRATORY   Date Value Ref Range Status   08/29/2021 Oral Pharyngeal Sepideh reduced (A)  Final   08/29/2021   Final    Heavy growth  This isolate is presumed to be resistant based on the  detection of inducible Clindamycin resistance. Clindamycin  may still be effective in some patients. 08/29/2021 Heavy growth  Beta Lactamase negative    Final           FINAL IMPRESSION    Pt had   Chief Complaint   Patient presents with    Oral Swelling     woke up with tongue swelling, worse over the day, took 25 mg benadryl 1.5 hrs ago without relief     Admitted for SOB (shortness of breath) [R06.02]  Acquired angioedema [T78. 3XXA]  On treatment for   rx pneumonia   cefTRIAXone (ROCEPHIN) 2,000 mg in sterile water 20 mL IV syringe, Q24H  fluconazole (DIFLUCAN) tablet 200 mg, Daily  miconazole nitrate 2 % ointment, BID  nystatin (MYCOSTATIN) 214783 UNIT/ML suspension 500,000 Units, 4x Daily  doxycycline hyclate (VIBRAMYCIN) capsule 100 mg, 2 times per day     Ca d/c with orals when ready     · Monitor labs    Imaging and labs were reviewed per medical

## 2021-09-13 ENCOUNTER — APPOINTMENT (OUTPATIENT)
Dept: GENERAL RADIOLOGY | Age: 66
DRG: 207 | End: 2021-09-13
Payer: MEDICARE

## 2021-09-13 LAB
ALBUMIN SERPL-MCNC: 3.3 G/DL (ref 3.5–5.2)
ALP BLD-CCNC: 49 U/L (ref 35–104)
ALT SERPL-CCNC: 45 U/L (ref 0–32)
ANION GAP SERPL CALCULATED.3IONS-SCNC: 10 MMOL/L (ref 7–16)
AST SERPL-CCNC: 23 U/L (ref 0–31)
BASOPHILS ABSOLUTE: 0.01 E9/L (ref 0–0.2)
BASOPHILS RELATIVE PERCENT: 0.1 % (ref 0–2)
BILIRUB SERPL-MCNC: 0.6 MG/DL (ref 0–1.2)
BUN BLDV-MCNC: 15 MG/DL (ref 6–23)
CALCIUM SERPL-MCNC: 8.9 MG/DL (ref 8.6–10.2)
CHLORIDE BLD-SCNC: 100 MMOL/L (ref 98–107)
CO2: 25 MMOL/L (ref 22–29)
CREAT SERPL-MCNC: 0.7 MG/DL (ref 0.5–1)
EOSINOPHILS ABSOLUTE: 0.07 E9/L (ref 0.05–0.5)
EOSINOPHILS RELATIVE PERCENT: 1 % (ref 0–6)
GFR AFRICAN AMERICAN: >60
GFR NON-AFRICAN AMERICAN: >60 ML/MIN/1.73
GLUCOSE BLD-MCNC: 160 MG/DL (ref 74–99)
HCT VFR BLD CALC: 35.4 % (ref 34–48)
HEMOGLOBIN: 11.7 G/DL (ref 11.5–15.5)
IMMATURE GRANULOCYTES #: 0.03 E9/L
IMMATURE GRANULOCYTES %: 0.4 % (ref 0–5)
LYMPHOCYTES ABSOLUTE: 0.77 E9/L (ref 1.5–4)
LYMPHOCYTES RELATIVE PERCENT: 11.4 % (ref 20–42)
MAGNESIUM: 1.8 MG/DL (ref 1.6–2.6)
MCH RBC QN AUTO: 30.5 PG (ref 26–35)
MCHC RBC AUTO-ENTMCNC: 33.1 % (ref 32–34.5)
MCV RBC AUTO: 92.2 FL (ref 80–99.9)
METER GLUCOSE: 126 MG/DL (ref 74–99)
METER GLUCOSE: 145 MG/DL (ref 74–99)
METER GLUCOSE: 205 MG/DL (ref 74–99)
METER GLUCOSE: 65 MG/DL (ref 74–99)
METER GLUCOSE: 69 MG/DL (ref 74–99)
METER GLUCOSE: 85 MG/DL (ref 74–99)
METER GLUCOSE: 86 MG/DL (ref 74–99)
MONOCYTES ABSOLUTE: 0.44 E9/L (ref 0.1–0.95)
MONOCYTES RELATIVE PERCENT: 6.5 % (ref 2–12)
NEUTROPHILS ABSOLUTE: 5.42 E9/L (ref 1.8–7.3)
NEUTROPHILS RELATIVE PERCENT: 80.6 % (ref 43–80)
PDW BLD-RTO: 11.8 FL (ref 11.5–15)
PHOSPHORUS: 3 MG/DL (ref 2.5–4.5)
PLATELET # BLD: 292 E9/L (ref 130–450)
PMV BLD AUTO: 9.7 FL (ref 7–12)
POTASSIUM SERPL-SCNC: 3.7 MMOL/L (ref 3.5–5)
RBC # BLD: 3.84 E12/L (ref 3.5–5.5)
RPR: NORMAL
SODIUM BLD-SCNC: 135 MMOL/L (ref 132–146)
TOTAL PROTEIN: 5.7 G/DL (ref 6.4–8.3)
TSH SERPL DL<=0.05 MIU/L-ACNC: 1.07 UIU/ML (ref 0.27–4.2)
WBC # BLD: 6.7 E9/L (ref 4.5–11.5)

## 2021-09-13 PROCEDURE — 6360000002 HC RX W HCPCS: Performed by: SPECIALIST

## 2021-09-13 PROCEDURE — 92611 MOTION FLUOROSCOPY/SWALLOW: CPT | Performed by: SPEECH-LANGUAGE PATHOLOGIST

## 2021-09-13 PROCEDURE — 85025 COMPLETE CBC W/AUTO DIFF WBC: CPT

## 2021-09-13 PROCEDURE — 6370000000 HC RX 637 (ALT 250 FOR IP): Performed by: SPECIALIST

## 2021-09-13 PROCEDURE — 6360000002 HC RX W HCPCS: Performed by: INTERNAL MEDICINE

## 2021-09-13 PROCEDURE — 6370000000 HC RX 637 (ALT 250 FOR IP): Performed by: NURSE PRACTITIONER

## 2021-09-13 PROCEDURE — 2700000000 HC OXYGEN THERAPY PER DAY

## 2021-09-13 PROCEDURE — 2060000000 HC ICU INTERMEDIATE R&B

## 2021-09-13 PROCEDURE — 6370000000 HC RX 637 (ALT 250 FOR IP): Performed by: INTERNAL MEDICINE

## 2021-09-13 PROCEDURE — 84100 ASSAY OF PHOSPHORUS: CPT

## 2021-09-13 PROCEDURE — 2580000003 HC RX 258

## 2021-09-13 PROCEDURE — 84443 ASSAY THYROID STIM HORMONE: CPT

## 2021-09-13 PROCEDURE — 74220 X-RAY XM ESOPHAGUS 1CNTRST: CPT

## 2021-09-13 PROCEDURE — 2580000003 HC RX 258: Performed by: SPECIALIST

## 2021-09-13 PROCEDURE — 74230 X-RAY XM SWLNG FUNCJ C+: CPT

## 2021-09-13 PROCEDURE — 36415 COLL VENOUS BLD VENIPUNCTURE: CPT

## 2021-09-13 PROCEDURE — 97129 THER IVNTJ 1ST 15 MIN: CPT | Performed by: SPEECH-LANGUAGE PATHOLOGIST

## 2021-09-13 PROCEDURE — 2500000003 HC RX 250 WO HCPCS: Performed by: INTERNAL MEDICINE

## 2021-09-13 PROCEDURE — 82962 GLUCOSE BLOOD TEST: CPT

## 2021-09-13 PROCEDURE — 80053 COMPREHEN METABOLIC PANEL: CPT

## 2021-09-13 PROCEDURE — 83735 ASSAY OF MAGNESIUM: CPT

## 2021-09-13 PROCEDURE — 94640 AIRWAY INHALATION TREATMENT: CPT

## 2021-09-13 RX ORDER — CLONIDINE HYDROCHLORIDE 0.1 MG/1
0.1 TABLET ORAL 2 TIMES DAILY
Qty: 60 TABLET | Refills: 0 | DISCHARGE
Start: 2021-09-14

## 2021-09-13 RX ORDER — PREDNISONE 20 MG/1
TABLET ORAL
Qty: 3 TABLET | Refills: 0 | DISCHARGE
Start: 2021-09-13

## 2021-09-13 RX ORDER — INSULIN GLARGINE 100 [IU]/ML
15 INJECTION, SOLUTION SUBCUTANEOUS 2 TIMES DAILY
Status: DISCONTINUED | OUTPATIENT
Start: 2021-09-14 | End: 2021-09-15 | Stop reason: HOSPADM

## 2021-09-13 RX ADMIN — CLOTRIMAZOLE 10 MG: 10 LOZENGE ORAL; TOPICAL at 17:04

## 2021-09-13 RX ADMIN — BARIUM SULFATE 176 G: 960 POWDER, FOR SUSPENSION ORAL at 11:49

## 2021-09-13 RX ADMIN — NYSTATIN 500000 UNITS: 100000 SUSPENSION ORAL at 09:30

## 2021-09-13 RX ADMIN — ENOXAPARIN SODIUM 40 MG: 40 INJECTION SUBCUTANEOUS at 09:29

## 2021-09-13 RX ADMIN — DOXYCYCLINE HYCLATE 100 MG: 100 CAPSULE ORAL at 09:30

## 2021-09-13 RX ADMIN — PYRIDOXINE HCL TAB 50 MG 50 MG: 50 TAB at 09:31

## 2021-09-13 RX ADMIN — BARIUM SULFATE 45 G: 0.81 POWDER, FOR SUSPENSION ORAL at 11:44

## 2021-09-13 RX ADMIN — ROFLUMILAST 500 MCG: 500 TABLET ORAL at 09:30

## 2021-09-13 RX ADMIN — INSULIN LISPRO 1 UNITS: 100 INJECTION, SOLUTION INTRAVENOUS; SUBCUTANEOUS at 21:46

## 2021-09-13 RX ADMIN — NYSTATIN 500000 UNITS: 100000 SUSPENSION ORAL at 21:27

## 2021-09-13 RX ADMIN — CLONIDINE HYDROCHLORIDE 0.1 MG: 0.1 TABLET ORAL at 09:30

## 2021-09-13 RX ADMIN — HYDRALAZINE HYDROCHLORIDE 50 MG: 50 TABLET, FILM COATED ORAL at 21:35

## 2021-09-13 RX ADMIN — CLONIDINE HYDROCHLORIDE 0.1 MG: 0.1 TABLET ORAL at 21:35

## 2021-09-13 RX ADMIN — Medication 1000 UNITS: at 09:30

## 2021-09-13 RX ADMIN — THIAMINE HCL TAB 100 MG 100 MG: 100 TAB at 09:31

## 2021-09-13 RX ADMIN — HYDRALAZINE HYDROCHLORIDE 50 MG: 50 TABLET, FILM COATED ORAL at 05:49

## 2021-09-13 RX ADMIN — WATER 2000 MG: 1 INJECTION INTRAMUSCULAR; INTRAVENOUS; SUBCUTANEOUS at 16:58

## 2021-09-13 RX ADMIN — INSULIN GLARGINE 20 UNITS: 100 INJECTION, SOLUTION SUBCUTANEOUS at 09:42

## 2021-09-13 RX ADMIN — IPRATROPIUM BROMIDE AND ALBUTEROL SULFATE 1 AMPULE: .5; 3 SOLUTION RESPIRATORY (INHALATION) at 09:55

## 2021-09-13 RX ADMIN — PREDNISONE 20 MG: 20 TABLET ORAL at 09:30

## 2021-09-13 RX ADMIN — HYDRALAZINE HYDROCHLORIDE 50 MG: 50 TABLET, FILM COATED ORAL at 13:48

## 2021-09-13 RX ADMIN — Medication 5 MG: at 21:26

## 2021-09-13 RX ADMIN — IPRATROPIUM BROMIDE AND ALBUTEROL SULFATE 1 AMPULE: .5; 3 SOLUTION RESPIRATORY (INHALATION) at 20:10

## 2021-09-13 RX ADMIN — IPRATROPIUM BROMIDE AND ALBUTEROL SULFATE 1 AMPULE: .5; 3 SOLUTION RESPIRATORY (INHALATION) at 06:45

## 2021-09-13 RX ADMIN — IPRATROPIUM BROMIDE AND ALBUTEROL SULFATE 1 AMPULE: .5; 3 SOLUTION RESPIRATORY (INHALATION) at 14:13

## 2021-09-13 RX ADMIN — FLUCONAZOLE 200 MG: 100 TABLET ORAL at 09:30

## 2021-09-13 RX ADMIN — Medication 10 ML: at 22:09

## 2021-09-13 RX ADMIN — CLOTRIMAZOLE 10 MG: 10 LOZENGE ORAL; TOPICAL at 21:27

## 2021-09-13 RX ADMIN — METOPROLOL SUCCINATE 50 MG: 50 TABLET, EXTENDED RELEASE ORAL at 09:30

## 2021-09-13 RX ADMIN — Medication 10 ML: at 09:00

## 2021-09-13 RX ADMIN — INSULIN LISPRO 4 UNITS: 100 INJECTION, SOLUTION INTRAVENOUS; SUBCUTANEOUS at 13:48

## 2021-09-13 RX ADMIN — INSULIN GLARGINE 20 UNITS: 100 INJECTION, SOLUTION SUBCUTANEOUS at 21:45

## 2021-09-13 RX ADMIN — PANTOPRAZOLE SODIUM 40 MG: 40 TABLET, DELAYED RELEASE ORAL at 07:29

## 2021-09-13 RX ADMIN — DOXYCYCLINE HYCLATE 100 MG: 100 CAPSULE ORAL at 21:43

## 2021-09-13 RX ADMIN — NYSTATIN 500000 UNITS: 100000 SUSPENSION ORAL at 16:57

## 2021-09-13 RX ADMIN — MICONAZOLE NITRATE: 2 OINTMENT TOPICAL at 22:09

## 2021-09-13 RX ADMIN — CLOTRIMAZOLE 10 MG: 10 LOZENGE ORAL; TOPICAL at 07:29

## 2021-09-13 RX ADMIN — BARIUM SULFATE 45 ML: 400 SUSPENSION ORAL at 11:43

## 2021-09-13 RX ADMIN — MICONAZOLE NITRATE: 2 OINTMENT TOPICAL at 09:47

## 2021-09-13 RX ADMIN — Medication 1 CAPSULE: at 09:30

## 2021-09-13 RX ADMIN — BARIUM SULFATE 45 G: 0.6 CREAM ORAL at 11:44

## 2021-09-13 ASSESSMENT — PAIN SCALES - WONG BAKER
WONGBAKER_NUMERICALRESPONSE: 0
WONGBAKER_NUMERICALRESPONSE: 0

## 2021-09-13 ASSESSMENT — PAIN SCALES - GENERAL
PAINLEVEL_OUTOF10: 0
PAINLEVEL_OUTOF10: 0

## 2021-09-13 ASSESSMENT — PAIN SCALES - PAIN ASSESSMENT IN ADVANCED DEMENTIA (PAINAD)
NEGVOCALIZATION: 1
BREATHING: 1
BODYLANGUAGE: 1
CONSOLABILITY: 1
TOTALSCORE: 5
FACIALEXPRESSION: 1

## 2021-09-13 NOTE — CARE COORDINATION
9/13/21 1019 CM note: NO COVID TESTING THIS ADMIT. 1:1 sitter. Plan for video swallow today. Per [facility] liaison, they can accept patient (as long as no NG tube). WILL NEED ZOLTAN SCHUSTER, RAPID COVID, & SIGNED AHMET.  Electronically signed by Rommel Rayo RN on 9/13/2021 at 10:25 AM

## 2021-09-13 NOTE — PROGRESS NOTES
[address] [facility]  [facility]  Progress Note    NAME: Johann Ochoa  MR:  [id number]  :   1955  DATE OF SERVICE:21    This is a face to face encounter with Johann Ochoa 78 y.o. female on 21    CHIEF COMPLAINT     ID following for   Chief Complaint   Patient presents with    Oral Swelling     woke up with tongue swelling, worse over the day, took 25 mg benadryl 1.5 hrs ago without relief      HISTORY OF PRESENT ILLNESS   Pt seen and examined  21  Has a sitter  Asleep but arousable     Patient is tolerating medications. No reported adverse drug reactions. REVIEW OF SYSTEMS     As stated above in the chief complaint, otherwise negative.   CURRENT MEDICATIONS      clotrimazole  10 mg Oral 5x Daily    melatonin  5 mg Oral Nightly    cloNIDine  0.1 mg Oral BID    predniSONE  20 mg Oral Daily    pantoprazole  40 mg Oral QAM AC    cefTRIAXone (ROCEPHIN) IV  2,000 mg IntraVENous Q24H    insulin glargine  20 Units SubCUTAneous BID    fluconazole  200 mg Oral Daily    thiamine mononitrate  100 mg Oral Daily    vitamin B-6  50 mg Oral Daily    Vitamin D  1,000 Units Oral Daily    miconazole nitrate   Topical BID    nystatin  5 mL Oral 4x Daily    doxycycline hyclate  100 mg Oral 2 times per day    metoprolol succinate  50 mg Oral Daily    insulin lispro  0-12 Units SubCUTAneous TID WC    insulin lispro  0-6 Units SubCUTAneous Nightly    hydrALAZINE  50 mg Oral 3 times per day    Roflumilast  500 mcg Oral Daily    lactobacillus  1 capsule Oral Daily    ipratropium-albuterol  1 ampule Inhalation Q4H    enoxaparin  40 mg SubCUTAneous Daily    lidocaine PF  5 mL IntraDERmal Once    sodium chloride flush  5-40 mL IntraVENous 2 times per day    heparin flush  3 mL IntraVENous 2 times per day    medicated lip ointment   Topical Daily    sodium chloride flush  5-40 mL IntraVENous 2 times per day     Continuous Infusions:   sodium chloride 25 mL (21 0046)    dextrose      sodium chloride       PRN Meds:diphenhydrAMINE, perflutren lipid microspheres, sodium chloride flush, sodium chloride, heparin flush, glucose, dextrose, glucagon (rDNA), dextrose, sodium chloride flush, sodium chloride, ondansetron **OR** ondansetron, polyethylene glycol, acetaminophen **OR** acetaminophen    PHYSICAL EXAM     BP (!) 145/72   Pulse 87   Temp 98.5 °F (36.9 °C) (Oral)   Resp 16   Ht 5' 4\" (1.626 m)   Wt 198 lb 12.8 oz (90.2 kg)   SpO2 98%   BMI 34.12 kg/m²   Temp  Av.4 °F (36.9 °C)  Min: 98.2 °F (36.8 °C)  Max: 98.5 °F (36.9 °C)  Constitutional:  The patient is  Not oriented. Skin:    Warm and dry. HEENT:      AT/NC  Chest:   No use of accessory muscles to breathe. Symmetrical expansion. Cardiovascular:  S1 and S2 are rhythmic and regular. No murmurs appreciated. Abdomen:   Positive bowel sounds to auscultation. Benign to palpation. Extremities:   no edema.   CNS    AAxO   Lines: picc rue      DIAGNOSTIC RESULTS   Radiology:    Recent Labs     21  0521  1225   WBC 7.2 6.7   RBC 3.50 3.84   HGB 10.7* 11.7   HCT 32.5* 35.4   MCV 92.9 92.2   MCH 30.6 30.5   MCHC 32.9 33.1   RDW 11.6 11.8    292   MPV 9.4 9.7     Recent Labs     21  0521  1225    135   K 3.9 3.7   CL 99 100   CO2 28 25   BUN 15 15   CREATININE 0.7 0.7   GLUCOSE 192* 160*   PROT 5.4* 5.7*   LABALBU 3.3* 3.3*   CALCIUM 8.7 8.9   BILITOT 0.5 0.6   ALKPHOS 45 49   AST 12 23   ALT 40* 45*     Lab Results   Component Value Date    CRP 0.2 2021     Lab Results   Component Value Date    SEDRATE 10 2019     Recent Labs     21  0520 21  1225   AST 12 23   ALT 40* 45*     Lab Results   Component Value Date    CHOL 296 2021    TRIG 122 2021    HDL 66 2021    LDLCALC 196 2021    LABVLDL 34 2021     Lab Results   Component Value Date/Time    VITD25 56 2021 05:20 AM       Microbiology:   No for involving me in the care of Mohit Rothman will continue to follow. Please do not hesitate to call for any questions or concerns.     Electronically signed by Carolin Goodwin MD on 9/13/2021 at 6:26 PM

## 2021-09-13 NOTE — PROGRESS NOTES
SPEECH/LANGUAGE PATHOLOGY  VIDEOFLUOROSCOPIC STUDY OF SWALLOWING (MBS)   and PLAN OF CARE    PATIENT NAME:  Nic Barry  (female)     MRN:  [id number]    :  1955  (78 y.o.)  STATUS:  Inpatient: Room 0515/0515-01    TODAY'S DATE:  2021  REFERRING PROVIDER:     SLP video swallow Start: 21 1000, End: 21 1000, ONE TIME, Standing Count: 1 Occurrences, R    Deretha Pall, DO  REASON FOR REFERRAL: dysphagia   EVALUATING THERAPIST: RAFAT Saldana      RESULTS:      DYSPHAGIA DIAGNOSIS:  swallow within functional limits for age/premorbid functioning     DIET RECOMMENDATIONS:  Easy to chew consistency solids with  thin liquids    FEEDING RECOMMENDATIONS:    Assistance level:  Full assistance is needed during all oral intake     Compensatory strategies recommended: Small bites/sips and Alternate solids and liquids     Discussed recommendations with nursing and/or faxed report to referring provider: Yes    SPEECH THERAPY  PLAN OF CARE   The dysphagia POC is established based on physician order and dysphagia diagnosis    Skilled SLP intervention for dysphagia management on acute care 3-5 x per week until goals met, pt plateaus in function and/or discharged from hospital      Conditions Requiring Skilled Therapeutic Intervention for dysphagia:    very slow mastication with decreased recollection and bolus formation, had to spit out part of unmasticated solids edentulous during eval     SPECIFIC DYSPHAGIA INTERVENTIONS TO INCLUDE:     Trials of upgraded diet/liquid     Specific instructions for next treatment:  development and training of compensatory swallow strategies to improve airway protection and swallow function  Treatment Goals:    Short Term Goals:  Pt will complete PO trials of upgraded diet textures with SLP only to determine the least restrictive PO diet to maintain adequate nutrition/hydration with no more than 1 overt s/s of pen/asp.     Long Term Goals:   Pt will improve oropharyngeal swallow function to ensure airway protection during PO intake to maintain adequate nutrition/hydration and decrease signs/symptoms of aspiration to less than 1 x/day. Patient/family Goal:    Did not state. Will further assess during treatment. Plan of care discussed with Patient   The Patient did not demonstrate complete understanding of the diagnosis, prognosis and plan of care     Rehabilitation Potential/Prognosis: good                      ADMITTING DIAGNOSIS: SOB (shortness of breath) [R06.02]  Acquired angioedema [T78. 3XXA]     VISIT DIAGNOSIS:   Visit Diagnoses       Codes    Angioedema, initial encounter     T78. 3XXA    Acute respiratory distress     R06.03              PATIENT REPORT/COMPLAINT: no specific complaints from patient. Staff reports poor intake and pain/discomfort with intake when assisting with meals    PRIOR LEVEL OF SWALLOW FUNCTION:    Past History of Dysphagia?:  none reported    Diet during hospital admission: Regular consistency solids with thin liquids    PROCEDURE:  Consistencies Administered During the Evaluation   Liquids: thin liquid and nectar thick liquid   Solids:  pureed foods and soft solid foods      Method of Intake:   cup, straw, spoon  Self fed, Fed by clinician      Position:   Seated, upright, Lateral plane    INSTRUMENTAL ASSESSMENT:      MBSImP Results:   Lip closure for intraoral bolus containment resulted in no labial escape. Tongue control during bolus hold maintained a cohesive bolus held between tongue to palate seal. Bolus preparation and mastication decreased due to edentulous, not able to fully masticate solid and had to spit out part of solid. Bolus transport/lingual motion was with slowed tongue motion. Oral residue was not observed. Initiation of the pharyngeal swallow occurred as the bolus head reached the valleculae. Soft palate elevation resulted in no bolus between the soft palate and the pharyngeal wall.  Laryngeal elevation demonstrated complete superior movement of the thyroid cartilage with complete approximation of the arytenoids to the epiglottic petiole. Anterior hyoid excursion demonstrated complete anterior movement. Epiglottic movement resulted in complete inversion. Laryngeal vestibule closure was complete, as indicated by no air or contrast within the laryngeal vestibule at the height of the swallow. Pharyngeal stripping wave was present and complete. Pharyngeal contraction could not be determined due to logistical reasons not related to physiologic impairment. Pharyngoesophageal segment opening was completely distended for complete duration with no obstruction of bolus flow. Tongue base retraction allowed no contrast between the retracted tongue base and the posterior pharyngeal wall. Pharyngeal residue was not present. Esophageal clearance in the upright position could not be assessed due to logistical reasons not related to physiologic impairment. PENETRATION-ASPIRATION SCALE (PAS):  THIN 1 = Material does not enter the airway  MILDLY THICK 1 = Material does not enter the airway  MODERATELY THICK item not administered  PUREE 1 = Material does not enter the airway  HARD SOLID 1 = Material does not enter the airway--but had to spit out harder piece that she was not able to chew. COMPENSATORY STRATEGIES    Compensatory strategies were not attempted      STRUCTURAL/FUNCTIONAL ANOMALIES   No structural/functional anomalies were noted    CERVICAL ESOPHAGEAL STAGE :     The cervical esophagus appeared adequate          ___________    Cognition:   Alert & Oriented x 2 and Confusion noted    Oral Peripheral Examination   Generalized oral weakness    Current Respiratory Status   2 liters nasal cannula     Parameters of Speech Production  Respiration:  Adequate for speech production  Quality:   Within functional limits  Intensity: Within functional limits    Pain: No pain reported.     EDUCATION:   The Speech Language Pathologist (SLP) completed education regarding results of evaluation and that intervention is warranted at this time. Learner: Patient  Education: Reviewed results and recommendations of this evaluation  Evaluation of Education:  Needs further instruction    This plan may be re-evaluated and revised as warranted. Evaluation Time includes thorough review of current medical information, gathering information on past medical history/social history and prior level of function, completion of standardized testing/informal observation of tasks, assessment of data and education on plan of care and goals. [x]The admitting diagnosis and active problem list, have been reviewed prior to initiation of this evaluation.     CPT Code: 84246  dysphagia study    ACTIVE PROBLEM LIST:   Patient Active Problem List   Diagnosis    Non-rheumatic mitral regurgitation    Hypertensive left ventricular hypertrophy, without heart failure    Dyslipidemia    Non morbid obesity    Essential hypertension    Diabetes mellitus ([address]    Myocardiopathy ([address]    Tobacco use    Near syncope    Diarrhea    Generalized abdominal pain    Abdominal pain    Pre-syncope    PVD (peripheral vascular disease) with claudication (HCC)    Chronic pain of both ankles    ACE inhibitor-aggravated angioedema    Acute respiratory failure (HCC)    COPD (chronic obstructive pulmonary disease) ([address]    Acquired angioedema    SOB (shortness of breath)    Disorder of airway    Acquired hypertrophy of tongue    MSSA (methicillin susceptible Staphylococcus aureus) pneumonia ([address]       Madalyn Thornton MSCCC/SLP  Speech Language Pathologist  [id number]

## 2021-09-13 NOTE — PLAN OF CARE
Problem: Non-Violent Restraints  Goal: Removal from restraints as soon as assessed to be safe  Outcome: Met This Shift  Goal: No harm/injury to patient while restraints in use  Outcome: Met This Shift  Goal: Patient's dignity will be maintained  Outcome: Met This Shift  Goal: Removal from restraints as soon as assessed to be safe  Outcome: Met This Shift  Goal: No harm/injury to patient while restraints in use  Outcome: Met This Shift  Goal: Patient's dignity will be maintained  Outcome: Met This Shift     Problem: Falls - Risk of:  Goal: Will remain free from falls  Description: Will remain free from falls  Outcome: Met This Shift  Goal: Absence of physical injury  Description: Absence of physical injury  Outcome: Met This Shift     Problem: Skin Integrity:  Goal: Will show no infection signs and symptoms  Description: Will show no infection signs and symptoms  Outcome: Met This Shift  Goal: Absence of new skin breakdown  Description: Absence of new skin breakdown  Outcome: Met This Shift     Problem: Inadequate oral food/beverage intake (NI-2.1)  Goal: Food and/or Nutrient Delivery  Description: Individualized approach for food/nutrient provision. 9/13/2021 0945 by Fredi Ceja RD, LD  Outcome: Met This Shift     Problem: Breathing Pattern - Ineffective:  Goal: Ability to achieve and maintain a regular respiratory rate will improve  Description: Ability to achieve and maintain a regular respiratory rate will improve  Outcome: Met This Shift     Problem: Airway Clearance - Ineffective:  Goal: Ability to maintain a clear airway will improve  Description: Ability to maintain a clear airway will improve  Outcome: Met This Shift     Problem: Pain:  Goal: Pain level will decrease  Description: Pain level will decrease  Outcome: Met This Shift  Goal: Control of acute pain  Description: Control of acute pain  Outcome: Met This Shift  Goal: Control of chronic pain  Description: Control of chronic pain  Outcome:  Met This Shift

## 2021-09-13 NOTE — CONSULTS
[facility]  Dr. Anushka Wesley M.D., Dr. Elisa Braswell M.D., Dr. Racquel Camarena D.O., Dr. Rohit Harrington M.D., Dr. Paty Kumari D.O. Patient Name: Jaron Beltran  MRN: [id number]  : 1955 (77 y.o. female)  Allergies: is allergic to ace inhibitors, angiotensin receptor blockers, lactose intolerance (gi), lisinopril, and pcn [penicillins]. Date of Service: 2021       Reason for Consultation: Dysphagia    HISTORY OF PRESENT ILLNESS:    Patient is a 69-year  female with a past history significant for coronary artery disease, COPD, diabetes mellitus on insulin pump and history of CVA. Patient is centers emergency room 20 days prior with main complaint of oral swelling. Patient was electively intubated in the emergency room for airway protection due to the oral airway swelling. Patient was treated in the ICU for angioedema from an ACE inhibitor associated severe COPD and exacerbation with Haemophilus influenza and MSSA pneumonia. Was extubated and required reintubation on . Patient was extubated on . Through chart review patient was seen by speech therapist on  at that time patient with no overt signs of dysphagia with him at the bedside however patient very poor oral intake even with significant encouragement. At that time they recommended a modified barium swallow if there is concern for aspiration. Patient was seen evaluate on the fifth floor this AM.  Patient very difficult to elicit history from. Patient very noncooperative with interviewing. Patient asked to define her dysphagia and she states that just hurts to eat. When asked to point where she will like foods get stuck she does not participate. She does complaints multiple times of pain in the back of her throat. REVIEW OF SYSTEMS:   Constitutional: Denies fever, chills, or unintentional weight loss. HEENT: Denies double or blurry vision, headaches, ear pain or ringing in the ears. No drainage from the ears, nose or throat. Cardiovascular: Denies any chest pain, irregular heartbeats, or palpitations. Respiratory: Denies coughing, sputum production, hemoptysis, or wheezing. Gastrointestinal: Denies nausea, vomiting, diarrhea, or constipation. Denies any abdominal pain, black or bloody stools. Genitourinary: Denies any urinary urgency, frequency, hematuria. Voiding without difficulty. Endocrine: Denies sensitivity to heat or cold. Denies changes in hair, skin or nails. Denies excessive thirst, hunger or going to the bathroom more frequently than usual.  Extremities: Denies swelling or calf pain. Musculoskeletal: Denies muscle or joint pain, stiffness, arthritis, gout, or instability. Dermatology / Skin: Denies any rashes, ulcers, or excoriations. Denies bruising. Neurology: Denies any bowel or bladder incontinence, headache or focal neurological deficits. No weakness or paresthesia. Denies numbness or tingling in the hands or feet. Psychiatric: Denies nervousness/anxiety, depression or memory loss. Past Medical History:  Past Medical History:   Diagnosis Date    Arthritis     Asthma     Cerebral artery occlusion with cerebral infarction ([facility] 2015    COPD (chronic obstructive pulmonary disease) ([facility]     Diabetes mellitus ([facility]     Heart attack ([facility]     Hyperlipidemia     Hypertension     VHD (valvular heart disease)        Past Surgical History:  Past Surgical History:   Procedure Laterality Date    CHOLECYSTECTOMY      COLONOSCOPY  12/2018    HYSTERECTOMY      HYSTERECTOMY, VAGINAL      UPPER GASTROINTESTINAL ENDOSCOPY  12/2018       Home Medications:  Prior to Admission medications    Medication Sig Start Date End Date Taking?  Authorizing Provider   metoprolol succinate (TOPROL XL) 50 MG extended release tablet Take 1 tablet by mouth daily 9/13/21  Yes Yang Bojorquez MD   miconazole nitrate 2 % OINT Apply topically 2 times daily 9/13/21  Yes Yang Bojorquez MD   melatonin 5 MG TBDP disintegrating tablet Take 1 tablet by mouth nightly 9/13/21  Yes Marjan Spear MD   nystatin (MYCOSTATIN) 887836 UNIT/ML suspension Take 5 mLs by mouth 4 times daily 9/13/21  Yes Marjan Spear MD   insulin glargine (LANTUS) 100 UNIT/ML injection vial Inject 20 Units into the skin 2 times daily 9/13/21  Yes Marjan Spear MD   hydrALAZINE (APRESOLINE) 50 MG tablet Take 1 tablet by mouth every 8 hours 9/13/21  Yes Marjan Spear MD   lactobacillus (CULTURELLE) CAPS capsule Take 1 capsule by mouth daily 9/13/21  Yes Marjan Spear MD   rosuvastatin (CRESTOR) 40 MG tablet Take 40 mg by mouth daily   Yes Historical Provider, MD   Budeson-Glycopyrrol-Formoterol (BREZTRI AEROSPHERE) 160-9-4.8 MCG/ACT AERO Inhale 2 puffs into the lungs 2 times daily   Yes Historical Provider, MD   donepezil (ARICEPT) 5 MG tablet Take 5 mg by mouth daily   Yes Historical Provider, MD   pantoprazole (PROTONIX) 40 MG tablet Take 40 mg by mouth daily   Yes Historical Provider, MD   leflunomide (ARAVA) 20 MG tablet Take 20 mg by mouth daily   Yes Historical Provider, MD   levothyroxine (SYNTHROID) 25 MCG tablet Take 25 mcg by mouth Daily   Yes Historical Provider, MD   aspirin EC 81 MG EC tablet Take 1 tablet by mouth daily 3/3/21  Yes Sarita Rollins PA-C   ipratropium-albuterol (DUONEB) 0.5-2.5 (3) MG/3ML SOLN nebulizer solution Inhale 3 mLs into the lungs every 6 hours as needed for Shortness of Breath  Patient taking differently: Inhale 1 vial into the lungs 2 times daily  1/3/20  Yes Brodie Carbajal, DO   Multiple Vitamins-Minerals (CENTRUM ADULTS) TABS Take 1 tablet by mouth daily    Yes Historical Provider, MD   venlafaxine 150 MG extended release tablet Take 150 mg by mouth daily (with breakfast)  9/12/18  Yes Historical Provider, MD   cetirizine (ZYRTEC) 10 MG tablet Take 10 mg by mouth daily   Yes Historical Provider, MD   insulin lispro (HUMALOG) 100 UNIT/ML injection vial Inject into the skin every 4 hours as needed for High Blood Sugar (Checks BS 4-6x/day; For use with insulin pump; MAX DOSE 50 UNITS)    Yes Historical Provider, MD   clopidogrel (PLAVIX) 75 MG tablet Take 1 tablet by mouth daily 9/28/20   Quynh English PA-C   cilostazol (PLETAL) 100 MG tablet take 1 tablet by mouth twice a day 7/23/20   Maren Sumner MD       Allergies: Ace inhibitors, Angiotensin receptor blockers, Lactose intolerance (gi), Lisinopril, and Pcn [penicillins]    Social History:  Social History     Socioeconomic History    Marital status:      Spouse name: Not on file    Number of children: Not on file    Years of education: Not on file    Highest education level: Not on file   Occupational History    Not on file   Tobacco Use    Smoking status: Current Every Day Smoker     Packs/day: 0.25     Years: 45.00     Pack years: 11.25     Types: Cigarettes    Smokeless tobacco: Never Used   Vaping Use    Vaping Use: Never used   Substance and Sexual Activity    Alcohol use: Yes     Alcohol/week: 7.0 - 8.0 standard drinks     Types: 7 - 8 Glasses of wine per week     Comment: glass of wine with dinner. 1 cup of coffee a day     Drug use: Never    Sexual activity: Not on file   Other Topics Concern    Not on file   Social History Narrative    Drinks 2 cups coffee daily. Social Determinants of Health     Financial Resource Strain:     Difficulty of Paying Living Expenses:    Food Insecurity:     Worried About Running Out of Food in the Last Year:     [address] in the Last Year:    Transportation Needs:     Lack of Transportation (Medical):      Lack of Transportation (Non-Medical):    Physical Activity:     Days of Exercise per Week:     Minutes of Exercise per Session:    Stress:     Feeling of Stress :    Social Connections:     Frequency of Communication with Friends and Family:     Frequency of Social Gatherings with Friends and Family:     Attends Confucianism Services:     Active Escherichia coli (A)     Urine Culture, Routine >100,000 CFU/ml        Susceptibility    Escherichia coli - BACTERIAL SUSCEPTIBILITY PANEL BY JEN     ampicillin <=^2 Sensitive mcg/mL     ceFAZolin <=^4 Sensitive mcg/mL     cefepime <=^0.12 Sensitive mcg/mL     cefTRIAXone <=^0.25 Sensitive mcg/mL     Confirmatory Extended Spectrum Beta-Lactamase Neg Negative mcg/mL     ertapenem <=^0.12 Sensitive mcg/mL     gentamicin <=^1 Sensitive mcg/mL     levofloxacin <=^0.12 Sensitive mcg/mL     nitrofurantoin <=^16 Sensitive mcg/mL     piperacillin-tazobactam <=^4 Sensitive mcg/mL     trimethoprim-sulfamethoxazole <=^20 Sensitive mcg/mL   Culture, Blood 1    Specimen: Blood   Result Value Ref Range    Blood Culture, Routine 5 Days no growth    Culture, Blood 2    Specimen: Blood   Result Value Ref Range    Culture, Blood 2 5 Days no growth    Culture, Respiratory    Specimen: Endotracheal   Result Value Ref Range    CULTURE, RESPIRATORY Oral Pharyngeal Sepideh reduced (A)     Smear, Respiratory       Group 5: >25 PMN's/LPF and <10 Epithelial cells/LPF  Abundant Polymorphonuclear leukocytes  Epithelial cells not seen  Rare Gram positive diplococci  Few Gram negative rods      Organism Staphylococcus aureus (A)     CULTURE, RESPIRATORY       Heavy growth  This isolate is presumed to be resistant based on the  detection of inducible Clindamycin resistance. Clindamycin  may still be effective in some patients. Organism Haemophilus influenzae (A)     CULTURE, RESPIRATORY Heavy growth  Beta Lactamase negative          Susceptibility    Staphylococcus aureus - BACTERIAL SUSCEPTIBILITY PANEL BY JEN     clindamycin ^0. 25 Resistant mcg/mL     doxycycline <=^0.5 Sensitive mcg/mL     erythromycin >=^8 Resistant mcg/mL     gentamicin <=^0.5 Sensitive mcg/mL     oxacillin <=^0.25 Sensitive mcg/mL     trimethoprim-sulfamethoxazole <=^10 Sensitive mcg/mL     vancomycin ^1 Sensitive mcg/mL   Culture, MRSA, Screening    Specimen: Nares Result Value Ref Range    MRSA Culture Only Methicillin resistant Staph aureus not isolated    CBC Auto Differential   Result Value Ref Range    WBC 11.4 4.5 - 11.5 E9/L    RBC 5.04 3.50 - 5.50 E12/L    Hemoglobin 15.7 (H) 11.5 - 15.5 g/dL    Hematocrit 47.2 34.0 - 48.0 %    MCV 93.7 80.0 - 99.9 fL    MCH 31.2 26.0 - 35.0 pg    MCHC 33.3 32.0 - 34.5 %    RDW 12.1 11.5 - 15.0 fL    Platelets 221 760 - 263 E9/L    MPV 10.2 7.0 - 12.0 fL    Neutrophils % 65.3 43.0 - 80.0 %    Immature Granulocytes % 0.3 0.0 - 5.0 %    Lymphocytes % 21.8 20.0 - 42.0 %    Monocytes % 10.0 2.0 - 12.0 %    Eosinophils % 1.9 0.0 - 6.0 %    Basophils % 0.7 0.0 - 2.0 %    Neutrophils Absolute 7.42 (H) 1.80 - 7.30 E9/L    Immature Granulocytes # 0.03 E9/L    Lymphocytes Absolute 2.47 1.50 - 4.00 E9/L    Monocytes Absolute 1.14 (H) 0.10 - 0.95 E9/L    Eosinophils Absolute 0.21 0.05 - 0.50 E9/L    Basophils Absolute 0.08 0.00 - 0.20 E9/L   Comprehensive Metabolic Panel w/ Reflex to MG   Result Value Ref Range    Sodium 134 132 - 146 mmol/L    Potassium reflex Magnesium 4.4 3.5 - 5.0 mmol/L    Chloride 94 (L) 98 - 107 mmol/L    CO2 28 22 - 29 mmol/L    Anion Gap 12 7 - 16 mmol/L    Glucose 285 (H) 74 - 99 mg/dL    BUN 17 6 - 23 mg/dL    CREATININE 1.4 (H) 0.5 - 1.0 mg/dL    GFR Non-African American 37 >=60 mL/min/1.73    GFR African American 45     Calcium 9.2 8.6 - 10.2 mg/dL    Total Protein 6.3 (L) 6.4 - 8.3 g/dL    Albumin 4.0 3.5 - 5.2 g/dL    Total Bilirubin 0.4 0.0 - 1.2 mg/dL    Alkaline Phosphatase 85 35 - 104 U/L    ALT 19 0 - 32 U/L    AST 26 0 - 31 U/L   Hemoglobin A1c   Result Value Ref Range    Hemoglobin A1C 9.0 (H) 4.0 - 5.6 %   CBC auto differential   Result Value Ref Range    WBC 11.0 4.5 - 11.5 E9/L    RBC 4.20 3.50 - 5.50 E12/L    Hemoglobin 13.0 11.5 - 15.5 g/dL    Hematocrit 40.8 34.0 - 48.0 %    MCV 97.1 80.0 - 99.9 fL    MCH 31.0 26.0 - 35.0 pg    MCHC 31.9 (L) 32.0 - 34.5 %    RDW 12.1 11.5 - 15.0 fL    Platelets 736 130 - 450 E9/L    MPV 10.4 7.0 - 12.0 fL    Neutrophils % 87.5 (H) 43.0 - 80.0 %    Immature Granulocytes % 0.5 0.0 - 5.0 %    Lymphocytes % 8.9 (L) 20.0 - 42.0 %    Monocytes % 3.0 2.0 - 12.0 %    Eosinophils % 0.0 0.0 - 6.0 %    Basophils % 0.1 0.0 - 2.0 %    Neutrophils Absolute 9.59 (H) 1.80 - 7.30 E9/L    Immature Granulocytes # 0.06 E9/L    Lymphocytes Absolute 0.97 (L) 1.50 - 4.00 E9/L    Monocytes Absolute 0.33 0.10 - 0.95 E9/L    Eosinophils Absolute 0.00 (L) 0.05 - 0.50 E9/L    Basophils Absolute 0.01 0.00 - 0.20 C3/L   Basic Metabolic Panel w/ Reflex to MG   Result Value Ref Range    Sodium 133 132 - 146 mmol/L    Potassium reflex Magnesium 4.7 3.5 - 5.0 mmol/L    Chloride 100 98 - 107 mmol/L    CO2 25 22 - 29 mmol/L    Anion Gap 8 7 - 16 mmol/L    Glucose 246 (H) 74 - 99 mg/dL    BUN 22 6 - 23 mg/dL    CREATININE 1.6 (H) 0.5 - 1.0 mg/dL    GFR Non-African American 32 >=60 mL/min/1.73    GFR African American 39     Calcium 8.4 (L) 8.6 - 10.2 mg/dL   Arterial Blood Gas, Respiratory Only   Result Value Ref Range    Source: Arterial     FIO2 Arterial 60.0     pH, Blood Gas 7.266 (L) 7.350 - 7.450    pCO2, Arterial 57.7 (H) 35.0 - 45.0 mmHg    pO2, Arterial 79.6 60.0 - 80.0 mmHg    HCO3, Arterial 26.2 (H) 22.0 - 26.0 mmol/L    B.E. -1.8 -3.0 - 3.0 mmol/L    O2 Sat 93.4 92.0 - 98.5 %     ID 30     DEVICE 15,065,521,400,933     Mode AC     Tidal Volume 400 mL    Positive End EXP Press 7 cmH2O    Respiratory Rate 12 b/min    [employer]    Basic metabolic panel   Result Value Ref Range    Sodium 137 132 - 146 mmol/L    Potassium 5.0 3.5 - 5.0 mmol/L    Chloride 101 98 - 107 mmol/L    CO2 25 22 - 29 mmol/L    Anion Gap 11 7 - 16 mmol/L    Glucose 250 (H) 74 - 99 mg/dL    BUN 27 (H) 6 - 23 mg/dL    CREATININE 1.5 (H) 0.5 - 1.0 mg/dL    GFR Non-African American 35 >=60 mL/min/1.73    GFR African American 42     Calcium 8.3 (L) 8.6 - 10.2 mg/dL   Magnesium   Result Value Ref Range    Magnesium 2.1 1.6 - 2.6 mg/dL   Phosphorus   Result Value Ref Range    Phosphorus 4.2 2.5 - 4.5 mg/dL   Phosphorus   Result Value Ref Range    Phosphorus 3.6 2.5 - 4.5 mg/dL   Magnesium   Result Value Ref Range    Magnesium 2.1 1.6 - 2.6 mg/dL   Triglyceride   Result Value Ref Range    Triglycerides 222 (H) 0 - 149 mg/dL   Comprehensive Metabolic Panel   Result Value Ref Range    Sodium 137 132 - 146 mmol/L    Potassium 4.9 3.5 - 5.0 mmol/L    Chloride 101 98 - 107 mmol/L    CO2 28 22 - 29 mmol/L    Anion Gap 8 7 - 16 mmol/L    Glucose 171 (H) 74 - 99 mg/dL    BUN 24 (H) 6 - 23 mg/dL    CREATININE 1.2 (H) 0.5 - 1.0 mg/dL    GFR Non-African American 45 >=60 mL/min/1.73    GFR African American 54     Calcium 8.1 (L) 8.6 - 10.2 mg/dL    Total Protein 5.2 (L) 6.4 - 8.3 g/dL    Albumin 3.0 (L) 3.5 - 5.2 g/dL    Total Bilirubin <0.2 0.0 - 1.2 mg/dL    Alkaline Phosphatase 62 35 - 104 U/L    ALT 13 0 - 32 U/L    AST 20 0 - 31 U/L   CBC   Result Value Ref Range    WBC 11.7 (H) 4.5 - 11.5 E9/L    RBC 3.68 3.50 - 5.50 E12/L    Hemoglobin 11.4 (L) 11.5 - 15.5 g/dL    Hematocrit 36.8 34.0 - 48.0 %    .0 (H) 80.0 - 99.9 fL    MCH 31.0 26.0 - 35.0 pg    MCHC 31.0 (L) 32.0 - 34.5 %    RDW 12.8 11.5 - 15.0 fL    Platelets 927 912 - 721 E9/L    MPV 10.8 7.0 - 12.0 fL   CBC WITH AUTO DIFFERENTIAL   Result Value Ref Range    WBC 10.3 4.5 - 11.5 E9/L    RBC 3.60 3.50 - 5.50 E12/L    Hemoglobin 11.2 (L) 11.5 - 15.5 g/dL    Hematocrit 36.3 34.0 - 48.0 %    .8 (H) 80.0 - 99.9 fL    MCH 31.1 26.0 - 35.0 pg    MCHC 30.9 (L) 32.0 - 34.5 %    RDW 12.9 11.5 - 15.0 fL    Platelets 833 353 - 401 E9/L    MPV 10.7 7.0 - 12.0 fL    Neutrophils % 82.6 (H) 43.0 - 80.0 %    Lymphocytes % 9.6 (L) 20.0 - 42.0 %    Monocytes % 7.8 2.0 - 12.0 %    Eosinophils % 0.1 0.0 - 6.0 %    Basophils % 0.2 0.0 - 2.0 %    Neutrophils Absolute 8.55 (H) 1.80 - 7.30 E9/L    Lymphocytes Absolute 1.03 (L) 1.50 - 4.00 E9/L    Monocytes Absolute 0.82 0.10 - 0.95 E9/L Eosinophils Absolute 0.00 (L) 0.05 - 0.50 E9/L    Basophils Absolute 0.00 0.00 - 0.20 E9/L   Comprehensive metabolic panel   Result Value Ref Range    Sodium 137 132 - 146 mmol/L    Potassium 4.9 3.5 - 5.0 mmol/L    Chloride 100 98 - 107 mmol/L    CO2 30 (H) 22 - 29 mmol/L    Anion Gap 7 7 - 16 mmol/L    Glucose 297 (H) 74 - 99 mg/dL    BUN 21 6 - 23 mg/dL    CREATININE 1.0 0.5 - 1.0 mg/dL    GFR Non-African American 55 >=60 mL/min/1.73    GFR African American >60     Calcium 8.0 (L) 8.6 - 10.2 mg/dL    Total Protein 5.5 (L) 6.4 - 8.3 g/dL    Albumin 3.1 (L) 3.5 - 5.2 g/dL    Total Bilirubin 0.2 0.0 - 1.2 mg/dL    Alkaline Phosphatase 66 35 - 104 U/L    ALT 12 0 - 32 U/L    AST 12 0 - 31 U/L   MAGNESIUM   Result Value Ref Range    Magnesium 2.0 1.6 - 2.6 mg/dL   Phosphorus   Result Value Ref Range    Phosphorus 3.4 2.5 - 4.5 mg/dL   CBC WITH AUTO DIFFERENTIAL   Result Value Ref Range    WBC 12.1 (H) 4.5 - 11.5 E9/L    RBC 3.68 3.50 - 5.50 E12/L    Hemoglobin 11.4 (L) 11.5 - 15.5 g/dL    Hematocrit 35.5 34.0 - 48.0 %    MCV 96.5 80.0 - 99.9 fL    MCH 31.0 26.0 - 35.0 pg    MCHC 32.1 32.0 - 34.5 %    RDW 12.5 11.5 - 15.0 fL    Platelets 613 282 - 192 E9/L    MPV 10.4 7.0 - 12.0 fL    Neutrophils % 79.2 43.0 - 80.0 %    Immature Granulocytes % 0.5 0.0 - 5.0 %    Lymphocytes % 8.1 (L) 20.0 - 42.0 %    Monocytes % 11.9 2.0 - 12.0 %    Eosinophils % 0.2 0.0 - 6.0 %    Basophils % 0.1 0.0 - 2.0 %    Neutrophils Absolute 9.56 (H) 1.80 - 7.30 E9/L    Immature Granulocytes # 0.06 E9/L    Lymphocytes Absolute 0.98 (L) 1.50 - 4.00 E9/L    Monocytes Absolute 1.44 (H) 0.10 - 0.95 E9/L    Eosinophils Absolute 0.02 (L) 0.05 - 0.50 E9/L    Basophils Absolute 0.01 0.00 - 0.20 E9/L   Comprehensive metabolic panel   Result Value Ref Range    Sodium 137 132 - 146 mmol/L    Potassium 4.4 3.5 - 5.0 mmol/L    Chloride 100 98 - 107 mmol/L    CO2 29 22 - 29 mmol/L    Anion Gap 8 7 - 16 mmol/L    Glucose 251 (H) 74 - 99 mg/dL    BUN 21 6 - 23 - 0.95 E9/L    Eosinophils Absolute 0.00 (L) 0.05 - 0.50 E9/L    Basophils Absolute 0.00 0.00 - 0.20 E9/L    Polychromasia 1+     Poikilocytes 1+     Frankenmuth Cells 1+     Ovalocytes 1+    Comprehensive metabolic panel   Result Value Ref Range    Sodium 139 132 - 146 mmol/L    Potassium 4.5 3.5 - 5.0 mmol/L    Chloride 100 98 - 107 mmol/L    CO2 29 22 - 29 mmol/L    Anion Gap 10 7 - 16 mmol/L    Glucose 180 (H) 74 - 99 mg/dL    BUN 22 6 - 23 mg/dL    CREATININE 0.8 0.5 - 1.0 mg/dL    GFR Non-African American >60 >=60 mL/min/1.73    GFR African American >60     Calcium 8.2 (L) 8.6 - 10.2 mg/dL    Total Protein 5.6 (L) 6.4 - 8.3 g/dL    Albumin 2.7 (L) 3.5 - 5.2 g/dL    Total Bilirubin 0.4 0.0 - 1.2 mg/dL    Alkaline Phosphatase 54 35 - 104 U/L    ALT 13 0 - 32 U/L    AST 15 0 - 31 U/L   MAGNESIUM   Result Value Ref Range    Magnesium 1.9 1.6 - 2.6 mg/dL   Phosphorus   Result Value Ref Range    Phosphorus 2.9 2.5 - 4.5 mg/dL   Procalcitonin   Result Value Ref Range    Procalcitonin 0.19 (H) 0.00 - 0.08 ng/mL   Blood Gas, Arterial   Result Value Ref Range    Date Analyzed 20210829     Time Analyzed 2035     Source: Blood Arterial     pH, Blood Gas 7.435 7.350 - 7.450    PCO2 49.7 (H) 35.0 - 45.0 mmHg    PO2 125.8 (H) 75.0 - 100.0 mmHg    HCO3 32.6 (H) 22.0 - 26.0 mmol/L    B.E. 7.1 (H) -3.0 - 3.0 mmol/L    O2 Sat 97.9 92.0 - 98.5 %    PO2/FIO2 3.14 mmHg/%    RI(T) 0.73     O2Hb 96.5 94.0 - 97.0 %    COHb 1.1 0.0 - 1.5 %    MetHb 0.3 0.0 - 1.5 %    O2 Content 19.3 mL/dL    HHb 2.1 0.0 - 5.0 %    tHb (est) 14.1 11.5 - 16.5 g/dL    Mode BILEVEL     FIO2 40.0 %    Peep/Cpap 6.0 cmH2O    PIP 12.0 cmH2O    Date Of Collection      Time Collected      Pt Temp 37.0 C     ID [id number]     Lab [id number]     Critical(s) Notified .  No Critical Values    Blood Gas, Arterial   Result Value Ref Range    Date Analyzed 20210829     Time Analyzed 2224     Source: Blood Arterial     pH, Blood Gas 7.467 (H) 7.350 - 7.450    PCO2 41.6 35.0 - 45.0 mmHg    PO2 106.5 (H) 75.0 - 100.0 mmHg    HCO3 29.4 (H) 22.0 - 26.0 mmol/L    B.E. 5.2 (H) -3.0 - 3.0 mmol/L    O2 Sat 97.4 92.0 - 98.5 %    PO2/FIO2 2.13 mmHg/%    RI(T) 1.79     O2Hb 96.3 94.0 - 97.0 %    COHb 0.8 0.0 - 1.5 %    MetHb 0.3 0.0 - 1.5 %    O2 Content 16.1 mL/dL    HHb 2.6 0.0 - 5.0 %    tHb (est) 11.8 11.5 - 16.5 g/dL    Mode AC     FIO2 50.0 %    Rr Mechanical 25.0 b/min    Vt Mechanical 400.0 mL    Peep/Cpap 8.0 cmH2O    Date Of Collection      Time Collected      Pt Temp 37.0 C     ID [id number]     Lab [id number]     Critical(s) Notified . No Critical Values    Blood Gas, Arterial   Result Value Ref Range    Date Analyzed 20210830     Time Analyzed 0517     Source: Blood Arterial     pH, Blood Gas 7.456 (H) 7.350 - 7.450    PCO2 43.0 35.0 - 45.0 mmHg    PO2 101.1 (H) 75.0 - 100.0 mmHg    HCO3 29.6 (H) 22.0 - 26.0 mmol/L    B.E. 5.2 (H) -3.0 - 3.0 mmol/L    O2 Sat 97.3 92.0 - 98.5 %    PO2/FIO2 2.02 mmHg/%    RI(T) 1.92     O2Hb 96.1 94.0 - 97.0 %    COHb 0.9 0.0 - 1.5 %    MetHb 0.3 0.0 - 1.5 %    O2 Content 16.5 mL/dL    HHb 2.7 0.0 - 5.0 %    tHb (est) 12.1 11.5 - 16.5 g/dL    Mode AC     FIO2 50.0 %    Rr Mechanical 22.0 b/min    Vt Mechanical 400.0 mL    Peep/Cpap 8.0 cmH2O    Date Of Collection      Time Collected      Pt Temp 37.0 C     ID 3086     Lab [id number]     Critical(s) Notified .  No Critical Values    CBC WITH AUTO DIFFERENTIAL   Result Value Ref Range    WBC 12.6 (H) 4.5 - 11.5 E9/L    RBC 3.34 (L) 3.50 - 5.50 E12/L    Hemoglobin 10.4 (L) 11.5 - 15.5 g/dL    Hematocrit 32.0 (L) 34.0 - 48.0 %    MCV 95.8 80.0 - 99.9 fL    MCH 31.1 26.0 - 35.0 pg    MCHC 32.5 32.0 - 34.5 %    RDW 12.3 11.5 - 15.0 fL    Platelets 474 601 - 659 E9/L    MPV 10.1 7.0 - 12.0 fL    Neutrophils % 80.8 (H) 43.0 - 80.0 %    Immature Granulocytes % 1.3 0.0 - 5.0 %    Lymphocytes % 6.0 (L) 20.0 - 42.0 %    Monocytes % 11.7 2.0 - 12.0 %    Eosinophils % 0.0 0.0 - 6.0 %    Basophils % 0.2 0.0 - 2.0 % Neutrophils Absolute 10.17 (H) 1.80 - 7.30 E9/L    Immature Granulocytes # 0.16 E9/L    Lymphocytes Absolute 0.76 (L) 1.50 - 4.00 E9/L    Monocytes Absolute 1.48 (H) 0.10 - 0.95 E9/L    Eosinophils Absolute 0.00 (L) 0.05 - 0.50 E9/L    Basophils Absolute 0.03 0.00 - 0.20 E9/L   Comprehensive metabolic panel   Result Value Ref Range    Sodium 138 132 - 146 mmol/L    Potassium 4.7 3.5 - 5.0 mmol/L    Chloride 100 98 - 107 mmol/L    CO2 31 (H) 22 - 29 mmol/L    Anion Gap 7 7 - 16 mmol/L    Glucose 226 (H) 74 - 99 mg/dL    BUN 23 6 - 23 mg/dL    CREATININE 0.7 0.5 - 1.0 mg/dL    GFR Non-African American >60 >=60 mL/min/1.73    GFR African American >60     Calcium 8.4 (L) 8.6 - 10.2 mg/dL    Total Protein 5.3 (L) 6.4 - 8.3 g/dL    Albumin 2.5 (L) 3.5 - 5.2 g/dL    Total Bilirubin 0.2 0.0 - 1.2 mg/dL    Alkaline Phosphatase 58 35 - 104 U/L    ALT 12 0 - 32 U/L    AST 10 0 - 31 U/L   MAGNESIUM   Result Value Ref Range    Magnesium 2.0 1.6 - 2.6 mg/dL   Phosphorus   Result Value Ref Range    Phosphorus 2.6 2.5 - 4.5 mg/dL   VANCOMYCIN, TROUGH   Result Value Ref Range    Vancomycin Tr 10.5 5.0 - 16.0 mcg/mL   Blood Gas, Arterial   Result Value Ref Range    Date Analyzed [id number]     Time Analyzed 0754     Source: Blood Arterial     pH, Blood Gas 7.432 7.350 - 7.450    PCO2 45.0 35.0 - 45.0 mmHg    PO2 105.5 (H) 75.0 - 100.0 mmHg    HCO3 29.3 (H) 22.0 - 26.0 mmol/L    B.E. 4.5 (H) -3.0 - 3.0 mmol/L    O2 Sat 97.4 92.0 - 98.5 %    PO2/FIO2 2.64 mmHg/%    RI(T) 1.12     O2Hb 96.4 94.0 - 97.0 %    COHb 0.7 0.0 - 1.5 %    MetHb 0.3 0.0 - 1.5 %    O2 Content 15.6 mL/dL    HHb 2.6 0.0 - 5.0 %    tHb (est) 11.4 (L) 11.5 - 16.5 g/dL    Mode AC     FIO2 40.0 %    Rr Mechanical 22.0 b/min    Vt Mechanical 400.0 mL    Peep/Cpap 8.0 cmH2O    Date Of Collection      Time Collected      Pt Temp 37.0 C     ID [id number]     Lab [id number]     Critical(s) Notified .  No Critical Values    CBC WITH AUTO DIFFERENTIAL   Result Value Ref Range WBC 12.9 (H) 4.5 - 11.5 E9/L    RBC 3.43 (L) 3.50 - 5.50 E12/L    Hemoglobin 10.5 (L) 11.5 - 15.5 g/dL    Hematocrit 33.2 (L) 34.0 - 48.0 %    MCV 96.8 80.0 - 99.9 fL    MCH 30.6 26.0 - 35.0 pg    MCHC 31.6 (L) 32.0 - 34.5 %    RDW 12.3 11.5 - 15.0 fL    Platelets 740 354 - 052 E9/L    MPV 10.2 7.0 - 12.0 fL    Neutrophils % 84.8 (H) 43.0 - 80.0 %    Immature Granulocytes % 1.5 0.0 - 5.0 %    Lymphocytes % 5.4 (L) 20.0 - 42.0 %    Monocytes % 8.1 2.0 - 12.0 %    Eosinophils % 0.0 0.0 - 6.0 %    Basophils % 0.2 0.0 - 2.0 %    Neutrophils Absolute 10.90 (H) 1.80 - 7.30 E9/L    Immature Granulocytes # 0.19 E9/L    Lymphocytes Absolute 0.70 (L) 1.50 - 4.00 E9/L    Monocytes Absolute 1.04 (H) 0.10 - 0.95 E9/L    Eosinophils Absolute 0.00 (L) 0.05 - 0.50 E9/L    Basophils Absolute 0.03 0.00 - 0.20 E9/L   Comprehensive metabolic panel   Result Value Ref Range    Sodium 136 132 - 146 mmol/L    Potassium 5.0 3.5 - 5.0 mmol/L    Chloride 98 98 - 107 mmol/L    CO2 30 (H) 22 - 29 mmol/L    Anion Gap 8 7 - 16 mmol/L    Glucose 305 (H) 74 - 99 mg/dL    BUN 26 (H) 6 - 23 mg/dL    CREATININE 0.8 0.5 - 1.0 mg/dL    GFR Non-African American >60 >=60 mL/min/1.73    GFR African American >60     Calcium 8.4 (L) 8.6 - 10.2 mg/dL    Total Protein 5.6 (L) 6.4 - 8.3 g/dL    Albumin 2.7 (L) 3.5 - 5.2 g/dL    Total Bilirubin <0.2 0.0 - 1.2 mg/dL    Alkaline Phosphatase 58 35 - 104 U/L    ALT 14 0 - 32 U/L    AST 13 0 - 31 U/L   MAGNESIUM   Result Value Ref Range    Magnesium 2.1 1.6 - 2.6 mg/dL   Phosphorus   Result Value Ref Range    Phosphorus 3.5 2.5 - 4.5 mg/dL   CBC WITH AUTO DIFFERENTIAL   Result Value Ref Range    WBC 9.3 4.5 - 11.5 E9/L    RBC 3.40 (L) 3.50 - 5.50 E12/L    Hemoglobin 10.6 (L) 11.5 - 15.5 g/dL    Hematocrit 32.5 (L) 34.0 - 48.0 %    MCV 95.6 80.0 - 99.9 fL    MCH 31.2 26.0 - 35.0 pg    MCHC 32.6 32.0 - 34.5 %    RDW 12.0 11.5 - 15.0 fL    Platelets 541 205 - 406 E9/L    MPV 10.1 7.0 - 12.0 fL    Neutrophils % 79.3 43.0 - 80.0 %    Immature Granulocytes % 2.0 0.0 - 5.0 %    Lymphocytes % 9.5 (L) 20.0 - 42.0 %    Monocytes % 9.0 2.0 - 12.0 %    Eosinophils % 0.0 0.0 - 6.0 %    Basophils % 0.2 0.0 - 2.0 %    Neutrophils Absolute 7.35 (H) 1.80 - 7.30 E9/L    Immature Granulocytes # 0.19 E9/L    Lymphocytes Absolute 0.88 (L) 1.50 - 4.00 E9/L    Monocytes Absolute 0.83 0.10 - 0.95 E9/L    Eosinophils Absolute 0.00 (L) 0.05 - 0.50 E9/L    Basophils Absolute 0.02 0.00 - 0.20 E9/L   Comprehensive metabolic panel   Result Value Ref Range    Sodium 136 132 - 146 mmol/L    Potassium 5.0 3.5 - 5.0 mmol/L    Chloride 98 98 - 107 mmol/L    CO2 32 (H) 22 - 29 mmol/L    Anion Gap 6 (L) 7 - 16 mmol/L    Glucose 375 (H) 74 - 99 mg/dL    BUN 30 (H) 6 - 23 mg/dL    CREATININE 0.7 0.5 - 1.0 mg/dL    GFR Non-African American >60 >=60 mL/min/1.73    GFR African American >60     Calcium 8.5 (L) 8.6 - 10.2 mg/dL    Total Protein 5.5 (L) 6.4 - 8.3 g/dL    Albumin 2.7 (L) 3.5 - 5.2 g/dL    Total Bilirubin <0.2 0.0 - 1.2 mg/dL    Alkaline Phosphatase 50 35 - 104 U/L    ALT 20 0 - 32 U/L    AST 18 0 - 31 U/L   MAGNESIUM   Result Value Ref Range    Magnesium 2.1 1.6 - 2.6 mg/dL   Phosphorus   Result Value Ref Range    Phosphorus 3.6 2.5 - 4.5 mg/dL   CBC WITH AUTO DIFFERENTIAL   Result Value Ref Range    WBC 10.6 4.5 - 11.5 E9/L    RBC 3.59 3.50 - 5.50 E12/L    Hemoglobin 11.3 (L) 11.5 - 15.5 g/dL    Hematocrit 34.6 34.0 - 48.0 %    MCV 96.4 80.0 - 99.9 fL    MCH 31.5 26.0 - 35.0 pg    MCHC 32.7 32.0 - 34.5 %    RDW 12.0 11.5 - 15.0 fL    Platelets 906 870 - 413 E9/L    MPV 9.8 7.0 - 12.0 fL    Neutrophils % 77.9 43.0 - 80.0 %    Immature Granulocytes % 1.9 0.0 - 5.0 %    Lymphocytes % 12.0 (L) 20.0 - 42.0 %    Monocytes % 8.0 2.0 - 12.0 %    Eosinophils % 0.0 0.0 - 6.0 %    Basophils % 0.2 0.0 - 2.0 %    Neutrophils Absolute 8.23 (H) 1.80 - 7.30 E9/L    Immature Granulocytes # 0.20 E9/L    Lymphocytes Absolute 1.27 (L) 1.50 - 4.00 E9/L    Monocytes Absolute 0. 85 0.10 - 0.95 E9/L    Eosinophils Absolute 0.00 (L) 0.05 - 0.50 E9/L    Basophils Absolute 0.02 0.00 - 0.20 E9/L   Comprehensive metabolic panel   Result Value Ref Range    Sodium 137 132 - 146 mmol/L    Potassium 4.9 3.5 - 5.0 mmol/L    Chloride 97 (L) 98 - 107 mmol/L    CO2 33 (H) 22 - 29 mmol/L    Anion Gap 7 7 - 16 mmol/L    Glucose 230 (H) 74 - 99 mg/dL    BUN 29 (H) 6 - 23 mg/dL    CREATININE 0.7 0.5 - 1.0 mg/dL    GFR Non-African American >60 >=60 mL/min/1.73    GFR African American >60     Calcium 8.4 (L) 8.6 - 10.2 mg/dL    Total Protein 5.3 (L) 6.4 - 8.3 g/dL    Albumin 2.8 (L) 3.5 - 5.2 g/dL    Total Bilirubin 0.3 0.0 - 1.2 mg/dL    Alkaline Phosphatase 47 35 - 104 U/L    ALT 24 0 - 32 U/L    AST 14 0 - 31 U/L   MAGNESIUM   Result Value Ref Range    Magnesium 2.2 1.6 - 2.6 mg/dL   Phosphorus   Result Value Ref Range    Phosphorus 4.1 2.5 - 4.5 mg/dL   Blood Gas, Arterial   Result Value Ref Range    Date Analyzed 20210903     Time Analyzed 0508     Source: Blood Arterial     pH, Blood Gas 7.416 7.350 - 7.450    PCO2 49.7 (H) 35.0 - 45.0 mmHg    PO2 108.7 (H) 75.0 - 100.0 mmHg    HCO3 31.2 (H) 22.0 - 26.0 mmol/L    B.E. 5.7 (H) -3.0 - 3.0 mmol/L    O2 Sat 97.4 92.0 - 98.5 %    PO2/FIO2 2.72 mmHg/%    RI(T) 1.01     O2Hb 96.3 94.0 - 97.0 %    COHb 0.8 0.0 - 1.5 %    MetHb 0.3 0.0 - 1.5 %    O2 Content 16.3 mL/dL    HHb 2.6 0.0 - 5.0 %    tHb (est) 11.9 11.5 - 16.5 g/dL    Mode AC     FIO2 40.0 %    Rr Mechanical 18.0 b/min    Vt Mechanical 400.0 mL    Peep/Cpap 8.0 cmH2O    Date Of Collection      Time Collected      Pt Temp 37.0 C     ID [id number]     Lab [id number]     Critical(s) Notified .  No Critical Values    CBC WITH AUTO DIFFERENTIAL   Result Value Ref Range    WBC 16.6 (H) 4.5 - 11.5 E9/L    RBC 3.89 3.50 - 5.50 E12/L    Hemoglobin 11.8 11.5 - 15.5 g/dL    Hematocrit 37.6 34.0 - 48.0 %    MCV 96.7 80.0 - 99.9 fL    MCH 30.3 26.0 - 35.0 pg    MCHC 31.4 (L) 32.0 - 34.5 %    RDW 11.9 11.5 - 15.0 fL Platelets 602 535 - 541 E9/L    MPV 10.2 7.0 - 12.0 fL    Neutrophils % 81.7 (H) 43.0 - 80.0 %    Immature Granulocytes % 1.3 0.0 - 5.0 %    Lymphocytes % 8.3 (L) 20.0 - 42.0 %    Monocytes % 8.4 2.0 - 12.0 %    Eosinophils % 0.2 0.0 - 6.0 %    Basophils % 0.1 0.0 - 2.0 %    Neutrophils Absolute 13.55 (H) 1.80 - 7.30 E9/L    Immature Granulocytes # 0.22 E9/L    Lymphocytes Absolute 1.38 (L) 1.50 - 4.00 E9/L    Monocytes Absolute 1.40 (H) 0.10 - 0.95 E9/L    Eosinophils Absolute 0.04 (L) 0.05 - 0.50 E9/L    Basophils Absolute 0.01 0.00 - 0.20 E9/L   Comprehensive metabolic panel   Result Value Ref Range    Sodium 140 132 - 146 mmol/L    Potassium 4.0 3.5 - 5.0 mmol/L    Chloride 98 98 - 107 mmol/L    CO2 34 (H) 22 - 29 mmol/L    Anion Gap 8 7 - 16 mmol/L    Glucose 91 74 - 99 mg/dL    BUN 32 (H) 6 - 23 mg/dL    CREATININE 0.8 0.5 - 1.0 mg/dL    GFR Non-African American >60 >=60 mL/min/1.73    GFR African American >60     Calcium 8.6 8.6 - 10.2 mg/dL    Total Protein 5.5 (L) 6.4 - 8.3 g/dL    Albumin 2.9 (L) 3.5 - 5.2 g/dL    Total Bilirubin 0.4 0.0 - 1.2 mg/dL    Alkaline Phosphatase 50 35 - 104 U/L    ALT 24 0 - 32 U/L    AST 17 0 - 31 U/L   MAGNESIUM   Result Value Ref Range    Magnesium 2.0 1.6 - 2.6 mg/dL   Phosphorus   Result Value Ref Range    Phosphorus 3.1 2.5 - 4.5 mg/dL   Blood Gas, Arterial   Result Value Ref Range    Date Analyzed [id number]     Time Analyzed 0509     Source: Blood Arterial     pH, Blood Gas 7.459 (H) 7.350 - 7.450    PCO2 45.5 (H) 35.0 - 45.0 mmHg    PO2 93.8 75.0 - 100.0 mmHg    HCO3 31.6 (H) 22.0 - 26.0 mmol/L    B.E. 6.8 (H) -3.0 - 3.0 mmol/L    O2 Sat 96.8 92.0 - 98.5 %    PO2/FIO2 2.35 mmHg/%    RI(T) 1.38     O2Hb 95.6 94.0 - 97.0 %    COHb 0.9 0.0 - 1.5 %    MetHb 0.3 0.0 - 1.5 %    O2 Content 16.5 mL/dL    HHb 3.2 0.0 - 5.0 %    tHb (est) 12.2 11.5 - 16.5 g/dL    Mode AC     FIO2 40.0 %    Rr Mechanical 18.0 b/min    Vt Mechanical 400.0 mL    Peep/Cpap 8.0 cmH2O    Date Of Collection      Time Collected      Pt Temp 37.0 C     ID [id number]     Lab [id number]     Critical(s) Notified .  No Critical Values    TRIGLYCERIDES   Result Value Ref Range    Triglycerides 221 (H) 0 - 149 mg/dL   CBC WITH AUTO DIFFERENTIAL   Result Value Ref Range    WBC 21.8 (H) 4.5 - 11.5 E9/L    RBC 4.20 3.50 - 5.50 E12/L    Hemoglobin 13.0 11.5 - 15.5 g/dL    Hematocrit 38.7 34.0 - 48.0 %    MCV 92.1 80.0 - 99.9 fL    MCH 31.0 26.0 - 35.0 pg    MCHC 33.6 32.0 - 34.5 %    RDW 11.8 11.5 - 15.0 fL    Platelets 645 309 - 606 E9/L    MPV 9.6 7.0 - 12.0 fL    Neutrophils % 85.2 (H) 43.0 - 80.0 %    Lymphocytes % 8.7 (L) 20.0 - 42.0 %    Monocytes % 6.1 2.0 - 12.0 %    Eosinophils % 0.0 0.0 - 6.0 %    Basophils % 0.1 0.0 - 2.0 %    Neutrophils Absolute 18.53 (H) 1.80 - 7.30 E9/L    Lymphocytes Absolute 1.96 1.50 - 4.00 E9/L    Monocytes Absolute 1.31 (H) 0.10 - 0.95 E9/L    Eosinophils Absolute 0.00 (L) 0.05 - 0.50 E9/L    Basophils Absolute 0.00 0.00 - 0.20 E9/L    Poikilocytes 1+     Ovalocytes 1+    Comprehensive metabolic panel   Result Value Ref Range    Sodium 134 132 - 146 mmol/L    Potassium 3.6 3.5 - 5.0 mmol/L    Chloride 94 (L) 98 - 107 mmol/L    CO2 30 (H) 22 - 29 mmol/L    Anion Gap 10 7 - 16 mmol/L    Glucose 128 (H) 74 - 99 mg/dL    BUN 24 (H) 6 - 23 mg/dL    CREATININE 0.7 0.5 - 1.0 mg/dL    GFR Non-African American >60 >=60 mL/min/1.73    GFR African American >60     Calcium 8.3 (L) 8.6 - 10.2 mg/dL    Total Protein 5.7 (L) 6.4 - 8.3 g/dL    Albumin 3.1 (L) 3.5 - 5.2 g/dL    Total Bilirubin 0.7 0.0 - 1.2 mg/dL    Alkaline Phosphatase 50 35 - 104 U/L    ALT 23 0 - 32 U/L    AST 20 0 - 31 U/L   MAGNESIUM   Result Value Ref Range    Magnesium 1.9 1.6 - 2.6 mg/dL   Phosphorus   Result Value Ref Range    Phosphorus 2.6 2.5 - 4.5 mg/dL   CBC WITH AUTO DIFFERENTIAL   Result Value Ref Range    WBC 13.3 (H) 4.5 - 11.5 E9/L    RBC 3.86 3.50 - 5.50 E12/L    Hemoglobin 11.9 11.5 - 15.5 g/dL    Hematocrit 36.1 34.0 - 48.0 %    MCV 93.5 80.0 - 99.9 fL    MCH 30.8 26.0 - 35.0 pg    MCHC 33.0 32.0 - 34.5 %    RDW 11.9 11.5 - 15.0 fL    Platelets 781 816 - 844 E9/L    MPV 9.6 7.0 - 12.0 fL    Neutrophils % 82.9 (H) 43.0 - 80.0 %    Immature Granulocytes % 1.1 0.0 - 5.0 %    Lymphocytes % 8.6 (L) 20.0 - 42.0 %    Monocytes % 7.3 2.0 - 12.0 %    Eosinophils % 0.0 0.0 - 6.0 %    Basophils % 0.1 0.0 - 2.0 %    Neutrophils Absolute 11.01 (H) 1.80 - 7.30 E9/L    Immature Granulocytes # 0.15 E9/L    Lymphocytes Absolute 1.14 (L) 1.50 - 4.00 E9/L    Monocytes Absolute 0.97 (H) 0.10 - 0.95 E9/L    Eosinophils Absolute 0.00 (L) 0.05 - 0.50 E9/L    Basophils Absolute 0.01 0.00 - 0.20 E9/L   Comprehensive metabolic panel   Result Value Ref Range    Sodium 136 132 - 146 mmol/L    Potassium 4.0 3.5 - 5.0 mmol/L    Chloride 98 98 - 107 mmol/L    CO2 29 22 - 29 mmol/L    Anion Gap 9 7 - 16 mmol/L    Glucose 86 74 - 99 mg/dL    BUN 24 (H) 6 - 23 mg/dL    CREATININE 0.8 0.5 - 1.0 mg/dL    GFR Non-African American >60 >=60 mL/min/1.73    GFR African American >60     Calcium 8.6 8.6 - 10.2 mg/dL    Total Protein 5.6 (L) 6.4 - 8.3 g/dL    Albumin 3.1 (L) 3.5 - 5.2 g/dL    Total Bilirubin 0.7 0.0 - 1.2 mg/dL    Alkaline Phosphatase 63 35 - 104 U/L    ALT 22 0 - 32 U/L    AST 19 0 - 31 U/L   MAGNESIUM   Result Value Ref Range    Magnesium 2.0 1.6 - 2.6 mg/dL   Phosphorus   Result Value Ref Range    Phosphorus 2.9 2.5 - 4.5 mg/dL   Triglyceride   Result Value Ref Range    Triglycerides 122 0 - 149 mg/dL   Blood Gas, Arterial   Result Value Ref Range    Date Analyzed 20210906     Time Analyzed 1159     Source: Blood Arterial     pH, Blood Gas 7.522 (H) 7.350 - 7.450    PCO2 31.1 (L) 35.0 - 45.0 mmHg    PO2 93.5 75.0 - 100.0 mmHg    HCO3 24.9 22.0 - 26.0 mmol/L    B.E. 2.6 -3.0 - 3.0 mmol/L    O2 Sat 96.8 92.0 - 98.5 %    O2Hb 95.9 94.0 - 97.0 %    COHb 0.6 0.0 - 1.5 %    MetHb 0.3 0.0 - 1.5 %    O2 Content 16.3 mL/dL    HHb 3.2 0.0 - 5.0 %    tHb (est) 12.0 11.5 - 16.5 g/dL    Mode NC-  4L     Date Of Collection      Time Collected      Pt Temp 37.0 C     ID [id number]     Lab [id number]     Critical(s) Notified .  No Critical Values    CBC WITH AUTO DIFFERENTIAL   Result Value Ref Range    WBC 10.6 4.5 - 11.5 E9/L    RBC 3.57 3.50 - 5.50 E12/L    Hemoglobin 11.1 (L) 11.5 - 15.5 g/dL    Hematocrit 33.2 (L) 34.0 - 48.0 %    MCV 93.0 80.0 - 99.9 fL    MCH 31.1 26.0 - 35.0 pg    MCHC 33.4 32.0 - 34.5 %    RDW 11.9 11.5 - 15.0 fL    Platelets 686 055 - 363 E9/L    MPV 9.6 7.0 - 12.0 fL    Neutrophils % 86.2 (H) 43.0 - 80.0 %    Immature Granulocytes % 0.9 0.0 - 5.0 %    Lymphocytes % 7.6 (L) 20.0 - 42.0 %    Monocytes % 5.1 2.0 - 12.0 %    Eosinophils % 0.1 0.0 - 6.0 %    Basophils % 0.1 0.0 - 2.0 %    Neutrophils Absolute 9.10 (H) 1.80 - 7.30 E9/L    Immature Granulocytes # 0.09 E9/L    Lymphocytes Absolute 0.80 (L) 1.50 - 4.00 E9/L    Monocytes Absolute 0.54 0.10 - 0.95 E9/L    Eosinophils Absolute 0.01 (L) 0.05 - 0.50 E9/L    Basophils Absolute 0.01 0.00 - 0.20 E9/L   Comprehensive metabolic panel   Result Value Ref Range    Sodium 138 132 - 146 mmol/L    Potassium 4.1 3.5 - 5.0 mmol/L    Chloride 102 98 - 107 mmol/L    CO2 28 22 - 29 mmol/L    Anion Gap 8 7 - 16 mmol/L    Glucose 59 (L) 74 - 99 mg/dL    BUN 22 6 - 23 mg/dL    CREATININE 0.8 0.5 - 1.0 mg/dL    GFR Non-African American >60 >=60 mL/min/1.73    GFR African American >60     Calcium 8.2 (L) 8.6 - 10.2 mg/dL    Total Protein 5.1 (L) 6.4 - 8.3 g/dL    Albumin 3.0 (L) 3.5 - 5.2 g/dL    Total Bilirubin 0.6 0.0 - 1.2 mg/dL    Alkaline Phosphatase 41 35 - 104 U/L    ALT 26 0 - 32 U/L    AST 20 0 - 31 U/L   MAGNESIUM   Result Value Ref Range    Magnesium 2.1 1.6 - 2.6 mg/dL   Phosphorus   Result Value Ref Range    Phosphorus 4.2 2.5 - 4.5 mg/dL   CBC WITH AUTO DIFFERENTIAL   Result Value Ref Range    WBC 9.5 4.5 - 11.5 E9/L    RBC 3.50 3.50 - 5.50 E12/L    Hemoglobin 10.7 (L) 11.5 - 15.5 g/dL    Hematocrit 33.3 (L) 34.0 - 48.0 %    MCV 95.1 80.0 - 99.9 fL    MCH 30.6 26.0 - 35.0 pg    MCHC 32.1 32.0 - 34.5 %    RDW 11.8 11.5 - 15.0 fL    Platelets 121 743 - 220 E9/L    MPV 9.7 7.0 - 12.0 fL    Neutrophils % 83.6 (H) 43.0 - 80.0 %    Immature Granulocytes % 0.6 0.0 - 5.0 %    Lymphocytes % 10.3 (L) 20.0 - 42.0 %    Monocytes % 5.4 2.0 - 12.0 %    Eosinophils % 0.0 0.0 - 6.0 %    Basophils % 0.1 0.0 - 2.0 %    Neutrophils Absolute 7.94 (H) 1.80 - 7.30 E9/L    Immature Granulocytes # 0.06 E9/L    Lymphocytes Absolute 0.98 (L) 1.50 - 4.00 E9/L    Monocytes Absolute 0.51 0.10 - 0.95 E9/L    Eosinophils Absolute 0.00 (L) 0.05 - 0.50 E9/L    Basophils Absolute 0.01 0.00 - 0.20 E9/L   Comprehensive metabolic panel   Result Value Ref Range    Sodium 136 132 - 146 mmol/L    Potassium 4.7 3.5 - 5.0 mmol/L    Chloride 104 98 - 107 mmol/L    CO2 26 22 - 29 mmol/L    Anion Gap 6 (L) 7 - 16 mmol/L    Glucose 286 (H) 74 - 99 mg/dL    BUN 17 6 - 23 mg/dL    CREATININE 0.7 0.5 - 1.0 mg/dL    GFR Non-African American >60 >=60 mL/min/1.73    GFR African American >60     Calcium 7.9 (L) 8.6 - 10.2 mg/dL    Total Protein 5.2 (L) 6.4 - 8.3 g/dL    Albumin 3.0 (L) 3.5 - 5.2 g/dL    Total Bilirubin 0.7 0.0 - 1.2 mg/dL    Alkaline Phosphatase 43 35 - 104 U/L    ALT 47 (H) 0 - 32 U/L    AST 35 (H) 0 - 31 U/L   MAGNESIUM   Result Value Ref Range    Magnesium 2.0 1.6 - 2.6 mg/dL   Phosphorus   Result Value Ref Range    Phosphorus 2.9 2.5 - 4.5 mg/dL   CBC WITH AUTO DIFFERENTIAL   Result Value Ref Range    WBC 10.2 4.5 - 11.5 E9/L    RBC 3.78 3.50 - 5.50 E12/L    Hemoglobin 11.7 11.5 - 15.5 g/dL    Hematocrit 34.6 34.0 - 48.0 %    MCV 91.5 80.0 - 99.9 fL    MCH 31.0 26.0 - 35.0 pg    MCHC 33.8 32.0 - 34.5 %    RDW 11.4 (L) 11.5 - 15.0 fL    Platelets 354 619 - 090 E9/L    MPV 9.4 7.0 - 12.0 fL    Neutrophils % 79.9 43.0 - 80.0 %    Immature Granulocytes % 0.7 0.0 - 5.0 %    Lymphocytes % 11.1 (L) 20.0 - 42.0 %    Monocytes % 8.1 2.0 - 12.0 % Eosinophils % 0.1 0.0 - 6.0 %    Basophils % 0.1 0.0 - 2.0 %    Neutrophils Absolute 8.15 (H) 1.80 - 7.30 E9/L    Immature Granulocytes # 0.07 E9/L    Lymphocytes Absolute 1.13 (L) 1.50 - 4.00 E9/L    Monocytes Absolute 0.83 0.10 - 0.95 E9/L    Eosinophils Absolute 0.01 (L) 0.05 - 0.50 E9/L    Basophils Absolute 0.01 0.00 - 0.20 E9/L   Comprehensive metabolic panel   Result Value Ref Range    Sodium 134 132 - 146 mmol/L    Potassium 5.0 3.5 - 5.0 mmol/L    Chloride 100 98 - 107 mmol/L    CO2 25 22 - 29 mmol/L    Anion Gap 9 7 - 16 mmol/L    Glucose 396 (H) 74 - 99 mg/dL    BUN 19 6 - 23 mg/dL    CREATININE 0.7 0.5 - 1.0 mg/dL    GFR Non-African American >60 >=60 mL/min/1.73    GFR African American >60     Calcium 8.3 (L) 8.6 - 10.2 mg/dL    Total Protein 5.6 (L) 6.4 - 8.3 g/dL    Albumin 3.3 (L) 3.5 - 5.2 g/dL    Total Bilirubin 0.9 0.0 - 1.2 mg/dL    Alkaline Phosphatase 48 35 - 104 U/L    ALT 67 (H) 0 - 32 U/L    AST 32 (H) 0 - 31 U/L   MAGNESIUM   Result Value Ref Range    Magnesium 1.9 1.6 - 2.6 mg/dL   Phosphorus   Result Value Ref Range    Phosphorus 3.0 2.5 - 4.5 mg/dL   CBC WITH AUTO DIFFERENTIAL   Result Value Ref Range    WBC 10.4 4.5 - 11.5 E9/L    RBC 3.66 3.50 - 5.50 E12/L    Hemoglobin 11.5 11.5 - 15.5 g/dL    Hematocrit 33.6 (L) 34.0 - 48.0 %    MCV 91.8 80.0 - 99.9 fL    MCH 31.4 26.0 - 35.0 pg    MCHC 34.2 32.0 - 34.5 %    RDW 11.2 (L) 11.5 - 15.0 fL    Platelets 345 551 - 571 E9/L    MPV 9.4 7.0 - 12.0 fL    Neutrophils % 78.1 43.0 - 80.0 %    Immature Granulocytes % 0.5 0.0 - 5.0 %    Lymphocytes % 10.9 (L) 20.0 - 42.0 %    Monocytes % 9.0 2.0 - 12.0 %    Eosinophils % 1.4 0.0 - 6.0 %    Basophils % 0.1 0.0 - 2.0 %    Neutrophils Absolute 8.08 (H) 1.80 - 7.30 E9/L    Immature Granulocytes # 0.05 E9/L    Lymphocytes Absolute 1.13 (L) 1.50 - 4.00 E9/L    Monocytes Absolute 0.93 0.10 - 0.95 E9/L    Eosinophils Absolute 0.15 0.05 - 0.50 E9/L    Basophils Absolute 0.01 0.00 - 0.20 E9/L   Comprehensive metabolic panel   Result Value Ref Range    Sodium 132 132 - 146 mmol/L    Potassium 4.2 3.5 - 5.0 mmol/L    Chloride 96 (L) 98 - 107 mmol/L    CO2 27 22 - 29 mmol/L    Anion Gap 9 7 - 16 mmol/L    Glucose 266 (H) 74 - 99 mg/dL    BUN 18 6 - 23 mg/dL    CREATININE 0.7 0.5 - 1.0 mg/dL    GFR Non-African American >60 >=60 mL/min/1.73    GFR African American >60     Calcium 8.3 (L) 8.6 - 10.2 mg/dL    Total Protein 5.3 (L) 6.4 - 8.3 g/dL    Albumin 3.0 (L) 3.5 - 5.2 g/dL    Total Bilirubin 0.8 0.0 - 1.2 mg/dL    Alkaline Phosphatase 49 35 - 104 U/L    ALT 56 (H) 0 - 32 U/L    AST 20 0 - 31 U/L   MAGNESIUM   Result Value Ref Range    Magnesium 1.7 1.6 - 2.6 mg/dL   Phosphorus   Result Value Ref Range    Phosphorus 2.5 2.5 - 4.5 mg/dL   Phosphorus   Result Value Ref Range    Phosphorus 2.2 (L) 2.5 - 4.5 mg/dL   MAGNESIUM   Result Value Ref Range    Magnesium 1.7 1.6 - 2.6 mg/dL   Comprehensive metabolic panel   Result Value Ref Range    Sodium 134 132 - 146 mmol/L    Potassium 3.9 3.5 - 5.0 mmol/L    Chloride 99 98 - 107 mmol/L    CO2 28 22 - 29 mmol/L    Anion Gap 7 7 - 16 mmol/L    Glucose 192 (H) 74 - 99 mg/dL    BUN 15 6 - 23 mg/dL    CREATININE 0.7 0.5 - 1.0 mg/dL    GFR Non-African American >60 >=60 mL/min/1.73    GFR African American >60     Calcium 8.7 8.6 - 10.2 mg/dL    Total Protein 5.4 (L) 6.4 - 8.3 g/dL    Albumin 3.3 (L) 3.5 - 5.2 g/dL    Total Bilirubin 0.5 0.0 - 1.2 mg/dL    Alkaline Phosphatase 45 35 - 104 U/L    ALT 40 (H) 0 - 32 U/L    AST 12 0 - 31 U/L   CBC WITH AUTO DIFFERENTIAL   Result Value Ref Range    WBC 7.2 4.5 - 11.5 E9/L    RBC 3.50 3.50 - 5.50 E12/L    Hemoglobin 10.7 (L) 11.5 - 15.5 g/dL    Hematocrit 32.5 (L) 34.0 - 48.0 %    MCV 92.9 80.0 - 99.9 fL    MCH 30.6 26.0 - 35.0 pg    MCHC 32.9 32.0 - 34.5 %    RDW 11.6 11.5 - 15.0 fL    Platelets 963 991 - 763 E9/L    MPV 9.4 7.0 - 12.0 fL    Neutrophils % 67.4 43.0 - 80.0 %    Immature Granulocytes % 0.4 0.0 - 5.0 %    Lymphocytes % 19.9 (L) 20.0 - 42.0 %    Monocytes % 11.1 2.0 - 12.0 %    Eosinophils % 1.1 0.0 - 6.0 %    Basophils % 0.1 0.0 - 2.0 %    Neutrophils Absolute 4.85 1.80 - 7.30 E9/L    Immature Granulocytes # 0.03 E9/L    Lymphocytes Absolute 1.43 (L) 1.50 - 4.00 E9/L    Monocytes Absolute 0.80 0.10 - 0.95 E9/L    Eosinophils Absolute 0.08 0.05 - 0.50 E9/L    Basophils Absolute 0.01 0.00 - 0.20 E9/L   RPR   Result Value Ref Range    RPR NON-REACTIVE Non-reactive   Vitamin D 25 Hydroxy   Result Value Ref Range    Vit D, 25-Hydroxy 56 30 - 100 ng/mL   Vitamin B12 & folate   Result Value Ref Range    Vitamin B-12 1872 (H) 211 - 946 pg/mL    Folate 9.2 4.8 - 24.2 ng/mL   Ammonia   Result Value Ref Range    Ammonia <10.0 (L) 11.0 - 51.0 umol/L   POCT Glucose   Result Value Ref Range    Meter Glucose 229 (H) 74 - 99 mg/dL   POCT Glucose   Result Value Ref Range    Meter Glucose 277 (H) 74 - 99 mg/dL   POCT Glucose   Result Value Ref Range    Meter Glucose 199 (H) 74 - 99 mg/dL   POCT Glucose   Result Value Ref Range    Meter Glucose 205 (H) 74 - 99 mg/dL   POCT Glucose   Result Value Ref Range    Meter Glucose 217 (H) 74 - 99 mg/dL   POCT Glucose   Result Value Ref Range    Meter Glucose 274 (H) 74 - 99 mg/dL   POCT Glucose   Result Value Ref Range    Meter Glucose 304 (H) 74 - 99 mg/dL   POCT Glucose   Result Value Ref Range    Meter Glucose 194 (H) 74 - 99 mg/dL   POCT Glucose   Result Value Ref Range    Meter Glucose 225 (H) 74 - 99 mg/dL   POCT Glucose   Result Value Ref Range    Meter Glucose 242 (H) 74 - 99 mg/dL   POCT Glucose   Result Value Ref Range    Meter Glucose 204 (H) 74 - 99 mg/dL   POCT Glucose   Result Value Ref Range    Meter Glucose 222 (H) 74 - 99 mg/dL   POCT Glucose   Result Value Ref Range    Meter Glucose 251 (H) 74 - 99 mg/dL   POCT Glucose   Result Value Ref Range    Meter Glucose 254 (H) 74 - 99 mg/dL   POCT Glucose   Result Value Ref Range    Meter Glucose 181 (H) 74 - 99 mg/dL   POCT Glucose   Result Value Ref Range Meter Glucose 157 (H) 74 - 99 mg/dL   POCT Glucose   Result Value Ref Range    Meter Glucose 109 (H) 74 - 99 mg/dL   POCT Glucose   Result Value Ref Range    Meter Glucose 201 (H) 74 - 99 mg/dL   POCT Glucose   Result Value Ref Range    Meter Glucose 205 (H) 74 - 99 mg/dL   POCT Glucose   Result Value Ref Range    Meter Glucose 243 (H) 74 - 99 mg/dL   POCT Glucose   Result Value Ref Range    Meter Glucose 306 (H) 74 - 99 mg/dL   POCT Glucose   Result Value Ref Range    Meter Glucose 197 (H) 74 - 99 mg/dL   POCT Glucose   Result Value Ref Range    Meter Glucose 226 (H) 74 - 99 mg/dL   POCT Glucose   Result Value Ref Range    Meter Glucose 280 (H) 74 - 99 mg/dL   POCT Glucose   Result Value Ref Range    Meter Glucose 263 (H) 74 - 99 mg/dL   POCT Glucose   Result Value Ref Range    Meter Glucose 235 (H) 74 - 99 mg/dL   POCT Glucose   Result Value Ref Range    Meter Glucose 237 (H) 74 - 99 mg/dL     *Note: Due to a large number of results and/or encounters for the requested time period, some results have not been displayed. A complete set of results can be found in Results Review.          IMAGING:  Echo Limited    Result Date: 8/31/2021  Transthoracic Echocardiography Report (TTE)  Demographics   Patient Name    Maria Ines Rinaldi      Gender            Female                  [address] [place] [address]  [id number]      Room Number       [address]  Number   Account #       [de-identified]     Procedure Date    08/31/2021   Corporate ID                  Ordering                                Physician   Accession       [id number]    Referring         Yin Slider  Number                        Physician   Date of Birth   1955    Sonographer       Rayo Gomez [facility]   Age             78 year(s)    Interpreting      [facility]                                Physician         Physician Cardiology                                                  Claus Hernandez MD                                 Any Other  Procedure Type of Study TTE procedure:Echo Limited Study. Procedure Date Date: 08/31/2021 Start: 10:59 AM Study Location: Portable Technical Quality: Poor visualization due to body habitus. Indications:Congestive heart failure. Patient Status: Routine Height: 64 inches Weight: 196 pounds BSA: 1.94 m^2 BMI: 33.64 kg/m^2 BP: 142/99 mmHg  Findings   Left Ventricle  Left ventricular size is grossly normal. Ejection fraction is visually  estimated at 55-60%. Indeterminate diastolic function. Right Ventricle  Normal right ventricular size and function. Left Atrium  Left atrium is of normal size. Right Atrium  Normal right atrium size. Mitral Valve  Physiologic and/or trace mitral regurgitation is present. Tricuspid Valve  Physiologic and/or trace tricuspid regurgitation. Aortic Valve  Physiologic and/or trace aortic regurgitation is noted. There is mild  aortic stenosis with a mean gradient of 11 mm Hg. Aorta  The aorta is within normal limits. Miscellaneous  Normal Inferior Vena Cava diameter and respiratory variation. Conclusions   Summary  Left ventricular size is grossly normal. Ejection fraction is visually  estimated at 55-60%. Indeterminate diastolic function. Normal right ventricular size and function. Left atrium is of normal size. Normal right atrium size. Normal Inferior Vena Cava diameter and respiratory variation. Unable to estimate PA pressure. No comparison study available.    Signature   ----------------------------------------------------------------  Electronically signed by Kayla Harding MD(Interpreting  physician) on 08/31/2021 03:54 PM  ----------------------------------------------------------------  M-Mode/2D Measurements & Calculations   LV Diastolic    LV Systolic Dimension: 3 cm  AV Cusp Separation: 1.8 cmLA  Dimension: 5.1  LV Volume Diastolic: 713.6   Dimension: 3 cmAO Root  cm              ml                           Dimension: 2.7 cm  LV FS:41.2 %    LV Volume Systolic: 70.6 ml  LV PW LV EDV/LV EDV Index: 393.1  Diastolic: 1.4  GE/39 AJ/G^8CP ESV/LV ESV  cm              Index: 34.2 ml/18ml/ m^2     RV Diastolic Dimension: 2.5  LV PW Systolic: EF Calculated: 72 %          cm  1.7 cm          LV Mass Index: 155 l/min*m^2 RV Systolic Dimension: 2 cm  Septum                                       LA/Aorta: 3.10  Diastolic: 1.4  cm              LVOT: 2 cm                   LA volume/Index: 42.9 ml  Septum  Systolic: 2 cm   LV Mass: 300.44  g  Doppler Measurements & Calculations   MV Peak E-Wave:   AV Peak Velocity: 2.03 m/s    LVOT Peak Velocity: 0.96  0.81 m/s          AV Peak Gradient: 16.4 mmHg   m/s  MV Peak A-Wave:   AV Mean Velocity: 1.54 m/s    LVOT Mean Velocity: 0.58  1.15 m/s          AV Mean Gradient: 10.3 mmHg   m/s  MV E/A Ratio: 0.7 AV VTI: 47.5 cm               LVOT Peak Gradient: 3.7  MV Peak Gradient: AV Area (Continuity):1.51     mmHgLVOT Mean Gradient:  6.5 mmHg          cm^2                          1.7 mmHg  MV Mean Gradient:                               Estimated RVSP: 21.8 mmHg  2 mmHg            LVOT VTI: 22.8 cm             Estimated RAP:10 mmHg  MV Mean Velocity:  0.64 m/s          Estimated PASP: 21.85 mmHg  MV Deceleration   Pulm. Vein A Reversal         TR Velocity:1.72 m/s  Time: 262.9 msec  Duration:152.2 msec           TR Gradient:11.85 mmHg  MV P1/2t: 60.6    Pulm. Vein D Velocity:0.46    PV Peak Velocity: 1.17 m/s  msec              m/sPulm. Vein A Reversal      PV Peak Gradient: 5.49  MVA by PHT:3.63   Velocity:0.34 m/s             mmHg  cm^2              Pulm. Vein S Velocity: 0.55   PV Mean Velocity: 0.86 m/s  MV Area           m/s                           PV Mean Gradient: 3.2 mmHg  (continuity): 2.5  cm^2  [email or web address] [id number]    XR CHEST (2 VW)    Result Date: 9/12/2021  EXAMINATION: TWO XRAY VIEWS OF THE CHEST 9/12/2021 2:20 pm COMPARISON: September 6, 2021 HISTORY: ORDERING SYSTEM PROVIDED HISTORY: cough TECHNOLOGIST PROVIDED HISTORY: Reason for exam:->cough FINDINGS: Right PICC line present with distal tip of location of superior vena cava. No airspace opacity or pleural effusion. The heart is normal size. No pneumothorax. No free air beneath the hemidiaphragms. No pneumonia or pleural effusion. CT HEAD WO CONTRAST    Result Date: 9/10/2021  EXAMINATION: CT OF THE HEAD WITHOUT CONTRAST  9/10/2021 9:14 pm TECHNIQUE: CT of the head was performed without the administration of intravenous contrast. Dose modulation, iterative reconstruction, and/or weight based adjustment of the mA/kV was utilized to reduce the radiation dose to as low as reasonably achievable. COMPARISON: MRI brain 09/07/2021 HISTORY: ORDERING SYSTEM PROVIDED HISTORY: fall TECHNOLOGIST PROVIDED HISTORY: Reason for exam:->fall Has a \"code stroke\" or \"stroke alert\" been called? ->No FINDINGS: BRAIN/VENTRICLES: There is no acute intracranial hemorrhage, mass effect or midline shift. No abnormal extra-axial fluid collection. The gray-white differentiation is maintained without evidence of an acute infarct. There is no evidence of hydrocephalus. ORBITS: The visualized portion of the orbits demonstrate no acute abnormality. SINUSES: Pansinus disease. SOFT TISSUES/SKULL:  No acute abnormality of the visualized skull or soft tissues. No acute intracranial abnormality. CT SOFT TISSUE NECK W CONTRAST    Result Date: 8/29/2021  EXAMINATION: CT OF THE NECK SOFT TISSUE WITH CONTRAST  8/29/2021 TECHNIQUE: CT of the neck was performed with the administration of intravenous contrast. Multiplanar reformatted images are provided for review. Dose modulation, iterative reconstruction, and/or weight based adjustment of the mA/kV was utilized to reduce the radiation dose to as low as reasonably achievable. COMPARISON: None.  HISTORY: ORDERING SYSTEM PROVIDED HISTORY: continued oral swelling, palatine and lingual tonsils, and narrowing of the bilateral piriform sinuses, likely reactive. Mild infiltration of fat in the bilateral carotid spaces, particularly at the level of carotid bulbs, right more than left, likely related to extension of inflammatory changes. Acute sinusitis. XR CHEST PORTABLE    Result Date: 9/6/2021  EXAMINATION: ONE XRAY VIEW OF THE CHEST 9/6/2021 11:36 am COMPARISON: None. HISTORY: ORDERING SYSTEM PROVIDED HISTORY: Altered mental status, respiratory failure TECHNOLOGIST PROVIDED HISTORY: Reason for exam:->Altered mental status, respiratory failure FINDINGS: The lungs are without acute focal process. There is no effusion or pneumothorax. The cardiomediastinal silhouette is without acute process. The osseous structures are without acute process. The endotracheal tube and NG tube have been removed. There is a right-sided PICC line. No acute process. XR CHEST PORTABLE    Result Date: 9/4/2021  EXAMINATION: ONE XRAY VIEW OF THE CHEST 9/4/2021 7:03 am COMPARISON: None. HISTORY: ORDERING SYSTEM PROVIDED HISTORY: SOB TECHNOLOGIST PROVIDED HISTORY: Reason for exam:->SOB FINDINGS: The tip of the endotracheal tube is above the chivo. The tip of the PICC line projects over the superior vena cava. The tip of the nasogastric tube projects over the body the stomach. The lungs appear clear. The contour of the mediastinum and heart size are within normal range. There are calcifications within the aortic knob. No focal parenchymal densities are noted within the lungs. There are no signs of pneumothorax or pleural effusion     1. The position and alignment of the tubes and catheters are within normal range 2.  No signs of an acute cardiopulmonary process     XR CHEST PORTABLE    Result Date: 9/2/2021  EXAMINATION: ONE XRAY VIEW OF THE CHEST 9/2/2021 6:30 am COMPARISON: 08/31/2021 HISTORY: ORDERING SYSTEM PROVIDED HISTORY: SOB TECHNOLOGIST PROVIDED HISTORY: Reason for exam:->SOB FINDINGS: Heart size is normal.  There are no infiltrates or effusions. Support lines are appropriate. No acute process and unchanged     XR CHEST PORTABLE    Result Date: 8/31/2021  EXAMINATION: ONE XRAY VIEW OF THE CHEST 8/31/2021 7:27 am COMPARISON: 08/29/2021 HISTORY: ORDERING SYSTEM PROVIDED HISTORY: SOB TECHNOLOGIST PROVIDED HISTORY: Reason for exam:->SOB FINDINGS: Heart size is normal.  There are no infiltrates or effusions. Lines/tubes are unchanged. Tip of ET tube is near the chivo and could be withdrawn 1-2 cm. .     No acute process     XR CHEST PORTABLE    Result Date: 8/29/2021  EXAMINATION: ONE XRAY VIEW OF THE CHEST 8/29/2021 9:30 pm COMPARISON: 08/29/2021 about 14 hours earlier HISTORY: ORDERING SYSTEM PROVIDED HISTORY: ETT tube placement TECHNOLOGIST PROVIDED HISTORY: Reason for exam:->ETT tube placement FINDINGS: There is an ET tube with the tip felt to be in the distal trachea but within 1 cm of the chivo. This could be withdrawn 1-2 cm for better tube position. There is an NG tube extending into the stomach. No acute infiltrate or pleural effusion is seen. The heart and mediastinum are unchanged and there is no evidence of vascular congestion. ETT tip is within 1 cm of the chivo. It could be withdrawn 1-2 cm for better tube position. XR CHEST PORTABLE    Result Date: 8/29/2021  EXAMINATION: ONE XRAY VIEW OF THE CHEST 8/29/2021 6:28 am COMPARISON: 08/28/2021 HISTORY: ORDERING SYSTEM PROVIDED HISTORY: sob TECHNOLOGIST PROVIDED HISTORY: Reason for exam:->sob FINDINGS: The support lines and tubes are unchanged in position. Lungs are clear. There is no focal infiltrate or effusion. 1. There is no acute cardiopulmonary disease 2. Stable position of support lines and tubes.      XR CHEST PORTABLE    Result Date: 8/28/2021  EXAMINATION: ONE XRAY VIEW OF THE CHEST 8/28/2021 1:08 pm COMPARISON: August 24, 2021 HISTORY: ORDERING SYSTEM PROVIDED HISTORY: ett and ng placement TECHNOLOGIST PROVIDED HISTORY: Reason for exam:->ett and ng placement FINDINGS: Endotracheal tube is present with distal tip appearing approximately 4.5 cm above the chivo. NG tube courses below the level of the diaphragm. No airspace opacity or pleural effusion. The heart is normal size. No pneumothorax. 1. Endotracheal tube is present with distal tip approximately 4.5 cm above the chivo. 2. No airspace opacity or pleural effusion. XR CHEST PORTABLE    Result Date: 8/25/2021  EXAMINATION: ONE XRAY VIEW OF THE CHEST 8/24/2021 8:52 pm COMPARISON: 08/24/2021 HISTORY: ORDERING SYSTEM PROVIDED HISTORY: et tube placement TECHNOLOGIST PROVIDED HISTORY: Reason for exam:->et tube placement FINDINGS: Stable position of endotracheal tube. Cardiomediastinal silhouette is unremarkable. No infiltrate, effusion, or pneumothorax. No acute osseous abnormality. No pulmonary infiltrates. No significant change since the prior study     XR CHEST PORTABLE    Result Date: 8/24/2021  EXAMINATION: ONE XRAY VIEW OF THE CHEST 8/24/2021 5:30 pm COMPARISON: January 3, 2020 HISTORY: ORDERING SYSTEM PROVIDED HISTORY: SOB (shortness of breath) TECHNOLOGIST PROVIDED HISTORY: Reason for exam:->ETT placement, tongue swelling FINDINGS: Endotracheal tube is approximately 6.8 cm above the chivo. No airspace opacity or pleural effusion. The heart is normal in size. No pneumothorax. 1. Endotracheal tube is approximately 6.8 cm above the chivo. 2. No airspace opacity or pleural effusion. XR ABDOMEN FOR NG/OG/NE TUBE PLACEMENT    Result Date: 8/30/2021  EXAMINATION: ONE SUPINE XRAY VIEW(S) OF THE ABDOMEN 8/29/2021 8:30 pm COMPARISON: 26 August 2021 HISTORY: ORDERING SYSTEM PROVIDED HISTORY: OG tube placement TECHNOLOGIST PROVIDED HISTORY: Reason for exam:->OG tube placement Portable? ->Yes FINDINGS: NG tube tip is well within the gastric lumen located in the antrum.   No free air, bowel wall pneumatosis or dilated bowel is evident. An endotracheal tube terminates 2 cm above the chivo. NG tube tip is at the level of the gastric antrum, well within the gastric lumen. XR ABDOMEN FOR NG/OG/NE TUBE PLACEMENT    Result Date: 8/26/2021  EXAMINATION: ONE SUPINE XRAY VIEW(S) OF THE ABDOMEN 8/26/2021 9:47 am COMPARISON: None. HISTORY: ORDERING SYSTEM PROVIDED HISTORY: OGT TECHNOLOGIST PROVIDED HISTORY: Reason for exam:->OGT Portable? ->Yes FINDINGS: There is an NG tube seen within the stomach. There is no bowel distention. 1. Satisfactory position of the NG tube within the stomach     MRI BRAIN WO CONTRAST    Result Date: 9/7/2021  EXAMINATION: MRI OF THE BRAIN WITHOUT CONTRAST  9/7/2021 2:19 pm TECHNIQUE: Multiplanar multisequence MRI of the brain was performed without the administration of intravenous contrast. COMPARISON: None. HISTORY: ORDERING SYSTEM PROVIDED HISTORY: AMS, weakness, concern for CVA TECHNOLOGIST PROVIDED HISTORY: Reason for exam:->AMS, weakness, concern for CVA FINDINGS: INTRACRANIAL STRUCTURES/VENTRICLES: There is no acute infarct. No mass effect, edema or hemorrhage is seen. Mild volume loss is seen in the brain with minimal chronic microvascular ischemic changes. No hydrocephalus or extra-axial fluid is seen. ORBITS: Prosthetic lenses are seen in the globes bilaterally. The orbits are otherwise grossly unremarkable. SINUSES: Moderate mucosal thickening is seen in the paranasal sinuses with small air-fluid levels suspected in the sphenoid sinuses. T2 hypointense area in the lateral margin of the right sphenoid sinus is nonspecific and may represent a chronic inflammatory process. A solid mass lesion cannot be excluded. No marrow edema or other destructive changes are seen in the surrounding bones. Small bilateral mastoid effusions are noted. BONES/SOFT TISSUES: The bone marrow signal intensity appears normal. The soft tissues demonstrate no acute abnormality. 1. No acute intracranial abnormality.

## 2021-09-13 NOTE — PROGRESS NOTES
Subjective:  Mamie Summers was seen and examined at bedside today.    There were no new problems reported overnight but still awake most of the night  Patients appetite is better - seen by GI and barium swallow ordered  She denies any complaints at this time    A complete review of systems and social history was completed on admission and remains unchanged unless otherwise noted    Scheduled Meds:   clotrimazole  10 mg Oral 5x Daily    melatonin  5 mg Oral Nightly    cloNIDine  0.1 mg Oral BID    predniSONE  20 mg Oral Daily    pantoprazole  40 mg Oral QAM AC    cefTRIAXone (ROCEPHIN) IV  2,000 mg IntraVENous Q24H    insulin glargine  20 Units SubCUTAneous BID    fluconazole  200 mg Oral Daily    thiamine mononitrate  100 mg Oral Daily    vitamin B-6  50 mg Oral Daily    Vitamin D  1,000 Units Oral Daily    miconazole nitrate   Topical BID    nystatin  5 mL Oral 4x Daily    doxycycline hyclate  100 mg Oral 2 times per day    metoprolol succinate  50 mg Oral Daily    insulin lispro  0-12 Units SubCUTAneous TID WC    insulin lispro  0-6 Units SubCUTAneous Nightly    hydrALAZINE  50 mg Oral 3 times per day    Roflumilast  500 mcg Oral Daily    lactobacillus  1 capsule Oral Daily    ipratropium-albuterol  1 ampule Inhalation Q4H    enoxaparin  40 mg SubCUTAneous Daily    lidocaine PF  5 mL IntraDERmal Once    sodium chloride flush  5-40 mL IntraVENous 2 times per day    heparin flush  3 mL IntraVENous 2 times per day    medicated lip ointment   Topical Daily    sodium chloride flush  5-40 mL IntraVENous 2 times per day     Continuous Infusions:   sodium chloride 25 mL (09/09/21 0046)    dextrose      sodium chloride       PRN Meds:diphenhydrAMINE, perflutren lipid microspheres, sodium chloride flush, sodium chloride, heparin flush, glucose, dextrose, glucagon (rDNA), dextrose, sodium chloride flush, sodium chloride, ondansetron **OR** ondansetron, polyethylene glycol, acetaminophen **OR** pleural effusion. The heart is normal in size. No pneumothorax. 1. Endotracheal tube is approximately 6.8 cm above the chivo. 2. No airspace opacity or pleural effusion. Assessment:  Davion Goodwin is a 78y.o. year old female who presented on 8/24/2021 and is being treated for:  Principal Problem:    Acute respiratory failure ([facility] [address]  Active Problems:    Essential hypertension    Diabetes mellitus ([facility] [address]    Myocardiopathy ([address]    ACE inhibitor-aggravated angioedema    COPD (chronic obstructive pulmonary disease) (HCC)    Acquired angioedema    SOB (shortness of breath)    Disorder of airway    Acquired hypertrophy of tongue    MSSA (methicillin susceptible Staphylococcus aureus) pneumonia ([facility] [address]  Resolved Problems:    * No resolved hospital problems. *    Plan  · Cont supportive care including weaning steroids and abx post extubation   · Start antifungal for thrush  · ID following for pneumonia  · Monitor BS -we will adjust Lantus to achieve better sugar control  · Cont PT/OT - get out of bed to chair  · GI following for dysphagia  · Start melatonin nightly   · Discharge to rehab tomorrow     More than 50% of my  time was spent at the bedside counseling/coordinating care with the patient and/or family with face to face contact. This time was spent reviewing notes and laboratory data as well as instructing and counseling the patient. Time I spent with the family or surrogate(s) is included only if the patient was incapable of providing the necessary information or participating in medical decisions. I also discussed the differential diagnosis and all of the proposed management plans with the patient and individuals accompanying the patient. This patient requires this high level of physician care and nursing on the [facility] unit due the complexity of decision management and chance of rapid decline or death. Continued cardiac monitoring and higher level of nursing are required.  I am readily available for any further decision-making and intervention.      Shelby Wasserman MD  6:54 PM  9/13/2021

## 2021-09-13 NOTE — PROGRESS NOTES
Speech Language Pathology      NAME:  Mitzi Farah  :  1955  DATE: 2021  ROOM:  15/0515-    Patient seen for cognition 15 minutes. Patient oriented to self and place with moderate cues. Patient not able to recall home address, month or year despite increased prompting. Patient with improved participation in conversational speech tasks. Patient with very poor insight into current deficits, poor safety awareness. Will continue POC    SOB (shortness of breath) [R06.02]  Acquired angioedema [T78. 3XXA]    [id number]  therapeutic interventions that focus on cognitive function , initial  15 min    Katerine Pereyra MSCCC/SLP  Speech Language Pathologist  [id number]

## 2021-09-13 NOTE — PROGRESS NOTES
Comprehensive Nutrition Assessment    Type and Reason for Visit:  Reassess    Nutrition Recommendations/Plan: Continue Current Diet, Continue Oral Nutrition Supplement    Nutrition Assessment:  Pt admitted w/ SOB r/t tongue swelling, PMH of COPD, CVA, DM. Pt continues at nutritional compromise AEB poor oral intakes. Recommend SLP consult to assess chew/swallow. Continue to monitor and follow    Malnutrition Assessment:  Malnutrition Status: At risk for malnutrition (Comment)    Context:  Acute Illness     Findings of the 6 clinical characteristics of malnutrition:  Energy Intake:  Mild decrease in energy intake (Comment)  Weight Loss:  No significant weight loss     Body Fat Loss:  No significant body fat loss     Muscle Mass Loss:  No significant muscle mass loss    Fluid Accumulation:  No significant fluid accumulation     Strength:  Not Performed    Estimated Daily Nutrient Needs:  Energy (kcal):  7192-9590; Weight Used for Energy Requirements:  Admission     Protein (g):  80-90 (x1.4-1.4gm/kg); Weight Used for Protein Requirements:  Ideal        Fluid (ml/day):  1473-8009; Method Used for Fluid Requirements:  1 ml/kcal      Nutrition Related Findings:  A/ox2, abd WDL, +BS, -I/O 20.4, BLE/BUE +1 edema      Wounds:  None       Current Nutrition Therapies:    Adult Oral Nutrition Supplement; Clear Liquid Oral Supplement  ADULT DIET; Regular; 4 carb choices (60 gm/meal)    Anthropometric Measures:  · Height: 5' 4\" (162.6 cm)  · Current Body Weight: 181 lb (82.1 kg) (Cpntinue to use admit wt as # increase likely r/t fluid status)   · Admission Body Weight: 181 lb (82.1 kg) (8/24 bed scale)    · Usual Body Weight: 188 lb 6.4 oz (85.5 kg) (3/10/21 no method per EMR)     · Ideal Body Weight: 120 lbs; % Ideal Body Weight 150.8 %   · BMI: 31.1  · Adjusted Body Weight:  ; No Adjustment   · BMI Categories: Obese Class 1 (BMI 30.0-34. 9)       Nutrition Diagnosis:   · Inadequate oral intake related to impaired respiratory function as evidenced by intake 0-25%      Nutrition Interventions:   Food and/or Nutrient Delivery:  Continue Current Diet, Continue Oral Nutrition Supplement  Nutrition Education/Counseling:  No recommendation at this time   Coordination of Nutrition Care:  Continue to monitor while inpatient    Goals:  Pt to consume >50% meals/ONS       Nutrition Monitoring and Evaluation:   Behavioral-Environmental Outcomes:  None Identified   Food/Nutrient Intake Outcomes:  Food and Nutrient Intake, Supplement Intake  Physical Signs/Symptoms Outcomes:  Biochemical Data, GI Status, Fluid Status or Edema, Nutrition Focused Physical Findings, Skin, Weight     Discharge Planning:     Too soon to determine     Electronically signed by Fawn Clark, GERRY, LD on 9/13/21 at 9:45 AM EDT    Contact: [phone]

## 2021-09-14 LAB
ALBUMIN SERPL-MCNC: 3.4 G/DL (ref 3.5–5.2)
ALP BLD-CCNC: 55 U/L (ref 35–104)
ALT SERPL-CCNC: 47 U/L (ref 0–32)
ANION GAP SERPL CALCULATED.3IONS-SCNC: 10 MMOL/L (ref 7–16)
AST SERPL-CCNC: 22 U/L (ref 0–31)
BASOPHILS ABSOLUTE: 0.01 E9/L (ref 0–0.2)
BASOPHILS RELATIVE PERCENT: 0.1 % (ref 0–2)
BILIRUB SERPL-MCNC: 0.5 MG/DL (ref 0–1.2)
BUN BLDV-MCNC: 16 MG/DL (ref 6–23)
CALCIUM SERPL-MCNC: 8.7 MG/DL (ref 8.6–10.2)
CHLORIDE BLD-SCNC: 100 MMOL/L (ref 98–107)
CO2: 26 MMOL/L (ref 22–29)
CREAT SERPL-MCNC: 0.8 MG/DL (ref 0.5–1)
EOSINOPHILS ABSOLUTE: 0.09 E9/L (ref 0.05–0.5)
EOSINOPHILS RELATIVE PERCENT: 1.2 % (ref 0–6)
GFR AFRICAN AMERICAN: >60
GFR NON-AFRICAN AMERICAN: >60 ML/MIN/1.73
GLUCOSE BLD-MCNC: 89 MG/DL (ref 74–99)
HCT VFR BLD CALC: 38.4 % (ref 34–48)
HEMOGLOBIN: 12.7 G/DL (ref 11.5–15.5)
IMMATURE GRANULOCYTES #: 0.03 E9/L
IMMATURE GRANULOCYTES %: 0.4 % (ref 0–5)
LYMPHOCYTES ABSOLUTE: 1.76 E9/L (ref 1.5–4)
LYMPHOCYTES RELATIVE PERCENT: 24.4 % (ref 20–42)
MAGNESIUM: 1.7 MG/DL (ref 1.6–2.6)
MCH RBC QN AUTO: 30.6 PG (ref 26–35)
MCHC RBC AUTO-ENTMCNC: 33.1 % (ref 32–34.5)
MCV RBC AUTO: 92.5 FL (ref 80–99.9)
METER GLUCOSE: 112 MG/DL (ref 74–99)
METER GLUCOSE: 196 MG/DL (ref 74–99)
METER GLUCOSE: 223 MG/DL (ref 74–99)
METER GLUCOSE: 261 MG/DL (ref 74–99)
METER GLUCOSE: 76 MG/DL (ref 74–99)
MONOCYTES ABSOLUTE: 0.85 E9/L (ref 0.1–0.95)
MONOCYTES RELATIVE PERCENT: 11.8 % (ref 2–12)
NEUTROPHILS ABSOLUTE: 4.48 E9/L (ref 1.8–7.3)
NEUTROPHILS RELATIVE PERCENT: 62.1 % (ref 43–80)
PDW BLD-RTO: 12 FL (ref 11.5–15)
PHOSPHORUS: 3.4 MG/DL (ref 2.5–4.5)
PLATELET # BLD: 316 E9/L (ref 130–450)
PMV BLD AUTO: 9.2 FL (ref 7–12)
POTASSIUM SERPL-SCNC: 3.4 MMOL/L (ref 3.5–5)
PROCALCITONIN: 0.14 NG/ML (ref 0–0.08)
RBC # BLD: 4.15 E12/L (ref 3.5–5.5)
SODIUM BLD-SCNC: 136 MMOL/L (ref 132–146)
TOTAL PROTEIN: 5.9 G/DL (ref 6.4–8.3)
WBC # BLD: 7.2 E9/L (ref 4.5–11.5)

## 2021-09-14 PROCEDURE — 6360000002 HC RX W HCPCS: Performed by: SPECIALIST

## 2021-09-14 PROCEDURE — 6370000000 HC RX 637 (ALT 250 FOR IP): Performed by: NURSE PRACTITIONER

## 2021-09-14 PROCEDURE — 2580000003 HC RX 258: Performed by: SPECIALIST

## 2021-09-14 PROCEDURE — 84100 ASSAY OF PHOSPHORUS: CPT

## 2021-09-14 PROCEDURE — 97530 THERAPEUTIC ACTIVITIES: CPT

## 2021-09-14 PROCEDURE — 80053 COMPREHEN METABOLIC PANEL: CPT

## 2021-09-14 PROCEDURE — 82962 GLUCOSE BLOOD TEST: CPT

## 2021-09-14 PROCEDURE — 85025 COMPLETE CBC W/AUTO DIFF WBC: CPT

## 2021-09-14 PROCEDURE — 36415 COLL VENOUS BLD VENIPUNCTURE: CPT

## 2021-09-14 PROCEDURE — 6370000000 HC RX 637 (ALT 250 FOR IP): Performed by: INTERNAL MEDICINE

## 2021-09-14 PROCEDURE — 6370000000 HC RX 637 (ALT 250 FOR IP): Performed by: SPECIALIST

## 2021-09-14 PROCEDURE — 2700000000 HC OXYGEN THERAPY PER DAY

## 2021-09-14 PROCEDURE — 97535 SELF CARE MNGMENT TRAINING: CPT

## 2021-09-14 PROCEDURE — 2580000003 HC RX 258

## 2021-09-14 PROCEDURE — 84145 PROCALCITONIN (PCT): CPT

## 2021-09-14 PROCEDURE — 94640 AIRWAY INHALATION TREATMENT: CPT

## 2021-09-14 PROCEDURE — 6360000002 HC RX W HCPCS: Performed by: INTERNAL MEDICINE

## 2021-09-14 PROCEDURE — 83735 ASSAY OF MAGNESIUM: CPT

## 2021-09-14 PROCEDURE — 2060000000 HC ICU INTERMEDIATE R&B

## 2021-09-14 RX ORDER — DOXYCYCLINE HYCLATE 100 MG/1
100 CAPSULE ORAL EVERY 12 HOURS SCHEDULED
Qty: 20 CAPSULE | Refills: 0 | DISCHARGE
Start: 2021-09-14 | End: 2021-09-24

## 2021-09-14 RX ORDER — FLUCONAZOLE 200 MG/1
200 TABLET ORAL DAILY
Qty: 7 TABLET | Refills: 0 | DISCHARGE
Start: 2021-09-15 | End: 2021-09-22

## 2021-09-14 RX ADMIN — HYDRALAZINE HYDROCHLORIDE 50 MG: 50 TABLET, FILM COATED ORAL at 13:28

## 2021-09-14 RX ADMIN — MICONAZOLE NITRATE: 2 OINTMENT TOPICAL at 22:17

## 2021-09-14 RX ADMIN — PYRIDOXINE HCL TAB 50 MG 50 MG: 50 TAB at 09:39

## 2021-09-14 RX ADMIN — PREDNISONE 20 MG: 20 TABLET ORAL at 09:36

## 2021-09-14 RX ADMIN — DOXYCYCLINE HYCLATE 100 MG: 100 CAPSULE ORAL at 09:37

## 2021-09-14 RX ADMIN — INSULIN GLARGINE 15 UNITS: 100 INJECTION, SOLUTION SUBCUTANEOUS at 09:42

## 2021-09-14 RX ADMIN — INSULIN GLARGINE 15 UNITS: 100 INJECTION, SOLUTION SUBCUTANEOUS at 22:09

## 2021-09-14 RX ADMIN — IPRATROPIUM BROMIDE AND ALBUTEROL SULFATE 1 AMPULE: .5; 3 SOLUTION RESPIRATORY (INHALATION) at 13:38

## 2021-09-14 RX ADMIN — THIAMINE HCL TAB 100 MG 100 MG: 100 TAB at 09:36

## 2021-09-14 RX ADMIN — CLOTRIMAZOLE 10 MG: 10 LOZENGE ORAL; TOPICAL at 16:03

## 2021-09-14 RX ADMIN — CLONIDINE HYDROCHLORIDE 0.1 MG: 0.1 TABLET ORAL at 21:55

## 2021-09-14 RX ADMIN — HYDRALAZINE HYDROCHLORIDE 50 MG: 50 TABLET, FILM COATED ORAL at 21:56

## 2021-09-14 RX ADMIN — DOXYCYCLINE HYCLATE 100 MG: 100 CAPSULE ORAL at 21:54

## 2021-09-14 RX ADMIN — MICONAZOLE NITRATE: 2 OINTMENT TOPICAL at 13:30

## 2021-09-14 RX ADMIN — Medication 5 MG: at 21:55

## 2021-09-14 RX ADMIN — Medication 1 CAPSULE: at 09:37

## 2021-09-14 RX ADMIN — IPRATROPIUM BROMIDE AND ALBUTEROL SULFATE 1 AMPULE: .5; 3 SOLUTION RESPIRATORY (INHALATION) at 04:17

## 2021-09-14 RX ADMIN — INSULIN LISPRO 2 UNITS: 100 INJECTION, SOLUTION INTRAVENOUS; SUBCUTANEOUS at 22:08

## 2021-09-14 RX ADMIN — CLONIDINE HYDROCHLORIDE 0.1 MG: 0.1 TABLET ORAL at 09:36

## 2021-09-14 RX ADMIN — NYSTATIN 500000 UNITS: 100000 SUSPENSION ORAL at 09:37

## 2021-09-14 RX ADMIN — Medication 10 ML: at 21:56

## 2021-09-14 RX ADMIN — PANTOPRAZOLE SODIUM 40 MG: 40 TABLET, DELAYED RELEASE ORAL at 06:29

## 2021-09-14 RX ADMIN — NYSTATIN 500000 UNITS: 100000 SUSPENSION ORAL at 13:29

## 2021-09-14 RX ADMIN — INSULIN LISPRO 2 UNITS: 100 INJECTION, SOLUTION INTRAVENOUS; SUBCUTANEOUS at 09:42

## 2021-09-14 RX ADMIN — WATER 2000 MG: 1 INJECTION INTRAMUSCULAR; INTRAVENOUS; SUBCUTANEOUS at 16:02

## 2021-09-14 RX ADMIN — FLUCONAZOLE 200 MG: 100 TABLET ORAL at 09:37

## 2021-09-14 RX ADMIN — Medication 10 ML: at 09:36

## 2021-09-14 RX ADMIN — INSULIN LISPRO 6 UNITS: 100 INJECTION, SOLUTION INTRAVENOUS; SUBCUTANEOUS at 18:26

## 2021-09-14 RX ADMIN — Medication 1000 UNITS: at 09:36

## 2021-09-14 RX ADMIN — IPRATROPIUM BROMIDE AND ALBUTEROL SULFATE 1 AMPULE: .5; 3 SOLUTION RESPIRATORY (INHALATION) at 09:56

## 2021-09-14 RX ADMIN — ROFLUMILAST 500 MCG: 500 TABLET ORAL at 09:37

## 2021-09-14 RX ADMIN — METOPROLOL SUCCINATE 50 MG: 50 TABLET, EXTENDED RELEASE ORAL at 09:37

## 2021-09-14 RX ADMIN — HYDRALAZINE HYDROCHLORIDE 50 MG: 50 TABLET, FILM COATED ORAL at 06:29

## 2021-09-14 RX ADMIN — ENOXAPARIN SODIUM 40 MG: 40 INJECTION SUBCUTANEOUS at 09:38

## 2021-09-14 RX ADMIN — CLOTRIMAZOLE 10 MG: 10 LOZENGE ORAL; TOPICAL at 06:29

## 2021-09-14 ASSESSMENT — PAIN SCALES - GENERAL
PAINLEVEL_OUTOF10: 0
PAINLEVEL_OUTOF10: 0

## 2021-09-14 NOTE — PROGRESS NOTES
OT BEDSIDE TREATMENT NOTE      Date:2021  Patient Name: Han Feldman  MRN: [id number]  : 1955  Room: [address] [place] [address]        Evaluating OT: SHAWN Markham/DELANEY; [id number]        Referring Provider and Orders/Date:   OT eval and treat Start: 21 1145, End: 21 1145, ONE TIME, Standing Count: 1 Occurrences, Maurice Wilson MD     Diagnosis:   1. Acute respiratory failure with hypoxia and hypercapnia (HCC)    2. SOB (shortness of breath)    3. Angioedema, initial encounter    4. Acute respiratory distress          Pertinent Medical History:        Past Medical History        Past Medical History:   Diagnosis Date    Arthritis      Asthma      [facility]     COPD (chronic obstructive pulmonary disease) (HCC)      Diabetes mellitus ([facility]      Heart attack ([facility]      Hyperlipidemia      Hypertension      VHD (valvular heart disease)               Past Surgical History   Past Surgical History:   Procedure Laterality Date    CHOLECYSTECTOMY        COLONOSCOPY   2018    HYSTERECTOMY        HYSTERECTOMY, VAGINAL        UPPER GASTROINTESTINAL ENDOSCOPY   2018           Precautions:  Fall Risk, 3L O2, confused, montague,  A x 2 recommended     Recommended placement: subacute      Assessment of current deficits     [x]? Functional mobility           [x]? ADLs           [x]? Strength                   [x]? Cognition     [x]? Functional transfers         [x]? IADLs         [x]? Safety Awareness   [x]? Endurance     [x]? Fine Coordination                        [x]? Balance      [x]? Vision/perception    []? Sensation       [x]? Gross Motor Coordination            []? ROM           []?  Delirium                   []? Motor Control      OT PLAN OF CARE   OT POC based on physician orders, patient diagnosis and results of clinical assessment     Frequency/Duration 1-3 days/wk for 2 weeks PRN   Specific OT Treatment Interventions to include:   * Instruction/training on adapted ADL techniques and AE recommendations to increase functional independence within precautions       * Training on energy conservation strategies, correct breathing pattern and techniques to improve independence/tolerance for self-care routine  * Functional transfer/mobility training/DME recommendations for increased independence, safety, and fall prevention  * Patient/Family education to increase follow through with safety techniques and functional independence  * Recommendation of environmental modifications for increased safety with functional transfers/mobility and ADLs  * Cognitive retraining/development of therapeutic activities to improve problem solving, judgement, memory, and attention for increased safety/participation in ADL/IADL tasks  * Therapeutic exercise to improve motor endurance, ROM, and functional strength for ADLs/functional transfers  * Therapeutic activities to facilitate/challenge dynamic balance, stand tolerance for increased safety and independence with ADLs  * Therapeutic activities to facilitate gross/fine motor skills for increased independence with ADLs  * Neuro-muscular re-education: facilitation of righting/equilibrium reactions, midline orientation, scapular stability/mobility, normalization of muscle tone, and facilitation of volitional active controled movement      Recommended Adaptive Equipment/DME:  TBD       Home Living: Pt is a poor historian. Pt lives at home with spouse. Reports bed/bath on 2nd floor. She reports that she has 3 sons that live at home, but suspected this as false.     Bathroom setup: unknown    DME owned: unknown      Prior Level of Function: indep with ADLs , indep with IADLs; ambulated indep   Driving: yes   Occupation: unknow   Enjoys: no report     Pain Level: none  Cognition: A&O: 3/4 pt able to state name, place, and year               Memory:  fair-              Sequencing:  Fair-              Problem solving: Poor+              Judgement/safety:  Fair-     AM-[facility] Daily Activity Inpatient   How much help for putting on and taking off regular lower body clothing?: Total  How much help for Bathing?: A Lot  How much help for Toileting?: Total  How much help for putting on and taking off regular upper body clothing?: Total  How much help for taking care of personal grooming?: A Lot  How much help for eating meals?: A Lot  [facility] Inpatient Daily Activity Raw Score: 12/24  AM-[facility] Inpatient ADL T-Scale Score : 25.33  ADL Inpatient CMS 0-100% Score: 79.59  ADL Inpatient CMS G-Code Modifier : CL                   Functional Assessment:      Initial Eval Status  Date: 9/7/2021   Treatment Status  Date:9/13/2021 STGs = LTGs  Time frame: 10-14 days   Feeding Maximal Assist and hand over hand to bring drinks to mouth, use utensils appropriately and eat with finger foods. Positioning interventions in bed required. 75% spillage with attempts on her own.  Consumed <25% of intake  MIN A to take drink with pt requiring assist to hold cup 2* to participation  Min A   Grooming Maximal Assist for face/hand wash and hair comb from supine SBA pt at bed level to wash face/hands following cues appropriately  Minimal Assist    UB Dressing Dependent with gown management from supine due to confusion N/t  Moderate Assist    LB Dressing Dependent for prashant socks from supine MOD A to lona pants requiring assist to thread B LE, pull up around waist, pt able to pull up around thighs, pt pulled  up slightly towards waist, however pt sat EOB abruptly before finishing with pt stating that the pants were all the way up when they were not, mod v/c's for pt to stand again to finish pulling up around waist;    MAX A to lona/doff socks seated EOB   Moderate Assist    Bathing Maximal Assist with pt able to wash neck and chest from supine level  n/t Moderate Assist    Toileting Dependent with montague management Dep with montague management Maximal Assist    Bed Mobility Supine to sit: Maximal Assist   Sit to supine: Moderate Assist  With pt confused and demonstrating fear of falling. Rolling with max A with mod cues and hand over hand for use of bed rails  VIPUL for supine to sit;   MN A for scooting;     SBA sit>supine  Supine to sit: Minimal Assist   Sit to supine: Minimal Assist    Functional Transfers  NT due to pt overall debility, decreased activity tolerance, balance deficits, safety and fall risk.     MIN A sit<>stand from EOB with HHA  Moderate Assist    Functional Mobility  NT due to pt overall debility, decreased activity tolerance, balance deficits, safety and fall risk.     N/T with pt sitting EOB abruptly, with moving sit>supine   Moderate Assist    Balance Sitting:     Static:  Fair-    Dynamic:poor+  Standing: NT Sitting:     Static:  Fair    Dynamic: fair-  Standing: MIN A  Sitting:     Static:  fair    Dynamic:fair  Standing: fair-   Activity Tolerance Vitals with activity:3 L with 93% and HR 66. Sitting EOB for <2min with pt pushing backwards and confusion. Not safe to attempt standing    Fair pt completed light ADL tasks no c/o fatigue on room air  Increase sitting tolerance for >15min with stable vital signs for carry over into toileting, functional tranfers and indep in ADLs   Visual/  Perceptual Glasses: not Present; overall visual limitations, but difficult to assess due to confusion. Inaccurate with finding items on tray     Reports change in vision since admission: No      NA   Yunier UE Strengthening  4/5 generally   5/5MMT generally for carry over into self care, functional transfers and functional mobility with AD.       Hand Dominance  []? Right   [x]? Left     AROM (PROM) Strength Additional Info:    RUE  WFL 4/5 Fair  and  FMC/dexterity noted during ADL tasks  Opposition [x]? Intact []? Impaired  Finger to nose []? Intact [x]? Impaired      LUE WFL 4/5 good  and fair FMC/dexterity noted during ADL tasks  Opposition [x]? Intact []? Impaired  Finger to nose []? Intact [x]? Impaired         Hearing: WFL   Sensation:  No c/o numbness or tingling   Tone: WFL   Edema: none      Comments: Patient cleared by nursing staff. Upon arrival pt supine in bed. Pt agreeable to OT tx session. Pt educated with regards to bed mobility, hand placement, safety awareness, static sitting balance, LE dressing, ECT's, grooming tasks, self feeding. At end of session pt supine in bed  with all lines and tubes intact, call light within reach. Overall, pt demonstrated decreased independence and safety during completion of ADL/functional transfers/mobility tasks. Pt would benefit from continued skilled OT to increase safety and independence with completion of ADL/IADL tasks for functional independence and quality of life. Pt required cues and education as noted above for safe facilitation and completion of tasks. Therapist provided skilled monitoring of patient's response during treatment session. Prior to and at the end of session, environmental modifications/ line management completed for patients safety and efficiency of treatment session. Overall, patient demonstrates moderate difficulties with completion of BADLs and IADLs. Factors contributing to these difficulties include confusion, multiplane instability, decreased endurance, and generalized weakness. As noted above, patient likely to benefit from further OT intervention to increase independence, safety, and overall quality of life. Treatment:     ? Bed mobility: Facilitated bed mobility with cues for proper body mechanics and sequencing to prepare for ADL completion. ? ADL completion: Self-care retraining for the above-mentioned ADLs; training on proper hand placement, safety technique, sequencing, and energy conservation techniques. ? Postural Balance: Sitting balance retraining to improve righting reactions with postural changes during ADLs. ?  Skilled positioning: Proper positioning to improve interaction with environment, overall functioning and decrease/prevent edema and contractures. · Pt has made fair progress toward set goals  ·   OT 1-3x/week for 5-7 days during hospitalization       Treatment Time also includes thorough review of current medical information, gathering information on past medical history/social history and prior level of function, informal observation of tasks, assessment of data and education on plan of care and goals.     Treatment Time In: 6973     Treatment Time Out: 1150          Treatment Charges: Mins Units   ADL/Home Mgt     24043 10 1   Thera Activities     96142 15 1   Ther Ex                 04485     Manual Therapy    89932     Neuro Re-ed         98855     Orthotic manage/training                               86493     Non Billable Time     Total Timed Treatment [facility] BLUE/L 58799

## 2021-09-14 NOTE — PLAN OF CARE
Problem: Non-Violent Restraints  Goal: Removal from restraints as soon as assessed to be safe  Outcome: Met This Shift  Goal: No harm/injury to patient while restraints in use  Outcome: Met This Shift  Goal: Patient's dignity will be maintained  Outcome: Met This Shift  Goal: Removal from restraints as soon as assessed to be safe  Outcome: Met This Shift  Goal: No harm/injury to patient while restraints in use  Outcome: Met This Shift  Goal: Patient's dignity will be maintained  Outcome: Met This Shift     Problem: Falls - Risk of:  Goal: Will remain free from falls  Description: Will remain free from falls  Outcome: Met This Shift  Goal: Absence of physical injury  Description: Absence of physical injury  Outcome: Met This Shift     Problem: Skin Integrity:  Goal: Will show no infection signs and symptoms  Description: Will show no infection signs and symptoms  Outcome: Met This Shift  Goal: Absence of new skin breakdown  Description: Absence of new skin breakdown  Outcome: Met This Shift     Problem: Breathing Pattern - Ineffective:  Goal: Ability to achieve and maintain a regular respiratory rate will improve  Description: Ability to achieve and maintain a regular respiratory rate will improve  Outcome: Met This Shift     Problem: Airway Clearance - Ineffective:  Goal: Ability to maintain a clear airway will improve  Description: Ability to maintain a clear airway will improve  Outcome: Met This Shift     Problem: Pain:  Goal: Pain level will decrease  Description: Pain level will decrease  Outcome: Met This Shift  Goal: Control of acute pain  Description: Control of acute pain  Outcome: Met This Shift  Goal: Control of chronic pain  Description: Control of chronic pain  Outcome: Met This Shift

## 2021-09-14 NOTE — PLAN OF CARE
by Gavi Arellano RN  Outcome: Met This Shift     Problem: Inadequate oral food/beverage intake (NI-2.1)  Goal: Food and/or Nutrient Delivery  Description: Individualized approach for food/nutrient provision.   9/13/2021 0945 by Luc Bocanegra RD, LD  Outcome: Met This Shift     Problem: Breathing Pattern - Ineffective:  Goal: Ability to achieve and maintain a regular respiratory rate will improve  Description: Ability to achieve and maintain a regular respiratory rate will improve  9/13/2021 2325 by Tarun Martins RN  Outcome: Met This Shift  9/13/2021 1159 by Gavi Arellano RN  Outcome: Met This Shift     Problem: Airway Clearance - Ineffective:  Goal: Ability to maintain a clear airway will improve  Description: Ability to maintain a clear airway will improve  9/13/2021 2325 by Tarun Martins RN  Outcome: Met This Shift  9/13/2021 1159 by Gavi Arellano RN  Outcome: Met This Shift     Problem: Pain:  Goal: Pain level will decrease  Description: Pain level will decrease  9/13/2021 2325 by Tarun Martins RN  Outcome: Met This Shift  9/13/2021 1159 by Gavi Arellano RN  Outcome: Met This Shift  Goal: Control of acute pain  Description: Control of acute pain  9/13/2021 2325 by Tarun Martins RN  Outcome: Met This Shift  9/13/2021 1159 by Gavi Arellano RN  Outcome: Met This Shift  Goal: Control of chronic pain  Description: Control of chronic pain  9/13/2021 2325 by Tarun Martins RN  Outcome: Met This Shift  9/13/2021 1159 by Gavi Arellano RN  Outcome: Met This Shift

## 2021-09-14 NOTE — CARE COORDINATION
9/14/21 0948  note: NO COVID TESTING THIS ADMIT. Patient currently has 1:1 sitter; however RN states patient is doing better and she is going to switch patient to a telesitter. Requested RN document on chart when 1:1 sitter is discontinued. Spoke with [facility] liaison, and she is going to start precert today. Requested therapy provide updated notes. WILL NEED HENS WHEN DISCHARGE ORDER IS PLACED, RAPID COVID, & SIGNED AHMET.  Electronically signed by Sally Hoover RN on 9/14/2021 at 9:53 AM

## 2021-09-14 NOTE — PROGRESS NOTES
[facility]  [facility]  Progress Note    NAME: Olivier Mclean  MR:  [id number]  :   1955  DATE OF SERVICE:21    This is a face to face encounter with Olivier Mclean 78 y.o. female on 21    CHIEF COMPLAINT     ID following for   Chief Complaint   Patient presents with    Oral Swelling     woke up with tongue swelling, worse over the day, took 25 mg benadryl 1.5 hrs ago without relief      HISTORY OF PRESENT ILLNESS   Pt seen and examined  21  Has a sitter  More awake  Did not do well with pt    Patient is tolerating medications. No reported adverse drug reactions. REVIEW OF SYSTEMS     As stated above in the chief complaint, otherwise negative.   CURRENT MEDICATIONS      insulin glargine  15 Units SubCUTAneous BID    clotrimazole  10 mg Oral 5x Daily    melatonin  5 mg Oral Nightly    cloNIDine  0.1 mg Oral BID    predniSONE  20 mg Oral Daily    pantoprazole  40 mg Oral QAM AC    cefTRIAXone (ROCEPHIN) IV  2,000 mg IntraVENous Q24H    fluconazole  200 mg Oral Daily    thiamine mononitrate  100 mg Oral Daily    vitamin B-6  50 mg Oral Daily    Vitamin D  1,000 Units Oral Daily    miconazole nitrate   Topical BID    nystatin  5 mL Oral 4x Daily    doxycycline hyclate  100 mg Oral 2 times per day    metoprolol succinate  50 mg Oral Daily    insulin lispro  0-12 Units SubCUTAneous TID WC    insulin lispro  0-6 Units SubCUTAneous Nightly    hydrALAZINE  50 mg Oral 3 times per day    Roflumilast  500 mcg Oral Daily    lactobacillus  1 capsule Oral Daily    ipratropium-albuterol  1 ampule Inhalation Q4H    enoxaparin  40 mg SubCUTAneous Daily    lidocaine PF  5 mL IntraDERmal Once    sodium chloride flush  5-40 mL IntraVENous 2 times per day    heparin flush  3 mL IntraVENous 2 times per day    medicated lip ointment   Topical Daily     Continuous Infusions:   dextrose       PRN Meds:diphenhydrAMINE, sodium chloride flush, glucose, dextrose, glucagon (rDNA), dextrose, ondansetron **OR** ondansetron, polyethylene glycol, acetaminophen **OR** acetaminophen    PHYSICAL EXAM     BP (!) 147/73   Pulse 90   Temp 98.4 °F (36.9 °C) (Axillary)   Resp 16   Ht 5' 4\" (1.626 m)   Wt 198 lb 12.8 oz (90.2 kg)   SpO2 98%   BMI 34.12 kg/m²   Temp  Av.6 °F (37 °C)  Min: 98.4 °F (36.9 °C)  Max: 98.9 °F (37.2 °C)  Constitutional:  The patient is  Not oriented. Awake responsive. HEENT:      AT/NC  Chest:   No use of accessory muscles to breathe. Symmetrical expansion. Cardiovascular:  S1 and S2 are rhythmic and regular. No murmurs appreciated. Abdomen:   Positive bowel sounds to auscultation. Benign to palpation. Extremities:   no edema. CNS    awake  Lines: picc rue      DIAGNOSTIC RESULTS   Radiology:    Recent Labs     21  0520 21  1225 21  0853   WBC 7.2 6.7 7.2   RBC 3.50 3.84 4.15   HGB 10.7* 11.7 12.7   HCT 32.5* 35.4 38.4   MCV 92.9 92.2 92.5   MCH 30.6 30.5 30.6   MCHC 32.9 33.1 33.1   RDW 11.6 11.8 12.0    292 316   MPV 9.4 9.7 9.2     Recent Labs     21  0520 21  1225 21  0853    135 136   K 3.9 3.7 3.4*   CL 99 100 100   CO2 28 25 26   BUN 15 15 16   CREATININE 0.7 0.7 0.8   GLUCOSE 192* 160* 89   PROT 5.4* 5.7* 5.9*   LABALBU 3.3* 3.3* 3.4*   CALCIUM 8.7 8.9 8.7   BILITOT 0.5 0.6 0.5   ALKPHOS 45 49 55   AST 12 23 22   ALT 40* 45* 47*     Lab Results   Component Value Date    CRP 0.2 2021     Lab Results   Component Value Date    SEDRATE 10 2019     Recent Labs     21  0520 21  1225 21  0853   AST 12 23 22   ALT 40* 45* 47*     Lab Results   Component Value Date    CHOL 296 2021    TRIG 122 2021    HDL 66 2021    LDLCALC 196 2021    LABVLDL 34 2021     Lab Results   Component Value Date/Time    VITD25 56 2021 05:20 AM       Microbiology:   No results for input(s): COVID19 in the last 72 hours.   Lab Results Component Value Date    BC 5 Days no growth 08/29/2021    BC 5 Days- no growth 01/03/2020    BLOODCULT2 5 Days no growth 08/29/2021    BLOODCULT2 5 Days- no growth 01/03/2020    ORG Staphylococcus aureus 08/29/2021    ORG Haemophilus influenzae 08/29/2021    ORG Escherichia coli 08/29/2021     No results found for: WNDABS  Smear, Respiratory   Date Value Ref Range Status   08/29/2021   Final    Group 5: >25 PMN's/LPF and <10 Epithelial cells/LPF  Abundant Polymorphonuclear leukocytes  Epithelial cells not seen  Rare Gram positive diplococci  Few Gram negative rods       MRSA Culture Only   Date Value Ref Range Status   08/30/2021 Methicillin resistant Staph aureus not isolated  Final     CULTURE, RESPIRATORY   Date Value Ref Range Status   08/29/2021 Oral Pharyngeal Sepideh reduced (A)  Final   08/29/2021   Final    Heavy growth  This isolate is presumed to be resistant based on the  detection of inducible Clindamycin resistance. Clindamycin  may still be effective in some patients. 08/29/2021 Heavy growth  Beta Lactamase negative    Final     FINAL IMPRESSION    Pt had   Chief Complaint   Patient presents with    Oral Swelling     woke up with tongue swelling, worse over the day, took 25 mg benadryl 1.5 hrs ago without relief     Admitted for SOB (shortness of breath) [R06.02]  Acquired angioedema [T78. 3XXA]  On treatment for   rx pneumonia MRSA/H influenzae  -no evidence of aspiration or penetration. E coli UTI   8/30 picc     cefTRIAXone (ROCEPHIN) 2,000 mg in sterile water 20 mL IV syringe, Q24H  fluconazole (DIFLUCAN) tablet 200 mg, Daily  miconazole nitrate 2 % ointment, BID  nystatin (MYCOSTATIN) [id number] UNIT/ML suspension 500,000 Units, 4x Daily  doxycycline hyclate (VIBRAMYCIN) capsule 100 mg, 2 times per day  procal   Can d/c with orals when ready   Check procal     · Monitor labs    Imaging and labs were reviewed per medical records.       Thank you for involving me in the care of [address] [place] [address] will continue to follow. Please do not hesitate to call for any questions or concerns.     Electronically signed by Manan Barrera MD on 9/14/2021 at 11:13 AM

## 2021-09-14 NOTE — PROGRESS NOTES
removed from room and tele-sitter put in place at 1400  Electronically signed by Regine Bailey RN on 9/14/2021 at 6:45 PM

## 2021-09-14 NOTE — PROGRESS NOTES
Physical Therapy Treatment Note/Plan of Care    Room #:  0515/0515-01  Patient Name: Bettie Palomares  YOB: 1955  MRN: [id number]    Date of Service: 9/14/2021     Tentative placement recommendation: Subacute rehab  Equipment recommendation: To be determined      Evaluating Physical Therapist: Scar Hurley, PT, DPT #[id number]      Specific Provider Orders/Date/Referring Provider :   09/04/21 1145   PT eval and treat Start: 09/04/21 1145, End: 09/04/21 1145, ONE TIME, Standing Count: 1 Occurrences, Caesar Granda MD Acknowledge New    Admitting Diagnosis:   SOB (shortness of breath) [R06.02]  Acquired angioedema [T78. 3XXA]      Visit Diagnoses       Codes    Angioedema, initial encounter     T78. 3XXA    Acute respiratory distress     R06.03        Surgery: None    Patient Active Problem List   Diagnosis    Non-rheumatic mitral regurgitation    Hypertensive left ventricular hypertrophy, without heart failure    Dyslipidemia    Non morbid obesity    Essential hypertension    [facility] [address]    Myocardiopathy [facility] [address]    Tobacco use    Near syncope    Diarrhea    Generalized abdominal pain    Abdominal pain    Pre-syncope    PVD (peripheral vascular disease) with claudication (HCC)    Chronic pain of both ankles    ACE inhibitor-aggravated angioedema    Acute respiratory failure (HCC)    [facility]    Acquired angioedema    SOB (shortness of breath)    Disorder of airway    Acquired hypertrophy of tongue    MSSA (methicillin susceptible Staphylococcus aureus) pneumonia ([facility])        ASSESSMENT of Current Deficits Patient exhibits decreased strength, balance and endurance impairing functional mobility and tolerance to activity. Pt's confusion and dizziness limited her progress with all functional mobility. Patient stood for 4 reps: all stands were 5 seconds or less.  Pt displays impaired strength, endurance, and confusion: all factors that increase her risk for falls. Patient needs continued PT to improve strength and endurance to tolerate and complete functional mobility with decreased assist required.         PHYSICAL THERAPY  PLAN OF CARE       Physical therapy plan of care is established based on physician order,  patient diagnosis and clinical assessment    Current Treatment Recommendations:    -Bed Mobility: Lower extremity exercises  and Trunk control activities   -Sitting Balance: Incorporate reaching activities to activate trunk muscles , Hands on support to maintain midline , Facilitate active trunk muscle engagement , Facilitate postural control in all planes  and Engage in core activities to allow for movement within base of support   -Standing Balance: Perform strengthening exercises in standing to promote motor control with or without upper extremity support , Instruct patient on adequate base of support to maintain balance and Challenge balance utilizing reaching  activities beyond center of gravity    -Transfers: Provide instruction on proper hand and foot position for adequate transfer of weight onto lower extremities and use of gait device, Cues for hand placement, technique and safety, Facilitate weight shift forward on to lower extremities and provide necessary stabilization of bilateral lower extremities , Support transfer of weight on to lower extremities, Assist with extension of knees trunk and hip to accept weight transfer  and Provide stabilization to prevent fall   -Gait: Gait training, Standing activities to improve: base of support, weight shift, weight bearing , Exercises to improve trunk control, Exercises to improve hip and knee control, Performance of protected weight bearing activities and Activities to increase weight bearing   -Endurance: Utilize Supervised activities to increase level of endurance to allow for safe functional mobility including transfers and gait  and Use graduated activities to promote good breathing techniques and provide support and education to maximize respiratory function    PT long term treatment goals are located in below grid    Patient and or family understand(s) diagnosis, prognosis, and plan of care. Frequency of treatments: Patient will be seen  daily. Prior Level of Function: Patient ambulated unknown. Rehab Potential: fair  for baseline    Past medical history:   Past Medical History:   Diagnosis Date    Arthritis     Asthma     Cerebral artery occlusion with [facility] infarction ([facility] [address] 2015    COPD [facility]chronic obstructive pulmonary disease) [facility] [address]     Diabetes mellitus ([facility] [address]     Heart attack ([facility] [address]     Hyperlipidemia     Hypertension     VHD (valvular heart disease)      Past Surgical History:   Procedure Laterality Date    CHOLECYSTECTOMY      COLONOSCOPY  12/2018    HYSTERECTOMY      HYSTERECTOMY, VAGINAL      UPPER GASTROINTESTINAL ENDOSCOPY  12/2018       SUBJECTIVE:    Precautions: Up with assistance, falls, O2 and confusion , AMS, 1:1 sitter  Social history: Patient is a poor historian and stated that she lives alone in a two story home resides first  with 5 steps  to enter with [address]. Equipment owned: None    AM-PAC Basic Mobility   8/24    AM-[facility] Mobility Inpatient   How much difficulty turning over in bed?: A Little  How much difficulty sitting down on / standing up from a chair with arms?: A Little  How much difficulty moving from lying on back to sitting on side of bed?: A Little  How much help from another person moving to and from a bed to a chair?: A Lot  How much help from another person needed to walk in hospital room?: A Lot  How much help from another person for climbing 3-5 steps with a railing?: Total  AM-PAC Inpatient Mobility Raw Score : 14  AM-PAC Inpatient T-Scale Score : 38.1  Mobility Inpatient CMS 0-100% Score: 61.29  Mobility Inpatient CMS G-Code Modifier : CL    Nursing cleared patient for PT treatment.  .   OBJECTIVE;   Initial Evaluation  Date: 9/6/2021 Treatment Date:  9/14/2021       Short Term/ Long Term   Goals   Was pt agreeable to Eval/treatment?  Yes Yes  To be met in 5 days   Pain level   0/10   Not stated     Bed Mobility    Rolling: Maximal assist of 1    Supine to sit: Maximal assist of 1    Sit to supine: Maximal assist of 1    Scooting: Maximal assist of 1   Rolling: Minimal assist of 1   Supine to sit: Minimal assist of 1   Sit to supine: Minimal assist of 1   Scooting: Minimal assist of 1    Rolling: Minimal assist of 1    Supine to sit: Minimal assist of 1    Sit to supine: Minimal assist of 1    Scooting: Minimal assist of 1     Transfers Sit to stand: Not assessed   Sit to stand: Minimal assist of 1 Cues for hand placement and safety          Sit to stand: Minimal assist of 1    Ambulation    not assessed  not assessed     > 25 feet using  wheeled walker with Moderate assist of 1    Stair negotiation: ascended and descended   Not assessed        ROM Within functional limits    Increase range of motion 10% of affected joints    Strength BUE:  refer to OT eval  RLE:  3-/5  LLE:  3-/5  Increase strength in affected mm groups by 1/3 grade   Balance Sitting EOB:  poor+  Dynamic Standing:  not assessed  Sitting EOB: good   Dynamic Standing: fair wheeled walker    Sitting EOB:  fair   Dynamic Standing: fair-     Patient is Alert & Oriented x person and follows one step directions    Sensation:  Patient  denies numbness/tingling   Edema:  no   Endurance: poor    Vitals: 3 liters nasal cannula   Blood Pressure at rest  Blood Pressure during session    Heart Rate at rest  Heart Rate during session    SPO2 at rest 98 %  SPO2 during session %     Patient education  Patient educated on role of Physical Therapy, risks of immobility, safety and plan of care, energy conservation,  importance of mobility while in hospital , purse lip breathing and safety      Patient response to education:   Pt verbalized understanding Pt demonstrated skill Pt requires further education in this area   Yes Partial Yes      Treatment:  Patient practiced and was instructed/facilitated in the following treatment: patient assisted to edge of bed. Pt sat for 20 minutes to increase dynamic sitting balance and activity tolerance. Pt stood for 4 reps but could not take any steps. Pt c/o dizziness once sitting up. Therapeutic Exercises:  not performed     At end of session, patient in bed with  all lines and tubes in tact, call light within reach of patient and  nursing notified. One on One Sitter present. Patient would benefit from continued skilled Physical Therapy to improve functional independence and quality of life. Patient's/ family goals   rehab    Time in  10:00  Time out  10:23  Total Treatment Time  23 minutes    CPT codes:    Therapeutic activities (38070)   23 minutes  2 unit(s)   Mohinder Bhatt  [facility]  LIC # [id number]

## 2021-09-14 NOTE — PROGRESS NOTES
PROGRESS NOTE    By Stormy Berg D.O GI Fellow    [facility]  Dr. Simón Burrell MD, Dr. Stanley Doty MD, Dr Jarocho Crawford, Dr. Pravin Bridges MD, Dr. Martine Meeks, DO [facility] [place]  78 y.o.  female    SUBJECTIVE:    Patient resting comfortably this AM.  Patient's once again hesitant to answer questions and  not cooperative in interview    OBJECTIVE:    BP (!) 148/84   Pulse 90   Temp 98.9 °F (37.2 °C) (Oral)   Resp 18   Ht 5' 4\" (1.626 m)   Wt 198 lb 12.8 oz (90.2 kg)   SpO2 97%   BMI 34.12 kg/m²     Gen: NAD, AAO x 3  HEENT:PEERL, no icterus  Heart: RRR,   Lungs: Symmetrical rise  Abd.: soft, NT, ND,   Extr.: no C/C/E, no bruising      Stool (measured) : 0 mL  Lab Results   Component Value Date    WBC 6.7 09/13/2021    WBC 7.2 09/12/2021    WBC 10.4 09/10/2021    HGB 11.7 09/13/2021    HGB 10.7 09/12/2021    HGB 11.5 09/10/2021    HCT 35.4 09/13/2021    MCV 92.2 09/13/2021    RDW 11.8 09/13/2021     09/13/2021     09/12/2021     09/10/2021     Lab Results   Component Value Date     09/13/2021    K 3.7 09/13/2021    K 4.7 08/25/2021     09/13/2021    CO2 25 09/13/2021    BUN 15 09/13/2021    CREATININE 0.7 09/13/2021    CALCIUM 8.9 09/13/2021    PROT 5.7 09/13/2021    LABALBU 3.3 09/13/2021    BILITOT 0.6 09/13/2021    BILITOT 0.5 09/12/2021    BILITOT 0.8 09/10/2021    ALKPHOS 49 09/13/2021    ALKPHOS 45 09/12/2021    ALKPHOS 49 09/10/2021    AST 23 09/13/2021    AST 12 09/12/2021    AST 20 09/10/2021    ALT 45 09/13/2021    ALT 40 09/12/2021    ALT 56 09/10/2021     Lab Results   Component Value Date    LIPASE 44 11/24/2018     No results found for: AMYLASE      ASSESSMENT/PLAN:    1.   Odontalgia/oropharyngeal dysphagia  -Patient denies any overt signs of dysphagia on exam patient's main complaint is of throat pain  -Agree with empiric treatment for thrush  -Patient required 2 intubations during hospital stay, which most likely playing a role in patient's odontalgia  -Esophagram shows no evidence of stricture or obstruction normal peristalsis under fluoroscopy very small sliding hiatal hernia  -Modified barium swallow is no evidence of aspiration or penetration  -No plans for endoscopic evaluation at this time.        2. Acute on chronic respiratory failure  -Patient required 2 rounds of endotracheal intubation  -Currently being maintained on 2 L nasal cannula oxygen    Labwork this a.m. is pending if stable. Patient okay to DC to rehab from GI point of view today. GI will sign off the case and follow peripherally    Pt was discussed with Dr. Katharine Negrete and Dr. Serene Watson DO  GI Fellow   9/14/2021  8:42 AM    Pt seen and independently examined. Pertinent notes and lab work reviewed. Monitor noted - SR noted. D/w Dr. Denilson Arrieta. Agree with physical exam and A&P.     Elan Ware MD  9/14/2021  12:21 PM

## 2021-09-15 ENCOUNTER — APPOINTMENT (OUTPATIENT)
Dept: CT IMAGING | Age: 66
DRG: 207 | End: 2021-09-15
Payer: MEDICARE

## 2021-09-15 VITALS
BODY MASS INDEX: 33.94 KG/M2 | HEART RATE: 99 BPM | TEMPERATURE: 98 F | HEIGHT: 64 IN | DIASTOLIC BLOOD PRESSURE: 77 MMHG | RESPIRATION RATE: 20 BRPM | OXYGEN SATURATION: 97 % | SYSTOLIC BLOOD PRESSURE: 140 MMHG | WEIGHT: 198.8 LBS
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
## 2021-09-15 PROBLEM — M54.2 CERVICAL SPINE PAIN: Status: ACTIVE | Noted: 2021-09-15

## 2021-09-15 LAB
ALBUMIN SERPL-MCNC: 3.3 G/DL (ref 3.5–5.2)
ALP BLD-CCNC: 52 U/L (ref 35–104)
ALT SERPL-CCNC: 53 U/L (ref 0–32)
ANION GAP SERPL CALCULATED.3IONS-SCNC: 11 MMOL/L (ref 7–16)
AST SERPL-CCNC: 28 U/L (ref 0–31)
BASOPHILS ABSOLUTE: 0.01 E9/L (ref 0–0.2)
BASOPHILS RELATIVE PERCENT: 0.2 % (ref 0–2)
BILIRUB SERPL-MCNC: 0.6 MG/DL (ref 0–1.2)
BUN BLDV-MCNC: 17 MG/DL (ref 6–23)
CALCIUM SERPL-MCNC: 8.7 MG/DL (ref 8.6–10.2)
CHLORIDE BLD-SCNC: 100 MMOL/L (ref 98–107)
CO2: 26 MMOL/L (ref 22–29)
CREAT SERPL-MCNC: 0.8 MG/DL (ref 0.5–1)
EOSINOPHILS ABSOLUTE: 0.15 E9/L (ref 0.05–0.5)
EOSINOPHILS RELATIVE PERCENT: 2.5 % (ref 0–6)
GFR AFRICAN AMERICAN: >60
GFR NON-AFRICAN AMERICAN: >60 ML/MIN/1.73
GLUCOSE BLD-MCNC: 88 MG/DL (ref 74–99)
HCT VFR BLD CALC: 36.6 % (ref 34–48)
HEMOGLOBIN: 12.2 G/DL (ref 11.5–15.5)
IMMATURE GRANULOCYTES #: 0.04 E9/L
IMMATURE GRANULOCYTES %: 0.7 % (ref 0–5)
LYMPHOCYTES ABSOLUTE: 1.87 E9/L (ref 1.5–4)
LYMPHOCYTES RELATIVE PERCENT: 30.7 % (ref 20–42)
MAGNESIUM: 1.7 MG/DL (ref 1.6–2.6)
MCH RBC QN AUTO: 30.6 PG (ref 26–35)
MCHC RBC AUTO-ENTMCNC: 33.3 % (ref 32–34.5)
MCV RBC AUTO: 91.7 FL (ref 80–99.9)
METER GLUCOSE: 222 MG/DL (ref 74–99)
METER GLUCOSE: 235 MG/DL (ref 74–99)
METER GLUCOSE: 71 MG/DL (ref 74–99)
MONOCYTES ABSOLUTE: 0.66 E9/L (ref 0.1–0.95)
MONOCYTES RELATIVE PERCENT: 10.8 % (ref 2–12)
NEUTROPHILS ABSOLUTE: 3.36 E9/L (ref 1.8–7.3)
NEUTROPHILS RELATIVE PERCENT: 55.1 % (ref 43–80)
PDW BLD-RTO: 11.9 FL (ref 11.5–15)
PHOSPHORUS: 2.8 MG/DL (ref 2.5–4.5)
PLATELET # BLD: 283 E9/L (ref 130–450)
PMV BLD AUTO: 9.2 FL (ref 7–12)
POTASSIUM SERPL-SCNC: 3.5 MMOL/L (ref 3.5–5)
RBC # BLD: 3.99 E12/L (ref 3.5–5.5)
SARS-COV-2, NAAT: NOT DETECTED
SODIUM BLD-SCNC: 137 MMOL/L (ref 132–146)
TOTAL PROTEIN: 6 G/DL (ref 6.4–8.3)
WBC # BLD: 6.1 E9/L (ref 4.5–11.5)

## 2021-09-15 PROCEDURE — 6370000000 HC RX 637 (ALT 250 FOR IP): Performed by: INTERNAL MEDICINE

## 2021-09-15 PROCEDURE — 70450 CT HEAD/BRAIN W/O DYE: CPT

## 2021-09-15 PROCEDURE — 84100 ASSAY OF PHOSPHORUS: CPT

## 2021-09-15 PROCEDURE — 2580000003 HC RX 258: Performed by: SPECIALIST

## 2021-09-15 PROCEDURE — 83735 ASSAY OF MAGNESIUM: CPT

## 2021-09-15 PROCEDURE — 6370000000 HC RX 637 (ALT 250 FOR IP): Performed by: SPECIALIST

## 2021-09-15 PROCEDURE — 2580000003 HC RX 258

## 2021-09-15 PROCEDURE — 94640 AIRWAY INHALATION TREATMENT: CPT

## 2021-09-15 PROCEDURE — 6370000000 HC RX 637 (ALT 250 FOR IP): Performed by: NURSE PRACTITIONER

## 2021-09-15 PROCEDURE — 6360000002 HC RX W HCPCS: Performed by: INTERNAL MEDICINE

## 2021-09-15 PROCEDURE — 99291 CRITICAL CARE FIRST HOUR: CPT | Performed by: INTERNAL MEDICINE

## 2021-09-15 PROCEDURE — 87635 SARS-COV-2 COVID-19 AMP PRB: CPT

## 2021-09-15 PROCEDURE — 82962 GLUCOSE BLOOD TEST: CPT

## 2021-09-15 PROCEDURE — 6360000002 HC RX W HCPCS: Performed by: SPECIALIST

## 2021-09-15 PROCEDURE — 72125 CT NECK SPINE W/O DYE: CPT

## 2021-09-15 PROCEDURE — 80053 COMPREHEN METABOLIC PANEL: CPT

## 2021-09-15 PROCEDURE — 85025 COMPLETE CBC W/AUTO DIFF WBC: CPT

## 2021-09-15 PROCEDURE — 36415 COLL VENOUS BLD VENIPUNCTURE: CPT

## 2021-09-15 RX ADMIN — Medication 10 ML: at 10:55

## 2021-09-15 RX ADMIN — PANTOPRAZOLE SODIUM 40 MG: 40 TABLET, DELAYED RELEASE ORAL at 06:11

## 2021-09-15 RX ADMIN — WATER 2000 MG: 1 INJECTION INTRAMUSCULAR; INTRAVENOUS; SUBCUTANEOUS at 17:58

## 2021-09-15 RX ADMIN — FLUCONAZOLE 200 MG: 100 TABLET ORAL at 10:54

## 2021-09-15 RX ADMIN — IPRATROPIUM BROMIDE AND ALBUTEROL SULFATE 1 AMPULE: .5; 3 SOLUTION RESPIRATORY (INHALATION) at 10:39

## 2021-09-15 RX ADMIN — IPRATROPIUM BROMIDE AND ALBUTEROL SULFATE 1 AMPULE: .5; 3 SOLUTION RESPIRATORY (INHALATION) at 08:31

## 2021-09-15 RX ADMIN — DOXYCYCLINE HYCLATE 100 MG: 100 CAPSULE ORAL at 10:52

## 2021-09-15 RX ADMIN — Medication 1000 UNITS: at 10:51

## 2021-09-15 RX ADMIN — THIAMINE HCL TAB 100 MG 100 MG: 100 TAB at 10:52

## 2021-09-15 RX ADMIN — CLOTRIMAZOLE 10 MG: 10 LOZENGE ORAL; TOPICAL at 10:52

## 2021-09-15 RX ADMIN — PYRIDOXINE HCL TAB 50 MG 50 MG: 50 TAB at 10:54

## 2021-09-15 RX ADMIN — METOPROLOL SUCCINATE 50 MG: 50 TABLET, EXTENDED RELEASE ORAL at 10:53

## 2021-09-15 RX ADMIN — CLONIDINE HYDROCHLORIDE 0.1 MG: 0.1 TABLET ORAL at 10:52

## 2021-09-15 RX ADMIN — ROFLUMILAST 500 MCG: 500 TABLET ORAL at 10:51

## 2021-09-15 RX ADMIN — ENOXAPARIN SODIUM 40 MG: 40 INJECTION SUBCUTANEOUS at 10:55

## 2021-09-15 RX ADMIN — INSULIN GLARGINE 15 UNITS: 100 INJECTION, SOLUTION SUBCUTANEOUS at 11:10

## 2021-09-15 RX ADMIN — NYSTATIN 500000 UNITS: 100000 SUSPENSION ORAL at 18:02

## 2021-09-15 RX ADMIN — NYSTATIN 500000 UNITS: 100000 SUSPENSION ORAL at 10:54

## 2021-09-15 RX ADMIN — INSULIN LISPRO 4 UNITS: 100 INJECTION, SOLUTION INTRAVENOUS; SUBCUTANEOUS at 18:56

## 2021-09-15 RX ADMIN — PREDNISONE 20 MG: 20 TABLET ORAL at 11:15

## 2021-09-15 RX ADMIN — HYDRALAZINE HYDROCHLORIDE 50 MG: 50 TABLET, FILM COATED ORAL at 06:11

## 2021-09-15 RX ADMIN — Medication 1 CAPSULE: at 10:53

## 2021-09-15 RX ADMIN — IPRATROPIUM BROMIDE AND ALBUTEROL SULFATE 1 AMPULE: .5; 3 SOLUTION RESPIRATORY (INHALATION) at 01:21

## 2021-09-15 ASSESSMENT — PAIN SCALES - GENERAL
PAINLEVEL_OUTOF10: 0

## 2021-09-15 NOTE — PROGRESS NOTES
Physical Therapy        0515/0515-01    Patient unavailable for physical therapy treatment due to HOLD as RRT was called because patient fell out of bed and inappropriate for therapy at this time.     Guerrero Sanchez, PTA  #[id number]

## 2021-09-15 NOTE — PLAN OF CARE
Problem: Non-Violent Restraints  Goal: Removal from restraints as soon as assessed to be safe  9/14/2021 1511 by Ahsan Panda RN  Outcome: Met This Shift  Goal: No harm/injury to patient while restraints in use  9/14/2021 1511 by Ahsan Panda RN  Outcome: Met This Shift  Goal: Patient's dignity will be maintained  9/14/2021 1511 by Ahsan Panda RN  Outcome: Met This Shift  Goal: Removal from restraints as soon as assessed to be safe  9/14/2021 1511 by Ahsan Panda RN  Outcome: Met This Shift  Goal: No harm/injury to patient while restraints in use  9/14/2021 1511 by Ahsan Panda RN  Outcome: Met This Shift  Goal: Patient's dignity will be maintained  9/14/2021 1511 by Ahsan Panda RN  Outcome: Met This Shift     Problem: Falls - Risk of:  Goal: Will remain free from falls  Description: Will remain free from falls  9/15/2021 0419 by Abad Ortega RN  Outcome: Met This Shift  9/14/2021 1511 by Ahsan Panda RN  Outcome: Met This Shift  Goal: Absence of physical injury  Description: Absence of physical injury  9/15/2021 0419 by Abad Ortega RN  Outcome: Met This Shift  9/14/2021 1511 by Ahsan Panda RN  Outcome: Met This Shift     Problem: Skin Integrity:  Goal: Will show no infection signs and symptoms  Description: Will show no infection signs and symptoms  9/15/2021 0419 by Abad Ortega RN  Outcome: Met This Shift  9/14/2021 1511 by Ahsan Panda RN  Outcome: Met This Shift  Goal: Absence of new skin breakdown  Description: Absence of new skin breakdown  9/15/2021 0419 by Abad Ortega RN  Outcome: Met This Shift  9/14/2021 1511 by Ahsan Panda RN  Outcome: Met This Shift     Problem: Breathing Pattern - Ineffective:  Goal: Ability to achieve and maintain a regular respiratory rate will improve  Description: Ability to achieve and maintain a regular respiratory rate will improve  9/14/2021 1511 by Ahsan Panda RN  Outcome: Met This Shift     Problem: Airway Clearance - Ineffective:  Goal: Ability to maintain a clear airway will improve  Description: Ability to maintain a clear airway will improve  9/14/2021 1511 by Yolis Beckman RN  Outcome: Met This Shift     Problem: Pain:  Goal: Pain level will decrease  Description: Pain level will decrease  9/15/2021 0419 by Viral Aguiar RN  Outcome: Met This Shift  9/14/2021 1511 by Yolis Beckman RN  Outcome: Met This Shift  Goal: Control of acute pain  Description: Control of acute pain  9/15/2021 0419 by Viral Aguiar RN  Outcome: Met This Shift  9/14/2021 1511 by Yolis Beckman RN  Outcome: Met This Shift  Goal: Control of chronic pain  Description: Control of chronic pain  9/15/2021 0419 by Viral Aguiar RN  Outcome: Met This Shift  9/14/2021 1511 by Yolis Beckman RN  Outcome: Met This Shift

## 2021-09-15 NOTE — PROGRESS NOTES
Subjective:  Theodore Black was seen and examined at bedside today.    There were no new problems reported overnight   Patients appetite is stable  She denies any complaints at this time but anxious to go home  Has been without sitter all day    A complete review of systems and social history was completed on admission and remains unchanged unless otherwise noted    Scheduled Meds:   insulin glargine  15 Units SubCUTAneous BID    clotrimazole  10 mg Oral 5x Daily    melatonin  5 mg Oral Nightly    cloNIDine  0.1 mg Oral BID    predniSONE  20 mg Oral Daily    pantoprazole  40 mg Oral QAM AC    cefTRIAXone (ROCEPHIN) IV  2,000 mg IntraVENous Q24H    fluconazole  200 mg Oral Daily    thiamine mononitrate  100 mg Oral Daily    vitamin B-6  50 mg Oral Daily    Vitamin D  1,000 Units Oral Daily    miconazole nitrate   Topical BID    nystatin  5 mL Oral 4x Daily    doxycycline hyclate  100 mg Oral 2 times per day    metoprolol succinate  50 mg Oral Daily    insulin lispro  0-12 Units SubCUTAneous TID WC    insulin lispro  0-6 Units SubCUTAneous Nightly    hydrALAZINE  50 mg Oral 3 times per day    Roflumilast  500 mcg Oral Daily    lactobacillus  1 capsule Oral Daily    ipratropium-albuterol  1 ampule Inhalation Q4H    enoxaparin  40 mg SubCUTAneous Daily    lidocaine PF  5 mL IntraDERmal Once    sodium chloride flush  5-40 mL IntraVENous 2 times per day    heparin flush  3 mL IntraVENous 2 times per day    medicated lip ointment   Topical Daily     Continuous Infusions:   dextrose       PRN Meds:diphenhydrAMINE, sodium chloride flush, glucose, dextrose, glucagon (rDNA), dextrose, ondansetron **OR** ondansetron, polyethylene glycol, acetaminophen **OR** acetaminophen    Objective:  BP (!) 179/76   Pulse 93   Temp 98.2 °F (36.8 °C) (Oral)   Resp 16   Ht 5' 4\" (1.626 m)   Wt 198 lb 12.8 oz (90.2 kg)   SpO2 97%   BMI 34.12 kg/m²   In: 360 [P.O.:360]  Out: 650    In: 360   Out: 650 [Urine:650]     AAO x 3 at this time - was able to recognize me today, currently in NAD  Thrush in mouth resolved  RRR, pos S1, S2  Tight, no wheeze, rales or rhonchi  bowel sounds present, nontender, nondistended  No clubbing, cyanosis, or edema  No neuro changes o/w seen at this time  No obvious rashes or lesions except for excoriations on LE    Recent Labs     09/12/21  0520 09/13/21  1225 09/14/21  0853   WBC 7.2 6.7 7.2   HGB 10.7* 11.7 12.7    292 316     Recent Labs     09/12/21  0520 09/13/21  1225 09/14/21  0853    135 136   K 3.9 3.7 3.4*   CL 99 100 100   CO2 28 25 26   BUN 15 15 16   CREATININE 0.7 0.7 0.8   GLUCOSE 192* 160* 89     Recent Labs     09/12/21 0520 09/13/21  1225 09/14/21  0853   BILITOT 0.5 0.6 0.5   ALKPHOS 45 49 55   AST 12 23 22   ALT 40* 45* 47*     No results for input(s): INR in the last 72 hours. Invalid input(s): PT  No results for input(s): CKTOTAL, CKMB, CKMBINDEX, TROPONINI in the last 72 hours. XR CHEST PORTABLE    Result Date: 8/25/2021  EXAMINATION: ONE XRAY VIEW OF THE CHEST 8/24/2021 8:52 pm COMPARISON: 08/24/2021 HISTORY: ORDERING SYSTEM PROVIDED HISTORY: et tube placement TECHNOLOGIST PROVIDED HISTORY: Reason for exam:->et tube placement FINDINGS: Stable position of endotracheal tube. Cardiomediastinal silhouette is unremarkable. No infiltrate, effusion, or pneumothorax. No acute osseous abnormality. No pulmonary infiltrates. No significant change since the prior study     XR CHEST PORTABLE    Result Date: 8/24/2021  EXAMINATION: ONE XRAY VIEW OF THE CHEST 8/24/2021 5:30 pm COMPARISON: January 3, 2020 HISTORY: ORDERING SYSTEM PROVIDED HISTORY: SOB (shortness of breath) TECHNOLOGIST PROVIDED HISTORY: Reason for exam:->ETT placement, tongue swelling FINDINGS: Endotracheal tube is approximately 6.8 cm above the chivo. No airspace opacity or pleural effusion. The heart is normal in size. No pneumothorax.      1. Endotracheal tube is approximately 6.8 cm above the chivo. 2. No airspace opacity or pleural effusion. Assessment:  Sheryl Barber is a 78y.o. year old female who presented on 8/24/2021 and is being treated for:  Principal Problem:    Acute respiratory failure ([facility] [address]  Active Problems:    Essential hypertension    Diabetes mellitus ([facility] [address]    Myocardiopathy ([facility] [address]    ACE inhibitor-aggravated angioedema    COPD (chronic obstructive pulmonary disease) (HCC)    Acquired angioedema    SOB (shortness of breath)    Disorder of airway    Acquired hypertrophy of tongue    MSSA (methicillin susceptible Staphylococcus aureus) pneumonia ([facility] [address]  Resolved Problems:    * No resolved hospital problems. *    Plan  · Cont supportive care including weaning steroids and abx post extubation   · Start antifungal for thrush  · ID following for pneumonia  · Monitor BS -we will adjust Lantus to achieve better sugar control  · Cont PT/OT - get out of bed to chair  · GI following for dysphagia  · Start melatonin nightly   · Dc sitter   · Discharge to rehab tomorrow - med rec/mariya completed     More than 50% of my  time was spent at the bedside counseling/coordinating care with the patient and/or family with face to face contact. This time was spent reviewing notes and laboratory data as well as instructing and counseling the patient. Time I spent with the family or surrogate(s) is included only if the patient was incapable of providing the necessary information or participating in medical decisions. I also discussed the differential diagnosis and all of the proposed management plans with the patient and individuals accompanying the patient. This patient requires this high level of physician care and nursing on the [facility] unit due the complexity of decision management and chance of rapid decline or death. Continued cardiac monitoring and higher level of nursing are required. I am readily available for any further decision-making and intervention.      Morales Cyr MD  4:13 PM  9/14/2021

## 2021-09-15 NOTE — SIGNIFICANT EVENT
Elihu Pallas RN found patient on floor. Patient on Telesitter for safety. 1:1 sitter discontinued on 9/14 at 1400.   3/4 siderails up. All safety measures in place. Did not receive phone call regarding patient  trying to get out of bed. RRT called at 12:01. Patient vital signs stable. Transported from floor to bed with emergency board and hard neck collar. CT Scan STAT ordered. Charge TEJA Espinosa called patient's . Patient denies pain except c/o sore neck.

## 2021-09-15 NOTE — DISCHARGE SUMMARY
Discharge Summary    Sheryl Barber  :  1955  MRN:  [id number]    Admit date:  2021  Discharge date:  9/15/2021    Admitting Physician:    Morales Cyr MD    Discharge Diagnoses:    Principal Problem:    Acute respiratory failure [facility])  Active Problems:    Essential hypertension    Diabetes mellitus ([facility]    Myocardiopathy ([facility]    ACE inhibitor-aggravated angioedema    COPD (chronic obstructive pulmonary disease) (HCC)    Acquired angioedema    SOB (shortness of breath)    Disorder of airway    Acquired hypertrophy of tongue    MSSA (methicillin susceptible Staphylococcus aureus) pneumonia ([facility] [address]    Fall from bed    Cervical spine pain  Resolved Problems:    * No resolved hospital problems. *    Consults:   IP CONSULT TO OTOLARYNGOLOGY  IP CONSULT TO CRITICAL CARE  IP CONSULT TO INTERNAL MEDICINE  IP CONSULT TO INTERNAL MEDICINE  IP CONSULT TO DIETITIAN  IP CONSULT TO PHARMACY  IP CONSULT TO PHARMACY  IP CONSULT TO INFECTIOUS DISEASES  IP CONSULT TO GI     Condition at Discharge:  Stable    HPI/Hospital Course: 78 y.o. female who presents with facial swelling which started a few hours before coming to the ED. Pt tried benadryl with no improvement and then had difficulty talking, swallowing and eventually breathing while in the ED. Pt was seen and assessed by ENT and ED physicians and the decision was made to intubate pt for airway protection. Pt has an extensive cardiopulmonary hx including CAD, COPD/Asthma, DM requiring insulin pump and CVA and follows with Dr Adri Quezada regularly. In review of records she has been on lisinopril since at least 2018. Patient was in the ICU for almost 2 weeks before she was finally extubated and transferred to [facility]. While on a [facility] she had issues with her mentation/agitation and swallowing. Eventually she did improve and arrangements were made for the patient to go to rehab upon discharge. Today on day of discharge pt feels better with no further complaints. Vitals and Labs are stable/baseline. All consultants involved during this admission are agreeable to d/c. Physical Exam  /60   Pulse 91   Temp 97.9 °F (36.6 °C) (Oral)   Resp 20   Ht 5' 4\" (1.626 m)   Wt 198 lb 12.8 oz (90.2 kg)   SpO2 95%   BMI 34.12 kg/m²   RRR   CTA bilaterally, no wheeze, rales or rhonchi   bowel sounds present, nontender, nondistended   No clubbing, cyanosis, or edema   Neuro - at baseline     Pertinent Results during this Hospital Course:  Echo Limited    Result Date: 8/31/2021  Transthoracic Echocardiography Report (TTE)  Demographics   Patient Name    [facility]      Gender            Female                  [address]  [id number]      Room Number       [address]  Number   Account #       [de-identified]     Procedure Date    08/31/2021   Corporate ID                  Ordering                                Physician   Accession       [id number]    Referring         Jenn Age  Number                        Physician   Date of Birth   1955    Sonographer       Rayo Gomez [facility]   Age             78 year(s)    Interpreting      Lenora 63                                Physician         Physician Cardiology                                                  Michael Townsend MD                                 Any Other  Procedure Type of Study   TTE procedure:Echo Limited Study. Procedure Date Date: 08/31/2021 Start: 10:59 AM Study Location: Portable Technical Quality: Poor visualization due to body habitus. Indications:Congestive heart failure. Patient Status: Routine Height: 64 inches Weight: 196 pounds BSA: 1.94 m^2 BMI: 33.64 kg/m^2 BP: 142/99 mmHg  Findings   Left Ventricle  Left ventricular size is grossly normal. Ejection fraction is visually  estimated at 55-60%. Indeterminate diastolic function. Right Ventricle  Normal right ventricular size and function. Left Atrium  Left atrium is of normal size. Right Atrium  Normal right atrium size.    Mitral Valve  Physiologic and/or trace mitral regurgitation is present. Tricuspid Valve  Physiologic and/or trace tricuspid regurgitation. Aortic Valve  Physiologic and/or trace aortic regurgitation is noted. There is mild  aortic stenosis with a mean gradient of 11 mm Hg. Aorta  The aorta is within normal limits. Miscellaneous  Normal Inferior Vena Cava diameter and respiratory variation. Conclusions   Summary  Left ventricular size is grossly normal. Ejection fraction is visually  estimated at 55-60%. Indeterminate diastolic function. Normal right ventricular size and function. Left atrium is of normal size. Normal right atrium size. Normal Inferior Vena Cava diameter and respiratory variation. Unable to estimate PA pressure. No comparison study available.    Signature   ----------------------------------------------------------------  Electronically signed by Damaris Pulliam MD(Interpreting  physician) on 08/31/2021 03:54 PM  ----------------------------------------------------------------  M-Mode/2D Measurements & Calculations   LV Diastolic    LV Systolic Dimension: 3 cm  AV Cusp Separation: 1.8 cmLA  Dimension: 5.1  LV Volume Diastolic: 419.3   Dimension: 3 cmAO Root  cm              ml                           Dimension: 2.7 cm  LV FS:41.2 %    LV Volume Systolic: 82.9 ml  LV PW           LV EDV/LV EDV Index: 487.1  Diastolic: 1.4  PY/09 WJ/M^3PL ESV/LV ESV  cm              Index: 34.2 ml/18ml/ m^2     RV Diastolic Dimension: 2.5  LV PW Systolic: EF Calculated: 72 %          cm  1.7 cm          LV Mass Index: 155 l/min*m^2 RV Systolic Dimension: 2 cm  Septum                                       LA/Aorta: 1.19  Diastolic: 1.4  cm              LVOT: 2 cm                   LA volume/Index: 42.9 ml  Septum  Systolic: 2 cm   LV Mass: 300.44  g  Doppler Measurements & Calculations   MV Peak E-Wave:   AV Peak Velocity: 2.03 m/s    LVOT Peak Velocity: 0.96  0.81 m/s          AV Peak Gradient: 16.4 mmHg   m/s  MV Peak A-Wave:   AV Mean Velocity: 1.54 m/s    LVOT Mean Velocity: 0.58  1.15 m/s          AV Mean Gradient: 10.3 mmHg   m/s  MV E/A Ratio: 0.7 AV VTI: 47.5 cm               LVOT Peak Gradient: 3.7  MV Peak Gradient: AV Area (Continuity):1.51     mmHgLVOT Mean Gradient:  6.5 mmHg          cm^2                          1.7 mmHg  MV Mean Gradient:                               Estimated RVSP: 21.8 mmHg  2 mmHg            LVOT VTI: 22.8 cm             Estimated RAP:10 mmHg  MV Mean Velocity:  0.64 m/s          Estimated PASP: 21.85 mmHg  MV Deceleration   Pulm. Vein A Reversal         TR Velocity:1.72 m/s  Time: 262.9 msec  Duration:152.2 msec           TR Gradient:11.85 mmHg  MV P1/2t: 60.6    Pulm. Vein D Velocity:0.46    PV Peak Velocity: 1.17 m/s  msec              m/sPulm. Vein A Reversal      PV Peak Gradient: 5.49  MVA by PHT:3.63   Velocity:0.34 m/s             mmHg  cm^2              Pulm. Vein S Velocity: 0.55   PV Mean Velocity: 0.86 m/s  MV Area           m/s                           PV Mean Gradient: 3.2 mmHg  (continuity): 2.5  cm^2  [email or web address] [id number]    XR CHEST (2 VW)    Result Date: 9/12/2021  EXAMINATION: TWO XRAY VIEWS OF THE CHEST 9/12/2021 2:20 pm COMPARISON: September 6, 2021 HISTORY: ORDERING SYSTEM PROVIDED HISTORY: cough TECHNOLOGIST PROVIDED HISTORY: Reason for exam:->cough FINDINGS: Right PICC line present with distal tip of location of superior vena cava. No airspace opacity or pleural effusion. The heart is normal size. No pneumothorax. No free air beneath the hemidiaphragms. No pneumonia or pleural effusion.      CT HEAD WO CONTRAST    Result Date: 9/10/2021  EXAMINATION: CT OF THE HEAD WITHOUT CONTRAST  9/10/2021 9:14 pm TECHNIQUE: CT of the head was performed without the administration of intravenous contrast. Dose modulation, iterative reconstruction, and/or weight based adjustment of the mA/kV was utilized to reduce the radiation dose to as low as reasonably achievable. COMPARISON: MRI brain 09/07/2021 HISTORY: ORDERING SYSTEM PROVIDED HISTORY: fall TECHNOLOGIST PROVIDED HISTORY: Reason for exam:->fall Has a \"code stroke\" or \"stroke alert\" been called? ->No FINDINGS: BRAIN/VENTRICLES: There is no acute intracranial hemorrhage, mass effect or midline shift. No abnormal extra-axial fluid collection. The gray-white differentiation is maintained without evidence of an acute infarct. There is no evidence of hydrocephalus. ORBITS: The visualized portion of the orbits demonstrate no acute abnormality. SINUSES: Pansinus disease. SOFT TISSUES/SKULL:  No acute abnormality of the visualized skull or soft tissues. No acute intracranial abnormality. CT SOFT TISSUE NECK W CONTRAST    Result Date: 8/29/2021  EXAMINATION: CT OF THE NECK SOFT TISSUE WITH CONTRAST  8/29/2021 TECHNIQUE: CT of the neck was performed with the administration of intravenous contrast. Multiplanar reformatted images are provided for review. Dose modulation, iterative reconstruction, and/or weight based adjustment of the mA/kV was utilized to reduce the radiation dose to as low as reasonably achievable. COMPARISON: None. HISTORY: ORDERING SYSTEM PROVIDED HISTORY: continued oral swelling, hx of intubatin for angioedema TECHNOLOGIST PROVIDED HISTORY: Reason for exam:->continued oral swelling, hx of intubatin for angioedema FINDINGS: Limited evaluation of the pharynx, larynx and hypopharynx due to presence of ETT and OG tube. PHARYNX/LARYNX: There is prominence of the bilateral adenoids, likely related to infection/inflammation. There is mild prominence of the bilateral palatine and lingual tonsils, and narrowing of the bilateral piriform sinuses, likely reactive. The tongue is normal in appearance. The valleculae, epiglottis, aryepiglottic folds appear unremarkable.   The true and false vocal cords are normal in appearance. No mass or abscess is seen. SALIVARY GLANDS/THYROID:  The parotid and submandibular glands appear unremarkable. The thyroid gland appears unremarkable. LYMPH NODES:  No cervical or supraclavicular lymphadenopathy is seen. SOFT TISSUES:  There is mild infiltration of fat in the bilateral carotid spaces, particularly at the level of carotid bulbs, right more than left, likely related to extension of inflammatory changes. BRAIN/ORBITS/SINUSES:  The visualized portion of the intracranial contents appear unremarkable. The visualized portion of the orbits, and mastoid air cells demonstrate no acute abnormality. There are air-fluid levels in the paranasal sinuses, likely related to sinusitis. LUNG APICES/SUPERIOR MEDIASTINUM:  No focal consolidation is seen within the visualized lung apices. No superior mediastinal lymphadenopathy or mass. The visualized portion of the trachea appears unremarkable. BONES:  No aggressive appearing lytic or blastic bony lesion. There are degenerative changes in the cervical spine, likely with mild spinal canal narrowing at C5-6 and C6-7. Limited evaluation of the pharynx, larynx and hypopharynx due to presence of ETT and OG tube. Prominence of the bilateral adenoids, likely related to infection/inflammation. Mild prominence of the bilateral palatine and lingual tonsils, and narrowing of the bilateral piriform sinuses, likely reactive. Mild infiltration of fat in the bilateral carotid spaces, particularly at the level of carotid bulbs, right more than left, likely related to extension of inflammatory changes. Acute sinusitis.      FL ESOPHAGRAM    Result Date: 9/13/2021  EXAMINATION: SINGLE CONTRAST ESOPHAGRAM 9/13/2021 HISTORY: ORDERING SYSTEM PROVIDED HISTORY: Dysphagia TECHNOLOGIST PROVIDED HISTORY: Reason for exam:->Dysphagia COMPARISON: None TECHNIQUE: Multiple single contrast images of the esophagus and gastroesophageal junction were obtained following the oral administration of water soluble contrast FLUOROSCOPY DOSE AND TYPE OR TIME AND EXPOSURES: Fluoroscopy time equals 0.6 minutes. Total dose equals 15.08 mGy FINDINGS: On the single contrast images, no evidence of stricture or obstruction. The esophagus demonstrates normal peristalsis under fluoroscopy. No evidence of leak. There is a very small sliding hiatal hernia. No gastroesophageal reflux was elicited during examination. 1. Very small sliding hiatal hernia otherwise unremarkable esophagram.     XR CHEST PORTABLE    Result Date: 9/6/2021  EXAMINATION: ONE XRAY VIEW OF THE CHEST 9/6/2021 11:36 am COMPARISON: None. HISTORY: ORDERING SYSTEM PROVIDED HISTORY: Altered mental status, respiratory failure TECHNOLOGIST PROVIDED HISTORY: Reason for exam:->Altered mental status, respiratory failure FINDINGS: The lungs are without acute focal process. There is no effusion or pneumothorax. The cardiomediastinal silhouette is without acute process. The osseous structures are without acute process. The endotracheal tube and NG tube have been removed. There is a right-sided PICC line. No acute process. XR CHEST PORTABLE    Result Date: 9/4/2021  EXAMINATION: ONE XRAY VIEW OF THE CHEST 9/4/2021 7:03 am COMPARISON: None. HISTORY: ORDERING SYSTEM PROVIDED HISTORY: SOB TECHNOLOGIST PROVIDED HISTORY: Reason for exam:->SOB FINDINGS: The tip of the endotracheal tube is above the chivo. The tip of the PICC line projects over the superior vena cava. The tip of the nasogastric tube projects over the body the stomach. The lungs appear clear. The contour of the mediastinum and heart size are within normal range. There are calcifications within the aortic knob. No focal parenchymal densities are noted within the lungs. There are no signs of pneumothorax or pleural effusion     1. The position and alignment of the tubes and catheters are within normal range 2.  No signs of an acute cardiopulmonary process     XR CHEST PORTABLE    Result Date: 9/2/2021  EXAMINATION: ONE XRAY VIEW OF THE CHEST 9/2/2021 6:30 am COMPARISON: 08/31/2021 HISTORY: ORDERING SYSTEM PROVIDED HISTORY: SOB TECHNOLOGIST PROVIDED HISTORY: Reason for exam:->SOB FINDINGS: Heart size is normal.  There are no infiltrates or effusions. Support lines are appropriate. No acute process and unchanged     XR CHEST PORTABLE    Result Date: 8/31/2021  EXAMINATION: ONE XRAY VIEW OF THE CHEST 8/31/2021 7:27 am COMPARISON: 08/29/2021 HISTORY: ORDERING SYSTEM PROVIDED HISTORY: SOB TECHNOLOGIST PROVIDED HISTORY: Reason for exam:->SOB FINDINGS: Heart size is normal.  There are no infiltrates or effusions. Lines/tubes are unchanged. Tip of ET tube is near the chivo and could be withdrawn 1-2 cm. .     No acute process     XR CHEST PORTABLE    Result Date: 8/29/2021  EXAMINATION: ONE XRAY VIEW OF THE CHEST 8/29/2021 9:30 pm COMPARISON: 08/29/2021 about 14 hours earlier HISTORY: ORDERING SYSTEM PROVIDED HISTORY: ETT tube placement TECHNOLOGIST PROVIDED HISTORY: Reason for exam:->ETT tube placement FINDINGS: There is an ET tube with the tip felt to be in the distal trachea but within 1 cm of the chivo. This could be withdrawn 1-2 cm for better tube position. There is an NG tube extending into the stomach. No acute infiltrate or pleural effusion is seen. The heart and mediastinum are unchanged and there is no evidence of vascular congestion. ETT tip is within 1 cm of the chivo. It could be withdrawn 1-2 cm for better tube position. XR CHEST PORTABLE    Result Date: 8/29/2021  EXAMINATION: ONE XRAY VIEW OF THE CHEST 8/29/2021 6:28 am COMPARISON: 08/28/2021 HISTORY: ORDERING SYSTEM PROVIDED HISTORY: sob TECHNOLOGIST PROVIDED HISTORY: Reason for exam:->sob FINDINGS: The support lines and tubes are unchanged in position. Lungs are clear. There is no focal infiltrate or effusion. 1. There is no acute cardiopulmonary disease 2.  Stable position of support lines and tubes. XR CHEST PORTABLE    Result Date: 8/28/2021  EXAMINATION: ONE XRAY VIEW OF THE CHEST 8/28/2021 1:08 pm COMPARISON: August 24, 2021 HISTORY: ORDERING SYSTEM PROVIDED HISTORY: ett and ng placement TECHNOLOGIST PROVIDED HISTORY: Reason for exam:->ett and ng placement FINDINGS: Endotracheal tube is present with distal tip appearing approximately 4.5 cm above the chivo. NG tube courses below the level of the diaphragm. No airspace opacity or pleural effusion. The heart is normal size. No pneumothorax. 1. Endotracheal tube is present with distal tip approximately 4.5 cm above the chivo. 2. No airspace opacity or pleural effusion. XR CHEST PORTABLE    Result Date: 8/25/2021  EXAMINATION: ONE XRAY VIEW OF THE CHEST 8/24/2021 8:52 pm COMPARISON: 08/24/2021 HISTORY: ORDERING SYSTEM PROVIDED HISTORY: et tube placement TECHNOLOGIST PROVIDED HISTORY: Reason for exam:->et tube placement FINDINGS: Stable position of endotracheal tube. Cardiomediastinal silhouette is unremarkable. No infiltrate, effusion, or pneumothorax. No acute osseous abnormality. No pulmonary infiltrates. No significant change since the prior study     XR CHEST PORTABLE    Result Date: 8/24/2021  EXAMINATION: ONE XRAY VIEW OF THE CHEST 8/24/2021 5:30 pm COMPARISON: January 3, 2020 HISTORY: ORDERING SYSTEM PROVIDED HISTORY: SOB (shortness of breath) TECHNOLOGIST PROVIDED HISTORY: Reason for exam:->ETT placement, tongue swelling FINDINGS: Endotracheal tube is approximately 6.8 cm above the chivo. No airspace opacity or pleural effusion. The heart is normal in size. No pneumothorax. 1. Endotracheal tube is approximately 6.8 cm above the chivo. 2. No airspace opacity or pleural effusion.      XR ABDOMEN FOR NG/OG/NE TUBE PLACEMENT    Result Date: 8/30/2021  EXAMINATION: ONE SUPINE XRAY VIEW(S) OF THE ABDOMEN 8/29/2021 8:30 pm COMPARISON: 26 August 2021 HISTORY: ORDERING SYSTEM PROVIDED HISTORY: OG tube placement TECHNOLOGIST PROVIDED HISTORY: Reason for exam:->OG tube placement Portable? ->Yes FINDINGS: NG tube tip is well within the gastric lumen located in the antrum. No free air, bowel wall pneumatosis or dilated bowel is evident. An endotracheal tube terminates 2 cm above the chivo. NG tube tip is at the level of the gastric antrum, well within the gastric lumen. XR ABDOMEN FOR NG/OG/NE TUBE PLACEMENT    Result Date: 8/26/2021  EXAMINATION: ONE SUPINE XRAY VIEW(S) OF THE ABDOMEN 8/26/2021 9:47 am COMPARISON: None. HISTORY: ORDERING SYSTEM PROVIDED HISTORY: OGT TECHNOLOGIST PROVIDED HISTORY: Reason for exam:->OGT Portable? ->Yes FINDINGS: There is an NG tube seen within the stomach. There is no bowel distention. 1. Satisfactory position of the NG tube within the stomach     MRI BRAIN WO CONTRAST    Result Date: 9/7/2021  EXAMINATION: MRI OF THE BRAIN WITHOUT CONTRAST  9/7/2021 2:19 pm TECHNIQUE: Multiplanar multisequence MRI of the brain was performed without the administration of intravenous contrast. COMPARISON: None. HISTORY: ORDERING SYSTEM PROVIDED HISTORY: AMS, weakness, concern for CVA TECHNOLOGIST PROVIDED HISTORY: Reason for exam:->AMS, weakness, concern for CVA FINDINGS: INTRACRANIAL STRUCTURES/VENTRICLES: There is no acute infarct. No mass effect, edema or hemorrhage is seen. Mild volume loss is seen in the brain with minimal chronic microvascular ischemic changes. No hydrocephalus or extra-axial fluid is seen. ORBITS: Prosthetic lenses are seen in the globes bilaterally. The orbits are otherwise grossly unremarkable. SINUSES: Moderate mucosal thickening is seen in the paranasal sinuses with small air-fluid levels suspected in the sphenoid sinuses. T2 hypointense area in the lateral margin of the right sphenoid sinus is nonspecific and may represent a chronic inflammatory process. A solid mass lesion cannot be excluded.   No marrow edema or other destructive changes are seen in the surrounding 0.6 09/15/2021    BILIDIR <0.2 01/04/2021    LABALBU 3.3 09/15/2021    LDLCALC 196 01/04/2021    TRIG 122 09/06/2021     Lab Results   Component Value Date    INR 0.9 07/28/2020     Discharge Medications:    Current Discharge Medication List           Details   fluconazole (DIFLUCAN) 200 MG tablet Take 1 tablet by mouth daily for 7 days  Qty: 7 tablet, Refills: 0      doxycycline hyclate (VIBRAMYCIN) 100 MG capsule Take 1 capsule by mouth every 12 hours for 10 days  Qty: 20 capsule, Refills: 0      cloNIDine (CATAPRES) 0.1 MG tablet Take 1 tablet by mouth 2 times daily  Qty: 60 tablet, Refills: 0      predniSONE (DELTASONE) 20 MG tablet 1 tab daily for 2 days then 1/2 daily for 2 days then dc  Qty: 3 tablet, Refills: 0      miconazole nitrate 2 % OINT Apply topically 2 times daily  Refills: 0      melatonin 5 MG TBDP disintegrating tablet Take 1 tablet by mouth nightly      nystatin (MYCOSTATIN) 090240 UNIT/ML suspension Take 5 mLs by mouth 4 times daily      insulin glargine (LANTUS) 100 UNIT/ML injection vial Inject 20 Units into the skin 2 times daily  Qty: 10 mL, Refills: 3      hydrALAZINE (APRESOLINE) 50 MG tablet Take 1 tablet by mouth every 8 hours  Qty: 90 tablet, Refills: 3      lactobacillus (CULTURELLE) CAPS capsule Take 1 capsule by mouth daily              Details   metoprolol succinate (TOPROL XL) 50 MG extended release tablet Take 1 tablet by mouth daily  Qty: 30 tablet, Refills: 3              Details   rosuvastatin (CRESTOR) 40 MG tablet Take 40 mg by mouth daily      Budeson-Glycopyrrol-Formoterol (BREZTRI AEROSPHERE) 160-9-4.8 MCG/ACT AERO Inhale 2 puffs into the lungs 2 times daily      donepezil (ARICEPT) 5 MG tablet Take 5 mg by mouth daily      pantoprazole (PROTONIX) 40 MG tablet Take 40 mg by mouth daily      leflunomide (ARAVA) 20 MG tablet Take 20 mg by mouth daily      levothyroxine (SYNTHROID) 25 MCG tablet Take 25 mcg by mouth Daily      aspirin EC 81 MG EC tablet Take 1 tablet by mouth daily  Qty: 30 tablet, Refills: 11      ipratropium-albuterol (DUONEB) 0.5-2.5 (3) MG/3ML SOLN nebulizer solution Inhale 3 mLs into the lungs every 6 hours as needed for Shortness of Breath  Qty: 360 mL, Refills: 0      Multiple Vitamins-Minerals (CENTRUM ADULTS) TABS Take 1 tablet by mouth daily       venlafaxine 150 MG extended release tablet Take 150 mg by mouth daily (with breakfast)       cetirizine (ZYRTEC) 10 MG tablet Take 10 mg by mouth daily      insulin lispro (HUMALOG) 100 UNIT/ML injection vial Inject into the skin every 4 hours as needed for High Blood Sugar (Checks BS 4-6x/day; For use with insulin pump; MAX DOSE 50 UNITS)       clopidogrel (PLAVIX) 75 MG tablet Take 1 tablet by mouth daily  Qty: 90 tablet, Refills: 1    Associated Diagnoses: PVD (peripheral vascular disease) with claudication (HCC)      cilostazol (PLETAL) 100 MG tablet take 1 tablet by mouth twice a day  Qty: 60 tablet, Refills: 5                        Current Discharge Medication List      STOP taking these medications       lisinopril (PRINIVIL;ZESTRIL) 20 MG tablet Comments:   Reason for Stopping:         COVID-19 mRNA Vacc, PFIZER, 30 MCG/0.3ML SUSP injection Comments:   Reason for Stopping:         gabapentin (NEURONTIN) 300 MG capsule Comments:   Reason for Stopping:         COMBIVENT RESPIMAT  MCG/ACT AERS inhaler Comments:   Reason for Stopping:             Disposition:   SNF on adjusted medications along with antibiotics as per ID    Patient to remain off all ACE inhibitors/ARB's from now on  Follow-up in office after discharge from rehab  Patient advised to bring all meds to appointment.     Note that over 30 minutes was spent in preparing discharge papers, discussing discharge with patient, nursing and ancillary staff, medication review, etc.    Signed:  Mirta Phipps MD  9/15/2021, 1:43 PM

## 2021-09-15 NOTE — SIGNIFICANT EVENT
[address] [place], [place] [address]ist RRT/Code Blue Note    Subjective/interval history:    Called to bedside for rapid response team.  Patient has a telesitter noted that the patient rolled and fell on the bed. At this time she is awake, alert, appropriately oriented and very tearful. States that she has severe neck pain. She does have tenderness to palpation of the right cervical spine. Denies any pain elsewhere. Her cervical spine was stabilized and she was logrolled onto stretcher. This was lifted back in the bed. Vital signs notable for systolic blood pressure 960 heart rate of 110.        insulin glargine  15 Units SubCUTAneous BID    clotrimazole  10 mg Oral 5x Daily    melatonin  5 mg Oral Nightly    cloNIDine  0.1 mg Oral BID    predniSONE  20 mg Oral Daily    pantoprazole  40 mg Oral QAM AC    cefTRIAXone (ROCEPHIN) IV  2,000 mg IntraVENous Q24H    fluconazole  200 mg Oral Daily    thiamine mononitrate  100 mg Oral Daily    vitamin B-6  50 mg Oral Daily    Vitamin D  1,000 Units Oral Daily    miconazole nitrate   Topical BID    nystatin  5 mL Oral 4x Daily    doxycycline hyclate  100 mg Oral 2 times per day    metoprolol succinate  50 mg Oral Daily    insulin lispro  0-12 Units SubCUTAneous TID WC    insulin lispro  0-6 Units SubCUTAneous Nightly    hydrALAZINE  50 mg Oral 3 times per day    Roflumilast  500 mcg Oral Daily    lactobacillus  1 capsule Oral Daily    ipratropium-albuterol  1 ampule Inhalation Q4H    enoxaparin  40 mg SubCUTAneous Daily    lidocaine PF  5 mL IntraDERmal Once    sodium chloride flush  5-40 mL IntraVENous 2 times per day    heparin flush  3 mL IntraVENous 2 times per day    medicated lip ointment   Topical Daily     diphenhydrAMINE, 25 mg, Q6H PRN  sodium chloride flush, 5-40 mL, PRN  glucose, 15 g, PRN  dextrose, 12.5 g, PRN  glucagon (rDNA), 1 mg, PRN  dextrose, 100 mL/hr, PRN  ondansetron, 4 mg, Q8H PRN   Or  ondansetron, 4 mg, Q6H PRN  polyethylene glycol, 17 g, Daily PRN  acetaminophen, 650 mg, Q6H PRN   Or  acetaminophen, 650 mg, Q6H PRN         Objective:    /60   Pulse 91   Temp 97.9 °F (36.6 °C) (Oral)   Resp 20   Ht 5' 4\" (1.626 m)   Wt 198 lb 12.8 oz (90.2 kg)   SpO2 95%   BMI 34.12 kg/m²   General Appearance: alert and oriented to person, place and time, well developed and well- nourished, very tearful and anxious  Skin: warm and dry, no rash or erythema  Head: normocephalic, tenderness on the occiput   Eyes: pupils equal, round, and reactive to light, extraocular eye movements intact, conjunctivae normal  ENT: External nose and ears normal.  Oral mucosa moist.  Throat clear. Neck: Tenderness to palpation along the right cervical spine and paravertebral musculature. No goiter, masses, or adenopathy    Pulmonary/Chest: Unlabored on room air. Coarse breath sounds bilaterally. Cardiovascular: Regular rate and rhythm, S1, S2 with no murmurs, gallops, rubs abdomen: soft, non-tender, non-distended, normal bowel sounds, no masses or organomegaly  Extremities: no cyanosis, clubbing or edema  Musculoskeletal: No joint swelling, tenderness other than noted above, or deformity  Neurologic: CN II-XII intact. Strength intact throughout. Sensation intact throughout. Recent Labs     09/13/21  1225 09/14/21  0853 09/15/21  0922    136 137   K 3.7 3.4* 3.5    100 100   CO2 25 26 26   BUN 15 16 17   CREATININE 0.7 0.8 0.8   GLUCOSE 160* 89 88   CALCIUM 8.9 8.7 8.7       Recent Labs     09/13/21  1225 09/14/21  0853 09/15/21  0922   WBC 6.7 7.2 6.1   RBC 3.84 4.15 3.99   HGB 11.7 12.7 12.2   HCT 35.4 38.4 36.6   MCV 92.2 92.5 91.7   MCH 30.5 30.6 30.6   MCHC 33.1 33.1 33.3   RDW 11.8 12.0 11.9    316 283   MPV 9.7 9.2 9.2       I/O last 3 completed shifts: In: 480 [P.O.:480]  Out: 600 [Urine:600]  No intake/output data recorded.       Radiology: CT of the head does not show any intracranial hemorrhage and CT neck does not show any evidence of fracture    Assessment/Plan:    Principal Problem:    Acute respiratory failure (HCC)  Active Problems:    Essential hypertension    Diabetes mellitus ([facility]    Myocardiopathy (HCC)    ACE inhibitor-aggravated angioedema    COPD (chronic obstructive pulmonary disease) (HCC)    Acquired angioedema    SOB (shortness of breath)    Disorder of airway    Acquired hypertrophy of tongue    MSSA (methicillin susceptible Staphylococcus aureus) pneumonia ([facility]    Fall from bed    Cervical spine pain  Resolved Problems:    * No resolved hospital problems. *    1. Fall out of bed resulting in cervical spine and occipital pain  -CT of the head and cervical spine was obtained which did not show any acute fracture or other osseous process  -Start fall precautions   -Will defer need for trauma consult to primary service      Critical care time 30 minutes not including procedures. NOTE: This report was transcribed using voice recognition software.  Every effort was made to ensure accuracy; however, inadvertent computerized transcription errors may be present.     Electronically signed by Garrett Perrin DO on 9/15/2021 at 12:26 PM

## 2021-09-15 NOTE — CARE COORDINATION
9/15/21 1206 CM note: NO COVID TESTING THIS ADMIT. Telesitter. Per [facility] liaison, insurance APPROVED admit to their facility. WILL NEED PCR COVID RESULTED, HENS initiated; CM/SW will need to complete HENS when we have a discharge order, SIGNED AHMET. Ambulance form started and on soft chart.  Electronically signed by Sally Hoover RN on 9/15/2021 at 12:27 PM

## 2021-09-15 NOTE — PROGRESS NOTES
Patient has been doing well with telesitter this shift.     Electronically signed by Luann Irving RN on 9/15/2021 at 3:29 AM

## 2021-09-15 NOTE — PROGRESS NOTES
Patient was found with 2 L oxygen out of nose. Checked pulse ox on room air. Patient's oxygen saturation was 96%. Patient on room air at this time. Will continue to monitor the patient.     Electronically signed by Antolin Fernandez RN on 9/15/2021 at 4:23 AM

## 2021-09-15 NOTE — CARE COORDINATION
SS Note: Covid negative 9/15/21. SW arranged for 9:30 PM [facility] ambulance, [phone], to transport pt to [facility] for a skilled level of care. SW called pt's  and he is aware of plan. CM will complete HENS tomorrow post discharge after have a discharge order in chart, SIGNED AHMET. Ambulance form completed and on soft chart.    Electronically signed by JER Deluca on 9/15/2021 at 4:26 PM

## 2021-09-15 NOTE — PROGRESS NOTES
Speech Language Pathology      NAME:  Katelin Bell  :  1955  DATE: 9/15/2021  ROOM:  [address] [place] [address]    Attempted ongoing Speech-Language Pathology intervention for speech and swallowing. Pt unavailable at this time due to:  [] HOLD per RN/ medical staff d/t medical status   [x] Off unit for testing/ procedure    [] With medical staff   [] Declined intervention  [] Sleeping/ Lethargic   [] Other:     SLP to continue previously established POC and re-attempt as able. SOB (shortness of breath) [R06.02]  Acquired angioedema [T78. 3XXA]        Brenda Montoya MSCCC/SLP  Speech Language Pathologist  [phone]

## 2021-10-19 ENCOUNTER — HOSPITAL ENCOUNTER (OUTPATIENT)
Dept: GENERAL RADIOLOGY | Age: 66
Discharge: HOME | End: 2021-10-21
Payer: MEDICARE

## 2021-10-19 ENCOUNTER — HOSPITAL ENCOUNTER (OUTPATIENT)
Age: 66
Discharge: HOME | End: 2021-10-21
Payer: MEDICARE

## 2021-10-19 DIAGNOSIS — R52 PAIN: ICD-10-CM

## 2021-10-19 PROCEDURE — 72100 X-RAY EXAM L-S SPINE 2/3 VWS: CPT

## 2021-10-19 PROCEDURE — 72220 X-RAY EXAM SACRUM TAILBONE: CPT

## 2021-11-03 ENCOUNTER — HOSPITAL ENCOUNTER (OUTPATIENT)
Age: 66
Discharge: HOME | End: 2021-11-03
Payer: MEDICARE

## 2021-11-03 LAB
ALBUMIN SERPL-MCNC: 4.1 G/DL (ref 3.5–5.2)
ALP BLD-CCNC: 71 U/L (ref 35–104)
ALT SERPL-CCNC: 18 U/L (ref 0–32)
ANION GAP SERPL CALCULATED.3IONS-SCNC: 8 MMOL/L (ref 7–16)
AST SERPL-CCNC: 25 U/L (ref 0–31)
BACTERIA: ABNORMAL /HPF
BASOPHILS ABSOLUTE: 0.09 E9/L (ref 0–0.2)
BASOPHILS RELATIVE PERCENT: 1 % (ref 0–2)
BILIRUB SERPL-MCNC: 0.5 MG/DL (ref 0–1.2)
BILIRUBIN URINE: ABNORMAL
BLOOD, URINE: NEGATIVE
BUN BLDV-MCNC: 17 MG/DL (ref 6–23)
CALCIUM SERPL-MCNC: 10.2 MG/DL (ref 8.6–10.2)
CHLORIDE BLD-SCNC: 97 MMOL/L (ref 98–107)
CHOLESTEROL, FASTING: 198 MG/DL (ref 0–199)
CLARITY: ABNORMAL
CO2: 31 MMOL/L (ref 22–29)
COLOR: ABNORMAL
CREAT SERPL-MCNC: 0.9 MG/DL (ref 0.5–1)
CREATININE URINE: 463 MG/DL (ref 29–226)
CRYSTALS, UA: ABNORMAL /HPF
EOSINOPHILS ABSOLUTE: 0.26 E9/L (ref 0.05–0.5)
EOSINOPHILS RELATIVE PERCENT: 2.8 % (ref 0–6)
EPITHELIAL CELLS, UA: ABNORMAL /HPF
GFR AFRICAN AMERICAN: >60
GFR NON-AFRICAN AMERICAN: >60 ML/MIN/1.73
GLUCOSE BLD-MCNC: 369 MG/DL (ref 74–99)
GLUCOSE URINE: 100 MG/DL
HBA1C MFR BLD: 7.7 % (ref 4–5.6)
HCT VFR BLD CALC: 41.5 % (ref 34–48)
HDLC SERPL-MCNC: 57 MG/DL
HEMOGLOBIN: 13.2 G/DL (ref 11.5–15.5)
IMMATURE GRANULOCYTES #: 0.04 E9/L
IMMATURE GRANULOCYTES %: 0.4 % (ref 0–5)
KETONES, URINE: ABNORMAL MG/DL
LDL CHOLESTEROL CALCULATED: 104 MG/DL (ref 0–99)
LEUKOCYTE ESTERASE, URINE: NEGATIVE
LYMPHOCYTES ABSOLUTE: 2.85 E9/L (ref 1.5–4)
LYMPHOCYTES RELATIVE PERCENT: 30.5 % (ref 20–42)
MCH RBC QN AUTO: 30.8 PG (ref 26–35)
MCHC RBC AUTO-ENTMCNC: 31.8 % (ref 32–34.5)
MCV RBC AUTO: 97 FL (ref 80–99.9)
MICROALBUMIN UR-MCNC: 357.8 MG/L
MICROALBUMIN/CREAT UR-RTO: 77.3 (ref 0–30)
MONOCYTES ABSOLUTE: 0.92 E9/L (ref 0.1–0.95)
MONOCYTES RELATIVE PERCENT: 9.9 % (ref 2–12)
NEUTROPHILS ABSOLUTE: 5.17 E9/L (ref 1.8–7.3)
NEUTROPHILS RELATIVE PERCENT: 55.4 % (ref 43–80)
NITRITE, URINE: POSITIVE
PDW BLD-RTO: 13 FL (ref 11.5–15)
PH UA: 5.5 (ref 5–9)
PLATELET # BLD: 300 E9/L (ref 130–450)
PMV BLD AUTO: 10.2 FL (ref 7–12)
POTASSIUM SERPL-SCNC: 4.8 MMOL/L (ref 3.5–5)
PROTEIN UA: 100 MG/DL
RBC # BLD: 4.28 E12/L (ref 3.5–5.5)
RBC UA: ABNORMAL /HPF (ref 0–2)
SODIUM BLD-SCNC: 136 MMOL/L (ref 132–146)
SPECIFIC GRAVITY UA: >=1.03 (ref 1–1.03)
T4 FREE: 1.07 NG/DL (ref 0.93–1.7)
TOTAL PROTEIN: 6.8 G/DL (ref 6.4–8.3)
TRIGLYCERIDE, FASTING: 186 MG/DL (ref 0–149)
TSH SERPL DL<=0.05 MIU/L-ACNC: 1.4 UIU/ML (ref 0.27–4.2)
UROBILINOGEN, URINE: 1 E.U./DL
VITAMIN D 25-HYDROXY: 67 NG/ML (ref 30–100)
VLDLC SERPL CALC-MCNC: 37 MG/DL
WBC # BLD: 9.3 E9/L (ref 4.5–11.5)
WBC UA: ABNORMAL /HPF (ref 0–5)

## 2021-11-03 PROCEDURE — 84443 ASSAY THYROID STIM HORMONE: CPT

## 2021-11-03 PROCEDURE — 80053 COMPREHEN METABOLIC PANEL: CPT

## 2021-11-03 PROCEDURE — 82570 ASSAY OF URINE CREATININE: CPT

## 2021-11-03 PROCEDURE — 36415 COLL VENOUS BLD VENIPUNCTURE: CPT

## 2021-11-03 PROCEDURE — 82306 VITAMIN D 25 HYDROXY: CPT

## 2021-11-03 PROCEDURE — 85025 COMPLETE CBC W/AUTO DIFF WBC: CPT

## 2021-11-03 PROCEDURE — 81001 URINALYSIS AUTO W/SCOPE: CPT

## 2021-11-03 PROCEDURE — 84439 ASSAY OF FREE THYROXINE: CPT

## 2021-11-03 PROCEDURE — 80061 LIPID PANEL: CPT

## 2021-11-03 PROCEDURE — 82044 UR ALBUMIN SEMIQUANTITATIVE: CPT

## 2021-11-03 PROCEDURE — 83036 HEMOGLOBIN GLYCOSYLATED A1C: CPT

## 2022-01-05 ENCOUNTER — HOSPITAL ENCOUNTER (OUTPATIENT)
Dept: MAMMOGRAPHY | Age: 67
Discharge: HOME | End: 2022-01-07
Payer: MEDICARE

## 2022-01-05 VITALS — HEIGHT: 64 IN | WEIGHT: 154 LBS | BODY MASS INDEX: 26.29 KG/M2

## 2022-01-05 DIAGNOSIS — Z78.0 POSTMENOPAUSAL STATUS (AGE-RELATED) (NATURAL): ICD-10-CM

## 2022-01-05 DIAGNOSIS — Z78.0 POST-MENOPAUSAL: ICD-10-CM

## 2022-01-05 DIAGNOSIS — Z12.31 ENCOUNTER FOR SCREENING MAMMOGRAM FOR MALIGNANT NEOPLASM OF BREAST: ICD-10-CM

## 2022-01-05 PROCEDURE — 77067 SCR MAMMO BI INCL CAD: CPT

## 2022-01-05 PROCEDURE — 77080 DXA BONE DENSITY AXIAL: CPT

## 2022-04-01 ENCOUNTER — TELEPHONE (OUTPATIENT)
Dept: VASCULAR SURGERY | Age: 67
End: 2022-04-01

## 2023-02-27 ENCOUNTER — HOSPITAL ENCOUNTER (OUTPATIENT)
Dept: MAMMOGRAPHY | Age: 68
Discharge: HOME | End: 2023-03-01
Payer: MEDICARE

## 2023-02-27 VITALS — WEIGHT: 174 LBS | BODY MASS INDEX: 29.71 KG/M2 | HEIGHT: 64 IN

## 2023-02-27 DIAGNOSIS — Z12.31 ENCOUNTER FOR SCREENING MAMMOGRAM FOR MALIGNANT NEOPLASM OF BREAST: ICD-10-CM

## 2023-02-27 PROCEDURE — 77067 SCR MAMMO BI INCL CAD: CPT

## 2023-04-30 ENCOUNTER — HOSPITAL ENCOUNTER (INPATIENT)
Age: 68
LOS: 2 days | Discharge: HOME | DRG: 291 | End: 2023-05-02
Attending: EMERGENCY MEDICINE | Admitting: INTERNAL MEDICINE
Payer: MEDICARE

## 2023-04-30 ENCOUNTER — APPOINTMENT (OUTPATIENT)
Dept: GENERAL RADIOLOGY | Age: 68
DRG: 291 | End: 2023-04-30
Payer: MEDICARE

## 2023-04-30 DIAGNOSIS — R77.8 ELEVATED TROPONIN: ICD-10-CM

## 2023-04-30 DIAGNOSIS — R79.89 ELEVATED TROPONIN: ICD-10-CM

## 2023-04-30 DIAGNOSIS — R73.9 HYPERGLYCEMIA: ICD-10-CM

## 2023-04-30 DIAGNOSIS — J44.1 ACUTE EXACERBATION OF CHRONIC OBSTRUCTIVE PULMONARY DISEASE (COPD) (HCC): Primary | ICD-10-CM

## 2023-04-30 DIAGNOSIS — I50.9 ACUTE ON CHRONIC CONGESTIVE HEART FAILURE, UNSPECIFIED HEART FAILURE TYPE (HCC): ICD-10-CM

## 2023-04-30 LAB
ALBUMIN SERPL-MCNC: 3.7 G/DL (ref 3.5–5.2)
ALP SERPL-CCNC: 83 U/L (ref 35–104)
ALT SERPL-CCNC: 44 U/L (ref 0–32)
ANION GAP SERPL CALCULATED.3IONS-SCNC: 9 MMOL/L (ref 7–16)
AST SERPL-CCNC: 42 U/L (ref 0–31)
B.E.: 5.8 MMOL/L (ref -3–3)
BASOPHILS # BLD: 0.05 E9/L (ref 0–0.2)
BASOPHILS NFR BLD: 0.5 % (ref 0–2)
BILIRUB SERPL-MCNC: 0.4 MG/DL (ref 0–1.2)
BNP BLD-MCNC: 6431 PG/ML (ref 0–125)
BUN SERPL-MCNC: 17 MG/DL (ref 6–23)
CALCIUM SERPL-MCNC: 8.9 MG/DL (ref 8.6–10.2)
CHLORIDE SERPL-SCNC: 100 MMOL/L (ref 98–107)
CO2 SERPL-SCNC: 31 MMOL/L (ref 22–29)
CREAT SERPL-MCNC: 1 MG/DL (ref 0.5–1)
DELIVERY SYSTEMS: ABNORMAL
DEVICE: ABNORMAL
EOSINOPHIL # BLD: 0.17 E9/L (ref 0.05–0.5)
EOSINOPHIL NFR BLD: 1.5 % (ref 0–6)
ERYTHROCYTE [DISTWIDTH] IN BLOOD BY AUTOMATED COUNT: 14 FL (ref 11.5–15)
GLUCOSE SERPL-MCNC: 230 MG/DL (ref 74–99)
HCO3: 30.6 MMOL/L (ref 22–26)
HCT VFR BLD AUTO: 35.1 % (ref 34–48)
HGB BLD-MCNC: 10.5 G/DL (ref 11.5–15.5)
IMM GRANULOCYTES # BLD: 0.04 E9/L
IMM GRANULOCYTES NFR BLD: 0.4 % (ref 0–5)
LYMPHOCYTES # BLD: 2.1 E9/L (ref 1.5–4)
LYMPHOCYTES NFR BLD: 19.1 % (ref 20–42)
MCH RBC QN AUTO: 28.5 PG (ref 26–35)
MCHC RBC AUTO-ENTMCNC: 29.9 % (ref 32–34.5)
MCV RBC AUTO: 95.1 FL (ref 80–99.9)
METER GLUCOSE: 256 MG/DL (ref 74–99)
MONOCYTES # BLD: 1.1 E9/L (ref 0.1–0.95)
MONOCYTES NFR BLD: 10 % (ref 2–12)
NEUTROPHILS # BLD: 7.56 E9/L (ref 1.8–7.3)
NEUTS SEG NFR BLD: 68.5 % (ref 43–80)
O2 SATURATION: 95.7 % (ref 92–98.5)
OPERATOR ID: 557
PATIENT TEMP: 37
PCO2 (TEMP CORRECTED): 44.2 MMHG (ref 35–45)
PH (TEMPERATURE CORRECTED): 7.45 (ref 7.35–7.45)
PLATELET # BLD AUTO: 336 E9/L (ref 130–450)
PMV BLD AUTO: 9.7 FL (ref 7–12)
PO2 (TEMP CORRECTED): 77.1 MMHG (ref 60–80)
POC SOURCE: ABNORMAL
POTASSIUM SERPL-SCNC: 3.9 MMOL/L (ref 3.5–5)
PROT SERPL-MCNC: 6.3 G/DL (ref 6.4–8.3)
RBC # BLD AUTO: 3.69 E12/L (ref 3.5–5.5)
SODIUM SERPL-SCNC: 140 MMOL/L (ref 132–146)
TROPONIN, HIGH SENSITIVITY: 35 NG/L (ref 0–9)
TROPONIN, HIGH SENSITIVITY: 38 NG/L (ref 0–9)
WBC # BLD: 11 E9/L (ref 4.5–11.5)

## 2023-04-30 PROCEDURE — 2060000000 HC ICU INTERMEDIATE R&B

## 2023-04-30 PROCEDURE — G0378 HOSPITAL OBSERVATION PER HR: HCPCS

## 2023-04-30 PROCEDURE — 93005 ELECTROCARDIOGRAM TRACING: CPT | Performed by: EMERGENCY MEDICINE

## 2023-04-30 PROCEDURE — 82962 GLUCOSE BLOOD TEST: CPT

## 2023-04-30 PROCEDURE — 71045 X-RAY EXAM CHEST 1 VIEW: CPT

## 2023-04-30 PROCEDURE — 6370000000 HC RX 637 (ALT 250 FOR IP): Performed by: INTERNAL MEDICINE

## 2023-04-30 PROCEDURE — 2700000000 HC OXYGEN THERAPY PER DAY

## 2023-04-30 PROCEDURE — 84484 ASSAY OF TROPONIN QUANT: CPT

## 2023-04-30 PROCEDURE — 96374 THER/PROPH/DIAG INJ IV PUSH: CPT

## 2023-04-30 PROCEDURE — 80053 COMPREHEN METABOLIC PANEL: CPT

## 2023-04-30 PROCEDURE — 96376 TX/PRO/DX INJ SAME DRUG ADON: CPT

## 2023-04-30 PROCEDURE — 82803 BLOOD GASES ANY COMBINATION: CPT

## 2023-04-30 PROCEDURE — 2580000003 HC RX 258: Performed by: INTERNAL MEDICINE

## 2023-04-30 PROCEDURE — 85025 COMPLETE CBC W/AUTO DIFF WBC: CPT

## 2023-04-30 PROCEDURE — 6360000002 HC RX W HCPCS: Performed by: INTERNAL MEDICINE

## 2023-04-30 PROCEDURE — 94664 DEMO&/EVAL PT USE INHALER: CPT

## 2023-04-30 PROCEDURE — 96375 TX/PRO/DX INJ NEW DRUG ADDON: CPT

## 2023-04-30 PROCEDURE — 94640 AIRWAY INHALATION TREATMENT: CPT

## 2023-04-30 PROCEDURE — 99285 EMERGENCY DEPT VISIT HI MDM: CPT

## 2023-04-30 PROCEDURE — 0202U NFCT DS 22 TRGT SARS-COV-2: CPT

## 2023-04-30 PROCEDURE — 36415 COLL VENOUS BLD VENIPUNCTURE: CPT

## 2023-04-30 PROCEDURE — 96372 THER/PROPH/DIAG INJ SC/IM: CPT

## 2023-04-30 PROCEDURE — 83880 ASSAY OF NATRIURETIC PEPTIDE: CPT

## 2023-04-30 PROCEDURE — 6370000000 HC RX 637 (ALT 250 FOR IP): Performed by: EMERGENCY MEDICINE

## 2023-04-30 PROCEDURE — 6360000002 HC RX W HCPCS: Performed by: EMERGENCY MEDICINE

## 2023-04-30 RX ORDER — SODIUM CHLORIDE 9 MG/ML
INJECTION, SOLUTION INTRAVENOUS PRN
Status: DISCONTINUED | OUTPATIENT
Start: 2023-04-30 | End: 2023-05-02 | Stop reason: HOSPADM

## 2023-04-30 RX ORDER — BUDESONIDE 0.25 MG/2ML
0.25 INHALANT ORAL 2 TIMES DAILY
Status: DISCONTINUED | OUTPATIENT
Start: 2023-04-30 | End: 2023-05-02 | Stop reason: HOSPADM

## 2023-04-30 RX ORDER — ONDANSETRON 2 MG/ML
4 INJECTION INTRAMUSCULAR; INTRAVENOUS EVERY 6 HOURS PRN
Status: DISCONTINUED | OUTPATIENT
Start: 2023-04-30 | End: 2023-05-02 | Stop reason: HOSPADM

## 2023-04-30 RX ORDER — ACETAMINOPHEN 325 MG/1
650 TABLET ORAL EVERY 6 HOURS PRN
Status: DISCONTINUED | OUTPATIENT
Start: 2023-04-30 | End: 2023-05-02 | Stop reason: HOSPADM

## 2023-04-30 RX ORDER — PANTOPRAZOLE SODIUM 40 MG/1
40 TABLET, DELAYED RELEASE ORAL DAILY
Status: DISCONTINUED | OUTPATIENT
Start: 2023-05-01 | End: 2023-05-02 | Stop reason: HOSPADM

## 2023-04-30 RX ORDER — DEXTROSE MONOHYDRATE 100 MG/ML
INJECTION, SOLUTION INTRAVENOUS CONTINUOUS PRN
Status: DISCONTINUED | OUTPATIENT
Start: 2023-04-30 | End: 2023-05-02 | Stop reason: HOSPADM

## 2023-04-30 RX ORDER — IPRATROPIUM BROMIDE AND ALBUTEROL SULFATE 2.5; .5 MG/3ML; MG/3ML
1 SOLUTION RESPIRATORY (INHALATION)
Status: DISCONTINUED | OUTPATIENT
Start: 2023-04-30 | End: 2023-04-30

## 2023-04-30 RX ORDER — DONEPEZIL HYDROCHLORIDE 5 MG/1
5 TABLET, FILM COATED ORAL DAILY
Status: DISCONTINUED | OUTPATIENT
Start: 2023-05-01 | End: 2023-05-02 | Stop reason: HOSPADM

## 2023-04-30 RX ORDER — HYDRALAZINE HYDROCHLORIDE 50 MG/1
50 TABLET, FILM COATED ORAL EVERY 8 HOURS SCHEDULED
Status: DISCONTINUED | OUTPATIENT
Start: 2023-04-30 | End: 2023-05-02 | Stop reason: HOSPADM

## 2023-04-30 RX ORDER — POLYETHYLENE GLYCOL 3350 17 G/17G
17 POWDER, FOR SOLUTION ORAL DAILY PRN
Status: DISCONTINUED | OUTPATIENT
Start: 2023-04-30 | End: 2023-05-02 | Stop reason: HOSPADM

## 2023-04-30 RX ORDER — SODIUM CHLORIDE 0.9 % (FLUSH) 0.9 %
5-40 SYRINGE (ML) INJECTION PRN
Status: DISCONTINUED | OUTPATIENT
Start: 2023-04-30 | End: 2023-05-02 | Stop reason: HOSPADM

## 2023-04-30 RX ORDER — MECOBALAMIN 5000 MCG
5 TABLET,DISINTEGRATING ORAL NIGHTLY
Status: DISCONTINUED | OUTPATIENT
Start: 2023-04-30 | End: 2023-05-02 | Stop reason: HOSPADM

## 2023-04-30 RX ORDER — ROSUVASTATIN CALCIUM 10 MG/1
40 TABLET, COATED ORAL NIGHTLY
Status: DISCONTINUED | OUTPATIENT
Start: 2023-04-30 | End: 2023-05-02 | Stop reason: HOSPADM

## 2023-04-30 RX ORDER — METHYLPREDNISOLONE SODIUM SUCCINATE 125 MG/2ML
125 INJECTION, POWDER, LYOPHILIZED, FOR SOLUTION INTRAMUSCULAR; INTRAVENOUS ONCE
Status: COMPLETED | OUTPATIENT
Start: 2023-04-30 | End: 2023-04-30

## 2023-04-30 RX ORDER — LEFLUNOMIDE 10 MG/1
20 TABLET ORAL DAILY
Status: DISCONTINUED | OUTPATIENT
Start: 2023-05-01 | End: 2023-05-02 | Stop reason: HOSPADM

## 2023-04-30 RX ORDER — METHYLPREDNISOLONE SODIUM SUCCINATE 40 MG/ML
40 INJECTION, POWDER, LYOPHILIZED, FOR SOLUTION INTRAMUSCULAR; INTRAVENOUS EVERY 6 HOURS
Status: DISCONTINUED | OUTPATIENT
Start: 2023-04-30 | End: 2023-05-02 | Stop reason: HOSPADM

## 2023-04-30 RX ORDER — PREDNISONE 20 MG/1
40 TABLET ORAL DAILY
Status: DISCONTINUED | OUTPATIENT
Start: 2023-05-03 | End: 2023-05-02 | Stop reason: HOSPADM

## 2023-04-30 RX ORDER — ENOXAPARIN SODIUM 100 MG/ML
40 INJECTION SUBCUTANEOUS DAILY
Status: DISCONTINUED | OUTPATIENT
Start: 2023-04-30 | End: 2023-05-02 | Stop reason: HOSPADM

## 2023-04-30 RX ORDER — FOLIC ACID 1 MG/1
1 TABLET ORAL DAILY
Status: DISCONTINUED | OUTPATIENT
Start: 2023-05-01 | End: 2023-05-02 | Stop reason: HOSPADM

## 2023-04-30 RX ORDER — GABAPENTIN 300 MG/1
300 CAPSULE ORAL 3 TIMES DAILY
Status: DISCONTINUED | OUTPATIENT
Start: 2023-04-30 | End: 2023-05-02 | Stop reason: HOSPADM

## 2023-04-30 RX ORDER — ONDANSETRON 4 MG/1
4 TABLET, ORALLY DISINTEGRATING ORAL EVERY 8 HOURS PRN
Status: DISCONTINUED | OUTPATIENT
Start: 2023-04-30 | End: 2023-05-02 | Stop reason: HOSPADM

## 2023-04-30 RX ORDER — METOPROLOL SUCCINATE 50 MG/1
50 TABLET, EXTENDED RELEASE ORAL DAILY
Status: DISCONTINUED | OUTPATIENT
Start: 2023-05-01 | End: 2023-05-02 | Stop reason: HOSPADM

## 2023-04-30 RX ORDER — FUROSEMIDE 10 MG/ML
20 INJECTION INTRAMUSCULAR; INTRAVENOUS ONCE
Status: COMPLETED | OUTPATIENT
Start: 2023-04-30 | End: 2023-04-30

## 2023-04-30 RX ORDER — ARFORMOTEROL TARTRATE 15 UG/2ML
15 SOLUTION RESPIRATORY (INHALATION) 2 TIMES DAILY
Status: DISCONTINUED | OUTPATIENT
Start: 2023-04-30 | End: 2023-05-02 | Stop reason: HOSPADM

## 2023-04-30 RX ORDER — M-VIT,TX,IRON,MINS/CALC/FOLIC 27MG-0.4MG
1 TABLET ORAL
Status: DISCONTINUED | OUTPATIENT
Start: 2023-05-01 | End: 2023-05-02 | Stop reason: HOSPADM

## 2023-04-30 RX ORDER — ASPIRIN 81 MG/1
81 TABLET ORAL DAILY
Status: DISCONTINUED | OUTPATIENT
Start: 2023-05-01 | End: 2023-05-02 | Stop reason: HOSPADM

## 2023-04-30 RX ORDER — GABAPENTIN 300 MG/1
300 CAPSULE ORAL 3 TIMES DAILY
COMMUNITY

## 2023-04-30 RX ORDER — SODIUM CHLORIDE 0.9 % (FLUSH) 0.9 %
5-40 SYRINGE (ML) INJECTION EVERY 12 HOURS SCHEDULED
Status: DISCONTINUED | OUTPATIENT
Start: 2023-04-30 | End: 2023-05-02 | Stop reason: HOSPADM

## 2023-04-30 RX ORDER — IBUPROFEN 200 MG
1 TABLET ORAL EVERY 10 MIN PRN
COMMUNITY

## 2023-04-30 RX ORDER — FUROSEMIDE 10 MG/ML
40 INJECTION INTRAMUSCULAR; INTRAVENOUS DAILY
Status: COMPLETED | OUTPATIENT
Start: 2023-05-01 | End: 2023-05-02

## 2023-04-30 RX ORDER — LEVOTHYROXINE SODIUM 0.03 MG/1
25 TABLET ORAL DAILY
Status: DISCONTINUED | OUTPATIENT
Start: 2023-05-01 | End: 2023-05-02 | Stop reason: HOSPADM

## 2023-04-30 RX ORDER — VENLAFAXINE HYDROCHLORIDE 150 MG/1
150 CAPSULE, EXTENDED RELEASE ORAL
Status: DISCONTINUED | OUTPATIENT
Start: 2023-05-01 | End: 2023-05-02 | Stop reason: HOSPADM

## 2023-04-30 RX ORDER — IPRATROPIUM BROMIDE AND ALBUTEROL SULFATE 2.5; .5 MG/3ML; MG/3ML
1 SOLUTION RESPIRATORY (INHALATION) 4 TIMES DAILY
Status: DISCONTINUED | OUTPATIENT
Start: 2023-04-30 | End: 2023-05-02 | Stop reason: HOSPADM

## 2023-04-30 RX ORDER — ACETAMINOPHEN 650 MG/1
650 SUPPOSITORY RECTAL EVERY 6 HOURS PRN
Status: DISCONTINUED | OUTPATIENT
Start: 2023-04-30 | End: 2023-05-02 | Stop reason: HOSPADM

## 2023-04-30 RX ORDER — FOLIC ACID 1 MG/1
1 TABLET ORAL DAILY
COMMUNITY

## 2023-04-30 RX ORDER — IPRATROPIUM BROMIDE AND ALBUTEROL SULFATE 2.5; .5 MG/3ML; MG/3ML
3 SOLUTION RESPIRATORY (INHALATION) ONCE
Status: COMPLETED | OUTPATIENT
Start: 2023-04-30 | End: 2023-04-30

## 2023-04-30 RX ADMIN — HYDRALAZINE HYDROCHLORIDE 50 MG: 50 TABLET, FILM COATED ORAL at 22:03

## 2023-04-30 RX ADMIN — ARFORMOTEROL TARTRATE 15 MCG: 15 SOLUTION RESPIRATORY (INHALATION) at 21:26

## 2023-04-30 RX ADMIN — BUDESONIDE 250 MCG: 0.25 SUSPENSION RESPIRATORY (INHALATION) at 21:26

## 2023-04-30 RX ADMIN — METHYLPREDNISOLONE SODIUM SUCCINATE 125 MG: 125 INJECTION, POWDER, FOR SOLUTION INTRAMUSCULAR; INTRAVENOUS at 12:46

## 2023-04-30 RX ADMIN — Medication 5 MG: at 22:03

## 2023-04-30 RX ADMIN — METHYLPREDNISOLONE SODIUM SUCCINATE 40 MG: 40 INJECTION, POWDER, FOR SOLUTION INTRAMUSCULAR; INTRAVENOUS at 22:03

## 2023-04-30 RX ADMIN — IPRATROPIUM BROMIDE AND ALBUTEROL SULFATE 3 AMPULE: .5; 2.5 SOLUTION RESPIRATORY (INHALATION) at 12:55

## 2023-04-30 RX ADMIN — SODIUM CHLORIDE, PRESERVATIVE FREE 10 ML: 5 INJECTION INTRAVENOUS at 22:04

## 2023-04-30 RX ADMIN — IPRATROPIUM BROMIDE AND ALBUTEROL SULFATE 3 ML: .5; 2.5 SOLUTION RESPIRATORY (INHALATION) at 21:26

## 2023-04-30 RX ADMIN — IPRATROPIUM BROMIDE AND ALBUTEROL SULFATE 1 AMPULE: .5; 3 SOLUTION RESPIRATORY (INHALATION) at 15:14

## 2023-04-30 RX ADMIN — ROSUVASTATIN 40 MG: 10 TABLET, FILM COATED ORAL at 22:03

## 2023-04-30 RX ADMIN — FUROSEMIDE 20 MG: 10 INJECTION INTRAMUSCULAR; INTRAVENOUS at 14:40

## 2023-04-30 RX ADMIN — GABAPENTIN 300 MG: 300 CAPSULE ORAL at 22:03

## 2023-04-30 RX ADMIN — ENOXAPARIN SODIUM 40 MG: 100 INJECTION SUBCUTANEOUS at 22:04

## 2023-04-30 ASSESSMENT — PAIN SCALES - GENERAL: PAINLEVEL_OUTOF10: 0

## 2023-05-01 LAB
ANION GAP SERPL CALCULATED.3IONS-SCNC: 10 MMOL/L (ref 7–16)
B PARAP IS1001 DNA NPH QL NAA+NON-PROBE: NOT DETECTED
B PERT.PT PRMT NPH QL NAA+NON-PROBE: NOT DETECTED
BASOPHILS # BLD: 0.02 E9/L (ref 0–0.2)
BASOPHILS NFR BLD: 0.2 % (ref 0–2)
BUN SERPL-MCNC: 16 MG/DL (ref 6–23)
C PNEUM DNA NPH QL NAA+NON-PROBE: NOT DETECTED
CALCIUM SERPL-MCNC: 8.9 MG/DL (ref 8.6–10.2)
CHLORIDE SERPL-SCNC: 95 MMOL/L (ref 98–107)
CO2 SERPL-SCNC: 33 MMOL/L (ref 22–29)
CREAT SERPL-MCNC: 0.9 MG/DL (ref 0.5–1)
EOSINOPHIL # BLD: 0 E9/L (ref 0.05–0.5)
EOSINOPHIL NFR BLD: 0 % (ref 0–6)
ERYTHROCYTE [DISTWIDTH] IN BLOOD BY AUTOMATED COUNT: 13.8 FL (ref 11.5–15)
FLUAV RNA NPH QL NAA+NON-PROBE: NOT DETECTED
FLUBV RNA NPH QL NAA+NON-PROBE: NOT DETECTED
GLUCOSE SERPL-MCNC: 184 MG/DL (ref 74–99)
HADV DNA NPH QL NAA+NON-PROBE: NOT DETECTED
HBA1C MFR BLD: 8.3 % (ref 4–5.6)
HCOV 229E RNA NPH QL NAA+NON-PROBE: NOT DETECTED
HCOV HKU1 RNA NPH QL NAA+NON-PROBE: NOT DETECTED
HCOV NL63 RNA NPH QL NAA+NON-PROBE: NOT DETECTED
HCOV OC43 RNA NPH QL NAA+NON-PROBE: NOT DETECTED
HCT VFR BLD AUTO: 35.2 % (ref 34–48)
HGB BLD-MCNC: 10.6 G/DL (ref 11.5–15.5)
HMPV RNA NPH QL NAA+NON-PROBE: NOT DETECTED
HPIV1 RNA NPH QL NAA+NON-PROBE: NOT DETECTED
HPIV2 RNA NPH QL NAA+NON-PROBE: NOT DETECTED
HPIV3 RNA NPH QL NAA+NON-PROBE: NOT DETECTED
HPIV4 RNA NPH QL NAA+NON-PROBE: NOT DETECTED
IMM GRANULOCYTES # BLD: 0.04 E9/L
IMM GRANULOCYTES NFR BLD: 0.4 % (ref 0–5)
LYMPHOCYTES # BLD: 1.13 E9/L (ref 1.5–4)
LYMPHOCYTES NFR BLD: 12 % (ref 20–42)
M PNEUMO DNA NPH QL NAA+NON-PROBE: NOT DETECTED
MCH RBC QN AUTO: 28.8 PG (ref 26–35)
MCHC RBC AUTO-ENTMCNC: 30.1 % (ref 32–34.5)
MCV RBC AUTO: 95.7 FL (ref 80–99.9)
METER GLUCOSE: 171 MG/DL (ref 74–99)
METER GLUCOSE: 223 MG/DL (ref 74–99)
METER GLUCOSE: 254 MG/DL (ref 74–99)
METER GLUCOSE: 262 MG/DL (ref 74–99)
MONOCYTES # BLD: 0.82 E9/L (ref 0.1–0.95)
MONOCYTES NFR BLD: 8.7 % (ref 2–12)
NEUTROPHILS # BLD: 7.43 E9/L (ref 1.8–7.3)
NEUTS SEG NFR BLD: 78.7 % (ref 43–80)
PLATELET # BLD AUTO: 361 E9/L (ref 130–450)
PMV BLD AUTO: 10.2 FL (ref 7–12)
POTASSIUM SERPL-SCNC: 3.9 MMOL/L (ref 3.5–5)
PROCALCITONIN: 0.07 NG/ML (ref 0–0.08)
RBC # BLD AUTO: 3.68 E12/L (ref 3.5–5.5)
RSV RNA NPH QL NAA+NON-PROBE: NOT DETECTED
RV+EV RNA NPH QL NAA+NON-PROBE: NOT DETECTED
SARS-COV-2 RNA NPH QL NAA+NON-PROBE: NOT DETECTED
SODIUM SERPL-SCNC: 138 MMOL/L (ref 132–146)
WBC # BLD: 9.4 E9/L (ref 4.5–11.5)

## 2023-05-01 PROCEDURE — 80048 BASIC METABOLIC PNL TOTAL CA: CPT

## 2023-05-01 PROCEDURE — G0378 HOSPITAL OBSERVATION PER HR: HCPCS

## 2023-05-01 PROCEDURE — 2700000000 HC OXYGEN THERAPY PER DAY

## 2023-05-01 PROCEDURE — 2580000003 HC RX 258: Performed by: INTERNAL MEDICINE

## 2023-05-01 PROCEDURE — 6360000002 HC RX W HCPCS: Performed by: INTERNAL MEDICINE

## 2023-05-01 PROCEDURE — 6370000000 HC RX 637 (ALT 250 FOR IP): Performed by: NURSE PRACTITIONER

## 2023-05-01 PROCEDURE — 36415 COLL VENOUS BLD VENIPUNCTURE: CPT

## 2023-05-01 PROCEDURE — 97161 PT EVAL LOW COMPLEX 20 MIN: CPT | Performed by: PHYSICAL THERAPIST

## 2023-05-01 PROCEDURE — 84145 PROCALCITONIN (PCT): CPT

## 2023-05-01 PROCEDURE — 94640 AIRWAY INHALATION TREATMENT: CPT

## 2023-05-01 PROCEDURE — 97165 OT EVAL LOW COMPLEX 30 MIN: CPT

## 2023-05-01 PROCEDURE — 2060000000 HC ICU INTERMEDIATE R&B

## 2023-05-01 PROCEDURE — 96372 THER/PROPH/DIAG INJ SC/IM: CPT

## 2023-05-01 PROCEDURE — 6370000000 HC RX 637 (ALT 250 FOR IP): Performed by: INTERNAL MEDICINE

## 2023-05-01 PROCEDURE — 82962 GLUCOSE BLOOD TEST: CPT

## 2023-05-01 PROCEDURE — 96376 TX/PRO/DX INJ SAME DRUG ADON: CPT

## 2023-05-01 PROCEDURE — 83036 HEMOGLOBIN GLYCOSYLATED A1C: CPT

## 2023-05-01 PROCEDURE — 99223 1ST HOSP IP/OBS HIGH 75: CPT | Performed by: INTERNAL MEDICINE

## 2023-05-01 PROCEDURE — 85025 COMPLETE CBC W/AUTO DIFF WBC: CPT

## 2023-05-01 PROCEDURE — 97535 SELF CARE MNGMENT TRAINING: CPT

## 2023-05-01 PROCEDURE — 97530 THERAPEUTIC ACTIVITIES: CPT | Performed by: PHYSICAL THERAPIST

## 2023-05-01 RX ORDER — FUROSEMIDE 40 MG/1
40 TABLET ORAL ONCE
Status: COMPLETED | OUTPATIENT
Start: 2023-05-01 | End: 2023-05-01

## 2023-05-01 RX ADMIN — SODIUM CHLORIDE, PRESERVATIVE FREE 10 ML: 5 INJECTION INTRAVENOUS at 20:50

## 2023-05-01 RX ADMIN — ENOXAPARIN SODIUM 40 MG: 100 INJECTION SUBCUTANEOUS at 20:49

## 2023-05-01 RX ADMIN — ARFORMOTEROL TARTRATE 15 MCG: 15 SOLUTION RESPIRATORY (INHALATION) at 19:03

## 2023-05-01 RX ADMIN — BUDESONIDE 250 MCG: 0.25 SUSPENSION RESPIRATORY (INHALATION) at 19:03

## 2023-05-01 RX ADMIN — Medication 5 MG: at 20:49

## 2023-05-01 RX ADMIN — SODIUM CHLORIDE, PRESERVATIVE FREE 10 ML: 5 INJECTION INTRAVENOUS at 08:22

## 2023-05-01 RX ADMIN — PANTOPRAZOLE SODIUM 40 MG: 40 TABLET, DELAYED RELEASE ORAL at 08:22

## 2023-05-01 RX ADMIN — METHYLPREDNISOLONE SODIUM SUCCINATE 40 MG: 40 INJECTION, POWDER, FOR SOLUTION INTRAMUSCULAR; INTRAVENOUS at 04:07

## 2023-05-01 RX ADMIN — GABAPENTIN 300 MG: 300 CAPSULE ORAL at 20:50

## 2023-05-01 RX ADMIN — METOPROLOL SUCCINATE 50 MG: 50 TABLET, EXTENDED RELEASE ORAL at 08:22

## 2023-05-01 RX ADMIN — BUDESONIDE 250 MCG: 0.25 SUSPENSION RESPIRATORY (INHALATION) at 06:45

## 2023-05-01 RX ADMIN — FOLIC ACID 1 MG: 1 TABLET ORAL at 08:22

## 2023-05-01 RX ADMIN — LEVOTHYROXINE SODIUM 25 MCG: 25 TABLET ORAL at 06:21

## 2023-05-01 RX ADMIN — METHYLPREDNISOLONE SODIUM SUCCINATE 40 MG: 40 INJECTION, POWDER, FOR SOLUTION INTRAMUSCULAR; INTRAVENOUS at 20:49

## 2023-05-01 RX ADMIN — IPRATROPIUM BROMIDE AND ALBUTEROL SULFATE 3 ML: .5; 2.5 SOLUTION RESPIRATORY (INHALATION) at 19:04

## 2023-05-01 RX ADMIN — HYDRALAZINE HYDROCHLORIDE 50 MG: 50 TABLET, FILM COATED ORAL at 20:50

## 2023-05-01 RX ADMIN — METHYLPREDNISOLONE SODIUM SUCCINATE 40 MG: 40 INJECTION, POWDER, FOR SOLUTION INTRAMUSCULAR; INTRAVENOUS at 14:18

## 2023-05-01 RX ADMIN — FUROSEMIDE 40 MG: 10 INJECTION, SOLUTION INTRAMUSCULAR; INTRAVENOUS at 08:22

## 2023-05-01 RX ADMIN — GABAPENTIN 300 MG: 300 CAPSULE ORAL at 14:18

## 2023-05-01 RX ADMIN — VENLAFAXINE HYDROCHLORIDE 150 MG: 150 CAPSULE, EXTENDED RELEASE ORAL at 08:22

## 2023-05-01 RX ADMIN — GABAPENTIN 300 MG: 300 CAPSULE ORAL at 08:22

## 2023-05-01 RX ADMIN — IPRATROPIUM BROMIDE AND ALBUTEROL SULFATE 3 ML: .5; 2.5 SOLUTION RESPIRATORY (INHALATION) at 06:45

## 2023-05-01 RX ADMIN — FUROSEMIDE 40 MG: 40 TABLET ORAL at 12:17

## 2023-05-01 RX ADMIN — ROSUVASTATIN 40 MG: 10 TABLET, FILM COATED ORAL at 20:49

## 2023-05-01 RX ADMIN — ARFORMOTEROL TARTRATE 15 MCG: 15 SOLUTION RESPIRATORY (INHALATION) at 06:45

## 2023-05-01 RX ADMIN — HYDRALAZINE HYDROCHLORIDE 50 MG: 50 TABLET, FILM COATED ORAL at 06:21

## 2023-05-01 RX ADMIN — METHYLPREDNISOLONE SODIUM SUCCINATE 40 MG: 40 INJECTION, POWDER, FOR SOLUTION INTRAMUSCULAR; INTRAVENOUS at 08:32

## 2023-05-01 RX ADMIN — ASPIRIN 81 MG: 81 TABLET, COATED ORAL at 08:22

## 2023-05-01 RX ADMIN — DONEPEZIL HYDROCHLORIDE 5 MG: 5 TABLET, FILM COATED ORAL at 08:22

## 2023-05-01 RX ADMIN — MULTIPLE VITAMINS W/ MINERALS TAB 1 TABLET: TAB at 12:17

## 2023-05-01 RX ADMIN — HYDRALAZINE HYDROCHLORIDE 50 MG: 50 TABLET, FILM COATED ORAL at 12:17

## 2023-05-01 RX ADMIN — LEFLUNOMIDE 20 MG: 10 TABLET ORAL at 08:32

## 2023-05-01 ASSESSMENT — PAIN SCALES - GENERAL
PAINLEVEL_OUTOF10: 0
PAINLEVEL_OUTOF10: 0

## 2023-05-01 NOTE — PLAN OF CARE
Patient's chart updated to reflect:        - HF discharge instructions.  -Orders: 2 gram sodium diet, daily weights, I/O.  -PCP and cardiology follow up appointments to be scheduled within 7 days of hospital discharge. -CHF education session will be provided to the patient prior to hospital discharge.     Meghan Coppola, RN MSN,RN  Heart Failure Navigator

## 2023-05-01 NOTE — PLAN OF CARE
Problem: Discharge Planning  Goal: Discharge to home or other facility with appropriate resources  5/1/2023 0129 by Oksana Bryan RN  Outcome: Progressing  4/30/2023 1710 by Tutu Ruff, RN  Outcome: Progressing  Flowsheets (Taken 4/30/2023 1613)  Discharge to home or other facility with appropriate resources: Identify barriers to discharge with patient and caregiver     Problem: Safety - Adult  Goal: Free from fall injury  5/1/2023 0129 by Oksana Bryan RN  Outcome: Progressing  4/30/2023 1710 by Tutu Ruff, RN  Outcome: Progressing

## 2023-05-01 NOTE — DISCHARGE INSTRUCTIONS
YOU ARE BEING DISCHARGED HOME. PLEASE MAKE AND KEEP YOUR FOLLOW UP APPOINTMENTS. Your information:  Name: Jay Ha  : 1955    Your instructions:    IF YOU EXPERIENCE ANY OF THE FOLLOWING SYMPTOMS, CHEST PAIN, SHORTNESS OF BREATH, COUGHING UP BLOOD OR BLOODY SPUTUM, STOMACH PAIN OR CRAMPING, DARK, TARRY STOOLS, LOSS OF APPETITE, GENERAL NOT FEELING WELL, SIGNS AND SYMPTOMS OF INFECTION LIKE FEVER AND OR CHILLS, PLEASE CALL DR TAPIA OR RETURN TO THE EMERGENCY ROOM. What to do after you leave the hospital:    Recommended diet: diabetic diet    Recommended activity: activity as tolerated        The following personal items were collected during your admission and were returned to you:    Belongings  Dental Appliances: Sent home  Vision - Corrective Lenses: Eyeglasses  Hearing Aid: None  Clothing: Pants, Pajamas, Footwear, Socks, Sweater, Undergarments  Jewelry: Ring, Necklace  Body Piercings Removed: N/A  Electronic Devices: Cell Phone,   Weapons (Notify Protective Services/Security): None  Other Valuables: Sent home  Home Medications: Sent home  Valuables Given To: Family (Comment)  Provide Name(s) of Who Valuable(s) Were Given To: nancy  Responsible person(s) in the waiting room: nancy  Patient approves for provider to speak to responsible person post operatively: Yes    Information obtained by:  By signing below, I understand that if any problems occur once I leave the hospital I am to contact [facility]. I understand and acknowledge receipt of the instructions indicated above. HEART FAILURE  / CONGESTIVE HEART FAILURE  DISCHARGE INSTRUCTIONS:  GUIDELINES TO FOLLOW AT HOME    Self- Managed Care:     MEDICATIONS:  Take your medication as directed. If you are experiencing any side effects, inform your doctor, Do not stop taking any of your medications without letting your doctor know.    Check with your doctor before taking any over-the-counter medications / herbal /

## 2023-05-01 NOTE — CARE COORDINATION
Case Management Assessment  Initial Evaluation    Date/Time of Evaluation: 5/1/2023 4:00 PM  Assessment Completed by: JER Quevedo    If patient is discharged prior to next notation, then this note serves as note for discharge by case management. Patient Name: Rose Marie Gutierres                   YOB: 1955  Diagnosis: Acute exacerbation of chronic obstructive pulmonary disease (COPD) ([facility] [J44.1]  Hyperglycemia [R73.9]  Elevated troponin [R77.8]  Acute on chronic congestive heart failure, unspecified heart failure type ([facility] [I50.9]                   Date / Time: 4/30/2023 12:24 PM    Patient Admission Status: Inpatient   Readmission Risk (Low < 19, Mod (19-27), High > 27): Readmission Risk Score: 11.1    Current PCP: Rj Olmstead MD  PCP verified by CM? Yes    Chart Reviewed: Yes      History Provided by: Patient  Patient Orientation: Alert and Oriented    Patient Cognition: Alert    Hospitalization in the last 30 days (Readmission):  No    If yes, Readmission Assessment in  Navigator will be completed. Advance Directives:      Code Status: Full Code   Patient's Primary Decision Maker is: Legal Next of Kin    Primary Decision Maker: [place] Ammye - Spouse - [phone]    Secondary Decision Maker: Jayashree Artis Child - [phone]    Discharge Planning:    Patient lives with: Spouse/Significant Other Type of Home: House  Primary Care Giver: Self  Patient Support Systems include: Spouse/Significant Other   Current Financial resources:    Current community resources:    Current services prior to admission: Oxygen Therapy            Current DME:              Type of Home Care services:  None    ADLS  Prior functional level:  Shopping, [address], [address] [facility], Assistance with the following: (spouse does homemaking chores)  Current functional level: Cooking, Housework, Shopping, Assistance with the following:    PT AM-PAC: 21 /24  OT AM-PAC: 23 /24    Family can provide assistance at DC:

## 2023-05-01 NOTE — H&P
History and Physical      CHIEF COMPLAINT:  Shortness of Breath (SOB x 3 days, Has been wearing oxygen  as needed at home on 2 L)    History Obtained From:  patient, spouse, electronic medical record    HISTORY OF PRESENT ILLNESS:    The patient is a 75 y.o. female who presents with complaints of shortness of breath worsened over the past 3 days. Patient states she also had a cough for the past 3 days as well. She did start developing lower extremity edema yesterday so she came to the ED today. Work-up in ED found the patient be in exacerbation of COPD but also CHF was found on x-ray  Patient is not bringing up any mucus with her cough. No associated chest pain, fevers or chills. Patient states she has not seen her cardiologist for over 2 years    Past Medical History:    Past Medical History:   Diagnosis Date    Arthritis     Asthma     Cerebral artery occlusion with cerebral infarction ([facility] 2015    COPD (chronic obstructive pulmonary disease) ([facility]     Diabetes mellitus ([facility]     Heart attack ([facility]     Hyperlipidemia     Hypertension     VHD (valvular heart disease)      Past Surgical History:    Past Surgical History:   Procedure Laterality Date    CHOLECYSTECTOMY      COLONOSCOPY  12/2018    HYSTERECTOMY (CERVIX STATUS UNKNOWN)      HYSTERECTOMY, VAGINAL      UPPER GASTROINTESTINAL ENDOSCOPY  12/2018     Medications Prior to Admission:    Medications Prior to Admission: Insulin Pump - insulin lispro, Inject into the skin continuous Insulin-to-Carb Ratio (ICR): Insulin Sensitivity Factor (ISF):  mg/dL per unit of insulin Target Blood Glucose:  mg/dL Bolus Frequency:  MAX DOSE 50 UNITS  folic acid (FOLVITE) 1 MG tablet, Take 1 tablet by mouth daily  gabapentin (NEURONTIN) 300 MG capsule, Take 1 capsule by mouth 3 times daily.   OXYGEN, Inhale 2 L into the lungs daily as needed for Shortness of Breath  neomycin-bacitracin-polymyxin (NEOSPORIN) 5-400-5000 ointment, Apply 1 each topically every 10 minutes as

## 2023-05-01 NOTE — PLAN OF CARE
Problem: Discharge Planning  Goal: Discharge to home or other facility with appropriate resources  5/1/2023 1034 by Sanket Schmidt RN  Outcome: Progressing     Problem: Safety - Adult  Goal: Free from fall injury  5/1/2023 1034 by Sanket Schmidt RN  Outcome: Progressing     Problem: Neurosensory - Adult  Goal: Achieves stable or improved neurological status  Outcome: Progressing     Problem: Neurosensory - Adult  Goal: Achieves maximal functionality and self care  Outcome: Progressing     Problem: Respiratory - Adult  Goal: Achieves optimal ventilation and oxygenation  Outcome: Progressing     Problem: Cardiovascular - Adult  Goal: Maintains optimal cardiac output and hemodynamic stability  Outcome: Progressing     Problem: Skin/Tissue Integrity - Adult  Goal: Skin integrity remains intact  Outcome: Progressing     Problem: Skin/Tissue Integrity - Adult  Goal: Skin integrity remains intact  Outcome: Progressing     Problem: Musculoskeletal - Adult  Goal: Return mobility to safest level of function  Outcome: Progressing     Problem: Gastrointestinal - Adult  Goal: Maintains adequate nutritional intake  Outcome: Progressing     Problem: Genitourinary - Adult  Goal: Absence of urinary retention  Outcome: Progressing     Problem: Infection - Adult  Goal: Absence of infection during hospitalization  Outcome: Progressing     Problem: Infection - Adult  Goal: Absence of infection at discharge  Outcome: Progressing     Problem: Metabolic/Fluid and Electrolytes - Adult  Goal: Glucose maintained within prescribed range  Outcome: Progressing     Problem: Metabolic/Fluid and Electrolytes - Adult  Goal: Electrolytes maintained within normal limits  Outcome: Progressing     Problem: Hematologic - Adult  Goal: Maintains hematologic stability  Outcome: Progressing     Problem: Pain  Goal: Verbalizes/displays adequate comfort level or baseline comfort level  Outcome: Progressing

## 2023-05-02 ENCOUNTER — APPOINTMENT (OUTPATIENT)
Dept: NON INVASIVE DIAGNOSTICS | Age: 68
DRG: 291 | End: 2023-05-02
Payer: MEDICARE

## 2023-05-02 ENCOUNTER — APPOINTMENT (OUTPATIENT)
Dept: NUCLEAR MEDICINE | Age: 68
DRG: 291 | End: 2023-05-02
Payer: MEDICARE

## 2023-05-02 VITALS
HEIGHT: 65 IN | WEIGHT: 148.1 LBS | BODY MASS INDEX: 24.68 KG/M2 | SYSTOLIC BLOOD PRESSURE: 137 MMHG | HEART RATE: 83 BPM | DIASTOLIC BLOOD PRESSURE: 66 MMHG | RESPIRATION RATE: 20 BRPM | TEMPERATURE: 98.8 F | OXYGEN SATURATION: 97 %

## 2023-05-02 LAB
ANION GAP SERPL CALCULATED.3IONS-SCNC: 9 MMOL/L (ref 7–16)
BUN SERPL-MCNC: 23 MG/DL (ref 6–23)
CALCIUM SERPL-MCNC: 8.9 MG/DL (ref 8.6–10.2)
CHLORIDE SERPL-SCNC: 92 MMOL/L (ref 98–107)
CO2 SERPL-SCNC: 39 MMOL/L (ref 22–29)
CREAT SERPL-MCNC: 1.2 MG/DL (ref 0.5–1)
EKG ATRIAL RATE: 93 BPM
EKG P AXIS: 51 DEGREES
EKG P-R INTERVAL: 138 MS
EKG Q-T INTERVAL: 334 MS
EKG QRS DURATION: 70 MS
EKG QTC CALCULATION (BAZETT): 415 MS
EKG R AXIS: 19 DEGREES
EKG T AXIS: 110 DEGREES
EKG VENTRICULAR RATE: 93 BPM
GLUCOSE SERPL-MCNC: 124 MG/DL (ref 74–99)
LV EF: 38 %
LV EF: 40 %
LVEF MODALITY: NORMAL
LVEF MODALITY: NORMAL
METER GLUCOSE: 108 MG/DL (ref 74–99)
METER GLUCOSE: 174 MG/DL (ref 74–99)
POTASSIUM SERPL-SCNC: 3.6 MMOL/L (ref 3.5–5)
SODIUM SERPL-SCNC: 140 MMOL/L (ref 132–146)

## 2023-05-02 PROCEDURE — 97530 THERAPEUTIC ACTIVITIES: CPT

## 2023-05-02 PROCEDURE — 93018 CV STRESS TEST I&R ONLY: CPT | Performed by: INTERNAL MEDICINE

## 2023-05-02 PROCEDURE — 93017 CV STRESS TEST TRACING ONLY: CPT

## 2023-05-02 PROCEDURE — 94640 AIRWAY INHALATION TREATMENT: CPT

## 2023-05-02 PROCEDURE — G0378 HOSPITAL OBSERVATION PER HR: HCPCS

## 2023-05-02 PROCEDURE — 6360000002 HC RX W HCPCS: Performed by: INTERNAL MEDICINE

## 2023-05-02 PROCEDURE — 36415 COLL VENOUS BLD VENIPUNCTURE: CPT

## 2023-05-02 PROCEDURE — 3430000000 HC RX DIAGNOSTIC RADIOPHARMACEUTICAL: Performed by: RADIOLOGY

## 2023-05-02 PROCEDURE — 80048 BASIC METABOLIC PNL TOTAL CA: CPT

## 2023-05-02 PROCEDURE — 93308 TTE F-UP OR LMTD: CPT

## 2023-05-02 PROCEDURE — 6370000000 HC RX 637 (ALT 250 FOR IP): Performed by: INTERNAL MEDICINE

## 2023-05-02 PROCEDURE — 96376 TX/PRO/DX INJ SAME DRUG ADON: CPT

## 2023-05-02 PROCEDURE — 78452 HT MUSCLE IMAGE SPECT MULT: CPT

## 2023-05-02 PROCEDURE — A9500 TC99M SESTAMIBI: HCPCS | Performed by: RADIOLOGY

## 2023-05-02 PROCEDURE — 93010 ELECTROCARDIOGRAM REPORT: CPT | Performed by: INTERNAL MEDICINE

## 2023-05-02 PROCEDURE — 2700000000 HC OXYGEN THERAPY PER DAY

## 2023-05-02 PROCEDURE — 2580000003 HC RX 258: Performed by: INTERNAL MEDICINE

## 2023-05-02 PROCEDURE — 82962 GLUCOSE BLOOD TEST: CPT

## 2023-05-02 PROCEDURE — 99233 SBSQ HOSP IP/OBS HIGH 50: CPT | Performed by: INTERNAL MEDICINE

## 2023-05-02 PROCEDURE — 78452 HT MUSCLE IMAGE SPECT MULT: CPT | Performed by: INTERNAL MEDICINE

## 2023-05-02 PROCEDURE — 97110 THERAPEUTIC EXERCISES: CPT

## 2023-05-02 PROCEDURE — 93016 CV STRESS TEST SUPVJ ONLY: CPT | Performed by: INTERNAL MEDICINE

## 2023-05-02 RX ORDER — VARENICLINE TARTRATE 0.5 MG/1
.5-1 TABLET, FILM COATED ORAL SEE ADMIN INSTRUCTIONS
Qty: 57 TABLET | Refills: 0 | Status: SHIPPED | OUTPATIENT
Start: 2023-05-02

## 2023-05-02 RX ORDER — TETRAKIS(2-METHOXYISOBUTYLISOCYANIDE)COPPER(I) TETRAFLUOROBORATE 1 MG/ML
30 INJECTION, POWDER, LYOPHILIZED, FOR SOLUTION INTRAVENOUS
Status: COMPLETED | OUTPATIENT
Start: 2023-05-02 | End: 2023-05-02

## 2023-05-02 RX ORDER — FUROSEMIDE 40 MG/1
40 TABLET ORAL DAILY
Status: DISCONTINUED | OUTPATIENT
Start: 2023-05-03 | End: 2023-05-02 | Stop reason: HOSPADM

## 2023-05-02 RX ORDER — FUROSEMIDE 40 MG/1
40 TABLET ORAL DAILY
Qty: 30 TABLET | Refills: 0 | Status: SHIPPED | OUTPATIENT
Start: 2023-05-03

## 2023-05-02 RX ORDER — METHYLPREDNISOLONE 4 MG/1
TABLET ORAL
Qty: 1 KIT | Refills: 0 | Status: SHIPPED | OUTPATIENT
Start: 2023-05-02 | End: 2023-05-08

## 2023-05-02 RX ORDER — TETRAKIS(2-METHOXYISOBUTYLISOCYANIDE)COPPER(I) TETRAFLUOROBORATE 1 MG/ML
10 INJECTION, POWDER, LYOPHILIZED, FOR SOLUTION INTRAVENOUS
Status: COMPLETED | OUTPATIENT
Start: 2023-05-02 | End: 2023-05-02

## 2023-05-02 RX ADMIN — METOPROLOL SUCCINATE 50 MG: 50 TABLET, EXTENDED RELEASE ORAL at 12:59

## 2023-05-02 RX ADMIN — REGADENOSON 0.4 MG: 0.08 INJECTION, SOLUTION INTRAVENOUS at 10:05

## 2023-05-02 RX ADMIN — IPRATROPIUM BROMIDE AND ALBUTEROL SULFATE 3 ML: .5; 2.5 SOLUTION RESPIRATORY (INHALATION) at 06:41

## 2023-05-02 RX ADMIN — METHYLPREDNISOLONE SODIUM SUCCINATE 40 MG: 40 INJECTION, POWDER, FOR SOLUTION INTRAMUSCULAR; INTRAVENOUS at 04:01

## 2023-05-02 RX ADMIN — Medication 10 MILLICURIE: at 07:44

## 2023-05-02 RX ADMIN — LEFLUNOMIDE 20 MG: 10 TABLET ORAL at 13:13

## 2023-05-02 RX ADMIN — Medication 30 MILLICURIE: at 11:32

## 2023-05-02 RX ADMIN — MULTIPLE VITAMINS W/ MINERALS TAB 1 TABLET: TAB at 12:59

## 2023-05-02 RX ADMIN — ARFORMOTEROL TARTRATE 15 MCG: 15 SOLUTION RESPIRATORY (INHALATION) at 06:41

## 2023-05-02 RX ADMIN — ASPIRIN 81 MG: 81 TABLET, COATED ORAL at 12:58

## 2023-05-02 RX ADMIN — GABAPENTIN 300 MG: 300 CAPSULE ORAL at 12:58

## 2023-05-02 RX ADMIN — METHYLPREDNISOLONE SODIUM SUCCINATE 40 MG: 40 INJECTION, POWDER, FOR SOLUTION INTRAMUSCULAR; INTRAVENOUS at 14:41

## 2023-05-02 RX ADMIN — SODIUM CHLORIDE, PRESERVATIVE FREE 10 ML: 5 INJECTION INTRAVENOUS at 13:01

## 2023-05-02 RX ADMIN — BUDESONIDE 250 MCG: 0.25 SUSPENSION RESPIRATORY (INHALATION) at 06:41

## 2023-05-02 RX ADMIN — HYDRALAZINE HYDROCHLORIDE 50 MG: 50 TABLET, FILM COATED ORAL at 12:59

## 2023-05-02 RX ADMIN — DONEPEZIL HYDROCHLORIDE 5 MG: 5 TABLET, FILM COATED ORAL at 12:59

## 2023-05-02 RX ADMIN — FUROSEMIDE 40 MG: 10 INJECTION, SOLUTION INTRAMUSCULAR; INTRAVENOUS at 13:00

## 2023-05-02 RX ADMIN — IPRATROPIUM BROMIDE AND ALBUTEROL SULFATE 3 ML: .5; 2.5 SOLUTION RESPIRATORY (INHALATION) at 14:19

## 2023-05-02 ASSESSMENT — PAIN SCALES - GENERAL: PAINLEVEL_OUTOF10: 0

## 2023-05-02 NOTE — CARE COORDINATION
SOCIAL WORK / DISCHARGE PLANNING:   Pt had stress test today, await results. Dc plan is to return home with spouse. Barbara Lancecarlos eduardoprasadjeremie Pt is ambulatory ad garrick, has been wearing O2 continously. Pratik called spouse to discuss. He states pt has [employer] and does have portable tanks but none have a regulator. Sw called [employer] rep and request portable tank be delivered to take home. She is going to try to have one delivered today if possible but it may be tomorrow. Denies any other dc needs.              Electronically signed by JER Teixeira on 5/2/2023 at 4:20 PM

## 2023-05-02 NOTE — CONSULTS
CHF NURSE NAVIGATOR CONSULT NOTE:      Patient currently admitted with diagnosis of HFpEF heart failure. Patient was awake and alert sitting in the chair during the consultation. She was slightly SOB after taking off her 02 and is awaiting a respiratory treatment. She was engaged and asked appropriate questions throughout the education session. She is agreeable to symptom monitoring, daily weights, and following a low sodium diet. Scheduling with PCP(5/11 at 12:45), [facility] APRN and the CHF clinic Yes. Future Appointments   Date Time Provider Sudhakar Sullivan   5/15/2023  7:45 AM SHELBIE Ayala - CNP [facility]   5/23/2023 10:00 AM [facility]       Barriers to Care:  Contributing risk factors for Heart Failure are identified as multiple comorbidites- HTN, VHD, DM type II, Obesity, COPD, HLD, smoker, PAD, Anemia. Pt admitted after seen in ED with SOB, WOODS and leg swelling. States that PTA she had been wearing home 02 more frequently. Follows with Dr. Nathan Ford     Daily wt- Pt does have a working scale,   Diet- pt's  does the cooking and shopping. Low sodium diet reviewed. Denies any problems obtaining or taking medications. Denies any issues obtaing transportation to appointments. HF education book, HF zones with weight tracker and CHF clinic information reviewed and given to patient. Pt is interested in establishing with the CHF clinic. The patient is ordered:  Diet: ADULT DIET; Regular; 4 carb choices (60 gm/meal);  Low Sodium (2 gm)   Sodium controlled diet Yes  Fluid restriction daily ordered (fluid restriction recommended if patient is hyponatremic and/or diuretic is initiated or increased) No  FR:   Daily Weights: Patient Vitals for the past 96 hrs (Last 3 readings):   Weight   04/30/23 2215 151 lb 3.2 oz (68.6 kg)   04/30/23 1232 149 lb (67.6 kg)     I/O:   Intake/Output Summary (Last 24 hours) at 5/1/2023 1514  Last data
Nutrition Education    Educated on Plate Method for DM, Low Sodium diet, meal planning and label reading, Sodium free flavoring tips. Learners: Patient  Readiness: Acceptance  Method: Explanation, Demonstration, and Handout  Response: Verbalizes Understanding  Contact name and number provided.     Steffany Lacy RD  Contact Number:  
Daily    pantoprazole  40 mg Oral Daily    leflunomide  20 mg Oral Daily    levothyroxine  25 mcg Oral Daily    metoprolol succinate  50 mg Oral Daily    melatonin  5 mg Oral Nightly    hydrALAZINE  50 mg Oral 3 times per day    folic acid  1 mg Oral Daily    gabapentin  300 mg Oral TID    sodium chloride flush  5-40 mL IntraVENous 2 times per day    enoxaparin  40 mg SubCUTAneous Daily    methylPREDNISolone  40 mg IntraVENous Q6H     Followed by    Shonda Chinchilla ON 5/3/2023] predniSONE  40 mg Oral Daily    furosemide  40 mg IntraVENous Daily    Insulin Pump - Bolus Dose   SubCUTAneous 4x Daily AC & HS    Insulin Pump - Basal Dose   SubCUTAneous Daily    arformoterol tartrate  15 mcg Nebulization BID     And    budesonide  0.25 mg Nebulization BID            IV Infusions (if any): Infusions Meds    sodium chloride      dextrose              PHYSICAL EXAM:      /87   Pulse 86   Temp 98.4 °F (36.9 °C) (Oral)   Resp 20   Ht 5' 4.5\" (1.638 m)   Wt 151 lb 3.2 oz (68.6 kg)   SpO2 100%   BMI 25.55 kg/m²          CONST:  Well developed, appears stated age. Awake, alert and no apparent distress. HEENT:   Head- Normocephalic  Eyes- Conjunctivae pink, no icterus  Throat- Oral mucosa moist  Neck-  No stridor, no jugular venous distention. No carotid bruit. CHEST: Chest symmetrical and non-tender to palpation. No accessory muscle use  RESPIRATORY: Lung sounds - Few rhonchi  CARDIOVASCULAR:     Heart Palpation- No thrills. Heart Ausculation- Regular rate and rhythm, 9-5/3 systolic murmur. No s3 or rub   EXT: + lower extremity edema. Distal pulses palpable, no cyanosis   ABDOMEN: Soft,  Bowel sounds present. No abdominal bruit  RECTAL: Deferred  SKIN: Warm and dry   NEURO / PSYCH: Oriented to person, place          IMPRESSION / RECOMMENDATIONS:       Acute exacerbation of chronic obstructive pulmonary disease (COPD) ([facility] - Per Dr Keya Bajwa       Acute on chronic HFpEF ([facility] - Continue diuretics.  Monitor daily weights,

## 2023-05-02 NOTE — DISCHARGE SUMMARY
Discharge Summary    Myrna Mcneill  :  1955  MRN:  [id number]    Admit date:  2023  Discharge date:  2023    Admitting Physician:    Gucci Palomares MD    Discharge Diagnoses:    Principal Problem:    Acute exacerbation of chronic obstructive pulmonary disease (COPD) ([facility]  Active Problems:    Hypertensive left ventricular hypertrophy, without heart failure    Dyslipidemia    Essential hypertension    Diabetes mellitus ([facility]    Acute on chronic congestive heart failure ([facility]  Resolved Problems:    * No resolved hospital problems. *    Consults:   IP CONSULT TO CARDIOLOGY  IP CONSULT TO HEART FAILURE NURSE/COORDINATOR  IP CONSULT TO DIETITIAN     Condition at Discharge:  Stable    HPI/Hospital Course: 75 y.o. female who presents with complaints of shortness of breath worsened over the past 3 days. Patient states she also had a cough for the past 3 days as well. She did start developing lower extremity edema yesterday so she came to the ED today. Work-up in ED found the patient be in exacerbation of COPD but also CHF was found on x-ray  Patient is not bringing up any mucus with her cough. No associated chest pain, fevers or chills. Patient states she has not seen her cardiologist for over 2 years  Pt was seen by cardio and improved with diuretics and steroids/nebs. Procal and resp panel negative and underwent stress test/echo which confirmed pts EF to be about 35-40%. Pt preferred medical management instead of cardiac cath. Today on day of discharge pt feels better with no further complaints. Vitals and Labs are stable. All consultants involved during this admission are agreeable to d/c.     Physical Exam  /66   Pulse 83   Temp 98.8 °F (37.1 °C) (Oral)   Resp 20   Ht 5' 4.5\" (1.638 m)   Wt 148 lb 1.6 oz (67.2 kg)   SpO2 97%   BMI 25.03 kg/m²   RRR   Improved air movement with no wheeze, rales or rhonchi   bowel sounds present, nontender, nondistended   No clubbing, cyanosis, less

## 2023-05-02 NOTE — PROGRESS NOTES
[address] [facility]  [facility] [place]. [place]        Date:2023                                                  Patient Name: Judith Del Rosario    MRN: [id number]    : 1955    Room: [address] [place] [address]      Evaluating OT: Moses Ha OTR/L #[id number]     Referring Provider and Specific Provider Orders/Date:      23  OT eval and treat  Start:  23,   End:  23,   ONE TIME,   Standing Count:  1 Occurrences,   Danny Pate MD      Placement Recommendation: Home       Diagnosis:   1. Acute exacerbation of chronic obstructive pulmonary disease (COPD) ([facility]    2. Acute on chronic congestive heart failure, unspecified heart failure type ([address]    3. Hyperglycemia    4.  Elevated troponin         Surgery: None       Pertinent Medical History:       Past Medical History:   Diagnosis Date    Arthritis     Asthma     Cerebral artery occlusion with cerebral infarction ([facility] [address]     COPD (chronic obstructive pulmonary disease) (HCC)     Diabetes mellitus ([address]     Heart attack ([address]     Hyperlipidemia     Hypertension     VHD (valvular heart disease)          Past Surgical History:   Procedure Laterality Date    CHOLECYSTECTOMY      COLONOSCOPY  2018    HYSTERECTOMY (CERVIX STATUS UNKNOWN)      HYSTERECTOMY, VAGINAL      UPPER GASTROINTESTINAL ENDOSCOPY  2018      Precautions:  Fall Risk, up as tolerated, droplet plus isolation, COVID positive, 2L O2 via nasal canula       Assessment of current deficits    [x] Functional mobility  [x]ADLs  [x] Strength               []Cognition    [x] Functional transfers   [x] IADLs         [] Safety Awareness   [x]Endurance    [] Fine Coordination              [x] Balance      [] Vision/perception   []Sensation     []Gross Motor Coordination  [] ROM  [] Delirium                   [] Motor Control     OT PLAN OF CARE   OT POC based on physician
Echo complete.   Stan Nice
Echo showed EF 35-40%. Stress test fixed defect, no ischemia, EF 40%.     Med Rx vs Heart cath discussed- She wants to go home on Med Rx    Add Entresto if Creatinine and BP tolerate tomorrow or as an Out-pt  No Jardiacne since on Insulin    F/u [facility] 2 weeks    Merilee Boast, MD  5/2/2023  4:57 PM
Lexiscan Stress Test:    Reason for study: CHF, PVCs    Resting EKG showed Sinus  rhythm Fq PVCs  Exam:  Heart - Irregular rate, Lungs - Few rhonchi    Patient received 0.4mg Lexiscan per protocol    Symptoms: No chest pain, mild short of breath, better    EKG:  No ischemia or arrhythmia during Lexiscan infusion. Maximal heart rate  100       Peak /56 mmHg       Post test complications: None    SPECT image report pending.     Electronically signed by Jonathan Hopkins MD on 5/2/2023 at 10:18 AM
Lexiscan nuclear stress test completed. Nevin Gray, RN
Patient has insulin pump with port rt lower abdomen and continuous glucose monitor on rt upper arm which she manages at home. Dr Roberto Carlos Alanis given handout with provider responsibilities and orders placed. Inpatient Insulin Pump Responsibilities form gone over with patient and , signed and placed in soft chart. Insulin Pump Communication Tool was given to patient with pen and clip board to keep at bedside with instructions on use for proper documentation.
Patient off floor unable to complete Q4 hour vitals and head to toe assessment at this time.
Physical Therapy  Physical Therapy Initial Evaluation/Plan of Care    Room #:  6100/0575-15  Patient Name: Janis Sanchez  YOB: 1955  MRN: [id number]    Date of Service: 5/1/2023     Tentative placement recommendation: Home    Equipment recommendation: Patient has needed equipment       Evaluating Physical Therapist: Ruthann Woodson PT, DPT #[id number]      Specific Provider Orders/Date/Referring Provider :     04/30/23 2045    PT evaluation and treat  Start:  04/30/23 2045,   End:  04/30/23 2045,   ONE TIME,   Standing Count:  1 Occurrences,   Renee Lyman MD Acknowledge New     Admitting Diagnosis:   Acute exacerbation of chronic obstructive pulmonary disease (COPD) ([facility] [address] [J44.1]  Hyperglycemia [R73.9]  Elevated troponin [R77.8]  Acute on chronic congestive heart failure, unspecified heart failure type ([facility] [address] [I50.9]       Surgery: none  Visit Diagnoses         Codes    Hyperglycemia     R73.9    Elevated troponin     R77.8            Patient Active Problem List   Diagnosis    Non-rheumatic mitral regurgitation    Hypertensive left ventricular hypertrophy, without heart failure    Dyslipidemia    Non morbid obesity    Essential hypertension    Diabetes mellitus ([facility] [address]    Myocardiopathy ([facility] [address]    Tobacco use    Near syncope    Diarrhea    Generalized abdominal pain    Abdominal pain    Pre-syncope    PVD (peripheral vascular disease) with claudication (HCC)    Chronic pain of both ankles    ACE inhibitor-aggravated angioedema    Acute respiratory failure (HCC)    COPD (chronic obstructive pulmonary disease) ([address]    Acquired angioedema    SOB (shortness of breath)    Disorder of airway    Acquired hypertrophy of tongue    MSSA (methicillin susceptible Staphylococcus aureus) pneumonia ([facility] [address]    Fall from bed    Cervical spine pain    Acute exacerbation of chronic obstructive pulmonary disease (COPD) ([facility] [address]    Acute on chronic congestive heart failure ([facility] [address]        ASSESSMENT of Current Deficits
Physical Therapy  Physical Therapy Treatment Note/Plan of Care    Room #:  5727/9254-04  Patient Name: Farrah Dawkins  YOB: 1955  MRN: [id number]    Date of Service: 5/2/2023     Tentative placement recommendation: Home    Equipment recommendation: Patient has needed equipment       Evaluating Physical Therapist: Josiah Grace PT, DPT #[id number]      Specific Provider Orders/Date/Referring Provider :     04/30/23 2045    PT evaluation and treat  Start:  04/30/23 2045,   End:  04/30/23 2045,   ONE TIME,   Standing Count:  1 Occurrences,   Linda Call MD Acknowledge New     Admitting Diagnosis:   Acute exacerbation of chronic obstructive pulmonary disease (COPD) ([facility] [address] [J44.1]  Hyperglycemia [R73.9]  Elevated troponin [R77.8]  Acute on chronic congestive heart failure, unspecified heart failure type ([facility] [address] [I50.9]       Surgery: none  Visit Diagnoses         Codes    Hyperglycemia     R73.9    Elevated troponin     R77.8            Patient Active Problem List   Diagnosis    Non-rheumatic mitral regurgitation    Hypertensive left ventricular hypertrophy, without heart failure    Dyslipidemia    Non morbid obesity    Essential hypertension    Diabetes mellitus ([address]    Myocardiopathy ([address]    Tobacco use    Near syncope    Diarrhea    Generalized abdominal pain    Abdominal pain    Pre-syncope    PVD (peripheral vascular disease) with claudication (HCC)    Chronic pain of both ankles    ACE inhibitor-aggravated angioedema    Acute respiratory failure (HCC)    COPD (chronic obstructive pulmonary disease) ([address]    Acquired angioedema    SOB (shortness of breath)    Disorder of airway    Acquired hypertrophy of tongue    MSSA (methicillin susceptible Staphylococcus aureus) pneumonia ([address]    Fall from bed    Cervical spine pain    Acute exacerbation of chronic obstructive pulmonary disease (COPD) ([address]    Acute on chronic congestive heart failure ([address]        ASSESSMENT of Current Deficits Patient
BMP:   Recent Labs     05/01/23  0649 05/02/23  0442    140   K 3.9 3.6   CO2 33* 39*   BUN 16 23   CREATININE 0.9 1.2*   LABGLOM >60 49   CALCIUM 8.9 8.9     Mag: No results for input(s): MG in the last 72 hours. Phos: No results for input(s): PHOS in the last 72 hours. TSH: No results for input(s): TSH in the last 72 hours. HgA1c:     BNP: No results for input(s): BNP in the last 72 hours. PT/INR: No results for input(s): PROTIME, INR in the last 72 hours. APTT:No results for input(s): APTT in the last 72 hours.   CARDIAC ENZYMES:  Recent Labs     04/30/23  1236 04/30/23  1342   TROPHS 38* 35*     FASTING LIPID PANEL:  Lab Results   Component Value Date/Time    CHOL 296 01/04/2021 01:10 PM    HDL 57 11/03/2021 02:19 PM    LDLCALC 104 11/03/2021 02:19 PM    TRIG 122 09/06/2021 05:33 AM     LIVER PROFILE:  Recent Labs     04/30/23  1236   AST 42*   ALT 44*   LABALBU 3.7       Current Inpatient Medications:   venlafaxine  150 mg Oral Daily with breakfast    therapeutic multivitamin-minerals  1 tablet Oral Lunch    ipratropium-albuterol  1 vial Inhalation 4x daily    aspirin EC  81 mg Oral Daily    rosuvastatin  40 mg Oral Nightly    donepezil  5 mg Oral Daily    pantoprazole  40 mg Oral Daily    leflunomide  20 mg Oral Daily    levothyroxine  25 mcg Oral Daily    metoprolol succinate  50 mg Oral Daily    melatonin  5 mg Oral Nightly    hydrALAZINE  50 mg Oral 3 times per day    folic acid  1 mg Oral Daily    gabapentin  300 mg Oral TID    sodium chloride flush  5-40 mL IntraVENous 2 times per day    enoxaparin  40 mg SubCUTAneous Daily    methylPREDNISolone  40 mg IntraVENous Q6H    Followed by    Doris Base ON 5/3/2023] predniSONE  40 mg Oral Daily    furosemide  40 mg IntraVENous Daily    Insulin Pump - Bolus Dose   SubCUTAneous 4x Daily AC & HS    Insulin Pump - Basal Dose   SubCUTAneous Daily    arformoterol tartrate  15 mcg Nebulization BID    And    budesonide  0.25 mg Nebulization BID       IV
S2  tight, no wheeze, rales or rhonchi  bowel sounds present, nontender, nondistended  No clubbing, cyanosis, pos edema  No neuro changes   No obvious rashes or lesions. Recent Labs     04/30/23  1236 05/01/23  0649   WBC 11.0 9.4   HGB 10.5* 10.6*    361     Recent Labs     04/30/23  1236 05/01/23  0649    138   K 3.9 3.9    95*   CO2 31* 33*   BUN 17 16   CREATININE 1.0 0.9   GLUCOSE 230* 184*     Recent Labs     04/30/23  1236   BILITOT 0.4   ALKPHOS 83   AST 42*   ALT 44*     No results for input(s): INR in the last 72 hours. Invalid input(s): PT  No results for input(s): CKTOTAL, CKMB, CKMBINDEX, TROPONINI in the last 72 hours. XR CHEST PORTABLE    Result Date: 4/30/2023  EXAMINATION: ONE XRAY VIEW OF THE CHEST 4/30/2023 12:47 pm COMPARISON: 09/12/2021 HISTORY: ORDERING SYSTEM PROVIDED HISTORY: COPD TECHNOLOGIST PROVIDED HISTORY: Reason for exam:->COPD SOB FINDINGS: There is cardiomegaly the with evidence of mild vascular congestion. There is suggestion of tiny bilateral pleural effusions. No focal airspace disease is seen. Cardiomegaly with mild CHF changes. Suspected tiny pleural effusions. Assessment:  Channing Mcqueen is a 75y.o. year old female who presented on 4/30/2023 and is being treated for:  Principal Problem:    Acute exacerbation of chronic obstructive pulmonary disease (COPD) ([address]  Active Problems:    Hypertensive left ventricular hypertrophy, without heart failure    Dyslipidemia    Essential hypertension    Diabetes mellitus ([address]    Acute on chronic congestive heart failure ([address]  Resolved Problems:    * No resolved hospital problems. *    Plan  Proc and resp panel negative - cont supportive care  Cont lasix and monitor bmp - stress test to be done prior to dc  Otherwise continue current therapy. Please see orders for further management and care.     More than 50% of my time was spent at the bedside counseling/coordinating care with the patient and/or

## 2023-05-02 NOTE — PLAN OF CARE
Problem: Discharge Planning  Goal: Discharge to home or other facility with appropriate resources  Outcome: Progressing     Problem: Neurosensory - Adult  Goal: Achieves stable or improved neurological status  Outcome: Progressing  Goal: Achieves maximal functionality and self care  Outcome: Progressing     Problem: Respiratory - Adult  Goal: Achieves optimal ventilation and oxygenation  Outcome: Progressing     Problem: Skin/Tissue Integrity - Adult  Goal: Skin integrity remains intact  Outcome: Progressing

## 2023-05-12 ENCOUNTER — TELEPHONE (OUTPATIENT)
Dept: CARDIOLOGY CLINIC | Age: 68
End: 2023-05-12

## 2023-05-12 NOTE — TELEPHONE ENCOUNTER
\" Spoke with Mrs. Aishwarya Aguilar and confirmed hospital follow up appointment at [facility]  CHF clinic with Toy Castaneda Monday at 7:45 AM\" CF

## 2023-05-22 ENCOUNTER — TELEPHONE (OUTPATIENT)
Dept: OTHER | Age: 68
End: 2023-05-22

## 2023-05-22 NOTE — TELEPHONE ENCOUNTER
Call received from Dr. Ernesto Naranjo office re: referral sent by PCP to CHF clinic. Called patient to remind of tomorrow's CHF clinic and patient stated she was in [place] and would be moving to [place] permanently. Call placed to Dr. Ernesto Naranjo office to inform of patient's move.      Electronically signed by Si Fleischer, RN on 5/22/2023 at 10:14 AM

## 2023-05-23 ENCOUNTER — HOSPITAL ENCOUNTER (OUTPATIENT)
Dept: OTHER | Age: 68
Setting detail: THERAPIES SERIES
Discharge: HOME | End: 2023-05-23

## 2023-05-24 ENCOUNTER — TELEPHONE (OUTPATIENT)
Dept: CARDIOLOGY CLINIC | Age: 68
End: 2023-05-24

## 2023-05-25 ENCOUNTER — TELEPHONE (OUTPATIENT)
Dept: CARDIOLOGY CLINIC | Age: 68
End: 2023-05-25

## 2023-05-25 NOTE — TELEPHONE ENCOUNTER
RE: Inpatient Notes  Received: Tracey Olvera, TEJA; Len Rayo noted in the chart spoke with pt today she moved to [place]. Previous Messages    Attached Notes    Progress Notes by Melinda Hope MD at 5/2/2023  4:57 PM    Author: Melinda Hope MD Service: Cardiology Author Type: Physician   Filed: 5/2/2023  4:59 PM Date of Service: 5/2/2023  4:57 PM Note Type: Progress Notes   Status: Signed : Melinda Hope MD (Physician)   Echo showed EF 35-40%. Stress test fixed defect, no ischemia, EF 40%.      Med Rx vs Heart cath discussed- She wants to go home on Med Rx     Add Entresto if Creatinine and BP tolerate tomorrow or as an Out-pt  No Jardiacne since on Insulin     F/u [facility] 2 weeks     Melinda Hope MD  5/2/2023  4:57 PM